# Patient Record
Sex: MALE | Race: OTHER | Employment: UNEMPLOYED | URBAN - METROPOLITAN AREA
[De-identification: names, ages, dates, MRNs, and addresses within clinical notes are randomized per-mention and may not be internally consistent; named-entity substitution may affect disease eponyms.]

---

## 2020-09-28 ENCOUNTER — VIRTUAL VISIT (OUTPATIENT)
Dept: FAMILY MEDICINE CLINIC | Age: 35
End: 2020-09-28

## 2020-09-28 DIAGNOSIS — I10 ESSENTIAL HYPERTENSION: Primary | ICD-10-CM

## 2020-09-28 PROCEDURE — 99202 OFFICE O/P NEW SF 15 MIN: CPT | Performed by: NURSE PRACTITIONER

## 2020-09-28 RX ORDER — AMLODIPINE BESYLATE 2.5 MG/1
2.5 TABLET ORAL DAILY
Qty: 30 TAB | Refills: 1 | Status: SHIPPED | OUTPATIENT
Start: 2020-09-28 | End: 2020-10-28 | Stop reason: SDUPTHER

## 2020-09-28 RX ORDER — ACETAMINOPHEN 500 MG
TABLET ORAL
COMMUNITY
End: 2022-08-18

## 2020-09-28 NOTE — PROGRESS NOTES
Patient states he is at work and does not have access to vital sign equipment. Sonam Jeffrey CMA    11:34 am: Reviewed discharge instructions and medications instructions with the patient. I told the pt he will receive by mail the AVS information. Patent verbalized understanding and denies having further questions. I sent front message to Dusty Alaniz to mail the AVS information as per Sam Aleman.  Sonam Jeffrey CMA

## 2020-09-28 NOTE — PATIENT INSTRUCTIONS
Amlodipino (Por la boca) Se [place] para tratar la presión arterial eduarda y la angina (dolor en el pecho). Liza medicamento es un agente bloqueador del canal de calcio. Kamila(s) : Norvasc Existen muchas otras marcas de Dueñas. Liza medicamento no debe ser usado cuando:  
Liza medicamento no es adecuado para todas las personas. No use liza medicamento si alguna vez ha tenido jovan reacción alérgica a la amlodipina. Forma de usar liza medicamento:  
Cara Flores para disolver · [place] cristiana medicamentos sravani se le haya indicado. Es probable que sea necesario cambiar padilla dosis varias veces hasta encontrar la que funciona mejor para usted. [employer] a la misma hora todos los días. · Rachel y siga las instrucciones para el paciente que vienen con el medicamento. Hable con padilla médico o farmacéutico si tiene alguna pregunta. · Si olvida jovan dosis: [place] la dosis tan pronto sravani lo recuerde. Si monteiro pasado más de 12 horas de la dosis que se supone que usted tome, omita la dosis [place] y tome la próxima dosis a la hora regular. · Guarde el medicamento en un recipiente cerrado a temperatura ambiente y alejado del calor, la humedad y la melanie directa. Medicamentos y [employer] que debe evitar:  
Consulte con padilla médico o farmacéutico antes de usar cualquier medicamento, incluyendo los que compra sin receta médica, las vitaminas y los productos herbales. · Algunos medicamentos pueden afectar la eficacia con que actúa la amlodipina. Informe a u médico si usted [employer] alguno de los siguientes medicamentos: ¨ Claritromicina, ciclosporina, diltiazem, itraconazol, ritonavir, sildenafil, simvastatina, tacrolimús Precauciones katherin el uso de [place] medicamento: · Informe a padilla médico si usted está embarazada o dando de lactar, o si padece de jovan enfermedad del hígado, enfermedad del corazón, arterioesclerosis coronaria, o estenosis aórtica. · Liza medicamento puede bajarle demasiado padilla presión arterial, especialmente cuando lo use por primera vez o si usted sufre jovan deshidratación. Si se siente desvanecer o mareado póngase de pie o siéntese lentamente. · Padilla médico tendrá que revisar padilla progreso y los efectos de liza medicamento katherin cristiana citas regulares. Cumpla sin falta con todas cristiana citas médicas. Asista a todas cristiana citas. · Aunque se sienta mejor, no suspenda el uso de liza medicamento sin antes consultar con padilla médico. Liza medicamento no le curará la presión arterial, kevyn sí le ayudará a mantenerla dentro de un rango normal. Es probable que necesite byron medicamento para la presión arterial por el nick de padilla janis. · Guarde todos los medicamentos fuera del alcance de los niños. Nunca comparta cristiana medicamentos con [employer]. Efectos secundarios que pueden presentarse katherin el uso de liza medicamento:  
Consulte inmediatamente con el médico si nota cualquiera de estos efectos secundarios: 
· Reacción alérgica: Comezón o ronchas, hinchazón del aruna o las dedra, hinchazón u hormigueo en la boca o garganta, opresión en el pecho, dificultad para respirar · Asif Richard · Dolor en el pecho nuevo o que [place] · Inflamación de cristiana dedra, tobillos o pies · Dificultad para respirar, náuseas, sudoración inusual, desmayos Consulte con el médico si nota otros efectos secundarios que marvin son causados por liza medicamento. Llame a padilla médico para consultarle Kaylene. Usted puede notificar cristiana efectos secundarios al FDA al [phone]. © 2017 [facility] Information is for End User's use only and may not be sold, redistributed or otherwise used for commercial purposes. Esta información es sólo para uso en educación. Padilla intención no es darle un consejo médico sobre enfermedades o tratamientos.  Colsulte con padilla Pa Jurist farmacéutico antes de seguir cualquier régimen [employer] para saber si es seguro y efectivo para usted. Presión arterial elevada: Instrucciones de cuidado - [ Elevated Blood Pressure: Care Instructions ] Instrucciones de cuidado La presión arterial es jovan medida de la fuerza que ejerce la mignon contra las rivas de las arterias. Es normal que la presión arterial suba y baje a lo tavares del día. Steph si se mantiene eduarda por un tiempo, usted tiene presión arterial eduarda. Dos números indican padilla presión arterial. El primer número es la presión sistólica. Muestra qué tan tommie presiona la mignon cuando el corazón está bombeando. El brigid número es la presión diastólica. Muestra qué tan tommie presiona la [employer] latidos, cuando el corazón está relajado y llenándose de [place].  Belts arterial ideal para adultos es menos de 120/80 (diga \"120 sobre 80\"). La presión arterial eduarda es de 140/90 o superior. Usted tiene la presión arterial eduarda si el número de [place] es 140 o superior o el número de abajo es 90 o superior, o ambas cosas. La prueba principal para la presión arterial eduarda es simple, rápida e indolora. Para diagnosticar presión arterial eduarda, padilla médico examinará padilla presión arterial en momentos diferentes. Después de tomarle la presión, es posible que padilla médico le pida que se vuelva a byron la presión en casa. Si se le diagnostica presión arterial eduarda, puede colaborar con padilla médico para elaborar un plan a tavares plazo para manejarla. La atención de seguimiento es jovan parte clave de padilla tratamiento y seguridad. Asegúrese de hacer y acudir a todas las citas, y llame a padilla médico si está teniendo problemas. También es jovan buena idea saber los resultados de los exámenes y mantener jovan lista de los medicamentos que le. Cómo puede cuidarse en el hogar? · No fume. Fumar aumenta padilla riesgo de ataque cerebral y ataque al corazón.  Si necesita ayuda para dejarlo, hable con padilla médico sobre programas y medicamentos para dejar de fumar. Estos pueden aumentar cristiana probabilidades de dejar de fumar para siempre. · Mantenga un peso saludable. · Trate de limitar la cantidad de sodio que ingiere a menos de 2,300 miligramos (mg) al día. Padilla médico podría pedirle que trate de ingerir menos de 1,500 mg al día. · Manténgase físicamente activo. Jane al menos 30 minutos de ejercicio la mayoría de los días de la [place]. Caminar es jovan buena opción. Es posible que también quiera hacer otras actividades, sravani correr, nadar, [employer], o practicar tenis o deportes de equipo. · No tome alcohol o limite la cantidad que marlene. Hable con padilla médico acerca de si puede byron alcohol. · Coma abundantes frutas, verduras y productos lácteos bajos en grasa. Consuma menos grasa saturada y total. 
· Aprenda a revisarse la presión arterial en padilla hogar. Cuándo debe pedir ayuda? Llame a padilla médico ahora mismo o busque atención médica inmediata si: 
? · Padilla presión arterial es mucho más eduarda de lo normal (sravani 180/110 o superior). ? · Iveth que la presión arterial eduarda está causando síntomas sravani: ¨ Dolor de cele intenso. Õpetajate 63. ? Vigile muy de cerca los cambios en padilla jay, y asegúrese de comunicarse con padilla médico si: 
? · No mejora sravani se esperaba. Dónde puede encontrar más información en inglés? Shailesh Castro a [email or web address]-cee.info/. Kassie I423 en la búsqueda para aprender más acerca de \"Presión arterial elevada: Instrucciones de cuidado - [ Elevated Blood Pressure: Care Instructions ]. \" 
Revisado: 21 septiembre, 2016 Versión del contenido: 11.4 © 6636-9550 Healthwise, [employer]Rx. Las instrucciones de cuidado fueron adaptadas bajo licencia por Good Help Connections (which disclaims liability or warranty for this information).  Si usted tiene [place] afección médica o sobre estas instrucciones, siempre pregunte a padilla profesional de jay. [facility] niega toda garantía o responsabilidad por padilla uso de esta información. Dieta DASH: Instrucciones de cuidado DASH Diet: Care Instructions Instrucciones de cuidado La dieta DASH es un plan de alimentación que puede ayudar a bajar la presión arterial. DASH es la sigla en inglés de \"Dietary Approaches to Stop Hypertension\" (medidas dietéticas para disminuir la hipertensión). Hipertensión significa presión arterial eduarda. La dieta DASH se concentra en el consumo de alimentos con alto contenido de calcio, potasio y [place]. Estos nutrientes pueden disminuir la presión arterial. Los alimentos con el mayor contenido de [place] nutrientes son las frutas, las verduras, los productos lácteos de bajo contenido de [place] zuleima, las nueces, las semillas y las legumbres. Steph byron suplementos de calcio, potasio y magnesio, en lugar de comer alimentos ricos en estos nutrientes, no tiene el mismo efecto. La dieta DASH también incluye granos integrales, pescado y aves. La dieta DASH es katheryn de los cambios de estilo de janis que quizá le recomiende padilla médico para reducir la presión arterial eduarda. Es posible que padilla médico también quiera que usted reduzca la cantidad de sodio en padilla Marnell Nipple. Reducir el sodio mientras sigue la dieta DASH puede bajar la presión arterial incluso más que únicamente con la dieta DASH. La atención de seguimiento es jovan parte clave de padilla tratamiento y seguridad. Asegúrese de hacer y acudir a todas las citas, y llame a padilla médico si está teniendo problemas. También es jovan buena idea saber los resultados de cristiana exámenes y mantener jovan lista de los medicamentos que le. Cómo puede cuidarse en el hogar? Cómo seguir la dieta DASH 
· Coma entre 4 y 5 porciones de fruta al día. Jovan porción incluye 1 fruta de [place], ½ taza de fruta enlatada o cortada en trozos, 1/4 taza de fruta seca o 4 onzas (½ taza) de jugo de frutas.  Scott Johnson con [place] frecuencia que jugo. · Consuma entre 4 y 11 porciones de verduras al día. Jovan porción incluye 1 taza de [place] o de verduras de hojas crudas, ½ taza de verduras cocidas o cortadas en trozos, o 4 onzas (½ taza) de jugo de verduras. Elija comer verduras con más frecuencia que byron padilla jugo. · Consuma entre 2 y 3 porciones de lácteos semidescremados y descremados al día. Jovan porción incluye 8 onzas de [place], 1 taza de yogur o 1½ onzas de Marquez-barre. · Coma entre 6 y 6 porciones de granos todos los [address] Jovan porción incluye 1 rebanada de pan, 1 onza de cereal seco, o ½ taza de arroz, pasta o cereal cocido. Trate de elegir productos de grano integral con la mayor frecuencia posible. · Limite la cantidad de [place]), aves y pescado a 2 porciones al día. Gennaro Siad porción son 3 onzas, lo que es aproximadamente el tamaño de un emma de naipes. · Coma entre 4 y 5 porciones de nueces, semillas y legumbres (frijoles [habichuelas], lentejas y arvejas [chícharos] secos y cocidos) a la semana. Jovan porción incluye 1/3 taza de nueces, 2 cucharadas de semillas o ½ taza de frijoles o arvejas cocidos. · [address] y aceites a 2 o 3 porciones al día. Jovan porción es 1 cucharadita de aceite vegetal o 2 cucharadas de aderezo para ensaladas. · Limite los dulces y el azúcar adicional a 5 porciones o menos por semana. Gennaro Siad porción es 1 cucharada de [place] o [place], ½ taza de sorbete o 1 taza de limonada. · Consuma menos de 2,300 miligramos (mg) de sodio al día. Si limita padilla consumo de sodio a 1,500 mg al día, puede reducir padilla presión arterial aún más. Consejos para tener éxito · Inicie con cambios pequeños. No intente hacer cambios radicales en padilla dieta de manera repentina. Podría sentir que extraña cristiana comidas favoritas y, por lo [place] lema, angelo jovan mayor probabilidad de que no cumpla el plan. [place]. Isadore Pester que esos cambios se conviertan en un hábito, incorpore algunos [employer]. · Pruebe algo de lo siguiente: 
? Fíjese el objetivo de comer jovan porción de fruta o de verduras en todas las comidas y los refrigerios. [place] facilitará alcanzar la cantidad diaria recomendada de frutas y verduras. ? Pruebe comer yogur cubierto con frutas frescas y nueces sravani refrigerio o sravani postre saludable. ? Agregue zurdo, tomate, pepino y cebolla a cristiana sándwiches. ? Combine jovan masa de pizza precocida con queso mozzarella de bajo contenido de grasa (\"low-fat\") y cúbralo con abundantes verduras. Intente utilizar tomates, calabaza, espinaca, brócoli, zanahorias, coliflor y cebollas. ? Opte por comer jovan variedad de verduras cortadas en trozos con un aderezo de bajo contenido de grasa sravani refrigerio, en lugar de \"chips\" (tipo aryan fritas) y un \"dip\" (producto untable). ? [place] semillas de girasol o almendras picadas [employer]. O intente agregar nueces o almendras picadas a las verduras cocidas. ? Pruebe algunas comidas vegetarianas con frijoles y arvejas. Edra Muskrat o frijoles rojos a las ensaladas. Prepare burritos y tacos con puré de frijoles pintos o negros. Dónde puede encontrar más información en inglés? Katina Mcclelland a [email or web address]/ [employer] T384 en la búsqueda para aprender más acerca de \"Dieta DASH: Instrucciones de cuidado. \" Revisado: 16 puja, 9673               VJJBXFA del contenido: 12.6 © 4219-7808 Healthwise, Incorporated. Las instrucciones de cuidado fueron adaptadas bajo licencia por Good [employer] Conne[employer] (which disclaims liability or warranty for this information). Si usted tiene [place] afección médica o sobre estas instrucciones, siempre pregunte a padilla profesional de jay. [facility] niega toda garantía o responsabilidad por padilla uso de esta información.

## 2020-09-28 NOTE — PROGRESS NOTES
HISTORY OF PRESENT ILLNESS  Wilian Goode is a 30 y.o. male. [email or web address]  #[id number]  Consent:  He and/or his healthcare decision maker is aware that this patient-initiated Telehealth encounter: Yes       This virtual visit was conducted via telephone. Antonia Star to the emergency declaration under the [address] [place] [address] authority and the [employer] Act, this Virtual  Visit was conducted to reduce the patient's risk of exposure to COVID-19 and provide continuity of care for an established patient.  Services were provided through a video synchronous discussion virtually to substitute for in-person clinic visit.  Due to this being a TeleHealth evaluation, many elements of the physical examination are unable to be assessed.      Total Time: minutes: 11-20 minutes  HPI Mr. Vanessa Muro, new to [facility], was told he has has hypertension last week when he went to a clinic for an ear infection. He bought a home BP monitor and his readings have been in the 150's over 90's-100's. His current reading is 158/78 today. He reports having HA's w/higher readings. HTN runs in the family. He's never been on BP meds. He denies CP, SOB and abdominal pain. Ht- 5'9  Wt-233  NKA    Review of Systems   Constitutional: Negative. HENT: Negative. Eyes: Negative. Respiratory: Negative. Cardiovascular: Negative. Gastrointestinal: Negative. Genitourinary: Negative. Musculoskeletal: Negative. Skin: Negative. Neurological: Positive for headaches. Negative for dizziness, tingling, tremors, sensory change, speech change, focal weakness, seizures, loss of consciousness and weakness. Endo/Heme/Allergies: Negative. Psychiatric/Behavioral: Negative. Physical Exam  Neurological:      Mental Status: He is alert and oriented to person, place, and time.    Psychiatric:         Mood and Affect: Mood normal. Behavior: Behavior normal.         ASSESSMENT and PLAN  1.  HTN  -trail Norvasc 2.5mg, 1 tab PO every day  -continue taking BP daily, log results and bring to next visit,  goal 130/80  -discussed medication action, side-effects, dosage, schedule in detail  -discussed decreasing sodium intake, exercise and stress reduction in detail  -F2F appt in 2-3 weeks, BP evaluation and wellness visit  Mr. Gabe Taylor agrees to plan

## 2020-10-28 ENCOUNTER — OFFICE VISIT (OUTPATIENT)
Dept: FAMILY MEDICINE CLINIC | Age: 35
End: 2020-10-28

## 2020-10-28 ENCOUNTER — HOSPITAL ENCOUNTER (OUTPATIENT)
Dept: LAB | Age: 35
Discharge: HOME | End: 2020-10-28

## 2020-10-28 VITALS
TEMPERATURE: 98 F | BODY MASS INDEX: 33.8 KG/M2 | SYSTOLIC BLOOD PRESSURE: 127 MMHG | WEIGHT: 223 LBS | HEIGHT: 68 IN | HEART RATE: 75 BPM | DIASTOLIC BLOOD PRESSURE: 81 MMHG

## 2020-10-28 DIAGNOSIS — Z23 ENCOUNTER FOR IMMUNIZATION: ICD-10-CM

## 2020-10-28 DIAGNOSIS — Z23 NEEDS FLU SHOT: ICD-10-CM

## 2020-10-28 DIAGNOSIS — I10 ESSENTIAL HYPERTENSION: ICD-10-CM

## 2020-10-28 DIAGNOSIS — Z71.3 DIETARY COUNSELING AND SURVEILLANCE: Primary | ICD-10-CM

## 2020-10-28 DIAGNOSIS — E66.3 OVERWEIGHT: Primary | ICD-10-CM

## 2020-10-28 LAB
ALBUMIN SERPL-MCNC: 4 G/DL (ref 3.5–5)
ALBUMIN/GLOB SERPL: 1.1 {RATIO} (ref 1.1–2.2)
ALP SERPL-CCNC: 90 U/L (ref 45–117)
ALT SERPL-CCNC: 63 U/L (ref 12–78)
ANION GAP SERPL CALC-SCNC: 8 MMOL/L (ref 5–15)
AST SERPL-CCNC: 22 U/L (ref 15–37)
BILIRUB SERPL-MCNC: 0.3 MG/DL (ref 0.2–1)
BUN SERPL-MCNC: 20 MG/DL (ref 6–20)
BUN/CREAT SERPL: 24 (ref 12–20)
CALCIUM SERPL-MCNC: 9.3 MG/DL (ref 8.5–10.1)
CHLORIDE SERPL-SCNC: 105 MMOL/L (ref 97–108)
CO2 SERPL-SCNC: 25 MMOL/L (ref 21–32)
CREAT SERPL-MCNC: 0.83 MG/DL (ref 0.7–1.3)
EST. AVERAGE GLUCOSE BLD GHB EST-MCNC: 114 MG/DL
GLOBULIN SER CALC-MCNC: 3.5 G/DL (ref 2–4)
GLUCOSE SERPL-MCNC: 103 MG/DL (ref 65–100)
HBA1C MFR BLD: 5.6 % (ref 4–5.6)
POTASSIUM SERPL-SCNC: 3.8 MMOL/L (ref 3.5–5.1)
PROT SERPL-MCNC: 7.5 G/DL (ref 6.4–8.2)
SODIUM SERPL-SCNC: 138 MMOL/L (ref 136–145)

## 2020-10-28 PROCEDURE — 97802 MEDICAL NUTRITION INDIV IN: CPT

## 2020-10-28 PROCEDURE — 80053 COMPREHEN METABOLIC PANEL: CPT

## 2020-10-28 PROCEDURE — 90471 IMMUNIZATION ADMIN: CPT

## 2020-10-28 PROCEDURE — 99213 OFFICE O/P EST LOW 20 MIN: CPT | Performed by: PHYSICIAN ASSISTANT

## 2020-10-28 PROCEDURE — 83036 HEMOGLOBIN GLYCOSYLATED A1C: CPT

## 2020-10-28 PROCEDURE — 90686 IIV4 VACC NO PRSV 0.5 ML IM: CPT

## 2020-10-28 RX ORDER — AMLODIPINE BESYLATE 2.5 MG/1
2.5 TABLET ORAL DAILY
Qty: 90 TAB | Refills: 1 | Status: SHIPPED | OUTPATIENT
Start: 2020-10-28 | End: 2021-01-21 | Stop reason: SDUPTHER

## 2020-10-28 NOTE — PROGRESS NOTES
Coordination of Care  1. Have you been to the ER, urgent care clinic since your last visit? Hospitalized since your last visit? Yes When: 10/20/20 [facility](right Ear)    2. Have you seen or consulted any other health care providers outside of the [address] [place] [address] since your last visit? Include any pap smears or colon screening. No    Does the patient need refills? NO    Learning Assessment Complete? yes  Depression Screening complete in the past 12 months? yes     Pt said that he is taking Methylprednisolone to right ear infection. 8 days ago.

## 2020-10-28 NOTE — PROGRESS NOTES
Nutritionist with patient. Farooq Quiroz.  [facility], [address] [facility]

## 2020-10-28 NOTE — PROGRESS NOTES
: Homa Avery  DATE: 10/28/2020      REFERRING PROVIDER: Dennie Peto McFerren, PA  NAME: Carlton Yin : 1985 AGE: 30 y.o. GENDER: male  REASON FOR VISIT: Hypertension Management    ASSESSMENT: Patient shares that he is interested in changing diet to help lower blood pressure. Past Medical Hx: None. LABS: No current pertinent labs. MEDICATIONS/SUPPLEMENTS:   Prior to Admission medications    Medication Sig Start Date End Date Taking? Authorizing Provider   amLODIPine (NORVASC) 2.5 mg tablet Take 1 Tab by mouth daily. Indications: high blood pressure 10/28/20   McFerren, Dennie Peto, PA   acetaminophen (TYLENOL) 500 mg tablet Take  by mouth every six (6) hours as needed for Pain. 2 or 3 po daily    Provider, Brody   amLODIPine (NORVASC) 2.5 mg tablet Take 1 Tab by mouth daily. Indications: high blood pressure 9/28/20 10/28/20  Lenny Caldera NP       FOOD ALLERGIES/INTOLERANCES: No known allergies. ANTHROPOMETRICS:    Ht Readings from Last 1 Encounters:   10/28/20 5' 8.11\" (1.73 m)      Wt Readings from Last 1 Encounters:   10/28/20 223 lb (101.2 kg)      BMI: 33.8                  Category: Obese Class I    Reported Wt Hx:  Wt Readings from Last 4 Encounters:   10/28/20 223 lb (101.2 kg)     Estimated Nutritional Needs: 2300 kcals/day ([place] [address]. 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 NUTRITION INTERVENTION: Discussed what helps to lower our blood pressure, including diet and exercise changes.  Discussed focusing on eating whole grains, fruits, and vegetables to give our bodies more fiber. We also talked about which types of foods to limit that would give excessive salt and fat. Also discussed that exercise  Helps with heart health. PATIENT GOALS:  - Look for a way to get in a walk regularly  - Avoiding red meat, eating more vegetables    Specific tips and techniques to facilitate compliance with above recommendations were provided and discussed. Pt was strongly encouraged to begin making necessary changes now. Will follow-up with Eligio Carrel prn. Yvonne Champion

## 2020-10-28 NOTE — PROGRESS NOTES
Assessment/Plan:    Diagnoses and all orders for this visit:    1. Overweight  -     METABOLIC PANEL, COMPREHENSIVE; Future  -     HEMOGLOBIN A1C WITH EAG; Future  -     REFERRAL TO NUTRITION    2. Essential hypertension  -     HEMOGLOBIN A1C WITH EAG; Future  -     REFERRAL TO NUTRITION    3. Encounter for immunization    Other orders  -     amLODIPine (NORVASC) 2.5 mg tablet; Take 1 Tab by mouth daily. Indications: high blood pressure        Follow-up and Dispositions    · Return in about 3 months (around 1/28/2021). Vessie Rao McFerren, PA-C Tilda Sandifer expressed understanding of this plan. An AVS was printed and given to the patient.      ----------------------------------------------------------------------    Chief Complaint   Patient presents with    Hypertension     F/U       History of Present Illness:  Pt presents for eval of HTN. He was started on amlodipine 2.5 mg daily about one month ago. He has had no side effects to the medication  His bp is improving, he is showing me his readings and they are going down from 140's/100 to 130/85 yesterday and today in clinic is 127/81    He does not smoke. He does not have chest pain, SOB, ALVAREZ or extremity swelling  He is currently undergoing eval and tx for chronic right ear infection with ENT specialist. He is able to pay for the appts, next one is next week. Apparently, he has been on abx and most recently a steroid dose pack, his sxs have remain unchanged    He agrees to labs and RD appt today  Flu shot       No past medical history on file. Current Outpatient Medications   Medication Sig Dispense Refill    amLODIPine (NORVASC) 2.5 mg tablet Take 1 Tab by mouth daily. Indications: high blood pressure 90 Tab 1    acetaminophen (TYLENOL) 500 mg tablet Take  by mouth every six (6) hours as needed for Pain.  2 or 3 po daily         No Known Allergies    Social History     Tobacco Use    Smoking status: Never Smoker    Smokeless tobacco: Never Used   Substance Use Topics    Alcohol use: Not Currently    Drug use: Never       No family history on file.     Physical Exam:     Visit Vitals  /81 (BP 1 Location: Left arm, BP Patient Position: Sitting)   Pulse 75   Temp 98 °F (36.7 °C)   Ht 5' 8.11\" (1.73 m)   Wt 223 lb (101.2 kg)   BMI 33.80 kg/m²     Pleasant, hard of hearing in right ear  A&Ox3  WDWN NAD  Respirations normal and non labored  Cor RRR s1s2  Lungs cTA jeanette   Ext neg for CCE jeanette

## 2020-12-19 ENCOUNTER — HOSPITAL ENCOUNTER (EMERGENCY)
Age: 35
Discharge: HOME | End: 2020-12-19
Attending: EMERGENCY MEDICINE | Admitting: EMERGENCY MEDICINE
Payer: SUBSIDIZED

## 2020-12-19 VITALS
RESPIRATION RATE: 16 BRPM | BODY MASS INDEX: 32.73 KG/M2 | WEIGHT: 221 LBS | TEMPERATURE: 99.1 F | HEART RATE: 80 BPM | HEIGHT: 69 IN | SYSTOLIC BLOOD PRESSURE: 138 MMHG | DIASTOLIC BLOOD PRESSURE: 89 MMHG | OXYGEN SATURATION: 99 %

## 2020-12-19 DIAGNOSIS — R04.0 EPISTAXIS: Primary | ICD-10-CM

## 2020-12-19 LAB
ALBUMIN SERPL-MCNC: 4 G/DL (ref 3.5–5)
ALBUMIN/GLOB SERPL: 0.9 {RATIO} (ref 1.1–2.2)
ALP SERPL-CCNC: 94 U/L (ref 45–117)
ALT SERPL-CCNC: 33 U/L (ref 12–78)
ANION GAP SERPL CALC-SCNC: 3 MMOL/L (ref 5–15)
AST SERPL-CCNC: 15 U/L (ref 15–37)
BASOPHILS # BLD: 0.1 K/UL (ref 0–0.1)
BASOPHILS NFR BLD: 1 % (ref 0–1)
BILIRUB SERPL-MCNC: 0.3 MG/DL (ref 0.2–1)
BUN SERPL-MCNC: 12 MG/DL (ref 6–20)
BUN/CREAT SERPL: 13 (ref 12–20)
CALCIUM SERPL-MCNC: 9.6 MG/DL (ref 8.5–10.1)
CHLORIDE SERPL-SCNC: 107 MMOL/L (ref 97–108)
CO2 SERPL-SCNC: 30 MMOL/L (ref 21–32)
COMMENT, HOLDF: NORMAL
CREAT SERPL-MCNC: 0.9 MG/DL (ref 0.7–1.3)
DIFFERENTIAL METHOD BLD: ABNORMAL
EOSINOPHIL # BLD: 0.2 K/UL (ref 0–0.4)
EOSINOPHIL NFR BLD: 2 % (ref 0–7)
ERYTHROCYTE [DISTWIDTH] IN BLOOD BY AUTOMATED COUNT: 13.9 % (ref 11.5–14.5)
GLOBULIN SER CALC-MCNC: 4.3 G/DL (ref 2–4)
GLUCOSE SERPL-MCNC: 111 MG/DL (ref 65–100)
HCT VFR BLD AUTO: 44.1 % (ref 36.6–50.3)
HGB BLD-MCNC: 14.7 G/DL (ref 12.1–17)
IMM GRANULOCYTES # BLD AUTO: 0 K/UL (ref 0–0.04)
IMM GRANULOCYTES NFR BLD AUTO: 0 % (ref 0–0.5)
INR PPP: 1 (ref 0.9–1.1)
LYMPHOCYTES # BLD: 1.4 K/UL (ref 0.8–3.5)
LYMPHOCYTES NFR BLD: 13 % (ref 12–49)
MCH RBC QN AUTO: 27.4 PG (ref 26–34)
MCHC RBC AUTO-ENTMCNC: 33.3 G/DL (ref 30–36.5)
MCV RBC AUTO: 82.3 FL (ref 80–99)
MONOCYTES # BLD: 0.6 K/UL (ref 0–1)
MONOCYTES NFR BLD: 6 % (ref 5–13)
NEUTS SEG # BLD: 8.4 K/UL (ref 1.8–8)
NEUTS SEG NFR BLD: 78 % (ref 32–75)
NRBC # BLD: 0 K/UL (ref 0–0.01)
NRBC BLD-RTO: 0 PER 100 WBC
PLATELET # BLD AUTO: 315 K/UL (ref 150–400)
PMV BLD AUTO: 9.7 FL (ref 8.9–12.9)
POTASSIUM SERPL-SCNC: 3.5 MMOL/L (ref 3.5–5.1)
PROT SERPL-MCNC: 8.3 G/DL (ref 6.4–8.2)
PROTHROMBIN TIME: 10.6 SEC (ref 9–11.1)
RBC # BLD AUTO: 5.36 M/UL (ref 4.1–5.7)
SAMPLES BEING HELD,HOLD: NORMAL
SODIUM SERPL-SCNC: 140 MMOL/L (ref 136–145)
WBC # BLD AUTO: 10.7 K/UL (ref 4.1–11.1)

## 2020-12-19 PROCEDURE — C9113 INJ PANTOPRAZOLE SODIUM, VIA: HCPCS | Performed by: EMERGENCY MEDICINE

## 2020-12-19 PROCEDURE — 99284 EMERGENCY DEPT VISIT MOD MDM: CPT

## 2020-12-19 PROCEDURE — 96360 HYDRATION IV INFUSION INIT: CPT

## 2020-12-19 PROCEDURE — 80053 COMPREHEN METABOLIC PANEL: CPT

## 2020-12-19 PROCEDURE — 85025 COMPLETE CBC W/AUTO DIFF WBC: CPT

## 2020-12-19 PROCEDURE — 74011250636 HC RX REV CODE- 250/636: Performed by: EMERGENCY MEDICINE

## 2020-12-19 PROCEDURE — 36415 COLL VENOUS BLD VENIPUNCTURE: CPT

## 2020-12-19 PROCEDURE — 85610 PROTHROMBIN TIME: CPT

## 2020-12-19 RX ORDER — CETIRIZINE HCL 10 MG
10 TABLET ORAL
COMMUNITY
End: 2021-01-04 | Stop reason: ALTCHOICE

## 2020-12-19 RX ORDER — PANTOPRAZOLE SODIUM 40 MG/10ML
80 INJECTION, POWDER, LYOPHILIZED, FOR SOLUTION INTRAVENOUS
Status: DISCONTINUED | OUTPATIENT
Start: 2020-12-19 | End: 2020-12-19 | Stop reason: HOSPADM

## 2020-12-19 RX ADMIN — SODIUM CHLORIDE 1000 ML: 9 INJECTION, SOLUTION INTRAVENOUS at 13:51

## 2020-12-19 NOTE — ED TRIAGE NOTES
# [id number] used. Pt reports having 1 episode of vomiting blood with clots just PTA. Pt reports \"sore throat\" since vomiting. No fevers, abdominal pain, nausea, diarrhea. Pt was recently diagnosed with TMJ. No blood thinners.

## 2020-12-19 NOTE — ED PROVIDER NOTES
Patient is a 71-year-old gentleman who comes into the emergency department after coughing and vomiting up some blood while he was experiencing a nosebleed. Patient has not had any diarrhea, dark or tarry stools, or hematochezia. The history is provided by the patient. Epistaxis   This is a new problem. The current episode started 1 to 2 hours ago. The problem occurs constantly. The problem has been resolved. The problem is associated with nothing. The bleeding has been from both nares. Past medical history comments: had a large nosebleed today but had smaller ones over the last few days. No past medical history on file. No past surgical history on file. No family history on file.     Social History     Socioeconomic History    Marital status:      Spouse name: Not on file    Number of children: Not on file    Years of education: Not on file    Highest education level: Not on file   Occupational History    Not on file   Social Needs    Financial resource strain: Not on file    Food insecurity     Worry: Not on file     Inability: Not on file    Transportation needs     Medical: Not on file     Non-medical: Not on file   Tobacco Use    Smoking status: Never Smoker    Smokeless tobacco: Never Used   Substance and Sexual Activity    Alcohol use: Not Currently    Drug use: Never    Sexual activity: Not on file   Lifestyle    Physical activity     Days per week: Not on file     Minutes per session: Not on file    Stress: Not on file   Relationships    Social connections     Talks on phone: Not on file     Gets together: Not on file     Attends Buddhism service: Not on file     Active member of club or organization: Not on file     Attends meetings of clubs or organizations: Not on file     Relationship status: Not on file    Intimate partner violence     Fear of current or ex partner: Not on file     Emotionally abused: Not on file     Physically abused: Not on file Forced sexual activity: Not on file   Other Topics Concern    Not on file   Social History Narrative    Not on file         ALLERGIES: Patient has no known allergies. Review of Systems   Constitutional: Negative for chills and fever. HENT: Positive for nosebleeds and sore throat. Respiratory: Negative for shortness of breath. Cardiovascular: Negative for chest pain. Gastrointestinal: Positive for nausea and vomiting. Negative for abdominal pain, blood in stool, constipation and diarrhea. Neurological: Negative for dizziness and light-headedness. All other systems reviewed and are negative. Vitals:    12/19/20 1321 12/19/20 1340   BP: (!) 161/98    Pulse: 90    Resp: 16    Temp: 97.5 °F (36.4 °C)    SpO2: 97% 97%   Weight: 100.2 kg (221 lb)    Height: 5' 9\" (1.753 m)             Physical Exam  Vitals signs and nursing note reviewed. Constitutional:       Appearance: He is well-developed. HENT:      Head: Normocephalic and atraumatic. Nose:      Comments: Dried blood in nares, no active bleeding       Mouth/Throat:      Mouth: Mucous membranes are moist.      Pharynx: Oropharynx is clear. No posterior oropharyngeal erythema. Eyes:      General: No scleral icterus. Neck:      Musculoskeletal: Normal range of motion. Cardiovascular:      Rate and Rhythm: Normal rate. Pulmonary:      Effort: Pulmonary effort is normal.   Abdominal:      General: There is no distension. Skin:     General: Skin is warm and dry. Findings: No erythema or rash. Neurological:      General: No focal deficit present. Mental Status: He is alert and oriented to person, place, and time. Psychiatric:         Mood and Affect: Mood normal.         Behavior: Behavior normal.          MDM  Number of Diagnoses or Management Options  Epistaxis  Diagnosis management comments: Patient is a 81-year-old gentleman who presents with \"hematemesis\" while experiencing epistaxis.   Bleeding source is most likely his nares and it is unlikely that his symptoms are secondary to an upper GI bleed. Patient denies melena and blood in his stool. Work-up negative for anemia, shock, sepsis, and other complications. Patient will follow-up with his ENT for continued management. Procedures        The patient's results have been reviewed with them and/or available family. Patient and/or family verbally conveyed their understanding and agreement of the patient's signs, symptoms, diagnosis, treatment and prognosis and additionally agree to follow up as recommended in the discharge instructions or to return to the Emergency Room should their condition change prior to their follow-up appointment. The patient/family verbally agrees with the care-plan and verbally conveys that all of their questions have been answered. The discharge instructions have also been provided to the patient and/or family with some educational information regarding the patient's diagnosis as well a list of reasons why the patient would want to return to the ER prior to their follow-up appointment, should their condition change.

## 2020-12-20 ENCOUNTER — APPOINTMENT (OUTPATIENT)
Dept: CT IMAGING | Age: 35
End: 2020-12-20
Attending: EMERGENCY MEDICINE
Payer: SUBSIDIZED

## 2020-12-20 ENCOUNTER — HOSPITAL ENCOUNTER (EMERGENCY)
Age: 35
Discharge: HOME | End: 2020-12-20
Attending: EMERGENCY MEDICINE
Payer: SUBSIDIZED

## 2020-12-20 VITALS
OXYGEN SATURATION: 97 % | WEIGHT: 221 LBS | HEART RATE: 91 BPM | BODY MASS INDEX: 32.73 KG/M2 | RESPIRATION RATE: 16 BRPM | SYSTOLIC BLOOD PRESSURE: 137 MMHG | DIASTOLIC BLOOD PRESSURE: 74 MMHG | TEMPERATURE: 99 F | HEIGHT: 69 IN

## 2020-12-20 DIAGNOSIS — R04.0 EPISTAXIS: Primary | ICD-10-CM

## 2020-12-20 PROCEDURE — 75810000284 HC CNTRL NASAL HEMORHRAGE SIMPLE

## 2020-12-20 PROCEDURE — 70450 CT HEAD/BRAIN W/O DYE: CPT

## 2020-12-20 PROCEDURE — 99284 EMERGENCY DEPT VISIT MOD MDM: CPT

## 2020-12-20 PROCEDURE — 70486 CT MAXILLOFACIAL W/O DYE: CPT

## 2020-12-20 PROCEDURE — 74011250637 HC RX REV CODE- 250/637: Performed by: EMERGENCY MEDICINE

## 2020-12-20 RX ORDER — CEPHALEXIN 250 MG/1
500 CAPSULE ORAL
Status: COMPLETED | OUTPATIENT
Start: 2020-12-20 | End: 2020-12-20

## 2020-12-20 RX ORDER — OXYMETAZOLINE HCL 0.05 %
2 SPRAY, NON-AEROSOL (ML) NASAL
Status: DISCONTINUED | OUTPATIENT
Start: 2020-12-20 | End: 2020-12-20 | Stop reason: HOSPADM

## 2020-12-20 RX ORDER — CEPHALEXIN 500 MG/1
500 CAPSULE ORAL 4 TIMES DAILY
Qty: 28 CAP | Refills: 0 | Status: SHIPPED | OUTPATIENT
Start: 2020-12-20 | End: 2020-12-27

## 2020-12-20 RX ORDER — IBUPROFEN 600 MG/1
600 TABLET ORAL
Status: COMPLETED | OUTPATIENT
Start: 2020-12-20 | End: 2020-12-20

## 2020-12-20 RX ADMIN — IBUPROFEN 600 MG: 600 TABLET, FILM COATED ORAL at 08:43

## 2020-12-20 RX ADMIN — Medication 2 SPRAY: at 10:21

## 2020-12-20 RX ADMIN — CEPHALEXIN 500 MG: 250 CAPSULE ORAL at 10:22

## 2020-12-20 NOTE — Clinical Note
Thank you for allowing us to provide you with medical care today. We realize that you have many choices for your emergency care needs. We thank you for choosing [facility]. Please choose us in the future for any continued health care needs. We hope we addressed all of your medical concerns. We strive to provide excellent quality care in the Emergency Department. Anything less than excellent does not meet our expectations. The exam and treatment you received in the Emergency Department  were for an emergent problem and are not intended as complete care. It is important that you follow up with a doctor, nurse practitioner, or physician's assistant for ongoing care. If your symptoms worsen or you do not improve as expected and you are un able to reach your usual health care provider, you should return to the Emergency Department. We are available 24 hours a day. Take this sheet with you when you go to your follow-up visit. If you have any problem arranging the follow-up visit, co ntact the Emergency Department immediately. Make an appointment your family doctor for follow up of this visit. Return to the ER if you are unable to be seen in a timely manner.

## 2020-12-20 NOTE — ED PROVIDER NOTES
Please note that this dictation was completed with Immus[employer], the computer voice recognition software.  Quite often unanticipated grammatical, syntax, homophones, and other interpretive errors are inadvertently transcribed by the computer software.  Please disregard these errors.  Please excuse any errors that have escaped final proofreading. 69-year-old  male non-English-speaking  service utilized presents the ER complaining of \"had a headache x3 days/my nose started bleeding yesterday and its blood 5 times total.\". Patient states he started having nosebleeds yesterday with 2-3 episodes lasting approximately 30 minutes. He came to the ER was treated and his symptoms seem to get better then last night he has had trouble sleeping secondary to his nosebleeding an additional 2-3 times each episode lasting approximately 15 minutes. Patient states he is able to get the bleeding stopped by pinching his nose (currently not bleeding) however then his nose gets stopped up. Patient states he is also having a headache x3 days no fevers no chills no trauma no vision changes no numbness tingling. He then produced a card which shows that he has been using a steroid nasal spray which instructed him to stop using. He also has been using Zyrtec for some seasonal allergies. He has seen an ENT previously though there is no name on the picture the ENT and was diagnosed with TMJ which is what he is describing here on the right side today as the cause of his headache. Patient states he has been unable to have anything to eat since yesterday because when he does his nose starts bleeding. Patient is requesting something for his headache something to eat and reevaluation of his nosebleed. There has been no interval trauma he denies other current systemic complaints.     pt denies HA, vison changes, diff swallowing, CP, SOB, Abd pain, F/Ch, N/V, D/Cons or other current systemic complaints    Social/ PSH reviewed in EMR    EMR Chart Reviewed - normal labs yesterday; No past medical history on file. No past surgical history on file. No family history on file. Social History     Socioeconomic History    Marital status:      Spouse name: Not on file    Number of children: Not on file    Years of education: Not on file    Highest education level: Not on file   Occupational History    Not on file   Social Needs    Financial resource strain: Not on file    Food insecurity     Worry: Not on file     Inability: Not on file    Transportation needs     Medical: Not on file     Non-medical: Not on file   Tobacco Use    Smoking status: Never Smoker    Smokeless tobacco: Never Used   Substance and Sexual Activity    Alcohol use: Not Currently    Drug use: Never    Sexual activity: Not on file   Lifestyle    Physical activity     Days per week: Not on file     Minutes per session: Not on file    Stress: Not on file   Relationships    Social connections     Talks on phone: Not on file     Gets together: Not on file     Attends Uatsdin service: Not on file     Active member of club or organization: Not on file     Attends meetings of clubs or organizations: Not on file     Relationship status: Not on file    Intimate partner violence     Fear of current or ex partner: Not on file     Emotionally abused: Not on file     Physically abused: Not on file     Forced sexual activity: Not on file   Other Topics Concern    Not on file   Social History Narrative    Not on file         ALLERGIES: Patient has no known allergies. Review of Systems   Constitutional: Negative for appetite change, chills and fever. HENT: Positive for nosebleeds and sinus pressure. Negative for drooling, rhinorrhea, trouble swallowing and voice change. Eyes: Negative for visual disturbance. Respiratory: Negative for cough and shortness of breath. Cardiovascular: Negative for leg swelling.    Gastrointestinal: Negative for abdominal pain, diarrhea, nausea and vomiting. Genitourinary: Negative for dysuria. Neurological: Positive for headaches. Negative for facial asymmetry and numbness. All other systems reviewed and are negative. Vitals:    12/20/20 0751   BP: (!) 151/96   Pulse: 91   Resp: 16   SpO2: 98%            Physical Exam  Vitals signs and nursing note reviewed. Constitutional:       General: He is not in acute distress. Appearance: Normal appearance. He is well-developed. He is not ill-appearing, toxic-appearing or diaphoretic. Comments: NAD, AxOx4, speaking in complete sentences       HENT:      Head: Normocephalic and atraumatic. Comments: Cn intact    R nare - noted dried blood; No blood running down his throat;     R mandibular min ttp; No sign dental abn noted;        Right Ear: External ear normal.      Left Ear: External ear normal.      Mouth/Throat:      Pharynx: No oropharyngeal exudate. Eyes:      General: No scleral icterus. Right eye: No discharge. Left eye: No discharge. Extraocular Movements: Extraocular movements intact. Conjunctiva/sclera: Conjunctivae normal.      Pupils: Pupils are equal, round, and reactive to light. Neck:      Musculoskeletal: Normal range of motion and neck supple. Cardiovascular:      Rate and Rhythm: Normal rate and regular rhythm. Pulses: Normal pulses. Heart sounds: Normal heart sounds. No murmur. No friction rub. No gallop. Pulmonary:      Effort: Pulmonary effort is normal. No respiratory distress. Breath sounds: Normal breath sounds. No wheezing or rales. Chest:      Chest wall: No tenderness. Abdominal:      General: Bowel sounds are normal. There is no distension. Palpations: Abdomen is soft. There is no mass. Tenderness: There is no abdominal tenderness. There is no guarding or rebound.       Comments: nttp       Genitourinary:     Comments: Pt denies urinary/ Testicular/ scrotal or penile  complaints  Musculoskeletal: Normal range of motion. General: No swelling, tenderness, deformity or signs of injury. Right lower leg: No edema. Left lower leg: No edema. Lymphadenopathy:      Cervical: No cervical adenopathy. Skin:     General: Skin is warm and dry. Capillary Refill: Capillary refill takes less than 2 seconds. Coloration: Skin is not jaundiced or pale. Findings: No bruising, erythema, lesion or rash. Neurological:      General: No focal deficit present. Mental Status: He is alert and oriented to person, place, and time. Cranial Nerves: No cranial nerve deficit. Sensory: No sensory deficit. Motor: No weakness. Coordination: Coordination normal.      Gait: Gait normal.      Deep Tendon Reflexes: Reflexes normal.          MDM       Epistaxis Management    Date/Time: 12/20/2020 9:53 AM  Performed by: Rand Briggs MD  Authorized by: Rand Briggs MD     Consent:     Consent obtained:  Verbal (  service used)    Consent given by:  Patient    Risks discussed:  Bleeding, infection, nasal injury and pain    Alternatives discussed:  Delayed treatment, no treatment, alternative treatment, observation and referral  Anesthesia (see MAR for exact dosages): Anesthesia method:  None  Procedure details:     Treatment site:  R anterior and R posterior    Treatment method:  Nasal balloon    Treatment complexity:  Limited    Treatment episode: initial    Post-procedure details:     Assessment:  Bleeding stopped    Patient tolerance of procedure: Tolerated well, no immediate complications  Comments:      Clot blown out/ afrin soaked 7.5 rhino-rocket placed; told of need for abx/ ENT follow-up and Pt agrees;         10:10 AM  Noted min oozing L nare; clot blown out/ afrin applied/ clamp placed;     10:21 AM  Clamp removed; no bleeding;    10:52 AM  No further bleeding;  Pt is going to eat his tray now; 12:12 PM  used to d/c pt; tolerating PO/ encouraged increased PO water consumption; no active bleeding noted/ min oozing noted red water; given clamp/ afrin/ instructed on clot removal/ afrin usage if needed; He agrees w/ plans/ ENT follow-up'; / Keflex rx  Cyndie Darian Mendoza's  results have been reviewed with him. He has been counseled regarding his diagnosis. He verbally conveys understanding and agreement of the signs, symptoms, diagnosis, treatment and prognosis and additionally agrees to Call/ Arrange follow up as recommended with JESSICA Rutherford in 24 - 48 hours. He also agrees with the care-plan and conveys that all of his questions have been answered. I have also put together some discharge instructions for him that include: 1) educational information regarding their diagnosis, 2) how to care for their diagnosis at home, as well a 3) list of reasons why they would want to return to the ED prior to their follow-up appointment, should their condition change or for concerns.

## 2020-12-20 NOTE — DISCHARGE INSTRUCTIONS
Patient Education        Hemorragias nasales: Instrucciones de cuidado  Nosebleeds: Care Instructions  Instrucciones de [address] [place] [address] hemorragias (sangrados) nasales son comunes, sobre todo si usted tiene resfriados o alergias. Hay muchas cosas que pueden causar jovan hemorragia nasal.  Algunas hemorragias nasales se detienen por sí solas con presión. Otras requieren taponamiento. Algunas se cauterizan (sellan). Si tiene gasa u otro material para taponar la nariz, deberá hacer jovan visita de seguimiento con padilla médico para le sea retirado el tapón. Puede que requiera tratamiento adicional si tiene hemorragias nasales con frecuencia. El médico lo andrade examinado minuciosamente, kevyn más tarde pueden presentarse problemas. Si nota algún problema o síntoma nuevo, busque tratamiento médico de inmediato. La atención de seguimiento es jovan parte clave de padilla tratamiento y seguridad. Asegúrese de hacer y acudir a todas las citas, y llame a padilla médico si está teniendo problemas. También es jovan buena idea saber los resultados de cristiana exámenes y mantener jovan lista de los medicamentos que le. ¿Cómo puede cuidarse en el hogar? · Si usted tiene otra hemorragia nasal:  ? Siéntese e incline la cele un poco hacia adelante. [place] impide que la mignon vaya hacia la garganta. ? Use los dedos pulgar e índice para apretarse la nariz y cerrarla por 10 minutos. Use un reloj. No verifique si se ha detenido la hemorragia antes de que transcurran 10 minutos. Si no se detiene la hemorragia, apriétese la nariz por 10 minutos más. ? Cuando se haya detenido la hemorragia, trate de no hurgarse, frotarse ni sonarse la nariz por 12 horas. Evitar estas cosas ayuda a impedir que [employer] nariz de [place]. · Si padilla médico le recetó antibióticos, tómelos según las indicaciones. No deje de tomarlos solo porque se sienta mejor. Debe byron todos los antibióticos hasta terminarlos.   [address] hemorragias nasales  · No se suene la nariz con [place] [facility] fuerza. · Evite levantar cosas o esforzarse después de jovan hemorragia nasal.  · Eleve la cele con jovan almohada mientras duerme. · Póngase jovan capa delgada de gel nasal a base de [place] o de [address] [place] [address], sravani [place], dentro de la Vera. Póngaselo en el tabique, el cual divide las fosas nasales. Naches prevendrá la sequedad que puede causar hemorragias nasales. · Use un vaporizador o humidificador para añadirle humedad a padilla dormitorio. Siga las instrucciones para limpiar el aparato. · No tome aspirina, ibuprofeno (Advil, Motrin) o naproxeno (Aleve) por un período de 36 a 48 horas después de jovan hemorragia nasal, a menos que padilla médico se lo indique. Puede usar acetaminofén (Tylenol) para aliviar el dolor. · Hable con padilla médico acerca de suspender cualquier otro medicamento que esté tomando. Algunos medicamentos podrían aumentar las probabilidades de que tenga jovan hemorragia nasal.  · No utilice medicamentos para el resfriado ni aerosoles nasales sin hablar aniceto con padilla médico. Pueden secarle la nariz. ¿Cuándo debes pedir ayuda? Llama al 911 toda vez que pienses que puedes necesitar atención de [place]. Por ejemplo, llama si:    · Te desmayaste (perdiste el conocimiento). [place] a tu médico ahora mismo o busca atención médica inmediata si:    · Tienes otra hemorragia nasal y te sigue sangrando la nariz después de haberte hecho presión 3 veces por 10 minutos (30 minutos en total).     · Hay mucha mignon que te baja por detrás de la garganta aunque te hayas presionado la nariz e inclinado la cele hacia adelante.     · Tienes fiebre.     · Tienes dolor en los senos paranasales. Presta especial atención a los cambios en tu jay y asegúrate de comunicarte con tu médico si:    · Tienes hemorragias nasales a menudo, incluso si se detienen.     · No mejoras sravani se esperaba. ¿Dónde puede encontrar más información en inglés?   Vaya a http://www.gray.com/  Escriba S156 en la búsqueda para aprender más acerca de \"Hemorragias nasales: Instrucciones de cuidado. \"  Revisado: 26 junio, 1686               EOYHPFS del contenido: 12.6  © 6749-8295 Healthwise, Incorporated. Las instrucciones de cuidado fueron adaptadas bajo licencia por [employer] (which disclaims liability or warranty for this information). Si usted tiene [place] afección médica o sobre estas instrucciones, siempre pregunte a padilla profesional de jay. Healthwise, Incorporated niega toda garantía o responsabilidad por padilla uso de esta información.

## 2021-01-04 ENCOUNTER — VIRTUAL VISIT (OUTPATIENT)
Dept: FAMILY MEDICINE CLINIC | Age: 36
End: 2021-01-04

## 2021-01-04 DIAGNOSIS — J01.00 ACUTE NON-RECURRENT MAXILLARY SINUSITIS: Primary | ICD-10-CM

## 2021-01-04 DIAGNOSIS — J30.2 SEASONAL ALLERGIC RHINITIS, UNSPECIFIED TRIGGER: ICD-10-CM

## 2021-01-04 PROCEDURE — 99442 PR PHYS/QHP TELEPHONE EVALUATION 11-20 MIN: CPT | Performed by: FAMILY MEDICINE

## 2021-01-04 RX ORDER — AMOXICILLIN 500 MG/1
1000 CAPSULE ORAL 2 TIMES DAILY
Qty: 40 CAP | Refills: 0 | Status: SHIPPED | OUTPATIENT
Start: 2021-01-04 | End: 2021-01-14

## 2021-01-04 RX ORDER — MINERAL OIL
180 ENEMA (ML) RECTAL DAILY
Qty: 30 TAB | Refills: 0
Start: 2021-01-04 | End: 2022-08-18

## 2021-01-04 NOTE — PROGRESS NOTES
Italia Mccormack (: 1985) is a 30 y.o. male, established patient, here for evaluation of the following chief complaint(s):   Head Pain (pt c/o right head pain radiating to ear and red eye x 3 weeks. ), Medication Refill (patient is not sure if he needs refills for BP), and Hospital Follow Up       ASSESSMENT/PLAN:  1. Acute non-recurrent maxillary sinusitis  Treat with Amox and follow up if unimproved. Would avoid analgesic overuse for his headaches as he could be having some rebound. Will have him change to Aleve OTC PRN, side effects reviewed. -     amoxicillin (AMOXIL) 500 mg capsule; Take 2 Caps by mouth two (2) times a day for 10 days. Indications: acute bacterial infection of the sinuses, Normal, Disp-40 Cap, R-0  2. Seasonal allergic rhinitis, unspecified trigger  Use Allegra OTC. Avoid nasal steroid due to prior hx of epistaxis. No orders of the defined types were placed in this encounter. SUBJECTIVE/OBJECTIVE:  HPI  Headache:  Rt sided headache with eye and ear pain x 3 weeks. This is slowly worsening requiring Tylenol every 6 hours to control pain. No fevers. Occurring day and night. Having more jaw pain and Rt upper tooth apin. Pain is worse when bending over. Epistaxis:  Seen in ER. Had f/u with ENT and was told everything was OK. No further nose bleed. Was using Zyrtec and Steroid nasal spray for his allergies. Has stopped steroid nasal spray due to nose bleed. Review of Systems   Denies fevers, vision loss, nausea, cough, dysphagia, neck pain, confusion. Patient-Reported Vitals 2021   Patient-Reported Weight 220 lb       Physical Exam    On this date 21 I have spent 18 minutes reviewing previous notes, test results and face to face with the patient discussing the diagnosis and importance of compliance with the treatment plan.       Landy Fraser is being evaluated by a Virtual Visit (phone visit) encounter to address concerns as mentioned above. A caregiver was present when appropriate. Due to this being a TeleHealth encounter (During QSU-41 public [facility] emergency), evaluation of the following organ systems was limited: Vitals/Constitutional/EENT/Resp/CV/GI//MS/Neuro/Skin/Heme-Lymph-Imm. Pursuant to the emergency declaration under the [address] [place] [address], [address] [place] [address] and the [employer] Act, this Virtual Visit was conducted with patient's (and/or legal guardian's) consent, to reduce the patient's risk of exposure to COVID-19 and provide necessary medical care. The patient (and/or legal guardian) has also been advised to contact this office for worsening conditions or problems, and seek emergency medical treatment and/or call 911 if deemed necessary. Patient identification was verified at the start of the visit: YES    Services were provided through a synchronous phone discussion virtually to substitute for in-person clinic visit. Patient and provider were located at their individual homes. An electronic signature was used to authenticate this note.   -- Brandon Elizalde MD

## 2021-01-04 NOTE — PATIENT INSTRUCTIONS
Use Aleve 220mg jovan vez diaria para dolor de [place]. Use Allegra 180 mg jovan vez diaria para las [place].

## 2021-01-04 NOTE — PROGRESS NOTES
Prescription coupons texted to pt's phone. [place] was the  for this call. I reviewed with pt recommendations for all meds and use of OTC Aleve.  Johanny Anaya RN

## 2021-01-04 NOTE — PROGRESS NOTES
Patient states he is at home and patient does  have access to vital sign equipment. Patient has not taking the vital sins lately. Coordination of Care  1. Have you been to the ER, urgent care clinic since your last visit? Hospitalized since your last visit? Yes When: 12/19 and 12/20/2020-[facility] - Epistaxis    2. Have you seen or consulted any other health care providers outside of the [address] [place] [address] since your last visit? Include any pap smears or colon screening. No    Does the patient need refills? YES    Learning Assessment Complete?  yes  Depression Screening complete in the past 12 months? yes

## 2021-01-12 ENCOUNTER — HOSPITAL ENCOUNTER (EMERGENCY)
Age: 36
Discharge: HOME | End: 2021-01-12
Attending: EMERGENCY MEDICINE
Payer: SUBSIDIZED

## 2021-01-12 VITALS
RESPIRATION RATE: 17 BRPM | BODY MASS INDEX: 32.58 KG/M2 | WEIGHT: 220 LBS | OXYGEN SATURATION: 95 % | TEMPERATURE: 97.5 F | DIASTOLIC BLOOD PRESSURE: 78 MMHG | HEIGHT: 69 IN | HEART RATE: 75 BPM | SYSTOLIC BLOOD PRESSURE: 137 MMHG

## 2021-01-12 DIAGNOSIS — R04.0 EPISTAXIS: Primary | ICD-10-CM

## 2021-01-12 PROCEDURE — 99283 EMERGENCY DEPT VISIT LOW MDM: CPT

## 2021-01-12 PROCEDURE — 74011250637 HC RX REV CODE- 250/637: Performed by: PHYSICIAN ASSISTANT

## 2021-01-12 RX ORDER — OXYMETAZOLINE HCL 0.05 %
2 SPRAY, NON-AEROSOL (ML) NASAL
Status: COMPLETED | OUTPATIENT
Start: 2021-01-12 | End: 2021-01-12

## 2021-01-12 RX ORDER — ACETAMINOPHEN 500 MG
1000 TABLET ORAL ONCE
Status: COMPLETED | OUTPATIENT
Start: 2021-01-12 | End: 2021-01-12

## 2021-01-12 RX ADMIN — ACETAMINOPHEN 1000 MG: 500 TABLET ORAL at 12:25

## 2021-01-12 RX ADMIN — Medication 2 SPRAY: at 12:25

## 2021-01-12 NOTE — ED TRIAGE NOTES
Pt here for nosebleeds starting yesterday. Denies injury or blood thinner use. Pt seen here 3 weeks ago for same. Bleeding stopped approx 1 hour ago. Pt adds right sided HA with skin sensitivity after ear infection 6 mos ago. Serbian interpretor used.

## 2021-01-12 NOTE — ED PROVIDER NOTES
Riedbergdaisy Barksdale is a 30 y.o. male with PMH of epistaxis 12/2020 with 2 ED visits one requiring nasal packing and subsequent ENT and PCP f/u presents to emergency room ambulatory for evaluation of \"I had 3 nosebleeds yesterday and 1 today but it's stopped now\". When asked which nare had blood coming from it he states \"both, no my right. \"  Symptoms began on February 19 where he was seen at [address] [place] [address], blood work done which included a hemoglobin of 14.7, platelets 482, INR normal at 1.0 and he was discharged home. He returned the next day on 12/20 and had persistent epistaxis requiring nasal packing and had a head CT and maxillofacial CT that showed sinus disease, he was discharged on Keflex with follow-up with ENT. He did follow-up with ENT and had the packing removed, states no other intervention was done by ENT. He had a follow-up appointment with his PCP on 1/4 and was diagnosed with recurrent sinusitis and given prescription for amoxicillin for 10 days which she endorses taking. He states he has a chronic headache that is unchanged prior to epistaxis. He states the nosebleeds last 5-10 minutes and he is able to control it with pinching and leaning his head forward. He has not tried Afrin. He has no active bleeding on arrival to the ER. He states he called ENT and was told to come to the ER as they had no openings today. He specifically denies fever, chills, vomiting, diarrhea, chest pain, shortness of breath, dizziness. He denies trauma or recent injury. PCP: JESSICA Ball    Surgical hx-see chart  Social hx- no EtOH    The patient has no other complaints at this time. No past medical history on file. No past surgical history on file. No family history on file.     Social History     Socioeconomic History    Marital status:      Spouse name: Not on file    Number of children: Not on file    Years of education: Not on file    Highest education level: Not on file   Occupational History    Not on file   Social Needs    Financial resource strain: Not on file    Food insecurity     Worry: Not on file     Inability: Not on file    Transportation needs     Medical: Not on file     Non-medical: Not on file   Tobacco Use    Smoking status: Never Smoker    Smokeless tobacco: Never Used   Substance and Sexual Activity    Alcohol use: Not Currently    Drug use: Never    Sexual activity: Not on file   Lifestyle    Physical activity     Days per week: Not on file     Minutes per session: Not on file    Stress: Not on file   Relationships    Social connections     Talks on phone: Not on file     Gets together: Not on file     Attends Anabaptism service: Not on file     Active member of club or organization: Not on file     Attends meetings of clubs or organizations: Not on file     Relationship status: Not on file    Intimate partner violence     Fear of current or ex partner: Not on file     Emotionally abused: Not on file     Physically abused: Not on file     Forced sexual activity: Not on file   Other Topics Concern    Not on file   Social History Narrative    Not on file         ALLERGIES: Patient has no known allergies. Review of Systems   Constitutional: Negative. Negative for activity change, chills, fatigue and unexpected weight change. HENT: Positive for nosebleeds. Negative for trouble swallowing. Respiratory: Negative for cough, chest tightness, shortness of breath and wheezing. Cardiovascular: Negative. Negative for chest pain and palpitations. Gastrointestinal: Negative. Negative for abdominal pain, diarrhea, nausea and vomiting. Genitourinary: Negative. Negative for dysuria, flank pain, frequency and hematuria. Musculoskeletal: Negative. Negative for arthralgias, back pain, neck pain and neck stiffness. Skin: Negative. Negative for color change and rash. Neurological: Negative.   Negative for dizziness, numbness and headaches. All other systems reviewed and are negative. Vitals:    01/12/21 1114   BP: 137/78   Pulse: 75   Resp: 17   Temp: 97.5 °F (36.4 °C)   SpO2: 95%   Weight: 99.8 kg (220 lb)   Height: 5' 9\" (1.753 m)            Physical Exam  Vitals signs and nursing note reviewed. Constitutional:       General: He is not in acute distress. Appearance: He is well-developed. He is not toxic-appearing or diaphoretic. HENT:      Head: Normocephalic and atraumatic. Left Ear: Tympanic membrane normal.      Ears:      Comments: Black PE tube in R TM; placed 3 months ago per patient. Nose:      Comments: Dry blood in both nares, + nasal congestion. Mouth/Throat:      Mouth: Mucous membranes are moist.      Comments: No posterior hemorrhage. Limited at opening mouth, states this is chronic due to chronic TMJ. Eyes:      General:         Right eye: No discharge. Left eye: No discharge. Conjunctiva/sclera: Conjunctivae normal.      Pupils: Pupils are equal, round, and reactive to light. Neck:      Musculoskeletal: Full passive range of motion without pain and normal range of motion. Trachea: No tracheal tenderness. Cardiovascular:      Rate and Rhythm: Normal rate and regular rhythm. Pulses: Normal pulses. Heart sounds: Normal heart sounds. No murmur. No friction rub. No gallop. Pulmonary:      Effort: Pulmonary effort is normal. No respiratory distress. Breath sounds: Normal breath sounds. No wheezing or rales. Chest:      Chest wall: No tenderness. Abdominal:      General: Bowel sounds are normal. There is no distension. Palpations: Abdomen is soft. Tenderness: There is no abdominal tenderness. There is no guarding or rebound. Musculoskeletal: Normal range of motion. General: No tenderness. Skin:     General: Skin is warm and dry. Capillary Refill: Capillary refill takes less than 2 seconds.       Findings: No abrasion, erythema or rash. Neurological:      Mental Status: He is alert and oriented to person, place, and time. Cranial Nerves: No cranial nerve deficit. Sensory: No sensory deficit. Coordination: Coordination normal.   Psychiatric:         Speech: Speech normal.         Behavior: Behavior normal.          MDM  Number of Diagnoses or Management Options  Epistaxis  Diagnosis management comments:   Ddx: epistaxis       Amount and/or Complexity of Data Reviewed  Review and summarize past medical records: yes    Patient Progress  Patient progress: stable         Procedures    Since arrival to ED patient has had no active bleeding. He has some nasal congestion and dried blood in both nares. He has had labs done in the past month with normal platelets and hemoglobin and INR, he is closely followed by his PCP and ENT. No indication for nasal packing as there is no acute hemorrhage. Will give Afrin here and for home to take twice daily for 3 days. Encouraged follow-up with ENT as he may need further management. Return precautions discussed and epistaxis management discussed. MEDICATIONS GIVEN:  Medications   oxymetazoline (AFRIN) 0.05 % nasal spray 2 Spray (2 Sprays Both Nostrils Given 1/12/21 1225)   acetaminophen (TYLENOL) tablet 1,000 mg (1,000 mg Oral Given 1/12/21 1225)         DISCHARGE NOTE:  1:20 PM  The patient's results have been reviewed with them and/or available family. Patient and/or family verbally conveyed their understanding and agreement of the patient's signs, symptoms, diagnosis, treatment and prognosis and additionally agree to follow up as recommended in the discharge instructions or to return to the Emergency Room should their condition change prior to their follow-up appointment. The patient/family verbally agrees with the care-plan and verbally conveys that all of their questions have been answered.  The discharge instructions have also been provided to the patient and/or family with some educational information regarding the patient's diagnosis as well a list of reasons why the patient would want to return to the ER prior to their follow-up appointment, should their condition change. Plan:  1. F/U with ENT, PCP  2.  Rx Afrin  3. Epistaxis management discussed  Return precautions discussed and advised to return to ER if worse

## 2021-01-13 ENCOUNTER — VIRTUAL VISIT (OUTPATIENT)
Dept: FAMILY MEDICINE CLINIC | Age: 36
End: 2021-01-13

## 2021-01-13 ENCOUNTER — TELEPHONE (OUTPATIENT)
Dept: FAMILY MEDICINE CLINIC | Age: 36
End: 2021-01-13

## 2021-01-13 ENCOUNTER — PATIENT OUTREACH (OUTPATIENT)
Dept: FAMILY MEDICINE CLINIC | Age: 36
End: 2021-01-13

## 2021-01-13 DIAGNOSIS — R04.0 EPISTAXIS: Primary | ICD-10-CM

## 2021-01-13 PROCEDURE — 99213 OFFICE O/P EST LOW 20 MIN: CPT | Performed by: NURSE PRACTITIONER

## 2021-01-13 NOTE — PROGRESS NOTES
Coordination of Care  1. Have you been to the ER, urgent care clinic since your last visit? Hospitalized since your last visit? Yes When: 1/12/21nose bleed    2. Have you seen or consulted any other health care providers outside of the [address] [place] [address] since your last visit? Include any pap smears or colon screening. No    Does the patient need refills? NO    Learning Assessment Complete?  yes  Depression Screening complete in the past 12 months? yes

## 2021-01-13 NOTE — TELEPHONE ENCOUNTER
Patient went to ER 12/19/ 12/20/ 1/12 with nose bleeds. Cause is unknown per patient, but he has been scheduled with a specialist today. Needs to be seen by our provider as well. No to all travel & exposure questions.

## 2021-01-13 NOTE — PROGRESS NOTES
HISTORY OF PRESENT ILLNESS  Frederick BLAKE Nicolas Tamayo is a 30 y.o. male w/nosebleeds. [facility] # [id number]  2 identifiers confirmed  This virtual visit was conducted via telephone. Pursuant to the emergency declaration under the [facility] [place] [address] [facility] authority and the [employer] Supplemental Appropriations Act, this Virtual  Visit was conducted to reduce the patient's risk of exposure to COVID-19 and provide continuity of care for an established patient.  Services were provided through a telephone synchronous discussion virtually to substitute for in-person clinic visit.  Due to this being a TeleHealth evaluation, many elements of the physical examination are unable to be assessed.   HPI Mr. Nicolas Tamayo has had frequent nosebleeds requiring 3 visits to the ER in the last month (last one yesterday). The bleeding stopped lastnight. He has an appointment with ENT today at 4pm. He denies use of blood thinners and only takes Tylenol for pain. It is unclear why he has been getting these nosebleeds. Non-smoker. Review of Systems   Constitutional: Negative. HENT: Positive for nosebleeds and sinus pain. Negative for congestion, ear discharge, ear pain and sore throat. Eyes: Negative. Respiratory: Negative. Cardiovascular: Negative. Gastrointestinal: Negative. Genitourinary: Negative. Musculoskeletal: Negative. Skin: Negative. Neurological: Negative. Endo/Heme/Allergies: Negative. Psychiatric/Behavioral: Negative. Physical Exam  Neurological:      Mental Status: He is alert and oriented to person, place, and time. Psychiatric:         Mood and Affect: Mood normal.         Behavior: Behavior normal.         Thought Content:  Thought content normal.         Judgment: Judgment normal.         ASSESSMENT and PLAN  1. Epistaxis  F/U w/ENT today at 16:00 (already scheduled)  Mr. Nicolas Tamayo agrees to plan

## 2021-01-21 ENCOUNTER — VIRTUAL VISIT (OUTPATIENT)
Dept: FAMILY MEDICINE CLINIC | Age: 36
End: 2021-01-21

## 2021-01-21 DIAGNOSIS — J32.9 CHRONIC SINUSITIS, UNSPECIFIED LOCATION: Primary | ICD-10-CM

## 2021-01-21 PROCEDURE — 99442 PR PHYS/QHP TELEPHONE EVALUATION 11-20 MIN: CPT | Performed by: PHYSICIAN ASSISTANT

## 2021-01-21 RX ORDER — AMLODIPINE BESYLATE 2.5 MG/1
2.5 TABLET ORAL DAILY
Qty: 90 TAB | Refills: 1 | Status: SHIPPED | OUTPATIENT
Start: 2021-01-21 | End: 2021-02-01 | Stop reason: ALTCHOICE

## 2021-01-21 RX ORDER — AMOXICILLIN AND CLAVULANATE POTASSIUM 875; 125 MG/1; MG/1
1 TABLET, FILM COATED ORAL 2 TIMES DAILY
Qty: 20 TAB | Refills: 0 | Status: SHIPPED | OUTPATIENT
Start: 2021-01-21 | End: 2021-02-01 | Stop reason: ALTCHOICE

## 2021-01-21 NOTE — PROGRESS NOTES
/84,  HR 77    Coordination of Care  1. Have you been to the ER, urgent care clinic since your last visit? Hospitalized since your last visit? No    2. Have you seen or consulted any other health care providers outside of the [address] [place] [address] since your last visit? Include any pap smears or colon screening. No    Does the patient need refills? YES    Learning Assessment Complete?  yes  Depression Screening complete in the past 12 months? yes

## 2021-01-21 NOTE — PROGRESS NOTES
Assessment/Plan:    Diagnoses and all orders for this visit:    1. Chronic sinusitis, unspecified location  -     amoxicillin-clavulanate (AUGMENTIN) 875-125 mg per tablet; Take 1 Tab by mouth two (2) times a day for 10 days. Pt did not complete the course of abx (amox) that he was given due to vague side effects of the medication. He continues to have headache. His nose bleeds are currently pretty quiet, yesterday he spoke to the specialist who recommended ER eval for heavy nose bleeds. To date, there has not been a cause of the nose bleeds identified    Recent BP readings were in the ER and were a little elevated, not sure if this is due to the emergent situation so would like for pt to come in for eval at the clinic. He agrees    Follow-up and Dispositions    · Return in about 2 weeks (around 2021) for f2f preferable . NIESHA Orozco expressed understanding of this plan. An AVS was printed and given to the patient.      ----------------------------------------------------------------------    Chief Complaint   Patient presents with    Hypertension     f/u, med refill       History of Present Illness:  Pt called and consented to virtual telephone call, he declined video  He is identified by name and     He is being seen today for f/up on HTN. He is on amlodipine for his bp control. He is not having any side effects to the medication. He was not advised that his bp could be the cause of his nose bleeds, no cause has been identified. As far as he is aware, his bp is well controlled    He was last in the ER about 9 days ago for epistaxis. The last 3 days he has only had one episode of very light bleeding, that was this morning after sneezing  He was instructed by ENT to seek ER care at [facility] for a recurrence of heavy nose bleeding    He continues to have headache, today one sided.  He takes tylenol and the headache resolves, only to return later  He did not finish the amoxicillin because he felt that the medication was causing vague side effects. His recent CT shows       No past medical history on file. Current Outpatient Medications   Medication Sig Dispense Refill    amoxicillin-clavulanate (AUGMENTIN) 875-125 mg per tablet Take 1 Tab by mouth two (2) times a day for 10 days. 20 Tab 0    fexofenadine (ALLEGRA) 180 mg tablet Take 1 Tab by mouth daily. Indications: seasonal runny nose 30 Tab 0    amLODIPine (NORVASC) 2.5 mg tablet Take 1 Tab by mouth daily. Indications: high blood pressure 90 Tab 1    acetaminophen (TYLENOL) 500 mg tablet Take  by mouth every six (6) hours as needed for Pain. 2 or 3 po daily         No Known Allergies    Social History     Tobacco Use    Smoking status: Never Smoker    Smokeless tobacco: Never Used   Substance Use Topics    Alcohol use: Not Currently    Drug use: Never       No family history on file. Physical Exam:     There were no vitals taken for this visit.     A&Ox3  WDWN NAD  Respirations normal and non labored

## 2021-01-21 NOTE — PROGRESS NOTES
RN called pt with the assistance of , Rafita Philippe. Pt was identified with name and . Per provider, RN reviewed the 2 medications that were prescribed to [facility] today. RN sent a Good RX coupon for Augmentin to pt's phone ($22) and explained how to use it. RN advised pt that the Amlodipine will cost $24 for 90, and there is no coupon available for that medication. Pt asked if he should continue taking the allergy medication prescribed on 21 as he has only 6 tabs left (no refills). RN will refer question to provider and call pt back. Jewell Duke RN     RN called pt back with the assistance of , Radha Ireland. Pt was identified by name and . Per provider, pt was   Advised to continue taking Allegra which may help relieve his sinusitus.   Jewell Duke RN

## 2021-01-22 ENCOUNTER — TELEPHONE (OUTPATIENT)
Dept: FAMILY MEDICINE CLINIC | Age: 36
End: 2021-01-22

## 2021-01-26 NOTE — PROGRESS NOTES
1/26/21    Goals      Patient/Family verbalizes understanding of self-management of chronic disease. 1/26/21   Chon Umanzor will tell Crescencio Metcalf about his chronic Head aches. NO epistaxis lately. NN provided contact information.  No FU, IM

## 2021-01-27 ENCOUNTER — OFFICE VISIT (OUTPATIENT)
Dept: FAMILY MEDICINE CLINIC | Age: 36
End: 2021-01-27

## 2021-01-27 VITALS
OXYGEN SATURATION: 98 % | TEMPERATURE: 97.7 F | DIASTOLIC BLOOD PRESSURE: 95 MMHG | BODY MASS INDEX: 30.31 KG/M2 | HEART RATE: 75 BPM | HEIGHT: 68 IN | WEIGHT: 200 LBS | SYSTOLIC BLOOD PRESSURE: 139 MMHG

## 2021-01-27 DIAGNOSIS — R51.9 HEADACHE DISORDER: ICD-10-CM

## 2021-01-27 DIAGNOSIS — G43.909 MIGRAINE SYNDROME: ICD-10-CM

## 2021-01-27 DIAGNOSIS — Z87.09 HISTORY OF CHRONIC SINUSITIS: Primary | ICD-10-CM

## 2021-01-27 DIAGNOSIS — R04.0 EPISTAXIS: ICD-10-CM

## 2021-01-27 PROCEDURE — 99213 OFFICE O/P EST LOW 20 MIN: CPT | Performed by: PHYSICIAN ASSISTANT

## 2021-01-27 RX ORDER — SUMATRIPTAN 100 MG/1
100 TABLET, FILM COATED ORAL
Qty: 9 TAB | Refills: 1 | Status: SHIPPED | OUTPATIENT
Start: 2021-01-27 | End: 2021-01-27

## 2021-01-27 RX ORDER — TOPIRAMATE 50 MG/1
50 TABLET, FILM COATED ORAL 2 TIMES DAILY
Qty: 60 TAB | Refills: 2 | Status: SHIPPED | OUTPATIENT
Start: 2021-01-27 | End: 2021-04-29

## 2021-01-27 RX ORDER — ZINC GLUCONATE 10 MG
LOZENGE ORAL
Qty: 30 TAB | Refills: 3 | Status: SHIPPED | OUTPATIENT
Start: 2021-01-27 | End: 2022-08-18

## 2021-01-27 NOTE — PROGRESS NOTES
Assessment/Plan:    Diagnoses and all orders for this visit:    1. History of chronic sinusitis    2. Headache disorder    3. Epistaxis      Seen with Dr Joe Smith  Suspect atypical migraine disorder: start magnesium 250 mg qhs, imitrex 100mg PRN migraine, and topamax 50 mg bid  Recheck in 2 weeks         [facility] expressed understanding of this plan. An AVS was printed and given to the patient.      ----------------------------------------------------------------------    Chief Complaint   Patient presents with    Headache     F/U       History of Present Illness:    29 yo patient presents for f2f visit per my request for eval of headache and nose bleeds and to check his bp (outside of an emergency room where it has been quite elevated)  He presents today and states that he is \"no better\" with regard to his headaches since starting the augmentin for \"sinusitis\". His hx is that around 12/19/20 he presented to the ER with an uncontrollable nose bleed, which was effectively stopped in the ER. A day later, he started with a \"strong headache\" right sided. Since then, he has had another ER visit for uncontrollable nose bleed, appt with ENT with no known etiology of the bleed, and continued chronic daily headaches on the right parietal side. He does have a long hx of occasional headaches. As a child, he would take tylenol and lie down until headache resolved. He has some triggers of light and noise sensitivity  He has not had a fever  Associated sxs include his inability to open his jaw fully  He has not been able to work for one month now due to the sxs. His sleep is greatly disturbed. He takes tylenol with minimal relief, the sxs typically return  The ENT visit ended with no known etiology for his nose bleeds and with recommendation to return to the ER if he has another nose bleed.  His last one was 10 days ago and was controlled at home    He is taking amlodipine as directed, 2.5 mg daily     No past medical history on file. Current Outpatient Medications   Medication Sig Dispense Refill    amoxicillin-clavulanate (AUGMENTIN) 875-125 mg per tablet Take 1 Tab by mouth two (2) times a day for 10 days. 20 Tab 0    amLODIPine (NORVASC) 2.5 mg tablet Take 1 Tab by mouth daily. Indications: high blood pressure 90 Tab 1    fexofenadine (ALLEGRA) 180 mg tablet Take 1 Tab by mouth daily. Indications: seasonal runny nose 30 Tab 0    acetaminophen (TYLENOL) 500 mg tablet Take  by mouth every six (6) hours as needed for Pain. 2 or 3 po daily         No Known Allergies    Social History     Tobacco Use    Smoking status: Never Smoker    Smokeless tobacco: Never Used   Substance Use Topics    Alcohol use: Not Currently    Drug use: Never       No family history on file.     Physical Exam:     Visit Vitals  BP (!) 139/95 (BP 1 Location: Right arm, BP Patient Position: Sitting)   Pulse 75   Temp 97.7 °F (36.5 °C)   Ht 5' 7.68\" (1.719 m)   Wt 200 lb (90.7 kg)   SpO2 98%   BMI 30.70 kg/m²   pt appears unwell, he appears older then his stated age    A&Ox3  WDWN NAD  Respirations normal and non labored  HEENT: TM intact jeanette, nose no active bleeds  OP clear- he has limited ROM of mandible but no TMJ on exam  Neuro CN 2-12 grossly intact

## 2021-02-01 ENCOUNTER — VIRTUAL VISIT (OUTPATIENT)
Dept: FAMILY MEDICINE CLINIC | Age: 36
End: 2021-02-01

## 2021-02-01 DIAGNOSIS — G44.021 INTRACTABLE CHRONIC CLUSTER HEADACHE: Primary | ICD-10-CM

## 2021-02-01 PROCEDURE — 99443 PR PHYS/QHP TELEPHONE EVALUATION 21-30 MIN: CPT | Performed by: FAMILY MEDICINE

## 2021-02-01 RX ORDER — DEXAMETHASONE 4 MG/1
4 TABLET ORAL 2 TIMES DAILY WITH MEALS
Qty: 6 TAB | Refills: 0 | Status: SHIPPED | OUTPATIENT
Start: 2021-02-01 | End: 2021-02-13

## 2021-02-01 RX ORDER — VERAPAMIL HYDROCHLORIDE 80 MG/1
80 TABLET ORAL 3 TIMES DAILY
Qty: 90 TAB | Refills: 3 | Status: SHIPPED | OUTPATIENT
Start: 2021-02-01 | End: 2021-04-29 | Stop reason: SINTOL

## 2021-02-01 RX ORDER — ZOLMITRIPTAN 2.5 MG/1
2.5 TABLET, FILM COATED ORAL AS NEEDED
Qty: 9 TAB | Refills: 1 | Status: SHIPPED | OUTPATIENT
Start: 2021-02-01 | End: 2021-06-09

## 2021-02-01 NOTE — PROGRESS NOTES
Luis Salinas (: 1985) is a 30 y.o. male, established patient, here for evaluation of the following chief complaint(s):   Medication Evaluation (Pt states the medicine he received for migraine is not working) and Insomnia (x 4 days. Patient states after he start taking the migraine medicine he's not able to sleep)      ASSESSMENT/PLAN:  1. Intractable chronic cluster headache    Headache description suggests cluster headaches, although other possibilities could be considered. Will change Imitrex to Zomig (oral due to cost) and Norvasc to Verapamil which can be more effective for cluster headaches. Continue with Topamax BID. Will use a short course of dexamethasone to see if we can abort this flare. Return in about 1 week (around 2021) for follow up for VV 1 in week. SUBJECTIVE/OBJECTIVE:  HPI  Headaches/Epistaxis:  Started migraine medication last week but is not able to sleep x 4 days due to the pain and restlessness. Is taking Topamax BID. Tried Imitrex 3 times but without relief. Seen in ER 3 times in past few months for headache and epistaxis. CT Head : Rt sided sinus disease. Evaluated by ENT for epistaxis without cause. Headaches did not improved with Amox or Augmentin tx for sinusitis. Headaches are Rt sided, can last up 20 minutes of intense pain, then improve. Associated with nasal congestion. These spells have occurred in past but over the past few months are increasing in frequently. Over the past few weeks they are occurring up to 4-8 times a day. Taking tylenol and laying down still helps until the pain subsides in 20 minutes. Denies aura. Review of Systems   Constitutional: Negative for chills, diaphoresis, fatigue, fever and unexpected weight change. HENT: Positive for congestion. Respiratory: Negative for cough and shortness of breath. Cardiovascular: Negative for chest pain, palpitations and leg swelling.    Gastrointestinal: Negative for nausea. Neurological: Negative for syncope, facial asymmetry, weakness, light-headedness and numbness. Patient-Reported Vitals 2/1/2021   Patient-Reported Weight 200 lb   Patient-Reported Pulse -   Patient-Reported Systolic  033   Patient-Reported Diastolic 94       Physical Exam    On this date 02/01/21 I have spent 37 minutes reviewing previous notes, test results and face to face (virtual) with the patient discussing the diagnosis and importance of compliance with the treatment plan as well as documenting on the day of the visit. Yesenia Nash is being evaluated by a Virtual Visit (phone visit) encounter to address concerns as mentioned above. A caregiver was present when appropriate. Due to this being a TeleHealth encounter (During [facility]- public health emergency), evaluation of the following organ systems was limited: Vitals/Constitutional/EENT/Resp/CV/GI//MS/Neuro/Skin/Heme-Lymph-Imm. Pursuant to the emergency declaration under the [address] [place] [address], [address] [place] [address] authority and the [employer] Act, this Virtual Visit was conducted with patient's (and/or legal guardian's) consent, to reduce the patient's risk of exposure to COVID-19 and provide necessary medical care. The patient (and/or legal guardian) has also been advised to contact this office for worsening conditions or problems, and seek emergency medical treatment and/or call 911 if deemed necessary. Patient identification was verified at the start of the visit: YES    Services were provided through a phone synchronous discussion virtually to substitute for in-person clinic visit. Patient was located at home and provider was located in office or at home. An electronic signature was used to authenticate this note.   -- Serena Beard MD

## 2021-02-01 NOTE — PROGRESS NOTES
Patient states he is at home and  Patient does have access to vital sign equipment. /94    Coordination of Care  1. Have you been to the ER, urgent care clinic since your last visit? Hospitalized since your last visit? No    2. Have you seen or consulted any other health care providers outside of the [address] [place] [address] since your last visit? Include any pap smears or colon screening. No    Does the patient need refills? NO    Learning Assessment Complete?  yes  Depression Screening complete in the past 12 months? yes

## 2021-02-01 NOTE — PROGRESS NOTES
Check-out Note: follow up for VV 1 week   New Medications eRxd, needs coupons. Discharge nurse encounter completed via telephoned call. Name and  verified. AVS, prescription and pharmacy location reviewed, patient was able to verbalized back new medication instructions. Goodrx coupon code for verapamil and zomig sent via text per patient's request. Patient to follow up in 1 week via virtual visit and appointment has already been made, patient aware of the day/time. This has been fully explained to the patient, who indicates understanding and agrees with plan. No further questions at this time.  Nirmala Gómez RN

## 2021-02-04 ENCOUNTER — APPOINTMENT (OUTPATIENT)
Dept: GENERAL RADIOLOGY | Age: 36
DRG: 147 | End: 2021-02-04
Attending: INTERNAL MEDICINE
Payer: SUBSIDIZED

## 2021-02-04 ENCOUNTER — HOSPITAL ENCOUNTER (INPATIENT)
Age: 36
LOS: 9 days | Discharge: HOME | DRG: 147 | End: 2021-02-13
Attending: EMERGENCY MEDICINE | Admitting: INTERNAL MEDICINE
Payer: SUBSIDIZED

## 2021-02-04 DIAGNOSIS — C11.9 NASOPHARYNGEAL CARCINOMA (HCC): ICD-10-CM

## 2021-02-04 DIAGNOSIS — R04.0 EPISTAXIS: ICD-10-CM

## 2021-02-04 DIAGNOSIS — J39.2 NASOPHARYNGEAL MASS: ICD-10-CM

## 2021-02-04 DIAGNOSIS — T40.2X5A CONSTIPATION DUE TO OPIOID THERAPY: ICD-10-CM

## 2021-02-04 DIAGNOSIS — G44.89 OTHER HEADACHE SYNDROME: ICD-10-CM

## 2021-02-04 DIAGNOSIS — K59.03 CONSTIPATION DUE TO OPIOID THERAPY: ICD-10-CM

## 2021-02-04 DIAGNOSIS — G89.3 CANCER ASSOCIATED PAIN: ICD-10-CM

## 2021-02-04 DIAGNOSIS — R04.0 POSTERIOR EPISTAXIS: Primary | ICD-10-CM

## 2021-02-04 LAB
ALBUMIN SERPL-MCNC: 3.9 G/DL (ref 3.5–5)
ALBUMIN/GLOB SERPL: 0.8 {RATIO} (ref 1.1–2.2)
ALP SERPL-CCNC: 92 U/L (ref 45–117)
ALT SERPL-CCNC: 34 U/L (ref 12–78)
ANION GAP SERPL CALC-SCNC: 4 MMOL/L (ref 5–15)
APTT PPP: 30.8 SEC (ref 22.1–31)
AST SERPL-CCNC: 13 U/L (ref 15–37)
BASOPHILS # BLD: 0.1 K/UL (ref 0–0.1)
BASOPHILS NFR BLD: 0 % (ref 0–1)
BILIRUB SERPL-MCNC: 0.3 MG/DL (ref 0.2–1)
BUN SERPL-MCNC: 11 MG/DL (ref 6–20)
BUN/CREAT SERPL: 12 (ref 12–20)
CALCIUM SERPL-MCNC: 10.1 MG/DL (ref 8.5–10.1)
CHLORIDE SERPL-SCNC: 108 MMOL/L (ref 97–108)
CO2 SERPL-SCNC: 25 MMOL/L (ref 21–32)
COMMENT, HOLDF: NORMAL
CREAT SERPL-MCNC: 0.89 MG/DL (ref 0.7–1.3)
DIFFERENTIAL METHOD BLD: ABNORMAL
EOSINOPHIL # BLD: 0.2 K/UL (ref 0–0.4)
EOSINOPHIL NFR BLD: 1 % (ref 0–7)
ERYTHROCYTE [DISTWIDTH] IN BLOOD BY AUTOMATED COUNT: 14.1 % (ref 11.5–14.5)
GLOBULIN SER CALC-MCNC: 5 G/DL (ref 2–4)
GLUCOSE SERPL-MCNC: 105 MG/DL (ref 65–100)
HCT VFR BLD AUTO: 41.9 % (ref 36.6–50.3)
HGB BLD-MCNC: 13.9 G/DL (ref 12.1–17)
IMM GRANULOCYTES # BLD AUTO: 0 K/UL (ref 0–0.04)
IMM GRANULOCYTES NFR BLD AUTO: 0 % (ref 0–0.5)
INR PPP: 1.1 (ref 0.9–1.1)
LYMPHOCYTES # BLD: 2.4 K/UL (ref 0.8–3.5)
LYMPHOCYTES NFR BLD: 20 % (ref 12–49)
MCH RBC QN AUTO: 26.8 PG (ref 26–34)
MCHC RBC AUTO-ENTMCNC: 33.2 G/DL (ref 30–36.5)
MCV RBC AUTO: 80.9 FL (ref 80–99)
MONOCYTES # BLD: 0.9 K/UL (ref 0–1)
MONOCYTES NFR BLD: 8 % (ref 5–13)
NEUTS SEG # BLD: 8.3 K/UL (ref 1.8–8)
NEUTS SEG NFR BLD: 71 % (ref 32–75)
NRBC # BLD: 0 K/UL (ref 0–0.01)
NRBC BLD-RTO: 0 PER 100 WBC
PLATELET # BLD AUTO: 373 K/UL (ref 150–400)
PMV BLD AUTO: 9.6 FL (ref 8.9–12.9)
POTASSIUM SERPL-SCNC: 3.6 MMOL/L (ref 3.5–5.1)
PROT SERPL-MCNC: 8.9 G/DL (ref 6.4–8.2)
PROTHROMBIN TIME: 11 SEC (ref 9–11.1)
RBC # BLD AUTO: 5.18 M/UL (ref 4.1–5.7)
SAMPLES BEING HELD,HOLD: NORMAL
SODIUM SERPL-SCNC: 137 MMOL/L (ref 136–145)
THERAPEUTIC RANGE,PTTT: NORMAL SECS (ref 58–77)
WBC # BLD AUTO: 11.8 K/UL (ref 4.1–11.1)

## 2021-02-04 PROCEDURE — 36415 COLL VENOUS BLD VENIPUNCTURE: CPT

## 2021-02-04 PROCEDURE — 71045 X-RAY EXAM CHEST 1 VIEW: CPT

## 2021-02-04 PROCEDURE — 85730 THROMBOPLASTIN TIME PARTIAL: CPT

## 2021-02-04 PROCEDURE — 85025 COMPLETE CBC W/AUTO DIFF WBC: CPT

## 2021-02-04 PROCEDURE — 65270000032 HC RM SEMIPRIVATE

## 2021-02-04 PROCEDURE — 86901 BLOOD TYPING SEROLOGIC RH(D): CPT

## 2021-02-04 PROCEDURE — 85610 PROTHROMBIN TIME: CPT

## 2021-02-04 PROCEDURE — 99283 EMERGENCY DEPT VISIT LOW MDM: CPT

## 2021-02-04 PROCEDURE — 80053 COMPREHEN METABOLIC PANEL: CPT

## 2021-02-04 RX ORDER — POLYETHYLENE GLYCOL 3350 17 G/17G
17 POWDER, FOR SOLUTION ORAL DAILY PRN
Status: DISCONTINUED | OUTPATIENT
Start: 2021-02-04 | End: 2021-02-10 | Stop reason: SDUPTHER

## 2021-02-04 RX ORDER — VERAPAMIL HYDROCHLORIDE 80 MG/1
80 TABLET ORAL 3 TIMES DAILY
Status: DISCONTINUED | OUTPATIENT
Start: 2021-02-05 | End: 2021-02-13 | Stop reason: HOSPADM

## 2021-02-04 RX ORDER — SODIUM CHLORIDE 0.9 % (FLUSH) 0.9 %
5-40 SYRINGE (ML) INJECTION EVERY 8 HOURS
Status: DISCONTINUED | OUTPATIENT
Start: 2021-02-04 | End: 2021-02-13 | Stop reason: HOSPADM

## 2021-02-04 RX ORDER — SODIUM CHLORIDE 0.9 % (FLUSH) 0.9 %
5-40 SYRINGE (ML) INJECTION AS NEEDED
Status: DISCONTINUED | OUTPATIENT
Start: 2021-02-04 | End: 2021-02-13 | Stop reason: HOSPADM

## 2021-02-04 RX ORDER — ACETAMINOPHEN 325 MG/1
650 TABLET ORAL
Status: DISCONTINUED | OUTPATIENT
Start: 2021-02-04 | End: 2021-02-13 | Stop reason: HOSPADM

## 2021-02-04 RX ORDER — OXYMETAZOLINE HCL 0.05 %
2 SPRAY, NON-AEROSOL (ML) NASAL
Status: DISCONTINUED | OUTPATIENT
Start: 2021-02-04 | End: 2021-02-13 | Stop reason: HOSPADM

## 2021-02-04 RX ORDER — TOPIRAMATE 25 MG/1
50 TABLET ORAL 2 TIMES DAILY
Status: DISCONTINUED | OUTPATIENT
Start: 2021-02-05 | End: 2021-02-13 | Stop reason: HOSPADM

## 2021-02-04 RX ORDER — ONDANSETRON 2 MG/ML
4 INJECTION INTRAMUSCULAR; INTRAVENOUS
Status: DISCONTINUED | OUTPATIENT
Start: 2021-02-04 | End: 2021-02-13 | Stop reason: HOSPADM

## 2021-02-04 RX ORDER — ZOLMITRIPTAN 2.5 MG/1
2.5 TABLET, FILM COATED ORAL AS NEEDED
Status: DISCONTINUED | OUTPATIENT
Start: 2021-02-04 | End: 2021-02-13 | Stop reason: HOSPADM

## 2021-02-04 RX ORDER — ACETAMINOPHEN 650 MG/1
650 SUPPOSITORY RECTAL
Status: DISCONTINUED | OUTPATIENT
Start: 2021-02-04 | End: 2021-02-13 | Stop reason: HOSPADM

## 2021-02-04 RX ORDER — PROMETHAZINE HYDROCHLORIDE 25 MG/1
12.5 TABLET ORAL
Status: DISCONTINUED | OUTPATIENT
Start: 2021-02-04 | End: 2021-02-13 | Stop reason: HOSPADM

## 2021-02-05 ENCOUNTER — APPOINTMENT (OUTPATIENT)
Dept: CT IMAGING | Age: 36
DRG: 147 | End: 2021-02-05
Attending: RADIOLOGY
Payer: SUBSIDIZED

## 2021-02-05 ENCOUNTER — APPOINTMENT (OUTPATIENT)
Dept: MRI IMAGING | Age: 36
DRG: 147 | End: 2021-02-05
Attending: NURSE PRACTITIONER
Payer: SUBSIDIZED

## 2021-02-05 LAB
ABO + RH BLD: NORMAL
ALBUMIN SERPL-MCNC: 4 G/DL (ref 3.5–5)
ALBUMIN/GLOB SERPL: 1 {RATIO} (ref 1.1–2.2)
ALP SERPL-CCNC: 96 U/L (ref 45–117)
ALT SERPL-CCNC: 36 U/L (ref 12–78)
AMPHET UR QL SCN: NEGATIVE
ANION GAP SERPL CALC-SCNC: 11 MMOL/L (ref 5–15)
ARTERIAL PATENCY WRIST A: ABNORMAL
AST SERPL-CCNC: 12 U/L (ref 15–37)
BARBITURATES UR QL SCN: NEGATIVE
BASE DEFICIT BLD-SCNC: 2 MMOL/L
BASOPHILS # BLD: 0 K/UL (ref 0–0.1)
BASOPHILS NFR BLD: 0 % (ref 0–1)
BDY SITE: ABNORMAL
BENZODIAZ UR QL: NEGATIVE
BILIRUB SERPL-MCNC: 0.4 MG/DL (ref 0.2–1)
BLOOD GROUP ANTIBODIES SERPL: NORMAL
BUN SERPL-MCNC: 11 MG/DL (ref 6–20)
BUN/CREAT SERPL: 14 (ref 12–20)
CA-I BLD-SCNC: 1.27 MMOL/L (ref 1.12–1.32)
CALCIUM SERPL-MCNC: 10.5 MG/DL (ref 8.5–10.1)
CANNABINOIDS UR QL SCN: NEGATIVE
CHLORIDE SERPL-SCNC: 108 MMOL/L (ref 97–108)
CO2 SERPL-SCNC: 21 MMOL/L (ref 21–32)
COCAINE UR QL SCN: NEGATIVE
COMMENT, HOLDF: NORMAL
COVID-19 RAPID TEST, COVR: NOT DETECTED
CREAT SERPL-MCNC: 0.81 MG/DL (ref 0.7–1.3)
DIFFERENTIAL METHOD BLD: ABNORMAL
DRUG SCRN COMMENT,DRGCM: NORMAL
EOSINOPHIL # BLD: 0.1 K/UL (ref 0–0.4)
EOSINOPHIL NFR BLD: 1 % (ref 0–7)
ERYTHROCYTE [DISTWIDTH] IN BLOOD BY AUTOMATED COUNT: 13.8 % (ref 11.5–14.5)
GAS FLOW.O2 O2 DELIVERY SYS: ABNORMAL L/MIN
GLOBULIN SER CALC-MCNC: 4.1 G/DL (ref 2–4)
GLUCOSE SERPL-MCNC: 96 MG/DL (ref 65–100)
HCO3 BLD-SCNC: 22.4 MMOL/L (ref 22–26)
HCT VFR BLD AUTO: 41.6 % (ref 36.6–50.3)
HGB BLD-MCNC: 13.6 G/DL (ref 12.1–17)
IMM GRANULOCYTES # BLD AUTO: 0.1 K/UL (ref 0–0.04)
IMM GRANULOCYTES NFR BLD AUTO: 0 % (ref 0–0.5)
LYMPHOCYTES # BLD: 2.4 K/UL (ref 0.8–3.5)
LYMPHOCYTES NFR BLD: 20 % (ref 12–49)
MAGNESIUM SERPL-MCNC: 2.4 MG/DL (ref 1.6–2.4)
MCH RBC QN AUTO: 26.7 PG (ref 26–34)
MCHC RBC AUTO-ENTMCNC: 32.7 G/DL (ref 30–36.5)
MCV RBC AUTO: 81.7 FL (ref 80–99)
METHADONE UR QL: NEGATIVE
MONOCYTES # BLD: 0.9 K/UL (ref 0–1)
MONOCYTES NFR BLD: 7 % (ref 5–13)
NEUTS SEG # BLD: 8.8 K/UL (ref 1.8–8)
NEUTS SEG NFR BLD: 72 % (ref 32–75)
NRBC # BLD: 0 K/UL (ref 0–0.01)
NRBC BLD-RTO: 0 PER 100 WBC
O2/TOTAL GAS SETTING VFR VENT: 0.21 %
OPIATES UR QL: NEGATIVE
PCO2 BLD: 34.1 MMHG (ref 35–45)
PCP UR QL: NEGATIVE
PH BLD: 7.43 [PH] (ref 7.35–7.45)
PHOSPHATE SERPL-MCNC: 4.9 MG/DL (ref 2.6–4.7)
PLATELET # BLD AUTO: 358 K/UL (ref 150–400)
PMV BLD AUTO: 10 FL (ref 8.9–12.9)
PO2 BLD: 48 MMHG (ref 80–100)
POTASSIUM SERPL-SCNC: 4 MMOL/L (ref 3.5–5.1)
PROT SERPL-MCNC: 8.1 G/DL (ref 6.4–8.2)
RBC # BLD AUTO: 5.09 M/UL (ref 4.1–5.7)
SAMPLES BEING HELD,HOLD: NORMAL
SAO2 % BLD: 85 % (ref 92–97)
SARS-COV-2, COV2: NORMAL
SODIUM SERPL-SCNC: 140 MMOL/L (ref 136–145)
SOURCE, COVRS: NORMAL
SPECIMEN EXP DATE BLD: NORMAL
SPECIMEN TYPE: ABNORMAL
TOTAL RESP. RATE, ITRR: 22
TSH SERPL DL<=0.05 MIU/L-ACNC: 1.04 UIU/ML (ref 0.36–3.74)
WBC # BLD AUTO: 12.3 K/UL (ref 4.1–11.1)

## 2021-02-05 PROCEDURE — A9575 INJ GADOTERATE MEGLUMI 0.1ML: HCPCS | Performed by: FAMILY MEDICINE

## 2021-02-05 PROCEDURE — U0002 COVID-19 LAB TEST NON-CDC: HCPCS

## 2021-02-05 PROCEDURE — 80307 DRUG TEST PRSMV CHEM ANLYZR: CPT

## 2021-02-05 PROCEDURE — 36415 COLL VENOUS BLD VENIPUNCTURE: CPT

## 2021-02-05 PROCEDURE — 74011250637 HC RX REV CODE- 250/637: Performed by: FAMILY MEDICINE

## 2021-02-05 PROCEDURE — 83735 ASSAY OF MAGNESIUM: CPT

## 2021-02-05 PROCEDURE — 70553 MRI BRAIN STEM W/O & W/DYE: CPT

## 2021-02-05 PROCEDURE — 70544 MR ANGIOGRAPHY HEAD W/O DYE: CPT

## 2021-02-05 PROCEDURE — 74011250637 HC RX REV CODE- 250/637: Performed by: INTERNAL MEDICINE

## 2021-02-05 PROCEDURE — 70496 CT ANGIOGRAPHY HEAD: CPT

## 2021-02-05 PROCEDURE — 84443 ASSAY THYROID STIM HORMONE: CPT

## 2021-02-05 PROCEDURE — 84100 ASSAY OF PHOSPHORUS: CPT

## 2021-02-05 PROCEDURE — 85025 COMPLETE CBC W/AUTO DIFF WBC: CPT

## 2021-02-05 PROCEDURE — 99255 IP/OBS CONSLTJ NEW/EST HI 80: CPT | Performed by: RADIOLOGY

## 2021-02-05 PROCEDURE — 74011000636 HC RX REV CODE- 636: Performed by: FAMILY MEDICINE

## 2021-02-05 PROCEDURE — 74011250636 HC RX REV CODE- 250/636: Performed by: FAMILY MEDICINE

## 2021-02-05 PROCEDURE — 82803 BLOOD GASES ANY COMBINATION: CPT

## 2021-02-05 PROCEDURE — 65270000032 HC RM SEMIPRIVATE

## 2021-02-05 PROCEDURE — 70498 CT ANGIOGRAPHY NECK: CPT

## 2021-02-05 PROCEDURE — 80053 COMPREHEN METABOLIC PANEL: CPT

## 2021-02-05 RX ORDER — GADOTERATE MEGLUMINE 376.9 MG/ML
18 INJECTION INTRAVENOUS
Status: COMPLETED | OUTPATIENT
Start: 2021-02-05 | End: 2021-02-05

## 2021-02-05 RX ORDER — SODIUM CHLORIDE 0.9 % (FLUSH) 0.9 %
10 SYRINGE (ML) INJECTION
Status: DISPENSED | OUTPATIENT
Start: 2021-02-05 | End: 2021-02-06

## 2021-02-05 RX ORDER — SODIUM CHLORIDE 0.9 % (FLUSH) 0.9 %
10 SYRINGE (ML) INJECTION
Status: COMPLETED | OUTPATIENT
Start: 2021-02-05 | End: 2021-02-05

## 2021-02-05 RX ORDER — BUTALBITAL, ACETAMINOPHEN AND CAFFEINE 50; 325; 40 MG/1; MG/1; MG/1
1 TABLET ORAL
Status: DISCONTINUED | OUTPATIENT
Start: 2021-02-05 | End: 2021-02-13 | Stop reason: HOSPADM

## 2021-02-05 RX ORDER — CLINDAMYCIN PHOSPHATE 900 MG/50ML
900 INJECTION INTRAVENOUS EVERY 8 HOURS
Status: DISCONTINUED | OUTPATIENT
Start: 2021-02-05 | End: 2021-02-06

## 2021-02-05 RX ADMIN — VERAPAMIL HYDROCHLORIDE 80 MG: 80 TABLET, FILM COATED ORAL at 00:06

## 2021-02-05 RX ADMIN — Medication 10 ML: at 00:07

## 2021-02-05 RX ADMIN — VERAPAMIL HYDROCHLORIDE 80 MG: 80 TABLET, FILM COATED ORAL at 16:18

## 2021-02-05 RX ADMIN — CLINDAMYCIN IN 5 PERCENT DEXTROSE 900 MG: 18 INJECTION, SOLUTION INTRAVENOUS at 17:46

## 2021-02-05 RX ADMIN — TOPIRAMATE 50 MG: 25 TABLET, FILM COATED ORAL at 17:45

## 2021-02-05 RX ADMIN — BUTALBITAL, ACETAMINOPHEN, AND CAFFEINE 1 TABLET: 50; 325; 40 TABLET ORAL at 23:40

## 2021-02-05 RX ADMIN — VERAPAMIL HYDROCHLORIDE 80 MG: 80 TABLET, FILM COATED ORAL at 23:40

## 2021-02-05 RX ADMIN — BUTALBITAL, ACETAMINOPHEN, AND CAFFEINE 1 TABLET: 50; 325; 40 TABLET ORAL at 17:52

## 2021-02-05 RX ADMIN — Medication 10 ML: at 10:48

## 2021-02-05 RX ADMIN — Medication 10 ML: at 17:29

## 2021-02-05 RX ADMIN — TOPIRAMATE 50 MG: 25 TABLET, FILM COATED ORAL at 10:36

## 2021-02-05 RX ADMIN — VERAPAMIL HYDROCHLORIDE 80 MG: 80 TABLET, FILM COATED ORAL at 10:45

## 2021-02-05 RX ADMIN — ZOLMITRIPTAN 2.5 MG: 2.5 TABLET ORAL at 07:16

## 2021-02-05 RX ADMIN — Medication 10 ML: at 23:40

## 2021-02-05 RX ADMIN — IOPAMIDOL 100 ML: 755 INJECTION, SOLUTION INTRAVENOUS at 20:37

## 2021-02-05 RX ADMIN — GADOTERATE MEGLUMINE 18 ML: 376.9 INJECTION INTRAVENOUS at 17:26

## 2021-02-05 RX ADMIN — ACETAMINOPHEN 650 MG: 325 TABLET ORAL at 00:06

## 2021-02-05 RX ADMIN — Medication 10 ML: at 13:12

## 2021-02-05 RX ADMIN — ACETAMINOPHEN 650 MG: 325 TABLET ORAL at 11:05

## 2021-02-05 RX ADMIN — CLINDAMYCIN IN 5 PERCENT DEXTROSE 900 MG: 18 INJECTION, SOLUTION INTRAVENOUS at 10:46

## 2021-02-05 NOTE — PROGRESS NOTES
Bedside shift change report given to Tee Cuevas (oncoming nurse) by Hien Madden (offgoing nurse). Report included the following information SBAR.     1405 Dr. Bonifacio Seth states pt may have cardiac diet if he is not having a procedure today. Christina with [facility]. Pt may eat. MRI/MRA being ordered. NP suggesting a neuro consult.

## 2021-02-05 NOTE — H&P
[address]  HISTORY AND PHYSICAL    Name:  Ale Curran  MR#:  [id number]  :  1985  ACCOUNT #:  [de-identified]  ADMIT DATE:  2021      The patient was seen, evaluated and admitted by me on 2021. PRIMARY CARE PROVIDER:  JESSICA Lawrence    SOURCE OF INFORMATION:  The patient through the [address] [place] [address] because the patient is mostly Yi speaking and review of ED and electronic medical records. CHIEF COMPLAINT:  Nosebleed. HISTORY OF PRESENT ILLNESS:  This is a 67-year-old man with a past medical history significant for hypertension, migraine headache, was in his usual state of health until the day of presentation at the emergency room when the patient developed nosebleed from the right nostril. The patient's nosebleed started at about 02:00 p.m. and has had 6 episodes since then, associated with headache. The headache is located on the right side of the head, described as throbbing. No associated blurring of vision. The nosebleed has been going on intermittently for about 2 months. According to the patient, this is usually aggravated by bending forward with a slight relief when the patient puts direct pressure on his nares. Since the onset of the nosebleed about 2 months ago, the patient has been seen by the ENT surgeon. He was at [facility] on 2020. The nose was packed and he was seen by ENT surgeon; shortly after that, the pack was removed. The patient underwent nasal endoscopy without any obvious source of bleeding. When the patient arrived at the emergency room today, the patient was no longer having active bleeding, but since this is intermittent and recurrent, the patient's ENT surgeon was consulted. The ENT surgeon advised consultation with interventional radiologist for embolization and the patient was referred to the hospitalist service for evaluation for admission.   No record of prior admission to this [facility].    PAST MEDICAL HISTORY:  Hypertension, migraine headache. ALLERGIES:  NO KNOWN DRUG ALLERGIES. MEDICATIONS:  Tylenol 500 mg every 6 hours as needed for pain; Allegra 180 mg daily; magnesium 250 mg daily at bedtime; Topamax 50 mg twice daily; verapamil 80 mg 3 times daily; Zomig 2.5 mg, dosage as directed. FAMILY HISTORY:  This was reviewed. His mother has hypertension and migraine headache. PAST SURGICAL HISTORY:  Not significant. SOCIAL HISTORY:  No history of alcohol or tobacco abuse. REVIEW OF SYSTEMS:  HEAD, EYES, EARS, NOSE AND THROAT:  This is positive for nosebleed and headache. No blurring of vision, no photophobia. RESPIRATORY SYSTEM:  No cough, no shortness of breath, no hemoptysis. CARDIOVASCULAR SYSTEM:  No chest pain, no orthopnea, no palpitation. GASTROINTESTINAL SYSTEM:  No nausea or vomiting, no diarrhea, no constipation. GENITOURINARY SYSTEM:  No dysuria, no urgency, and no frequency. All other systems are reviewed and they are negative. PHYSICAL EXAMINATION:  GENERAL APPEARANCE:  The patient appeared ill, in moderate distress. VITAL SIGNS:  On arrival at the emergency room, temperature 98, pulse 93, respiratory rate 18, blood pressure 148/103, oxygen saturation 99% on room air. HEENT:  Head:  Normocephalic, atraumatic. Eyes:  Normal eye movements. No redness, no drainage, no discharge. Ears:  Normal external ears with no evidence of drainage. Nose:  No deformity and no drainage. Mouth and Throat:  No visible oral lesion. NECK:  Neck is supple. No JVD, no thyromegaly. CHEST:  Clear breath sounds. No wheezing, no crackles. HEART:  Normal S1 and S2, regular. No clinically appreciable murmur. ABDOMEN:  Soft, nontender. Normal bowel sounds. CNS:  Alert, oriented x3. No gross focal neurological deficit. EXTREMITIES:  No edema. Pulses 2+ bilaterally. MUSCULOSKELETAL SYSTEM:  No evidence of joint deformity or swelling.   SKIN:  No active skin lesions seen in the exposed part of the body. PSYCHIATRY:  Normal mood and affect. LYMPHATIC SYSTEM:  No cervical lymphadenopathy. DIAGNOSTIC DATA:  Chest x-ray, no acute process. LABORATORY DATA:  Hematology:  WBC 11.8, hemoglobin 13.9, hematocrit 41.9, platelets 318. Coagulation profile:  INR 1.1, PT 11.0, PTT 30.8. Chemistry:  Sodium 137, potassium 3.6, chloride 108, CO2 25, glucose 105, BUN 11, creatinine 0.89, calcium 10.1, total bilirubin 0.3, ALT 34, AST 13, alkaline phosphatase 92, total protein 8.9, albumin level 3.9, globulin 5.0.    ASSESSMENT:  1. Right epistaxis. 2.  Hypertension. 3.  Migraine headache. PLAN:  1. Right epistaxis. We will admit the patient for further evaluation and treatment. We will carry out supportive treatment. We will await further recommendation from the ENT surgeon. The patient will be n.p.o. in anticipation of embolization by neurointerventionist consulted by the ENT surgeon. 2.  Hypertension. We will resume preadmission medication. We will monitor the patient's blood pressure closely. 3.  Migraine headache. We will continue with preadmission medication. OTHER ISSUES:  Code status: The patient is a full code. We will request SCD for DVT prophylaxis. FUNCTIONAL STATUS PRIOR TO ADMISSION:  The patient came from home. The patient is ambulatory with no assistant or device. COVID PRECAUTION:  The patient was wearing a face mask. I was wearing a face mask and gloves for this patient's encounter. Moose Gonsalez MD      [id number]  D:  02/05/2021 4:03  T:  02/05/2021 5:35  JOB #:  [id number]  CC:   JESSICA Ryan

## 2021-02-05 NOTE — CONSULTS
Neurointerventional Surgery Consult    Patient: Mel Macedo MRN: [id number]  SSN: [id number]    YOB: 1985  Age: 30 y.o. Sex: male      History of presenting illness:      Cherylene Gip A Ezell Buckle is a 30 y.o. male with a past medical history of hypertension. He reports a 2-month history of migrainous headaches, 10 out of 10 pain associated with frequent right nares nosebleeds. He was recently treated at [facility] at the end of December for nosebleed at which time he underwent a nasal endoscopy with no obvious source of bleeding identified. On 2/4/2021 the patient developed a nosebleed which started around 2 PM.  He reports 6 episodes of bleeding since that time with an ongoing headache described as throbbing on the right side of his head, 10 out of 10 pain. The patient's ENT surgeon, Dr. Viraj Gale, was consulted upon arrival to the ER who recommended consultation with our service for possible embolization. The patient has had no further nosebleeds since admission to the hospital, there is no packing present at time of examination, and hemoglobin is stable at 12. 3. No past medical history on file. No past surgical history on file. No family history on file.   Social History     Tobacco Use    Smoking status: Never Smoker    Smokeless tobacco: Never Used   Substance Use Topics    Alcohol use: Not Currently      Current Facility-Administered Medications   Medication Dose Route Frequency Provider Last Rate Last Admin    clindamycin (CLEOCIN) 900mg D5W 50mL IVPB (premix)  900 mg IntraVENous Q8H Yoav Meza  mL/hr at 02/05/21 1046 900 mg at 02/05/21 1046    oxymetazoline (AFRIN) 0.05 % nasal spray 2 Spray  2 Spray Both Nostrils BID PRN Sixto Tate MD        topiramate (TOPAMAX) tablet 50 mg  50 mg Oral BID Sixto Tate MD   50 mg at 02/05/21 1036    verapamiL (CALAN) tablet 80 mg  80 mg Oral TID Gauri BLAKE MD   80 mg at 02/05/21 1045    ZOLMitriptan (ZOMIG) tablet 2.5 mg  2.5 mg Oral PRN Sixto Tate MD   2.5 mg at 02/05/21 0716    sodium chloride (NS) flush 5-40 mL  5-40 mL IntraVENous Q8H Sixto Tate MD   10 mL at 02/05/21 1312    sodium chloride (NS) flush 5-40 mL  5-40 mL IntraVENous PRN Sixto Tate MD   10 mL at 02/05/21 1048    acetaminophen (TYLENOL) tablet 650 mg  650 mg Oral Q6H PRN Sixto Tate MD   650 mg at 02/05/21 1105    Or    acetaminophen (TYLENOL) suppository 650 mg  650 mg Rectal Q6H PRN Sixto Tate MD        polyethylene glycol (MIRALAX) packet 17 g  17 g Oral DAILY PRN Sixto Tate MD        promethazine (PHENERGAN) tablet 12.5 mg  12.5 mg Oral Q6H PRN Sixto Tate MD        Or    ondansetron (ZOFRAN) injection 4 mg  4 mg IntraVENous Q6H PRN Sixto Tate MD            No Known Allergies    Review of Systems:  A comprehensive review of systems was negative except for that written in the History of Present Illness. Objective:     Vitals:    02/04/21 2240 02/04/21 2334 02/05/21 0335 02/05/21 0917   BP: (!) 145/90 (!) 147/101 (!) 142/80 128/89   Pulse: 77 78 77 72   Resp: 20 16 18 16   Temp:  98.3 °F (36.8 °C) 98 °F (36.7 °C) 97.8 °F (36.6 °C)   SpO2: 99% 98% 97% 97%        Neurologic Exam:  Mental Status:  Alert and oriented x 4. Flat affect, appears depressed    Language:    Normal fluency, repetition, comprehension and naming. Cranial Nerves:   Pupils equal, round and reactive to light. Visual fields full to confrontation. Extraocular movements intact. Facial sensation intact V1 - V3. Full facial strength, no asymmetry. Hearing intact bilaterally. No dysarthria. Tongue protrudes to midline, palate elevates symmetrically. Shoulder shrug 5/5 bilaterally. Motor:    No pronator drift. Bulk and tone normal.      5/5 power in all extremities proximally and distally. No involuntary movements.     Sensation:    Sensation intact throughout to light touch    Coordination & Gait: Deferred      Imaging:    No imaging to review. Assessment:     Hospital Problems  Date Reviewed: 2/4/2021          Codes Class Noted POA    * (Principal) Epistaxis ICD-10-CM: R04.0  ICD-9-CM: 784.7  2/4/2021 Yes              Plan:     Mr. Zeina Jimenez is a 80-year-old male with a 2-month history of severe migrainous headache which is primarily on the right side along with recurrent right nares nosebleeds. The patient reports having had a nasal endoscopy at the end of December at [facility] which showed no obvious source of bleeding. He has been seeing Dr. Swapnil Montoya, ENT for these ongoing nosebleeds. The patient describes the bleeds as small amount of blood loss. He presented to the ER after having 6 episodes of nosebleeding since 2 PM yesterday. His ENT was contacted who recommend consultation by our service for possible embolization. The patient has not had any further nose bleeding since he presented to the ER, his hemoglobin is stable, and when he describes having had these bleeds it is small quantities of blood loss. Unfortunately this patient is not a candidate for embolization based on the criteria that he is not having ongoing and severe epistaxis. We would like to further investigate the cause of these headaches and have therefore ordered an MRI brain and an MRA brain. The patient describes depression and anxiety associated with concern and worry over his headaches. I discussed this patient's case with his hospitalist Dr. Colt Tripp and recommended neurology consult for headache work-up as well as communicated the patient's new onset of depression and anxiety which was described to us during his evaluation. Thank you for this consult and participating in the care of this patient. I have discussed the diagnosis with the patient and the intended plan as seen in the above orders. Patient is in agreement.       Signed By: Cony Aaron NP February 5, 2021

## 2021-02-05 NOTE — ROUTINE PROCESS
TRANSFER - OUT REPORT:    Verbal report given to [facility], RN(name) on Bhaskar Santana  being transferred to (unit) for routine progression of care       Report consisted of patients Situation, Background, Assessment and   Recommendations(SBAR). Information from the following report(s) SBAR, Kardex, Intake/Output, MAR and Recent Results was reviewed with the receiving nurse. Lines:       Opportunity for questions and clarification was provided.       Patient transported with:   [facility]

## 2021-02-05 NOTE — PROGRESS NOTES
Patient admitted by my colleague Dr. Whitney Saldaña, earlier this morning  Patient presented with epistaxis  ENT consult as was IR consult has been requested for possible embolization, H&H stable this morning, repeat H&H pending, will initiate patient on empirical antibiotics, monitor

## 2021-02-05 NOTE — ED PROVIDER NOTES
This is a 72-year-old male with past medical history of unspecified headache disorder (states migraines) and recurrent epistaxis who presents the ER today for evaluation of epistaxis. According the patient, he has been having intermittent and recurrent bleeding from his nares (R>L) since late December without any precipitating injury/trauma. He reports being in the emergency department for the symptoms once before and followed up with ENT shortly after (about 2 weeks ago). Is a somewhat poor medical historian and is uncertain whether any procedures or diagnosis was made. He presents today due to ongoing chronic intermittent nosebleeds stating that he had about 6 episodes earlier today lasting anywhere from 5 minutes to 20 minutes. He states that the nosebleeds seem to be aggravated by bending forward and alleviated with direct pressure to his nares. He denies any pain, aside from his intermittent headaches that he has had for several months. He denies the use of any cocaine or intranasal drugs. He denies a history of bleeding from his gums, urinary tract, melena, and hematochezia. He also denies any dizziness, abnormal fatigue, shortness of breath, chest pain. No past medical history on file. No past surgical history on file. No family history on file.     Social History     Socioeconomic History    Marital status:      Spouse name: Not on file    Number of children: Not on file    Years of education: Not on file    Highest education level: Not on file   Occupational History    Not on file   Social Needs    Financial resource strain: Not on file    Food insecurity     Worry: Not on file     Inability: Not on file    Transportation needs     Medical: Not on file     Non-medical: Not on file   Tobacco Use    Smoking status: Never Smoker    Smokeless tobacco: Never Used   Substance and Sexual Activity    Alcohol use: Not Currently    Drug use: Never    Sexual activity: Not on file   Lifestyle    Physical activity     Days per week: Not on file     Minutes per session: Not on file    Stress: Not on file   Relationships    Social connections     Talks on phone: Not on file     Gets together: Not on file     Attends Gnosticist service: Not on file     Active member of club or organization: Not on file     Attends meetings of clubs or organizations: Not on file     Relationship status: Not on file    Intimate partner violence     Fear of current or ex partner: Not on file     Emotionally abused: Not on file     Physically abused: Not on file     Forced sexual activity: Not on file   Other Topics Concern    Not on file   Social History Narrative    Not on file         ALLERGIES: Patient has no known allergies. Review of Systems   Constitutional: Negative for fever. HENT: Positive for nosebleeds. Negative for sore throat and trouble swallowing. Eyes: Negative for visual disturbance. Respiratory: Negative for shortness of breath. Cardiovascular: Negative for palpitations. Gastrointestinal: Negative for vomiting. Genitourinary: Negative for dysuria. Musculoskeletal: Negative for myalgias. Skin: Negative for rash. Neurological: Negative for dizziness. Psychiatric/Behavioral: Negative for dysphoric mood. Vitals:    02/04/21 1941   BP: (!) 148/103   Pulse: 93   Resp: 18   Temp: 98 °F (36.7 °C)   SpO2: 99%            Physical Exam  Vitals signs and nursing note reviewed. Constitutional:       General: He is not in acute distress. Appearance: Normal appearance. He is not ill-appearing. HENT:      Head: Normocephalic and atraumatic. Nose: No nasal deformity, septal deviation, signs of injury or laceration. Right Nostril: No septal hematoma. Left Nostril: No septal hematoma. Right Sinus: Maxillary sinus tenderness present. No frontal sinus tenderness. Left Sinus: Maxillary sinus tenderness present. No frontal sinus tenderness. Comments: Dried blood visualized in both nares     Mouth/Throat:      Mouth: Mucous membranes are moist.      Pharynx: Oropharynx is clear. Eyes:      Extraocular Movements: Extraocular movements intact. Neck:      Musculoskeletal: Normal range of motion and neck supple. Cardiovascular:      Rate and Rhythm: Normal rate and regular rhythm. Pulses: Normal pulses. Heart sounds: Normal heart sounds. Pulmonary:      Effort: Pulmonary effort is normal.      Breath sounds: Normal breath sounds. Abdominal:      General: Bowel sounds are normal.      Palpations: Abdomen is soft. Musculoskeletal: Normal range of motion. Skin:     General: Skin is warm and dry. Neurological:      Mental Status: He is alert and oriented to person, place, and time. Mental status is at baseline. Psychiatric:         Mood and Affect: Mood normal.         Behavior: Behavior normal.          MDM     VITAL SIGNS:  Patient Vitals for the past 4 hrs:   Temp Pulse Resp BP SpO2   02/04/21 1941 98 °F (36.7 °C) 93 18 (!) 148/103 99 %         LABS:  No results found for this or any previous visit (from the past 6 hour(s)). IMAGING:  No orders to display         Medications During Visit:  Medications   oxymetazoline (AFRIN) 0.05 % nasal spray 2 Spray (has no administration in time range)         DECISION MAKING:  Cherylene Gip A Ezell Buckle is a 30 y.o. male who comes in as above. Discussed the case with patient's ENT doctor, Shanae Osullivan, who is very familiar with the patient. It seems he has seen the patient several times with no success in finding or treating the source of the nosebleed. Dr. Viraj Gale requested that I admit the patient to hospital medicine for IR procedure (embolization) to be completed tomorrow morning. Perfect Serve Consult for Admission  8:44 PM    ED Room Number: R38/R38  Patient Name and age:  Mel Macedo 30 y.o.  male  Working Diagnosis:   1.  Posterior epistaxis COVID-19 Suspicion:  no  Sepsis present:  no  Reassessment needed: no  Code Status:  Full Code  Readmission: no  Isolation Requirements:  no  Recommended Level of Care:  med/surg  Department:[facility] Adult ED - 21   Other: Recurrent epistaxis increasing in frequency and duration. Discussed with patient's ENT doctor who requested admission for embolization in the morning due to multiple failed outpatient attempts to control bleeding. IMPRESSION:  1.  Posterior epistaxis        DISPOSITION:  Admitted

## 2021-02-05 NOTE — PROGRESS NOTES
Transition of Care Plan   RUR- Low 9%   DISPOSITION: TBD   Transport: family by car            Reason for Admission:   Epistaxis                   RUR Score:          9%           Plan for utilizing home health:      No likely will need HH at this point    PCP: First and Last name:  Carlos Manuel [address] [place] [address]   Name of Practice:    Are you a current patient: Yes/No:    Approximate date of last visit:    Can you participate in a virtual visit with your                     Current Advanced Directive/Advance Care Plan:   Full code, no ACP is on file                         Transition of Care Plan: This CM- called and spoke with patient's wife Lane Hurt [phone] who stated that patient is independent at home. Patient lived with wife and minor child. Family will be able to transport at home. Per wife, patient is not on medications. CM will need to follow for needs.       Care Management Interventions  PCP Verified by CM: Yes([facility])  Palliative Care Criteria Met (RRAT>21 & CHF Dx)?: No  Transition of Care Consult (CM Consult): (TBD/ CM to follow)  MyChart Signup: No  Discharge Durable Medical Equipment: No  Health Maintenance Reviewed: Yes  Physical Therapy Consult: No  Occupational Therapy Consult: No  Speech Therapy Consult: No  Current Support Network: Lives with Spouse(lives in an apartment)  Discharge Location  Discharge Placement: (TBD/ CM will need to follow)  Bora Bolivar  3:36 PM

## 2021-02-05 NOTE — CONSULTS
Brief NIS Note    I reviewed the prior CT scans from 12/20/2020. Imaging shows asymmetric soft tissue density in the right posterior nasopharynx with right mastoid fluid. There is extensive sinus disease with complete opacification of the sphenoid sinus and complete erosions through the inferior sphenoid sinus. 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 MRI with and without contrast of the brain will be performed. Recommend correlation with direct inspection. Discussed with Dr. Marielle Spivey at 5:06 PM on 2/5/2021.

## 2021-02-05 NOTE — ED TRIAGE NOTES
TRIAGE NOTE:  Patient arrives with c/o nose bleed patient reports bleeding from right nare. Patient bleeding started at 2 pm and has had 6 nose bleeds. Patient also reports headache to right side. No bleeding noted in triage.

## 2021-02-05 NOTE — PROGRESS NOTES
Otolaryngology - Head & Neck Surgery Progress Note        PATIENT NAME: Fara Chavez  MRN: [id number]  DATE: 2/5/2021  ADMISSION DATE: 2/4/2021      Subjective:     No epistaxis today. He continues to c/o headache, mainly right-sided since late December. CT in late December showed partial opacification of sphenoid sinuses with some hyperdense material) as well as mucosal thickening in maxillary/OMU regions. 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 Objective:     Visit Vitals  /85 (BP 1 Location: Left upper arm, BP Patient Position: At rest)   Pulse 69   Temp 98.3 °F (36.8 °C)   Resp 18   SpO2 99%     No intake/output data recorded. Physical Exam:     General - awake, alert, NAD. Head & face - no edema or cellulitis. CN 5/7 intact. Nose - small amount of dried blood anteriorly (in vestibules) but no visible blood intranasally. No obvious mass. Oral/oropharynx - clear, palate normal.   Neck - no mass or adenopathy. 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 Given erosion into sphenoid sinus, must consider neoplastic process, possible malignancy. Plan: Will plan for nasal endoscopy with biopsy, possible endoscopic sphenoidotomy tomorrow. NPO after midnight.        Modesto Godinez MD   [phone] - Office   [phone] - Cell

## 2021-02-05 NOTE — CONSULTS
Otolaryngology - Head & Neck Surgery Consult Note        PATIENT NAME: Jen Dunn  MRN: [id number]  DATE: 2/4/2021  ADMISSION DATE: 2/4/2021      Subjective:     Patient known to me as I've seen him in the office x 2 in December and early January. Recurrent nose bleeds x 2 months. A right nasal pack placed in ER at [facility] on 12/19. Packing was removed 2 days later but no obvious source. Nasal endoscopy clear. He continues to have frequent nosebleeds that are likely from posterior source (? Right sphenopalatine). Objective:     Visit Vitals  BP (!) 148/103 (BP 1 Location: Left upper arm, BP Patient Position: Sitting)   Pulse 93   Temp 98 °F (36.7 °C)   Resp 18   SpO2 99%     No intake/output data recorded. Physical Exam:             Assessment:     Recurrent epistaxis with high suspicion for posterior source, likely right side. Case discussed with Dr. Carlos Tariq from interventional neuroradiology 3 weeks ago. He would likely benefit from embolization of sphenopalatine arteries. Plan:     Admit to medicine service. Will consult neuro IR in AM.  NPO.        Jasmina Graves MD   [phone] - Office   [phone] - Cell

## 2021-02-06 ENCOUNTER — ANESTHESIA (OUTPATIENT)
Dept: SURGERY | Age: 36
DRG: 147 | End: 2021-02-06
Payer: SUBSIDIZED

## 2021-02-06 ENCOUNTER — ANESTHESIA EVENT (OUTPATIENT)
Dept: SURGERY | Age: 36
DRG: 147 | End: 2021-02-06
Payer: SUBSIDIZED

## 2021-02-06 PROCEDURE — 36415 COLL VENOUS BLD VENIPUNCTURE: CPT

## 2021-02-06 PROCEDURE — 74011250636 HC RX REV CODE- 250/636: Performed by: SPECIALIST

## 2021-02-06 PROCEDURE — 88305 TISSUE EXAM BY PATHOLOGIST: CPT

## 2021-02-06 PROCEDURE — 74011250636 HC RX REV CODE- 250/636: Performed by: NURSE ANESTHETIST, CERTIFIED REGISTERED

## 2021-02-06 PROCEDURE — 74011250636 HC RX REV CODE- 250/636: Performed by: ANESTHESIOLOGY

## 2021-02-06 PROCEDURE — 76060000033 HC ANESTHESIA 1 TO 1.5 HR: Performed by: SPECIALIST

## 2021-02-06 PROCEDURE — 76010000149 HC OR TIME 1 TO 1.5 HR: Performed by: SPECIALIST

## 2021-02-06 PROCEDURE — 88341 IMHCHEM/IMCYTCHM EA ADD ANTB: CPT

## 2021-02-06 PROCEDURE — 76210000017 HC OR PH I REC 1.5 TO 2 HR: Performed by: SPECIALIST

## 2021-02-06 PROCEDURE — 77030013079 HC BLNKT BAIR HGGR 3M -A: Performed by: ANESTHESIOLOGY

## 2021-02-06 PROCEDURE — 77030026438 HC STYL ET INTUB CARD -A: Performed by: ANESTHESIOLOGY

## 2021-02-06 PROCEDURE — 2709999900 HC NON-CHARGEABLE SUPPLY: Performed by: SPECIALIST

## 2021-02-06 PROCEDURE — 74011250636 HC RX REV CODE- 250/636: Performed by: FAMILY MEDICINE

## 2021-02-06 PROCEDURE — 65270000029 HC RM PRIVATE

## 2021-02-06 PROCEDURE — 88342 IMHCHEM/IMCYTCHM 1ST ANTB: CPT

## 2021-02-06 PROCEDURE — 09BN8ZX EXCISION OF NASOPHARYNX, VIA NATURAL OR ARTIFICIAL OPENING ENDOSCOPIC, DIAGNOSTIC: ICD-10-PCS | Performed by: SPECIALIST

## 2021-02-06 PROCEDURE — 77030008684 HC TU ET CUF COVD -B: Performed by: ANESTHESIOLOGY

## 2021-02-06 PROCEDURE — 74011250637 HC RX REV CODE- 250/637: Performed by: SPECIALIST

## 2021-02-06 PROCEDURE — 77030014008 HC SPNG HEMSTAT J&J -C: Performed by: SPECIALIST

## 2021-02-06 PROCEDURE — 74011250637 HC RX REV CODE- 250/637: Performed by: FAMILY MEDICINE

## 2021-02-06 PROCEDURE — 88365 INSITU HYBRIDIZATION (FISH): CPT

## 2021-02-06 PROCEDURE — 74011000250 HC RX REV CODE- 250: Performed by: NURSE ANESTHETIST, CERTIFIED REGISTERED

## 2021-02-06 PROCEDURE — 74011250637 HC RX REV CODE- 250/637: Performed by: INTERNAL MEDICINE

## 2021-02-06 RX ORDER — ONDANSETRON 2 MG/ML
INJECTION INTRAMUSCULAR; INTRAVENOUS
Status: DISPENSED
Start: 2021-02-06 | End: 2021-02-07

## 2021-02-06 RX ORDER — HYDROMORPHONE HYDROCHLORIDE 1 MG/ML
INJECTION, SOLUTION INTRAMUSCULAR; INTRAVENOUS; SUBCUTANEOUS
Status: DISPENSED
Start: 2021-02-06 | End: 2021-02-07

## 2021-02-06 RX ORDER — EPINEPHRINE 1 MG/ML
INJECTION, SOLUTION, CONCENTRATE INTRAVENOUS AS NEEDED
Status: DISCONTINUED | OUTPATIENT
Start: 2021-02-06 | End: 2021-02-06 | Stop reason: HOSPADM

## 2021-02-06 RX ORDER — FENTANYL CITRATE 50 UG/ML
INJECTION, SOLUTION INTRAMUSCULAR; INTRAVENOUS AS NEEDED
Status: DISCONTINUED | OUTPATIENT
Start: 2021-02-06 | End: 2021-02-06 | Stop reason: HOSPADM

## 2021-02-06 RX ORDER — MORPHINE SULFATE 2 MG/ML
2 INJECTION, SOLUTION INTRAMUSCULAR; INTRAVENOUS
Status: DISCONTINUED | OUTPATIENT
Start: 2021-02-06 | End: 2021-02-06 | Stop reason: HOSPADM

## 2021-02-06 RX ORDER — DEXAMETHASONE SODIUM PHOSPHATE 4 MG/ML
INJECTION, SOLUTION INTRA-ARTICULAR; INTRALESIONAL; INTRAMUSCULAR; INTRAVENOUS; SOFT TISSUE AS NEEDED
Status: DISCONTINUED | OUTPATIENT
Start: 2021-02-06 | End: 2021-02-06 | Stop reason: HOSPADM

## 2021-02-06 RX ORDER — MIDAZOLAM HYDROCHLORIDE 1 MG/ML
INJECTION, SOLUTION INTRAMUSCULAR; INTRAVENOUS AS NEEDED
Status: DISCONTINUED | OUTPATIENT
Start: 2021-02-06 | End: 2021-02-06 | Stop reason: HOSPADM

## 2021-02-06 RX ORDER — ONDANSETRON 2 MG/ML
4 INJECTION INTRAMUSCULAR; INTRAVENOUS ONCE
Status: COMPLETED | OUTPATIENT
Start: 2021-02-06 | End: 2021-02-06

## 2021-02-06 RX ORDER — FENTANYL CITRATE 50 UG/ML
INJECTION, SOLUTION INTRAMUSCULAR; INTRAVENOUS
Status: DISPENSED
Start: 2021-02-06 | End: 2021-02-07

## 2021-02-06 RX ORDER — ROCURONIUM BROMIDE 10 MG/ML
INJECTION, SOLUTION INTRAVENOUS AS NEEDED
Status: DISCONTINUED | OUTPATIENT
Start: 2021-02-06 | End: 2021-02-06 | Stop reason: HOSPADM

## 2021-02-06 RX ORDER — SODIUM CHLORIDE, SODIUM LACTATE, POTASSIUM CHLORIDE, CALCIUM CHLORIDE 600; 310; 30; 20 MG/100ML; MG/100ML; MG/100ML; MG/100ML
INJECTION, SOLUTION INTRAVENOUS
Status: DISCONTINUED | OUTPATIENT
Start: 2021-02-06 | End: 2021-02-06 | Stop reason: HOSPADM

## 2021-02-06 RX ORDER — DEXMEDETOMIDINE HYDROCHLORIDE 100 UG/ML
INJECTION, SOLUTION INTRAVENOUS AS NEEDED
Status: DISCONTINUED | OUTPATIENT
Start: 2021-02-06 | End: 2021-02-06 | Stop reason: HOSPADM

## 2021-02-06 RX ORDER — ONDANSETRON 2 MG/ML
INJECTION INTRAMUSCULAR; INTRAVENOUS AS NEEDED
Status: DISCONTINUED | OUTPATIENT
Start: 2021-02-06 | End: 2021-02-06 | Stop reason: HOSPADM

## 2021-02-06 RX ORDER — SUCCINYLCHOLINE CHLORIDE 20 MG/ML
INJECTION INTRAMUSCULAR; INTRAVENOUS AS NEEDED
Status: DISCONTINUED | OUTPATIENT
Start: 2021-02-06 | End: 2021-02-06 | Stop reason: HOSPADM

## 2021-02-06 RX ORDER — PROPOFOL 10 MG/ML
INJECTION, EMULSION INTRAVENOUS AS NEEDED
Status: DISCONTINUED | OUTPATIENT
Start: 2021-02-06 | End: 2021-02-06 | Stop reason: HOSPADM

## 2021-02-06 RX ORDER — HYDROMORPHONE HYDROCHLORIDE 1 MG/ML
0.5 INJECTION, SOLUTION INTRAMUSCULAR; INTRAVENOUS; SUBCUTANEOUS
Status: DISCONTINUED | OUTPATIENT
Start: 2021-02-06 | End: 2021-02-06 | Stop reason: HOSPADM

## 2021-02-06 RX ORDER — HYDROMORPHONE HYDROCHLORIDE 1 MG/ML
0.5 INJECTION, SOLUTION INTRAMUSCULAR; INTRAVENOUS; SUBCUTANEOUS
Status: DISCONTINUED | OUTPATIENT
Start: 2021-02-06 | End: 2021-02-09

## 2021-02-06 RX ORDER — FENTANYL CITRATE 50 UG/ML
25 INJECTION, SOLUTION INTRAMUSCULAR; INTRAVENOUS
Status: DISCONTINUED | OUTPATIENT
Start: 2021-02-06 | End: 2021-02-06 | Stop reason: HOSPADM

## 2021-02-06 RX ADMIN — MIDAZOLAM 2 MG: 1 INJECTION INTRAMUSCULAR; INTRAVENOUS at 13:05

## 2021-02-06 RX ADMIN — PROPOFOL 200 MG: 10 INJECTION, EMULSION INTRAVENOUS at 13:13

## 2021-02-06 RX ADMIN — FENTANYL CITRATE 25 MCG: 50 INJECTION, SOLUTION INTRAMUSCULAR; INTRAVENOUS at 14:56

## 2021-02-06 RX ADMIN — FENTANYL CITRATE 25 MCG: 50 INJECTION, SOLUTION INTRAMUSCULAR; INTRAVENOUS at 13:13

## 2021-02-06 RX ADMIN — VERAPAMIL HYDROCHLORIDE 80 MG: 80 TABLET, FILM COATED ORAL at 21:10

## 2021-02-06 RX ADMIN — BUTALBITAL, ACETAMINOPHEN, AND CAFFEINE 1 TABLET: 50; 325; 40 TABLET ORAL at 08:20

## 2021-02-06 RX ADMIN — VERAPAMIL HYDROCHLORIDE 80 MG: 80 TABLET, FILM COATED ORAL at 16:27

## 2021-02-06 RX ADMIN — FENTANYL CITRATE 25 MCG: 50 INJECTION, SOLUTION INTRAMUSCULAR; INTRAVENOUS at 15:15

## 2021-02-06 RX ADMIN — DEXAMETHASONE SODIUM PHOSPHATE 8 MG: 4 INJECTION, SOLUTION INTRAMUSCULAR; INTRAVENOUS at 13:19

## 2021-02-06 RX ADMIN — TOPIRAMATE 50 MG: 25 TABLET, FILM COATED ORAL at 08:09

## 2021-02-06 RX ADMIN — ONDANSETRON 4 MG: 2 INJECTION INTRAMUSCULAR; INTRAVENOUS at 15:02

## 2021-02-06 RX ADMIN — SODIUM CHLORIDE, POTASSIUM CHLORIDE, SODIUM LACTATE AND CALCIUM CHLORIDE: 600; 310; 30; 20 INJECTION, SOLUTION INTRAVENOUS at 12:50

## 2021-02-06 RX ADMIN — SUCCINYLCHOLINE CHLORIDE 120 MG: 20 INJECTION, SOLUTION INTRAMUSCULAR; INTRAVENOUS at 13:14

## 2021-02-06 RX ADMIN — HYDROMORPHONE HYDROCHLORIDE 0.5 MG: 1 INJECTION, SOLUTION INTRAMUSCULAR; INTRAVENOUS; SUBCUTANEOUS at 15:00

## 2021-02-06 RX ADMIN — BUTALBITAL, ACETAMINOPHEN, AND CAFFEINE 1 TABLET: 50; 325; 40 TABLET ORAL at 17:11

## 2021-02-06 RX ADMIN — Medication 10 ML: at 05:27

## 2021-02-06 RX ADMIN — FENTANYL CITRATE 75 MCG: 50 INJECTION, SOLUTION INTRAMUSCULAR; INTRAVENOUS at 13:22

## 2021-02-06 RX ADMIN — BUTALBITAL, ACETAMINOPHEN, AND CAFFEINE 1 TABLET: 50; 325; 40 TABLET ORAL at 03:02

## 2021-02-06 RX ADMIN — FENTANYL CITRATE 50 MCG: 50 INJECTION, SOLUTION INTRAMUSCULAR; INTRAVENOUS at 13:47

## 2021-02-06 RX ADMIN — BUTALBITAL, ACETAMINOPHEN, AND CAFFEINE 1 TABLET: 50; 325; 40 TABLET ORAL at 21:14

## 2021-02-06 RX ADMIN — DEXMEDETOMIDINE HYDROCHLORIDE 10 MCG: 100 INJECTION, SOLUTION, CONCENTRATE INTRAVENOUS at 13:50

## 2021-02-06 RX ADMIN — CLINDAMYCIN IN 5 PERCENT DEXTROSE 900 MG: 18 INJECTION, SOLUTION INTRAVENOUS at 09:38

## 2021-02-06 RX ADMIN — ONDANSETRON HYDROCHLORIDE 4 MG: 2 INJECTION, SOLUTION INTRAMUSCULAR; INTRAVENOUS at 13:19

## 2021-02-06 RX ADMIN — HYDROMORPHONE HYDROCHLORIDE 0.5 MG: 1 INJECTION, SOLUTION INTRAMUSCULAR; INTRAVENOUS; SUBCUTANEOUS at 19:20

## 2021-02-06 RX ADMIN — Medication 10 ML: at 16:28

## 2021-02-06 RX ADMIN — Medication 10 ML: at 21:11

## 2021-02-06 RX ADMIN — CLINDAMYCIN IN 5 PERCENT DEXTROSE 900 MG: 18 INJECTION, SOLUTION INTRAVENOUS at 02:58

## 2021-02-06 RX ADMIN — PROPOFOL 100 MG: 10 INJECTION, EMULSION INTRAVENOUS at 13:22

## 2021-02-06 RX ADMIN — DEXMEDETOMIDINE HYDROCHLORIDE 10 MCG: 100 INJECTION, SOLUTION, CONCENTRATE INTRAVENOUS at 14:22

## 2021-02-06 RX ADMIN — HYDROMORPHONE HYDROCHLORIDE 0.5 MG: 1 INJECTION, SOLUTION INTRAMUSCULAR; INTRAVENOUS; SUBCUTANEOUS at 14:43

## 2021-02-06 RX ADMIN — FENTANYL CITRATE 25 MCG: 50 INJECTION, SOLUTION INTRAMUSCULAR; INTRAVENOUS at 14:45

## 2021-02-06 RX ADMIN — ACETAMINOPHEN 650 MG: 325 TABLET ORAL at 03:02

## 2021-02-06 RX ADMIN — FENTANYL CITRATE 25 MCG: 50 INJECTION, SOLUTION INTRAMUSCULAR; INTRAVENOUS at 15:05

## 2021-02-06 RX ADMIN — ROCURONIUM BROMIDE 10 MG: 10 SOLUTION INTRAVENOUS at 13:13

## 2021-02-06 RX ADMIN — ROCURONIUM BROMIDE 30 MG: 10 SOLUTION INTRAVENOUS at 13:22

## 2021-02-06 RX ADMIN — FENTANYL CITRATE 50 MCG: 50 INJECTION, SOLUTION INTRAMUSCULAR; INTRAVENOUS at 13:45

## 2021-02-06 RX ADMIN — TOPIRAMATE 50 MG: 25 TABLET, FILM COATED ORAL at 17:11

## 2021-02-06 NOTE — PROGRESS NOTES
Per Emry Concha, CT Tech, pt began itching and having redness on his chest after receiving the IV contrast. The floor RN was notified and stated she will notified the physician to enter the allergy into the system.

## 2021-02-06 NOTE — PROGRESS NOTES
Reviewed chart for evidence of physician discussing biopsey and procedure to be done, could not find anything documented. This RN will let PACU staff and md performing procedure obtain consent.

## 2021-02-06 NOTE — PROGRESS NOTES
Otolaryngology - Head & Neck Surgery Progress Note        PATIENT NAME: Tommy Dooley  MRN: [id number]  DATE: 2/6/2021  ADMISSION DATE: 2/4/2021      Subjective:   Results of MRI and CTA reviewed. No further bleeding. Still with severe headache pain, mainly right-sided. Objective:     Visit Vitals  /82   Pulse 100   Temp 98 °F (36.7 °C)   Resp 14   SpO2 97%     02/04 1901 - 02/06 0700  In: 480 [P.O.:480]  Out: 400 [Urine:400]    Physical Exam:     NAD, voice normal.  Head & face - no edema, proptosis. Nose - clear anteriorly. No blood. 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 Plan: Will proceed with endoscopic nasopharyngeal biopsy. Risks discussed, including bleeding and infection. Consent obtained.        Marianne Griffiths MD   [phone] - Office   [phone] - Cell

## 2021-02-06 NOTE — BRIEF OP NOTE
Brief Postoperative Note    Patient: Joann Patterson  YOB: 1985  MRN: [id number]    Date of Procedure: 2/6/2021     Pre-Op Diagnosis: Nasopharyngeal mass, recurrent epistaxis and severe headaches. Post-Op Diagnosis: Same as preoperative diagnosis. Procedure(s):  ENDOSCOPIC NASOPHARENGEAL BIOPSIES    Surgeon(s):  Becky Ramirez MD    Surgical Assistant: None    Anesthesia: General     Estimated Blood Loss (mL): less than 50     Complications: None    Specimens:   ID Type Source Tests Collected by Time Destination   1 : RIGHT NASOPHARYNGEAL MASS Fresh Nasopharyngeal  Becky Ramirez MD 2/6/2021 1329 Pathology   2 : RIGHT NASOPHARYNGEAL MASS, R/O LYMPHOMA Fresh Nasopharyngeal  Becky Ramirez MD 2/6/2021 1330 Pathology        Implants: * No implants in log *    Drains: * No LDAs found *    Findings: Large nasopharyngeal mass, predominantly right-sided. Biopsies obtained, results pending.      Electronically Signed by Varsha Farias MD on 2/6/2021 at 2:20 PM

## 2021-02-06 NOTE — ANESTHESIA POSTPROCEDURE EVALUATION
Procedure(s):  ENDOSCOPIC NASOPHARENGEAL BIOPSY  ENDO SINUS TRAY ESSENTIAL  REQ TF.    general    <BSHSIANPOST>    INITIAL Post-op Vital signs:   Vitals Value Taken Time   /93 02/06/21 1432   Temp 36.8 °C (98.2 °F) 02/06/21 1432   Pulse 79 02/06/21 1434   Resp 17 02/06/21 1434   SpO2 97 % 02/06/21 1434   Vitals shown include unvalidated device data.

## 2021-02-06 NOTE — PERIOP NOTES
TRANSFER - OUT REPORT:    Verbal report given to Porsche (name) on Adrianne Obrien  being transferred to [facility](unit) for routine post - op       Report consisted of patients Situation, Background, Assessment and   Recommendations(SBAR). Time Pre op antibiotic MPAR  Anesthesia Stop time: 0699  Jones Present on Transfer to floor:N  Order for Jones on Chart:N  Discharge Prescriptions with Chart:N/A    Information from the following report(s) SBAR, OR Summary, Procedure Summary, Intake/Output and MAR was reviewed with the receiving nurse. Opportunity for questions and clarification was provided. Is the patient on 02? NO       L/Min RA       Other N/A    Is the patient on a monitor? NO    Is the nurse transporting with the patient? NO    Surgical Waiting Area notified of patient's transfer from PACU? NO (Wife and Friend are @ home)      The following personal items collected during your admission accompanied patient upon transfer:   Dental Appliance: Dental Appliances: None  Vision:    Hearing Aid:    Jewelry: Jewelry: None  Clothing: Clothing: Shirt, Pants  Other Valuables:  Other Valuables: Cell Phone, [facility] 37 sent to safe:

## 2021-02-06 NOTE — PERIOP NOTES
Wife called at patients request to obtain phone number of [phone]. speaking friend. Informed patient that must use interpretor for all medical procedures/signings, consent and information sharing and not friend via phone. Will review this again with team and interpretor when surgeon arrives. Patient wanted friends name on chart demographics as a contact.

## 2021-02-06 NOTE — PROGRESS NOTES
[place]-Head & Neck Surgery      Endoscopic biopsied of nasopharyngeal mass obtained today. I spoke with his wife (via ) following surgery. We will await path results. 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 Consider hem/onc consult ASAP. I d/c'd clindamycin as no signs of infection.      Jasmina Graves MD    [facility] [phone]

## 2021-02-06 NOTE — PERIOP NOTES
Interpretor #[id number] utilized for explaining procedure, consent, OR procedures and medications. Patient asked questions and was comfortable with signing consent on [place] and Citizen of [place] consents. Dr Michelle Ahuja will call wife using interpretor postoperatively.

## 2021-02-06 NOTE — PROGRESS NOTES
Brief NIS Note    MRI and CTA demonstrate large right nasopharyngeal mass with sphenoid infiltration and narrowing of right ICA. No evidence of active extravasation. Patient care per ENT and Hospitalist teams. NIS available as needed. Please call NIS at [phone] for any acute bleeding events.

## 2021-02-07 ENCOUNTER — APPOINTMENT (OUTPATIENT)
Dept: MRI IMAGING | Age: 36
DRG: 147 | End: 2021-02-07
Attending: INTERNAL MEDICINE
Payer: SUBSIDIZED

## 2021-02-07 LAB
ALBUMIN SERPL-MCNC: 3.6 G/DL (ref 3.5–5)
ANION GAP SERPL CALC-SCNC: 10 MMOL/L (ref 5–15)
BUN SERPL-MCNC: 15 MG/DL (ref 6–20)
BUN/CREAT SERPL: 16 (ref 12–20)
CALCIUM SERPL-MCNC: 9.2 MG/DL (ref 8.5–10.1)
CHLORIDE SERPL-SCNC: 106 MMOL/L (ref 97–108)
CO2 SERPL-SCNC: 22 MMOL/L (ref 21–32)
CREAT SERPL-MCNC: 0.94 MG/DL (ref 0.7–1.3)
GLUCOSE SERPL-MCNC: 98 MG/DL (ref 65–100)
HBV SURFACE AG SER QL: <0.1 INDEX
HBV SURFACE AG SER QL: NEGATIVE
HCV AB SERPL QL IA: NONREACTIVE
HCV COMMENT,HCGAC: NORMAL
HIV 1+2 AB+HIV1 P24 AG SERPL QL IA: NONREACTIVE
HIV12 RESULT COMMENT, HHIVC: NORMAL
LDH SERPL L TO P-CCNC: 158 U/L (ref 85–241)
PHOSPHATE SERPL-MCNC: 3.3 MG/DL (ref 2.6–4.7)
POTASSIUM SERPL-SCNC: 3.7 MMOL/L (ref 3.5–5.1)
SODIUM SERPL-SCNC: 138 MMOL/L (ref 136–145)

## 2021-02-07 PROCEDURE — A9575 INJ GADOTERATE MEGLUMI 0.1ML: HCPCS | Performed by: INTERNAL MEDICINE

## 2021-02-07 PROCEDURE — 83615 LACTATE (LD) (LDH) ENZYME: CPT

## 2021-02-07 PROCEDURE — 87389 HIV-1 AG W/HIV-1&-2 AB AG IA: CPT

## 2021-02-07 PROCEDURE — 99255 IP/OBS CONSLTJ NEW/EST HI 80: CPT | Performed by: INTERNAL MEDICINE

## 2021-02-07 PROCEDURE — 74011250637 HC RX REV CODE- 250/637: Performed by: SPECIALIST

## 2021-02-07 PROCEDURE — 70543 MRI ORBT/FAC/NCK W/O &W/DYE: CPT

## 2021-02-07 PROCEDURE — 80069 RENAL FUNCTION PANEL: CPT

## 2021-02-07 PROCEDURE — 74011250636 HC RX REV CODE- 250/636: Performed by: INTERNAL MEDICINE

## 2021-02-07 PROCEDURE — 74011250636 HC RX REV CODE- 250/636: Performed by: SPECIALIST

## 2021-02-07 PROCEDURE — 86803 HEPATITIS C AB TEST: CPT

## 2021-02-07 PROCEDURE — 36415 COLL VENOUS BLD VENIPUNCTURE: CPT

## 2021-02-07 PROCEDURE — 87799 DETECT AGENT NOS DNA QUANT: CPT

## 2021-02-07 PROCEDURE — 87340 HEPATITIS B SURFACE AG IA: CPT

## 2021-02-07 PROCEDURE — 65270000029 HC RM PRIVATE

## 2021-02-07 RX ORDER — SODIUM CHLORIDE 0.9 % (FLUSH) 0.9 %
10 SYRINGE (ML) INJECTION
Status: DISPENSED | OUTPATIENT
Start: 2021-02-07 | End: 2021-02-08

## 2021-02-07 RX ORDER — GADOTERATE MEGLUMINE 376.9 MG/ML
18 INJECTION INTRAVENOUS
Status: COMPLETED | OUTPATIENT
Start: 2021-02-07 | End: 2021-02-07

## 2021-02-07 RX ADMIN — BUTALBITAL, ACETAMINOPHEN, AND CAFFEINE 1 TABLET: 50; 325; 40 TABLET ORAL at 11:23

## 2021-02-07 RX ADMIN — Medication 10 ML: at 20:10

## 2021-02-07 RX ADMIN — HYDROMORPHONE HYDROCHLORIDE 0.5 MG: 1 INJECTION, SOLUTION INTRAMUSCULAR; INTRAVENOUS; SUBCUTANEOUS at 01:17

## 2021-02-07 RX ADMIN — HYDROMORPHONE HYDROCHLORIDE 0.5 MG: 1 INJECTION, SOLUTION INTRAMUSCULAR; INTRAVENOUS; SUBCUTANEOUS at 13:47

## 2021-02-07 RX ADMIN — BUTALBITAL, ACETAMINOPHEN, AND CAFFEINE 1 TABLET: 50; 325; 40 TABLET ORAL at 04:04

## 2021-02-07 RX ADMIN — Medication 10 ML: at 13:51

## 2021-02-07 RX ADMIN — VERAPAMIL HYDROCHLORIDE 80 MG: 80 TABLET, FILM COATED ORAL at 08:10

## 2021-02-07 RX ADMIN — VERAPAMIL HYDROCHLORIDE 80 MG: 80 TABLET, FILM COATED ORAL at 17:04

## 2021-02-07 RX ADMIN — VERAPAMIL HYDROCHLORIDE 80 MG: 80 TABLET, FILM COATED ORAL at 20:09

## 2021-02-07 RX ADMIN — Medication 10 ML: at 06:00

## 2021-02-07 RX ADMIN — GADOTERATE MEGLUMINE 18 ML: 376.9 INJECTION INTRAVENOUS at 15:07

## 2021-02-07 RX ADMIN — TOPIRAMATE 50 MG: 25 TABLET, FILM COATED ORAL at 08:10

## 2021-02-07 RX ADMIN — TOPIRAMATE 50 MG: 25 TABLET, FILM COATED ORAL at 17:04

## 2021-02-07 RX ADMIN — BUTALBITAL, ACETAMINOPHEN, AND CAFFEINE 1 TABLET: 50; 325; 40 TABLET ORAL at 20:09

## 2021-02-07 RX ADMIN — HYDROMORPHONE HYDROCHLORIDE 0.5 MG: 1 INJECTION, SOLUTION INTRAMUSCULAR; INTRAVENOUS; SUBCUTANEOUS at 08:10

## 2021-02-07 NOTE — CONSULTS
[facility] [address] [place], [id number] [place] [address] Road, [address] [place] [address]  W: [phone]  F: [phone]    Reason for consult:   Mandie Cunningham is a 30 y.o. male who is seen in consultation at the request of Dr. Kassidy Giron for evaluation of nasopharyngeal mass    This is a Telemedicine consult/ Synchronous audio visual    Treatment History:   · 2/6/2021: Endoscopic biopsy of the nasopharyngeal mass ( R)     History of Present Illness:   Patient is a 30 y.o. male with no significant PMH seen for above  He was seen by Dr. Rebecca Watts with ENT in December with c/o recurrent epistaxis x 2 months requiring ER visits and packing. His prior nasal endoscopy was clear. He was admitted on 2/4/2021 for recurrent HAs and epistaxis which lasted several minutes with improvement on pressure. He had a normal CBC for the most part and had no h/o bleeding growing up. MRI brain 2/5/2021 was obtained and showed a 4.2 x 4 cm posterior nasopharyngeal mass Extending into the sphenoid sinus with narrowing f the R distal internal carotid artery. CTA did not show active extravasation, but showed bilateral cervical adenopathy. He had a biopsy of the NP mass 2/6/2021 and pathology is pending. Oncology is consulted for further management. Patient states that he has ongoing HA, he has some decrease in visual acuity, he has no fevers, sweats, chills, appetite changes, rash. Can swallow, has no emesis, denies hearing loss. History reviewed. No pertinent past medical history. No past surgical history on file. Social History     Tobacco Use    Smoking status: Never Smoker    Smokeless tobacco: Never Used   Substance Use Topics    Alcohol use: Not Currently      No family history on file.   Current Facility-Administered Medications   Medication Dose Route Frequency    HYDROmorphone (PF) (DILAUDID) injection 0.5 mg  0.5 mg IntraVENous Q4H PRN    butalbital-acetaminophen-caffeine (FIORICET, ESGIC) -40 mg per tablet 1 Tab  1 Tab Oral Q4H PRN    oxymetazoline (AFRIN) 0.05 % nasal spray 2 Spray  2 Spray Both Nostrils BID PRN    topiramate (TOPAMAX) tablet 50 mg  50 mg Oral BID    verapamiL (CALAN) tablet 80 mg  80 mg Oral TID    ZOLMitriptan (ZOMIG) tablet 2.5 mg  2.5 mg Oral PRN    sodium chloride (NS) flush 5-40 mL  5-40 mL IntraVENous Q8H    sodium chloride (NS) flush 5-40 mL  5-40 mL IntraVENous PRN    acetaminophen (TYLENOL) tablet 650 mg  650 mg Oral Q6H PRN    Or    acetaminophen (TYLENOL) suppository 650 mg  650 mg Rectal Q6H PRN    polyethylene glycol (MIRALAX) packet 17 g  17 g Oral DAILY PRN    promethazine (PHENERGAN) tablet 12.5 mg  12.5 mg Oral Q6H PRN    Or    ondansetron (ZOFRAN) injection 4 mg  4 mg IntraVENous Q6H PRN      Allergies   Allergen Reactions    Iodinated Contrast Media Itching        Review of Systems: A complete review of systems was obtained, negative except as described above. Physical Exam:     Visit Vitals  BP (!) 143/94 (BP 1 Location: Left upper arm, BP Patient Position: At rest)   Pulse 83   Temp 98.3 °F (36.8 °C)   Resp 17   SpO2 95%       Due to this being a TeleHealth evaluation, many elements of the physical examination are unable to be assessed. Constitutional: Appears well-developed and well-nourished in no apparent distress   Mental status: Alert and awake, Oriented to person/place/time, Able to follow commands  Eyes: EOM normal, Sclera normal, No visible ocular discharge  HENT: Normocephalic, atraumatic  Pulmonary/Chest: Respiratory effort normal, No visualized signs of difficulty breathing or respiratory distress   Musculoskeletal: Normal range of motion of neck  Neurological: No facial asymmetry (Cranial nerve 7 motor function), No gaze palsy  Skin: No significant exanthematous lesions or discoloration noted on facial skin  Psychiatric: Normal affect, normal judgment/insight.  No hallucinations     Results:     Lab Results   Component Value Date/Time    WBC 12.3 (H) 02/05/2021 03:50 AM    HGB 13.6 02/05/2021 03:50 AM    HCT 41.6 02/05/2021 03:50 AM    PLATELET 585 78/90/3820 03:50 AM    MCV 81.7 02/05/2021 03:50 AM    ABS. NEUTROPHILS 8.8 (H) 02/05/2021 03:50 AM     Lab Results   Component Value Date/Time    Sodium 140 02/05/2021 03:50 AM    Potassium 4.0 02/05/2021 03:50 AM    Chloride 108 02/05/2021 03:50 AM    CO2 21 02/05/2021 03:50 AM    Glucose 96 02/05/2021 03:50 AM    BUN 11 02/05/2021 03:50 AM    Creatinine 0.81 02/05/2021 03:50 AM    GFR est AA >60 02/05/2021 03:50 AM    GFR est non-AA >60 02/05/2021 03:50 AM    Calcium 10.5 (H) 02/05/2021 03:50 AM     Lab Results   Component Value Date/Time    Bilirubin, total 0.4 02/05/2021 03:50 AM    ALT (SGPT) 36 02/05/2021 03:50 AM    Alk. phosphatase 96 02/05/2021 03:50 AM    Protein, total 8.1 02/05/2021 03:50 AM    Albumin 4.0 02/05/2021 03:50 AM    Globulin 4.1 (H) 02/05/2021 03:50 AM         Records reviewed and summarized above. Pathology report(s) reviewed above. Radiology report(s) reviewed above. CTA neck    IMPRESSION  1. Attenuation of the right internal carotid artery as it passes through the  right nasopharyngeal mass but no evidence of active extravasation or  intraluminal thrombus. 2.  Bilateral cervical lymphadenopathy in level 2 and level 5B. 3.  Right posterior nasopharyngeal mass with sphenoid sinus involvement and  diffuse sinus disease    MRI brain    IMPRESSION  1. 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 2.  Diffuse sinus mucosal thickening. 3.  Mass related narrowing of the right distal cervical internal carotid artery  but no large vessel occlusion. 4.  No evidence of infarct.       Assessment:   1) Nasopharyngeal mass- R     4.2 x 4 cm, extends into the sphenoid sinus, bilateral cervical nodes.  [place] but does not involve carotid artery    Suspicious for NP carcinoma  Extranodal NK cell Lymphoma is also in there differential     If this is Nasopharyngeal carcinoma he has at least T3N2MX-Stage III disease which is treated with ChemoRT followed by chemotherapy. Pathology is awaited  In the meantime will obtain staging scans as below. If those show no distant disease will get OP PET CT for definitive assessment  Also would get an MRI of face and orbit for clear understanding of CN involvement    2) Epistaxis  Secondary to the mass  CTA without active extravasation  At high risk for catastrophic hemorrhage  Management per ENT for now  Will consult Rad Onc    3) Headaches  Secondary to the nasopharyngeal mass with splenoid sinus involvement  He is on a regimen for migraines, dilaudid and decongestents    4) Nausea  Zofran prn    Plan:     · MRI skull base to clavicle ( bony erosion/ CN Assesment  · CT chest abdomen and pelvis  · EBV DNA PCR, LD, HIV, Hep panel  · Await pathology  · Rad Onc consult  · Consult palliative care for symptom management  · Discussed with Dr. Hunter Reynolds     I appreciate the opportunity to participate in Mr. Marvin Mendoza's care. Signed By: Rae Lopez MD      The patient was seen by synchronous (real-time) audio-video technology. I was in the office while conducting this encounter. The patient was at [facility] inpatient unit    Pursuant to the emergency declaration under the [facility], [address] [place] [address] authority and the [employer] General Act, this Virtual Visit was conducted.

## 2021-02-07 NOTE — PROGRESS NOTES
[address] [facility]  Hospitalist Group                                                                                          Hospitalist Progress Note  Aaron Adam MD  Answering service: [phone] OR [phone] from in house phone        Date of Service:  2021  NAME:  Gladys Wade  :  1985  MRN:  [id number]      Admission Summary: This is a 67-year-old man with a past medical history significant for hypertension, migraine headache, was in his usual state of health until the day of presentation at the emergency room when the patient developed nosebleed from the right nostril. The patient's nosebleed started at about 02:00 p.m. and has had 6 episodes since then, associated with headache. The headache is located on the right side of the head, described as throbbing. No associated blurring of vision. The nosebleed has been going on intermittently for about 2 months. According to the patient, this is usually aggravated by bending forward with a slight relief when the patient puts direct pressure on his nares. Since the onset of the nosebleed about 2 months ago, the patient has been seen by the ENT surgeon. He was at [facility] on 2020. The nose was packed and he was seen by ENT surgeon; shortly after that, the pack was removed. The patient underwent nasal endoscopy without any obvious source of bleeding. When the patient arrived at the emergency room today, the patient was no longer having active bleeding, but since this is intermittent and recurrent, the patient's ENT surgeon was consulted. The ENT surgeon advised consultation with interventional radiologist for embolization and the patient was referred to the hospitalist service for evaluation for admission. No record of prior admission to this hospital.  Interval history / Subjective:   Patient admitted to oncology floor where he had a biopsy of the nasopharyngeal mass today.  He is awaiting pathology     Assessment & Plan:     Patient is a pleasant 30 YOHM admitted for epistaxis. Interview done via language line    1. Right epistaxis. -- Went to OR today with ENT  -- Biopsy pending, concerning for nasopharyngeal carcinoma  -- Will consult oncology  -- Morphine for pain  -- We appreciate ENT help    2. Hypertension. We will resume preadmission medication. We will monitor the patient's blood pressure closely. 3.  Migraine headache. We will continue with preadmission medication. Code status: Full  DVT prophylaxis: Holding due to bleed risk    Care Plan discussed with: Patient/Family  Anticipated Disposition: Home w/Family  Anticipated Discharge: Less than 24 hours     Hospital Problems  Date Reviewed: 2/4/2021          Codes Class Noted POA    * (Principal) Epistaxis ICD-10-CM: R04.0  ICD-9-CM: 784.7  2/4/2021 Yes                Review of Systems:   A comprehensive review of systems was negative except for that written in the HPI. Vital Signs:    Last 24hrs VS reviewed since prior progress note. Most recent are:  Visit Vitals  /87 (BP 1 Location: Left upper arm, BP Patient Position: At rest)   Pulse 97   Temp 99.1 °F (37.3 °C)   Resp 17   SpO2 97%         Intake/Output Summary (Last 24 hours) at 2/6/2021 1915  Last data filed at 2/6/2021 1909  Gross per 24 hour   Intake 1380 ml   Output 20 ml   Net 1360 ml        Physical Examination:     I had a face to face encounter with this patient and independently examined them on 2/6/2021 as outlined below:          Constitutional:  No acute distress, cooperative, pleasant    ENT:  Oral mucosa moist, oropharynx benign. Resp:  CTA bilaterally. No wheezing/rhonchi/rales. No accessory muscle use   CV:  Regular rhythm, normal rate, no murmurs, gallops, rubs    GI:  Soft, non distended, non tender.  normoactive bowel sounds, no hepatosplenomegaly     Musculoskeletal:  No edema, warm, 2+ pulses throughout    Neurologic:  Moves all extremities. AAOx3, CN II-XII reviewed     Psych:  Good insight, Not anxious nor agitated. Data Review:    Review and/or order of clinical lab test  Review and/or order of tests in the radiology section of CPT  Review and/or order of tests in the medicine section of CPT      Labs:     Recent Labs     02/05/21 0350 02/04/21 2101   WBC 12.3* 11.8*   HGB 13.6 13.9   HCT 41.6 41.9    373     Recent Labs     02/05/21 0350 02/04/21 2101    137   K 4.0 3.6    108   CO2 21 25   BUN 11 11   CREA 0.81 0.89   GLU 96 105*   CA 10.5* 10.1   MG 2.4  --    PHOS 4.9*  --      Recent Labs     02/05/21 0350 02/04/21 2101   ALT 36 34   AP 96 92   TBILI 0.4 0.3   TP 8.1 8.9*   ALB 4.0 3.9   GLOB 4.1* 5.0*     Recent Labs     02/04/21 2101   INR 1.1   PTP 11.0   APTT 30.8      No results for input(s): FE, TIBC, PSAT, FERR in the last 72 hours. No results found for: FOL, RBCF   No results for input(s): PH, PCO2, PO2 in the last 72 hours. No results for input(s): CPK, CKNDX, TROIQ in the last 72 hours.     No lab exists for component: CPKMB  No results found for: CHOL, CHOLX, CHLST, CHOLV, HDL, HDLP, LDL, LDLC, DLDLP, TGLX, TRIGL, TRIGP, CHHD, CHHDX  No results found for: GLUCPOC  No results found for: COLOR, APPRN, SPGRU, REFSG, DENICE, PROTU, GLUCU, KETU, BILU, UROU, KIERAN, LEUKU, GLUKE, EPSU, BACTU, WBCU, RBCU, CASTS, UCRY      Medications Reviewed:     Current Facility-Administered Medications   Medication Dose Route Frequency    fentaNYL citrate (PF) 50 mcg/mL injection        HYDROmorphone (PF) (DILAUDID) 1 mg/mL injection        ondansetron (ZOFRAN) 4 mg/2 mL injection        HYDROmorphone (PF) (DILAUDID) injection 0.5 mg  0.5 mg IntraVENous Q4H PRN    butalbital-acetaminophen-caffeine (FIORICET, ESGIC) -40 mg per tablet 1 Tab  1 Tab Oral Q4H PRN    oxymetazoline (AFRIN) 0.05 % nasal spray 2 Spray  2 Spray Both Nostrils BID PRN    topiramate (TOPAMAX) tablet 50 mg  50 mg Oral BID    verapamiL (CALAN) tablet 80 mg  80 mg Oral TID    ZOLMitriptan (ZOMIG) tablet 2.5 mg  2.5 mg Oral PRN    sodium chloride (NS) flush 5-40 mL  5-40 mL IntraVENous Q8H    sodium chloride (NS) flush 5-40 mL  5-40 mL IntraVENous PRN    acetaminophen (TYLENOL) tablet 650 mg  650 mg Oral Q6H PRN    Or    acetaminophen (TYLENOL) suppository 650 mg  650 mg Rectal Q6H PRN    polyethylene glycol (MIRALAX) packet 17 g  17 g Oral DAILY PRN    promethazine (PHENERGAN) tablet 12.5 mg  12.5 mg Oral Q6H PRN    Or    ondansetron (ZOFRAN) injection 4 mg  4 mg IntraVENous Q6H PRN     ______________________________________________________________________  EXPECTED LENGTH OF STAY: 2d 4h  ACTUAL LENGTH OF STAY:          2                 Aaron Adam MD

## 2021-02-07 NOTE — OP NOTES
[address]   OPERATIVE REPORT    Name:  Nikky Steve  MR#:  [id number]  :  1985  ACCOUNT #:  [de-identified]  DATE OF SERVICE:  2021    PREOPERATIVE DIAGNOSES:  Nasopharyngeal mass, recurrent epistaxis and headaches. POSTOPERATIVE DIAGNOSES:  Nasopharyngeal mass, recurrent epistaxis and headaches. PROCEDURE PERFORMED:  Endoscopic biopsy of right nasopharyngeal mass. SURGEON:  Sheri Banuelos MD    ASSISTANT:  None. ANESTHESIA:  General endotracheal.    COMPLICATIONS:  None. SPECIMENS REMOVED:  Nasopharyngeal mass. IMPLANTS:  None. ESTIMATED BLOOD LOSS:  25 mL. DRAINS:  None. INDICATION FOR SURGERY:  The patient is a 60-year-old male with a 2-month history of recurrent epistaxis. He has had repeated epistaxis. He was admitted on 2021 where an MRI showed a large nasopharyngeal mass with destruction of the sphenoid sinus. A CT angiogram demonstrated a fairly well vascularized mass in the nasopharynx. Following extensive discussion with the patient regarding the management options, a decision was made to proceed with endoscopic nasopharyngeal biopsy. OPERATIVE FINDINGS:  There was a large mass in the nasopharynx. The mass was visualized bilaterally, but seemed to be more prominent on the right side. There was no extension into the nasal cavity. The mass was fairly vascularized. Multiple biopsies were obtained and sent in both formalin and in saline. We had a discussion with pathology before sending the specimen. Surgical pathology results are pending. DESCRIPTION OF PROCEDURE:  The patient was met in the preoperative area where the procedure was discussed in detail and an operative permit was obtained. He was then transported to the operating room where he was placed in a supine position on the operating table. General anesthesia was commenced and he was orotracheally intubated without complication.   The surgical bed was rotated to 90 degrees. He was positioned and draped in a standard fashion for endoscopic nasal surgery. A multidisciplinary surgical time-out was then performed. The nasal cavities were inspected using a 0-degree rigid nasal endoscope. Pledgets moistened with epinephrine were placed bilaterally for topical decongestion. After about 3 minutes, the right-sided pledgets were advanced into the nasopharynx where they were left in place for another couple of minutes. The pledgets were then removed bilaterally. The rigid endoscope was again inserted into the right and left nasal cavities and the findings were as aforementioned. The nasopharyngeal mass was much more prominent on the right side. Using a small Blakesley forceps, multiple biopsies of the mass were obtained. The specimens were sent in formalin and also as a fresh specimen for possible flow cytometry. The nasal cavities were irrigated with saline. Pledgets moistened with epinephrine were placed over the nasopharyngeal mass for hemostasis. After several minutes, hemostasis appeared to be fairly adequate. A small piece of Surgicel was cut to an appropriate size, slightly moistened in epinephrine, and placed over a small region of bleeding around one of the biopsy sites. This was left in place. The drapes were removed. The table was rotated to its neutral position. The patient was awakened and extubated. He was transferred to the postanesthesia care unit. There were no intraoperative complications. Surgical pathology is pending.         Gordon Torres MD [id number]  D:  02/06/2021 14:20  T:  02/06/2021 20:47  JOB #:  [id number]

## 2021-02-08 ENCOUNTER — VIRTUAL VISIT (OUTPATIENT)
Dept: FAMILY MEDICINE CLINIC | Age: 36
End: 2021-02-08

## 2021-02-08 LAB
ALBUMIN SERPL-MCNC: 3.3 G/DL (ref 3.5–5)
ANION GAP SERPL CALC-SCNC: 9 MMOL/L (ref 5–15)
BASOPHILS # BLD: 0.1 K/UL (ref 0–0.1)
BASOPHILS NFR BLD: 1 % (ref 0–1)
BUN SERPL-MCNC: 14 MG/DL (ref 6–20)
BUN/CREAT SERPL: 15 (ref 12–20)
CALCIUM SERPL-MCNC: 9.6 MG/DL (ref 8.5–10.1)
CHLORIDE SERPL-SCNC: 107 MMOL/L (ref 97–108)
CO2 SERPL-SCNC: 21 MMOL/L (ref 21–32)
CREAT SERPL-MCNC: 0.94 MG/DL (ref 0.7–1.3)
DIFFERENTIAL METHOD BLD: NORMAL
EOSINOPHIL # BLD: 0.1 K/UL (ref 0–0.4)
EOSINOPHIL NFR BLD: 1 % (ref 0–7)
ERYTHROCYTE [DISTWIDTH] IN BLOOD BY AUTOMATED COUNT: 14.3 % (ref 11.5–14.5)
GLUCOSE SERPL-MCNC: 112 MG/DL (ref 65–100)
HCT VFR BLD AUTO: 42.6 % (ref 36.6–50.3)
HGB BLD-MCNC: 13.3 G/DL (ref 12.1–17)
IMM GRANULOCYTES # BLD AUTO: 0 K/UL (ref 0–0.04)
IMM GRANULOCYTES NFR BLD AUTO: 0 % (ref 0–0.5)
LYMPHOCYTES # BLD: 2.5 K/UL (ref 0.8–3.5)
LYMPHOCYTES NFR BLD: 23 % (ref 12–49)
MCH RBC QN AUTO: 26.5 PG (ref 26–34)
MCHC RBC AUTO-ENTMCNC: 31.2 G/DL (ref 30–36.5)
MCV RBC AUTO: 84.9 FL (ref 80–99)
MONOCYTES # BLD: 0.8 K/UL (ref 0–1)
MONOCYTES NFR BLD: 7 % (ref 5–13)
NEUTS SEG # BLD: 7.4 K/UL (ref 1.8–8)
NEUTS SEG NFR BLD: 68 % (ref 32–75)
NRBC # BLD: 0 K/UL (ref 0–0.01)
NRBC BLD-RTO: 0 PER 100 WBC
PHOSPHATE SERPL-MCNC: 3.4 MG/DL (ref 2.6–4.7)
PLATELET # BLD AUTO: 322 K/UL (ref 150–400)
PMV BLD AUTO: 9.7 FL (ref 8.9–12.9)
POTASSIUM SERPL-SCNC: 3.5 MMOL/L (ref 3.5–5.1)
RBC # BLD AUTO: 5.02 M/UL (ref 4.1–5.7)
SODIUM SERPL-SCNC: 137 MMOL/L (ref 136–145)
WBC # BLD AUTO: 10.9 K/UL (ref 4.1–11.1)

## 2021-02-08 PROCEDURE — 74011250637 HC RX REV CODE- 250/637: Performed by: SPECIALIST

## 2021-02-08 PROCEDURE — 36415 COLL VENOUS BLD VENIPUNCTURE: CPT

## 2021-02-08 PROCEDURE — 80069 RENAL FUNCTION PANEL: CPT

## 2021-02-08 PROCEDURE — 85025 COMPLETE CBC W/AUTO DIFF WBC: CPT

## 2021-02-08 PROCEDURE — 74011250636 HC RX REV CODE- 250/636: Performed by: SPECIALIST

## 2021-02-08 PROCEDURE — 65270000029 HC RM PRIVATE

## 2021-02-08 RX ADMIN — Medication 10 ML: at 04:52

## 2021-02-08 RX ADMIN — HYDROMORPHONE HYDROCHLORIDE 0.5 MG: 1 INJECTION, SOLUTION INTRAMUSCULAR; INTRAVENOUS; SUBCUTANEOUS at 21:17

## 2021-02-08 RX ADMIN — TOPIRAMATE 50 MG: 25 TABLET, FILM COATED ORAL at 17:03

## 2021-02-08 RX ADMIN — BUTALBITAL, ACETAMINOPHEN, AND CAFFEINE 1 TABLET: 50; 325; 40 TABLET ORAL at 17:03

## 2021-02-08 RX ADMIN — VERAPAMIL HYDROCHLORIDE 80 MG: 80 TABLET, FILM COATED ORAL at 15:36

## 2021-02-08 RX ADMIN — TOPIRAMATE 50 MG: 25 TABLET, FILM COATED ORAL at 09:34

## 2021-02-08 RX ADMIN — HYDROMORPHONE HYDROCHLORIDE 0.5 MG: 1 INJECTION, SOLUTION INTRAMUSCULAR; INTRAVENOUS; SUBCUTANEOUS at 00:41

## 2021-02-08 RX ADMIN — HYDROMORPHONE HYDROCHLORIDE 0.5 MG: 1 INJECTION, SOLUTION INTRAMUSCULAR; INTRAVENOUS; SUBCUTANEOUS at 04:50

## 2021-02-08 RX ADMIN — BUTALBITAL, ACETAMINOPHEN, AND CAFFEINE 1 TABLET: 50; 325; 40 TABLET ORAL at 13:08

## 2021-02-08 RX ADMIN — BUTALBITAL, ACETAMINOPHEN, AND CAFFEINE 1 TABLET: 50; 325; 40 TABLET ORAL at 09:34

## 2021-02-08 RX ADMIN — VERAPAMIL HYDROCHLORIDE 80 MG: 80 TABLET, FILM COATED ORAL at 21:19

## 2021-02-08 RX ADMIN — Medication 10 ML: at 21:18

## 2021-02-08 RX ADMIN — HYDROMORPHONE HYDROCHLORIDE 0.5 MG: 1 INJECTION, SOLUTION INTRAMUSCULAR; INTRAVENOUS; SUBCUTANEOUS at 09:25

## 2021-02-08 RX ADMIN — VERAPAMIL HYDROCHLORIDE 80 MG: 80 TABLET, FILM COATED ORAL at 09:34

## 2021-02-08 RX ADMIN — Medication 10 ML: at 14:00

## 2021-02-08 NOTE — PROGRESS NOTES
Bedside and Verbal shift change report given to Rocio Travis (oncoming nurse) by Marty Stephen RN (offgoing nurse). Report included the following information SBAR, Kardex, MAR and Recent Results.

## 2021-02-08 NOTE — PROGRESS NOTES
SILVA called patient today for triage for appt at [facility] via telephone at 1:45pm but patient is in hospital unable to speak and requested appt to be cancelled for today.

## 2021-02-08 NOTE — PROGRESS NOTES
[place] - Head & Neck Surgery (ENT)    Appreciate consult from Dr. Pancho Gibbons yesterday. Results of orbital MRI reviewed. Awaiting results of biopsy from 2/6 but suspect nasopharyngeal carcinoma. If desired, patient can likely be discharged with follow up in oncology clinic ASAP.      Yessy Barrios MD

## 2021-02-08 NOTE — PROGRESS NOTES
[facility] [facility]  Hospitalist Group                                                                                          Hospitalist Progress Note  Tal Walls MD  Answering service: [phone] from in house phone        Date of Service:  2021  NAME:  Bernardino Galarza  :  1985  MRN:  [id number]      Admission Summary: This is a 69-year-old man with a past medical history significant for hypertension, migraine headache, was in his usual state of health until the day of presentation at the emergency room when the patient developed nosebleed from the right nostril. The patient's nosebleed started at about 02:00 p.m. and has had 6 episodes since then, associated with headache. The headache is located on the right side of the head, described as throbbing. No associated blurring of vision. The nosebleed has been going on intermittently for about 2 months. According to the patient, this is usually aggravated by bending forward with a slight relief when the patient puts direct pressure on his nares. Since the onset of the nosebleed about 2 months ago, the patient has been seen by the ENT surgeon. He was at [facility] on 2020. The nose was packed and he was seen by ENT surgeon; shortly after that, the pack was removed. The patient underwent nasal endoscopy without any obvious source of bleeding. When the patient arrived at the emergency room today, the patient was no longer having active bleeding, but since this is intermittent and recurrent, the patient's ENT surgeon was consulted. The ENT surgeon advised consultation with interventional radiologist for embolization and the patient was referred to the hospitalist service for evaluation for admission. No record of prior admission to this hospital.  Interval history / Subjective:   Admitted for epistaxis, found to have right nasopharyngeal mass, biopsy pending.    Have seen and examined patient today. Used the [facility] translation line to speak with patient. He reports dizziness. No nasal bleed. He said the first episode of epistaxis was December 19, 2020  He denied any known medical condition including cancer. .    Assessment & Plan:      Right epistaxis with nasopharyngeal mass concerning for malignancy. 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 CT of C/A/P pending. Radiation oncology consult per oncology. --Pain management. P      Hypertension. Patient on verapamil, BP stable. Migraine headache. Patient on Topamax. Code status: Full  DVT prophylaxis: Holding due to bleed risk    Care Plan discussed with: Patient/Family  Anticipated Disposition: Home w/Family  Anticipated Discharge: Pending biopsy report and/or ENT clearance. Hospital Problems  Date Reviewed: 2/4/2021          Codes Class Noted POA    * (Principal) Epistaxis ICD-10-CM: R04.0  ICD-9-CM: 784.7  2/4/2021 Yes                Review of Systems:   A comprehensive review of systems was negative except for that written in the HPI. Vital Signs:    Last 24hrs VS reviewed since prior progress note. Most recent are:  Visit Vitals  /80 (BP 1 Location: Left upper arm, BP Patient Position: At rest)   Pulse 73   Temp 98.2 °F (36.8 °C)   Resp 16   SpO2 95%         Intake/Output Summary (Last 24 hours) at 2/8/2021 1300  Last data filed at 2/8/2021 0931  Gross per 24 hour   Intake 360 ml   Output --   Net 360 ml        Physical Examination:     I had a face to face encounter with this patient and independently examined them on 2/8/2021 as outlined below:          Constitutional:  No acute distress, cooperative, pleasant    ENT:  Oral mucosa moist,no bleeding,or protruding mass. Lorean Snare Resp:  CTA bilaterally. No wheezing/rhonchi/rales. No accessory muscle use   CV:  Regular rhythm, normal rate, no murmurs, gallops, rubs    GI:  Soft, non distended, non tender.  normoactive bowel sounds, no hepatosplenomegaly     Musculoskeletal:  No edema, warm, 2+ pulses throughout    Neurologic:  Moves all extremities. AAOx3, CN II-XII reviewed     Psych:  Good insight, Not anxious nor agitated. Data Review:    Review and/or order of clinical lab test  Review and/or order of tests in the radiology section of CPT  Review and/or order of tests in the medicine section of CPT      Labs:     Recent Labs     02/08/21  0043   WBC 10.9   HGB 13.3   HCT 42.6        Recent Labs     02/08/21 0043 02/07/21  1226    138   K 3.5 3.7    106   CO2 21 22   BUN 14 15   CREA 0.94 0.94   * 98   CA 9.6 9.2   PHOS 3.4 3.3     Recent Labs     02/08/21 0043 02/07/21  1226   ALB 3.3* 3.6     No results for input(s): INR, PTP, APTT, INREXT, INREXT in the last 72 hours. No results for input(s): FE, TIBC, PSAT, FERR in the last 72 hours. No results found for: FOL, RBCF   No results for input(s): PH, PCO2, PO2 in the last 72 hours. No results for input(s): CPK, CKNDX, TROIQ in the last 72 hours.     No lab exists for component: CPKMB  No results found for: CHOL, CHOLX, CHLST, CHOLV, HDL, HDLP, LDL, LDLC, DLDLP, TGLX, TRIGL, TRIGP, CHHD, CHHDX  No results found for: GLUCPOC  No results found for: COLOR, APPRN, SPGRU, REFSG, DENICE, PROTU, GLUCU, KETU, BILU, UROU, KIERAN, LEUKU, GLUKE, EPSU, BACTU, WBCU, RBCU, CASTS, UCRY      Medications Reviewed:     Current Facility-Administered Medications   Medication Dose Route Frequency    HYDROmorphone (PF) (DILAUDID) injection 0.5 mg  0.5 mg IntraVENous Q4H PRN    butalbital-acetaminophen-caffeine (FIORICET, ESGIC) -40 mg per tablet 1 Tab  1 Tab Oral Q4H PRN    oxymetazoline (AFRIN) 0.05 % nasal spray 2 Spray  2 Spray Both Nostrils BID PRN    topiramate (TOPAMAX) tablet 50 mg  50 mg Oral BID    verapamiL (CALAN) tablet 80 mg  80 mg Oral TID    ZOLMitriptan (ZOMIG) tablet 2.5 mg  2.5 mg Oral PRN    sodium chloride (NS) flush 5-40 mL  5-40 mL IntraVENous Q8H    sodium chloride (NS) flush 5-40 mL  5-40 mL IntraVENous PRN    acetaminophen (TYLENOL) tablet 650 mg  650 mg Oral Q6H PRN    Or    acetaminophen (TYLENOL) suppository 650 mg  650 mg Rectal Q6H PRN    polyethylene glycol (MIRALAX) packet 17 g  17 g Oral DAILY PRN    promethazine (PHENERGAN) tablet 12.5 mg  12.5 mg Oral Q6H PRN    Or    ondansetron (ZOFRAN) injection 4 mg  4 mg IntraVENous Q6H PRN     ______________________________________________________________________  EXPECTED LENGTH OF STAY: 2d 4h  ACTUAL LENGTH OF STAY:          4                 Raine Marsh MD

## 2021-02-09 LAB
ALBUMIN SERPL-MCNC: 3.4 G/DL (ref 3.5–5)
ANION GAP SERPL CALC-SCNC: 9 MMOL/L (ref 5–15)
BASOPHILS # BLD: 0 K/UL (ref 0–0.1)
BASOPHILS NFR BLD: 0 % (ref 0–1)
BUN SERPL-MCNC: 14 MG/DL (ref 6–20)
BUN/CREAT SERPL: 15 (ref 12–20)
CALCIUM SERPL-MCNC: 9.6 MG/DL (ref 8.5–10.1)
CHLORIDE SERPL-SCNC: 106 MMOL/L (ref 97–108)
CO2 SERPL-SCNC: 21 MMOL/L (ref 21–32)
CREAT SERPL-MCNC: 0.92 MG/DL (ref 0.7–1.3)
DIFFERENTIAL METHOD BLD: ABNORMAL
EOSINOPHIL # BLD: 0.2 K/UL (ref 0–0.4)
EOSINOPHIL NFR BLD: 2 % (ref 0–7)
ERYTHROCYTE [DISTWIDTH] IN BLOOD BY AUTOMATED COUNT: 13.9 % (ref 11.5–14.5)
GLUCOSE SERPL-MCNC: 96 MG/DL (ref 65–100)
HCT VFR BLD AUTO: 39.7 % (ref 36.6–50.3)
HGB BLD-MCNC: 13.2 G/DL (ref 12.1–17)
IMM GRANULOCYTES # BLD AUTO: 0 K/UL (ref 0–0.04)
IMM GRANULOCYTES NFR BLD AUTO: 0 % (ref 0–0.5)
LYMPHOCYTES # BLD: 2.2 K/UL (ref 0.8–3.5)
LYMPHOCYTES NFR BLD: 18 % (ref 12–49)
MCH RBC QN AUTO: 26.8 PG (ref 26–34)
MCHC RBC AUTO-ENTMCNC: 33.2 G/DL (ref 30–36.5)
MCV RBC AUTO: 80.5 FL (ref 80–99)
MONOCYTES # BLD: 1 K/UL (ref 0–1)
MONOCYTES NFR BLD: 8 % (ref 5–13)
NEUTS SEG # BLD: 8.7 K/UL (ref 1.8–8)
NEUTS SEG NFR BLD: 72 % (ref 32–75)
NRBC # BLD: 0 K/UL (ref 0–0.01)
NRBC BLD-RTO: 0 PER 100 WBC
PHOSPHATE SERPL-MCNC: 4.4 MG/DL (ref 2.6–4.7)
PLATELET # BLD AUTO: 370 K/UL (ref 150–400)
PMV BLD AUTO: 10 FL (ref 8.9–12.9)
POTASSIUM SERPL-SCNC: 3.5 MMOL/L (ref 3.5–5.1)
RBC # BLD AUTO: 4.93 M/UL (ref 4.1–5.7)
SODIUM SERPL-SCNC: 136 MMOL/L (ref 136–145)
WBC # BLD AUTO: 12.1 K/UL (ref 4.1–11.1)

## 2021-02-09 PROCEDURE — 85025 COMPLETE CBC W/AUTO DIFF WBC: CPT

## 2021-02-09 PROCEDURE — 74011250637 HC RX REV CODE- 250/637: Performed by: SPECIALIST

## 2021-02-09 PROCEDURE — 74011250637 HC RX REV CODE- 250/637: Performed by: INTERNAL MEDICINE

## 2021-02-09 PROCEDURE — 99233 SBSQ HOSP IP/OBS HIGH 50: CPT | Performed by: INTERNAL MEDICINE

## 2021-02-09 PROCEDURE — 74011636637 HC RX REV CODE- 636/637: Performed by: INTERNAL MEDICINE

## 2021-02-09 PROCEDURE — 80069 RENAL FUNCTION PANEL: CPT

## 2021-02-09 PROCEDURE — 65270000029 HC RM PRIVATE

## 2021-02-09 PROCEDURE — 36415 COLL VENOUS BLD VENIPUNCTURE: CPT

## 2021-02-09 PROCEDURE — 74011250636 HC RX REV CODE- 250/636: Performed by: SPECIALIST

## 2021-02-09 RX ORDER — PREDNISONE 50 MG/1
50 TABLET ORAL ONCE
Status: COMPLETED | OUTPATIENT
Start: 2021-02-10 | End: 2021-02-10

## 2021-02-09 RX ORDER — HYDROMORPHONE HYDROCHLORIDE 1 MG/ML
0.5 INJECTION, SOLUTION INTRAMUSCULAR; INTRAVENOUS; SUBCUTANEOUS
Status: DISCONTINUED | OUTPATIENT
Start: 2021-02-10 | End: 2021-02-10

## 2021-02-09 RX ORDER — DIPHENHYDRAMINE HCL 25 MG
50 CAPSULE ORAL ONCE
Status: COMPLETED | OUTPATIENT
Start: 2021-02-10 | End: 2021-02-10

## 2021-02-09 RX ORDER — POTASSIUM CHLORIDE 750 MG/1
40 TABLET, FILM COATED, EXTENDED RELEASE ORAL
Status: COMPLETED | OUTPATIENT
Start: 2021-02-09 | End: 2021-02-09

## 2021-02-09 RX ORDER — PREDNISONE 50 MG/1
50 TABLET ORAL ONCE
Status: COMPLETED | OUTPATIENT
Start: 2021-02-09 | End: 2021-02-09

## 2021-02-09 RX ADMIN — PREDNISONE 50 MG: 50 TABLET ORAL at 17:31

## 2021-02-09 RX ADMIN — HYDROMORPHONE HYDROCHLORIDE 0.5 MG: 1 INJECTION, SOLUTION INTRAMUSCULAR; INTRAVENOUS; SUBCUTANEOUS at 02:39

## 2021-02-09 RX ADMIN — HYDROMORPHONE HYDROCHLORIDE 0.5 MG: 1 INJECTION, SOLUTION INTRAMUSCULAR; INTRAVENOUS; SUBCUTANEOUS at 07:28

## 2021-02-09 RX ADMIN — POTASSIUM CHLORIDE 40 MEQ: 750 TABLET, FILM COATED, EXTENDED RELEASE ORAL at 15:26

## 2021-02-09 RX ADMIN — TOPIRAMATE 50 MG: 25 TABLET, FILM COATED ORAL at 09:36

## 2021-02-09 RX ADMIN — HYDROMORPHONE HYDROCHLORIDE 0.5 MG: 1 INJECTION, SOLUTION INTRAMUSCULAR; INTRAVENOUS; SUBCUTANEOUS at 20:59

## 2021-02-09 RX ADMIN — Medication 10 ML: at 21:02

## 2021-02-09 RX ADMIN — HYDROMORPHONE HYDROCHLORIDE 0.5 MG: 1 INJECTION, SOLUTION INTRAMUSCULAR; INTRAVENOUS; SUBCUTANEOUS at 17:30

## 2021-02-09 RX ADMIN — BUTALBITAL, ACETAMINOPHEN, AND CAFFEINE 1 TABLET: 50; 325; 40 TABLET ORAL at 15:28

## 2021-02-09 RX ADMIN — TOPIRAMATE 50 MG: 25 TABLET, FILM COATED ORAL at 17:31

## 2021-02-09 RX ADMIN — VERAPAMIL HYDROCHLORIDE 80 MG: 80 TABLET, FILM COATED ORAL at 20:59

## 2021-02-09 RX ADMIN — HYDROMORPHONE HYDROCHLORIDE 0.5 MG: 1 INJECTION, SOLUTION INTRAMUSCULAR; INTRAVENOUS; SUBCUTANEOUS at 13:21

## 2021-02-09 RX ADMIN — BUTALBITAL, ACETAMINOPHEN, AND CAFFEINE 1 TABLET: 50; 325; 40 TABLET ORAL at 06:06

## 2021-02-09 RX ADMIN — Medication 10 ML: at 06:06

## 2021-02-09 RX ADMIN — BUTALBITAL, ACETAMINOPHEN, AND CAFFEINE 1 TABLET: 50; 325; 40 TABLET ORAL at 23:10

## 2021-02-09 RX ADMIN — Medication 10 ML: at 13:21

## 2021-02-09 RX ADMIN — BUTALBITAL, ACETAMINOPHEN, AND CAFFEINE 1 TABLET: 50; 325; 40 TABLET ORAL at 01:28

## 2021-02-09 RX ADMIN — VERAPAMIL HYDROCHLORIDE 80 MG: 80 TABLET, FILM COATED ORAL at 15:25

## 2021-02-09 RX ADMIN — VERAPAMIL HYDROCHLORIDE 80 MG: 80 TABLET, FILM COATED ORAL at 09:36

## 2021-02-09 NOTE — PROGRESS NOTES
Md was contacted three times regarding his CT scan scheduled for today> He requested a CT was contrast.

## 2021-02-09 NOTE — PROGRESS NOTES
[facility] [place] [facility] [place] [address], [address]  W: [phone]  F: [phone]    Reason for consult:   Shila Mcallister is a 30 y.o. male who is seen in consultation at the request of Dr. Alfredo Greenfield for evaluation of nasopharyngeal mass    Treatment History:   · 2/6/2021: Endoscopic biopsy of the nasopharyngeal mass ( R)     History of Present Illness:   Patient is a 30 y.o. male with no significant PMH seen for above  He was seen by Dr. Brad Hyde with ENT in December with c/o recurrent epistaxis x 2 months requiring ER visits and packing. His prior nasal endoscopy was clear. He was admitted on 2/4/2021 for recurrent HAs and epistaxis which lasted several minutes with improvement on pressure. He had a normal CBC for the most part and had no h/o bleeding growing up. MRI brain 2/5/2021 was obtained and showed a 4.2 x 4 cm posterior nasopharyngeal mass Extending into the sphenoid sinus with narrowing f the R distal internal carotid artery. CTA did not show active extravasation, but showed bilateral cervical adenopathy. He had a biopsy of the NP mass 2/6/2021 and pathology is pending. Oncology is consulted for further management. Patient states that he has ongoing HA, he is sleepy, had some epistaxis today    History reviewed. No pertinent past medical history. No past surgical history on file. Social History     Tobacco Use    Smoking status: Never Smoker    Smokeless tobacco: Never Used   Substance Use Topics    Alcohol use: Not Currently      No family history on file.   Current Facility-Administered Medications   Medication Dose Route Frequency    HYDROmorphone (PF) (DILAUDID) injection 0.5 mg  0.5 mg IntraVENous Q4H PRN    butalbital-acetaminophen-caffeine (FIORICET, ESGIC) -40 mg per tablet 1 Tab  1 Tab Oral Q4H PRN    oxymetazoline (AFRIN) 0.05 % nasal spray 2 Spray  2 Spray Both Nostrils BID PRN    topiramate (TOPAMAX) tablet 50 mg  50 mg Oral BID    verapamiL (CALAN) tablet 80 mg  80 mg Oral TID    ZOLMitriptan (ZOMIG) tablet 2.5 mg  2.5 mg Oral PRN    sodium chloride (NS) flush 5-40 mL  5-40 mL IntraVENous Q8H    sodium chloride (NS) flush 5-40 mL  5-40 mL IntraVENous PRN    acetaminophen (TYLENOL) tablet 650 mg  650 mg Oral Q6H PRN    Or    acetaminophen (TYLENOL) suppository 650 mg  650 mg Rectal Q6H PRN    polyethylene glycol (MIRALAX) packet 17 g  17 g Oral DAILY PRN    promethazine (PHENERGAN) tablet 12.5 mg  12.5 mg Oral Q6H PRN    Or    ondansetron (ZOFRAN) injection 4 mg  4 mg IntraVENous Q6H PRN      Allergies   Allergen Reactions    Iodinated Contrast Media Itching        Review of Systems: A complete review of systems was obtained, negative except as described above. Physical Exam:     Visit Vitals  /87 (BP 1 Location: Right upper arm, BP Patient Position: At rest)   Pulse 79   Temp 98.8 °F (37.1 °C)   Resp 18   SpO2 98%       Vitals reviewed      General appearance - alert, well appearing, and in no distress  Mental status - oriented to person, place, and time  Mouth - mucous membranes moist, old blood at nares  Neck - supple, no appreciable thyromegaly  Lymphatics - no palpable lymphadenopathy  Skin - normal coloration and turgor, no new rashes, no suspicious skin lesions noted    ECOG PS:1        Results:     Lab Results   Component Value Date/Time    WBC 12.1 (H) 02/09/2021 02:31 AM    HGB 13.2 02/09/2021 02:31 AM    HCT 39.7 02/09/2021 02:31 AM    PLATELET 155 44/59/8640 02:31 AM    MCV 80.5 02/09/2021 02:31 AM    ABS.  NEUTROPHILS 8.7 (H) 02/09/2021 02:31 AM     Lab Results   Component Value Date/Time    Sodium 136 02/09/2021 02:31 AM    Potassium 3.5 02/09/2021 02:31 AM    Chloride 106 02/09/2021 02:31 AM    CO2 21 02/09/2021 02:31 AM    Glucose 96 02/09/2021 02:31 AM    BUN 14 02/09/2021 02:31 AM    Creatinine 0.92 02/09/2021 02:31 AM    GFR est AA >60 02/09/2021 02:31 AM    GFR est non-AA >60 02/09/2021 02:31 AM    Calcium 9.6 02/09/2021 02:31 AM     Lab Results   Component Value Date/Time    Bilirubin, total 0.4 02/05/2021 03:50 AM    ALT (SGPT) 36 02/05/2021 03:50 AM    Alk. phosphatase 96 02/05/2021 03:50 AM    Protein, total 8.1 02/05/2021 03:50 AM    Albumin 3.4 (L) 02/09/2021 02:31 AM    Globulin 4.1 (H) 02/05/2021 03:50 AM         Records reviewed and summarized above. Pathology report(s) reviewed above. Radiology report(s) reviewed above. CTA neck    IMPRESSION  1. Attenuation of the right internal carotid artery as it passes through the  right nasopharyngeal mass but no evidence of active extravasation or  intraluminal thrombus. 2.  Bilateral cervical lymphadenopathy in level 2 and level 5B. 3.  Right posterior nasopharyngeal mass with sphenoid sinus involvement and  diffuse sinus disease    MRI brain    IMPRESSION  1. 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 2.  Diffuse sinus mucosal thickening. 3.  Mass related narrowing of the right distal cervical internal carotid artery  but no large vessel occlusion. 4.  No evidence of infarct.       MRI orbit and face    IMPRESSION  Again demonstrated is large right nasopharyngeal tumor as previously described  and without significant change. Some involvement right internal carotid artery. Associated mucosal thickening and fluid right paranasal sinuses. Assessment:   1) Nasopharyngeal mass- R     4.2 x 4 cm, extends into the sphenoid sinus, bilateral cervical nodes.  [place] but does not involve carotid artery    Suspicious for NP carcinoma  Scans and labs make Lymphoma less likely  He reacted to the die and hence will get an OP staging PET CT    If this is Nasopharyngeal carcinoma he has at least T3N2MX-Stage III disease which is treated with ChemoRT followed by chemotherapy OR induction chemotherapy followed by adjuvant chemotherapy    Unless he had T4 disease I generally prefer to go with chemoRT first due to high doses of Cisplatin given with RT . If we did give induction chemotherapy first there is no data to replace Cisplatin with carboplatin for that part of his treatment and we would then find it challenging to give him Cisplatin with concurrent RT ( should he develop neuro/ nephro/ nalini toxicity). Whereas, in the adjuvant setting if need be there is data to substitute carboplatin instead of Cipro should the need arise. 2) Epistaxis  Secondary to the mass  CTA without active extravasation  At high risk for catastrophic hemorrhage  Management per ENT for now  Rad Onc consulted    3) Headaches  Secondary to the nasopharyngeal mass with splenoid sinus involvement  He is on a regimen for migraines, dilaudid and decongestents    4) Nausea  Zofran prn    Plan:     · PET CT OP  · EBV DNA PCR pending  · Await path  · Rad Onc consulted  · Consult palliative care for symptom management      I appreciate the opportunity to participate in Mr. Genoveva Mendoza's care.     Signed By: Allyson Torres MD

## 2021-02-09 NOTE — PROGRESS NOTES
I was contacted by Nursing team for CT with contrast given allergy listed for contrast dye. I was told that I was contacted before 3 times but any of the previous communication did not reach me successfully. We have d/w radiology to plan CT with contrast tomorrow at 7am and 13hrs pre-medication with 50mg Prednisone 13, hrs, 7 hrs and 1 hrs before and 50mg Bendryl 1 hr before scan. Nursing team aware.

## 2021-02-09 NOTE — PROGRESS NOTES
[facility]                                                                                          Hospitalist Progress Note  Romana Neptune, MD  Answering service: [phone] from in house phone        Date of Service:  2021  NAME:  Salinas Brock  :  1985  MRN:  [id number]      Admission Summary: This is a 44-year-old man with a past medical history significant for hypertension, migraine headache, was in his usual state of health until the day of presentation at the emergency room when the patient developed nosebleed from the right nostril. The patient's nosebleed started at about 02:00 p.m. and has had 6 episodes since then, associated with headache. The headache is located on the right side of the head, described as throbbing. No associated blurring of vision. The nosebleed has been going on intermittently for about 2 months. According to the patient, this is usually aggravated by bending forward with a slight relief when the patient puts direct pressure on his nares. Since the onset of the nosebleed about 2 months ago, the patient has been seen by the ENT surgeon. He was at [facility] on [facility]. The nose was packed and he was seen by ENT surgeon; shortly after that, the pack was removed. The patient underwent nasal endoscopy without any obvious source of bleeding. When the patient arrived at the emergency room today, the patient was no longer having active bleeding, but since this is intermittent and recurrent, the patient's ENT surgeon was consulted. The ENT surgeon advised consultation with interventional radiologist for embolization and the patient was referred to the hospitalist service for evaluation for admission. No record of prior admission to this hospital.  Interval history / Subjective:   Admitted for epistaxis, found to have right nasopharyngeal mass, biopsy pending.    Have seen and examined patient today. Used the [employer] translation line to speak with patient. Complaints of pain, less controlled. Bothersome in nose and headache. Short lasting control with IV Dilaudid. Awaiting Nasal Bx, done on 2/6    Assessment & Plan:     Right epistaxis with nasopharyngeal mass concerning for malignancy. Willi Quintanilla --Biopsy done 2/6 by ENT-Dr. 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 CT of C/A/P pending. Radiation oncology consult per oncology. --Pain management. On IV dialudid  --Palliative care consultation consideration for symptoms management  --Rad-onc consulted as well. Hypertension. Patient on verapamil, BP stable. Migraine headache. Patient on Topamax. Code status: Full  DVT prophylaxis: Holding due to bleed risk    Care Plan discussed with: Patient/Family  Anticipated Disposition: Home w/Family  Anticipated Discharge: Pending biopsy report and/or ENT clearance. Hospital Problems  Date Reviewed: 2/4/2021          Codes Class Noted POA    * (Principal) Epistaxis ICD-10-CM: R04.0  ICD-9-CM: 784.7  2/4/2021 Yes                Review of Systems:   A comprehensive review of systems was negative except for that written in the HPI. Vital Signs:    Last 24hrs VS reviewed since prior progress note. Most recent are:  Visit Vitals  /87 (BP 1 Location: Right upper arm, BP Patient Position: At rest)   Pulse 79   Temp 98.8 °F (37.1 °C)   Resp 18   SpO2 98%         Intake/Output Summary (Last 24 hours) at 2/9/2021 1711  Last data filed at 2/9/2021 0935  Gross per 24 hour   Intake 240 ml   Output --   Net 240 ml        Physical Examination:     I had a face to face encounter with this patient and independently examined them on 2/9/2021 as outlined below:          Constitutional:  No acute distress, cooperative, pleasant    ENT:  Oral mucosa moist,no bleeding,or protruding mass. Willi Quintanilla Resp:  CTA bilaterally. No wheezing/rhonchi/rales.  No accessory muscle use CV:  Regular rhythm, normal rate, no murmurs, gallops, rubs    GI:  Soft, non distended, non tender. normoactive bowel sounds, no hepatosplenomegaly     Musculoskeletal:  No edema, warm, 2+ pulses throughout    Neurologic:  Moves all extremities. AAOx3, CN II-XII reviewed     Psych:  Good insight, Not anxious nor agitated. Data Review:    Review and/or order of clinical lab test  Review and/or order of tests in the radiology section of CPT  Review and/or order of tests in the medicine section of CPT    Mri Orb Face Neck W Wo Cont    Result Date: 2/8/2021  Again demonstrated is large right nasopharyngeal tumor as previously described and without significant change. Some involvement right internal carotid artery. Associated mucosal thickening and fluid right paranasal sinuses. Mra Brain Wo Cont    Result Date: 2/6/2021  1. 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 2.  Diffuse sinus mucosal thickening. 3.  Mass related narrowing of the right distal cervical internal carotid artery but no large vessel occlusion. 4.  No evidence of infarct. Mri Brain W Wo Cont    Result Date: 2/6/2021  1. 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 2.  Diffuse sinus mucosal thickening. 3.  Mass related narrowing of the right distal cervical internal carotid artery but no large vessel occlusion. 4.  No evidence of infarct. Cta Head    Result Date: 2/6/2021  1. Attenuation of the right internal carotid artery as it passes through the right nasopharyngeal mass but no evidence of active extravasation or intraluminal thrombus. 2.  Bilateral cervical lymphadenopathy in level 2 and level 5B. 3.  Right posterior nasopharyngeal mass with sphenoid sinus involvement and diffuse sinus disease. Cta Neck    Result Date: 2/6/2021  1.   Attenuation of the right internal carotid artery as it passes through the right nasopharyngeal mass but no evidence of active extravasation or intraluminal thrombus. 2.  Bilateral cervical lymphadenopathy in level 2 and level 5B. 3.  Right posterior nasopharyngeal mass with sphenoid sinus involvement and diffuse sinus disease. Xr Chest Port    Result Date: 2/4/2021  No acute process identified. Labs:     Recent Labs     02/09/21 0231 02/08/21  0043   WBC 12.1* 10.9   HGB 13.2 13.3   HCT 39.7 42.6    322     Recent Labs     02/09/21 0231 02/08/21  0043 02/07/21  1226    137 138   K 3.5 3.5 3.7    107 106   CO2 21 21 22   BUN 14 14 15   CREA 0.92 0.94 0.94   GLU 96 112* 98   CA 9.6 9.6 9.2   PHOS 4.4 3.4 3.3     Recent Labs     02/09/21 0231 02/08/21  0043 02/07/21  1226   ALB 3.4* 3.3* 3.6     No results for input(s): INR, PTP, APTT, INREXT, INREXT in the last 72 hours. No results for input(s): FE, TIBC, PSAT, FERR in the last 72 hours. No results found for: FOL, RBCF   No results for input(s): PH, PCO2, PO2 in the last 72 hours. No results for input(s): CPK, CKNDX, TROIQ in the last 72 hours.     No lab exists for component: CPKMB  No results found for: CHOL, CHOLX, CHLST, CHOLV, HDL, HDLP, LDL, LDLC, DLDLP, TGLX, TRIGL, TRIGP, CHHD, CHHDX  No results found for: GLUCPOC  No results found for: COLOR, APPRN, SPGRU, REFSG, DENICE, PROTU, GLUCU, KETU, BILU, UROU, KIERAN, LEUKU, GLUKE, EPSU, BACTU, WBCU, RBCU, CASTS, UCRY      Medications Reviewed:     Current Facility-Administered Medications   Medication Dose Route Frequency    predniSONE (DELTASONE) tablet 50 mg  50 mg Oral ONCE    [START ON 2/10/2021] predniSONE (DELTASONE) tablet 50 mg  50 mg Oral ONCE    [START ON 2/10/2021] predniSONE (DELTASONE) tablet 50 mg  50 mg Oral ONCE    [START ON 2/10/2021] diphenhydrAMINE (BENADRYL) capsule 50 mg  50 mg Oral ONCE    HYDROmorphone (PF) (DILAUDID) injection 0.5 mg  0.5 mg IntraVENous Q4H PRN    butalbital-acetaminophen-caffeine (FIORICET, ESGIC) -40 mg per tablet 1 Tab  1 Tab Oral Q4H PRN    oxymetazoline (AFRIN) 0.05 % nasal spray 2 Spray  2 Spray Both Nostrils BID PRN    topiramate (TOPAMAX) tablet 50 mg  50 mg Oral BID    verapamiL (CALAN) tablet 80 mg  80 mg Oral TID    ZOLMitriptan (ZOMIG) tablet 2.5 mg  2.5 mg Oral PRN    sodium chloride (NS) flush 5-40 mL  5-40 mL IntraVENous Q8H    sodium chloride (NS) flush 5-40 mL  5-40 mL IntraVENous PRN    acetaminophen (TYLENOL) tablet 650 mg  650 mg Oral Q6H PRN    Or    acetaminophen (TYLENOL) suppository 650 mg  650 mg Rectal Q6H PRN    polyethylene glycol (MIRALAX) packet 17 g  17 g Oral DAILY PRN    promethazine (PHENERGAN) tablet 12.5 mg  12.5 mg Oral Q6H PRN    Or    ondansetron (ZOFRAN) injection 4 mg  4 mg IntraVENous Q6H PRN     ______________________________________________________________________  EXPECTED LENGTH OF STAY: 2d 4h  ACTUAL LENGTH OF STAY:          5                 Valerie Kaufman MD

## 2021-02-10 ENCOUNTER — APPOINTMENT (OUTPATIENT)
Dept: CT IMAGING | Age: 36
DRG: 147 | End: 2021-02-10
Attending: INTERNAL MEDICINE
Payer: SUBSIDIZED

## 2021-02-10 LAB
ALBUMIN SERPL-MCNC: 3.6 G/DL (ref 3.5–5)
ALBUMIN/GLOB SERPL: 0.7 {RATIO} (ref 1.1–2.2)
ALP SERPL-CCNC: 110 U/L (ref 45–117)
ALT SERPL-CCNC: 64 U/L (ref 12–78)
ANION GAP SERPL CALC-SCNC: 8 MMOL/L (ref 5–15)
AST SERPL-CCNC: 19 U/L (ref 15–37)
BASOPHILS # BLD: 0 K/UL (ref 0–0.1)
BASOPHILS NFR BLD: 0 % (ref 0–1)
BILIRUB SERPL-MCNC: 0.2 MG/DL (ref 0.2–1)
BUN SERPL-MCNC: 14 MG/DL (ref 6–20)
BUN/CREAT SERPL: 16 (ref 12–20)
CALCIUM SERPL-MCNC: 10.4 MG/DL (ref 8.5–10.1)
CHLORIDE SERPL-SCNC: 105 MMOL/L (ref 97–108)
CO2 SERPL-SCNC: 19 MMOL/L (ref 21–32)
CREAT SERPL-MCNC: 0.87 MG/DL (ref 0.7–1.3)
DIFFERENTIAL METHOD BLD: ABNORMAL
EOSINOPHIL # BLD: 0 K/UL (ref 0–0.4)
EOSINOPHIL NFR BLD: 0 % (ref 0–7)
ERYTHROCYTE [DISTWIDTH] IN BLOOD BY AUTOMATED COUNT: 13.9 % (ref 11.5–14.5)
GLOBULIN SER CALC-MCNC: 5.3 G/DL (ref 2–4)
GLUCOSE SERPL-MCNC: 139 MG/DL (ref 65–100)
HCT VFR BLD AUTO: 40.8 % (ref 36.6–50.3)
HGB BLD-MCNC: 13.6 G/DL (ref 12.1–17)
IMM GRANULOCYTES # BLD AUTO: 0.1 K/UL (ref 0–0.04)
IMM GRANULOCYTES NFR BLD AUTO: 1 % (ref 0–0.5)
LYMPHOCYTES # BLD: 0.8 K/UL (ref 0.8–3.5)
LYMPHOCYTES NFR BLD: 9 % (ref 12–49)
MCH RBC QN AUTO: 26.4 PG (ref 26–34)
MCHC RBC AUTO-ENTMCNC: 33.3 G/DL (ref 30–36.5)
MCV RBC AUTO: 79.1 FL (ref 80–99)
MONOCYTES # BLD: 0.1 K/UL (ref 0–1)
MONOCYTES NFR BLD: 1 % (ref 5–13)
NEUTS SEG # BLD: 8.8 K/UL (ref 1.8–8)
NEUTS SEG NFR BLD: 89 % (ref 32–75)
NRBC # BLD: 0 K/UL (ref 0–0.01)
NRBC BLD-RTO: 0 PER 100 WBC
PLATELET # BLD AUTO: 395 K/UL (ref 150–400)
PMV BLD AUTO: 9.7 FL (ref 8.9–12.9)
POTASSIUM SERPL-SCNC: 4.4 MMOL/L (ref 3.5–5.1)
PROT SERPL-MCNC: 8.9 G/DL (ref 6.4–8.2)
RBC # BLD AUTO: 5.16 M/UL (ref 4.1–5.7)
SODIUM SERPL-SCNC: 132 MMOL/L (ref 136–145)
WBC # BLD AUTO: 9.7 K/UL (ref 4.1–11.1)

## 2021-02-10 PROCEDURE — 65270000029 HC RM PRIVATE

## 2021-02-10 PROCEDURE — 85025 COMPLETE CBC W/AUTO DIFF WBC: CPT

## 2021-02-10 PROCEDURE — 74011000636 HC RX REV CODE- 636: Performed by: INTERNAL MEDICINE

## 2021-02-10 PROCEDURE — 74011250637 HC RX REV CODE- 250/637: Performed by: PHYSICAL MEDICINE & REHABILITATION

## 2021-02-10 PROCEDURE — 99233 SBSQ HOSP IP/OBS HIGH 50: CPT | Performed by: INTERNAL MEDICINE

## 2021-02-10 PROCEDURE — 36415 COLL VENOUS BLD VENIPUNCTURE: CPT

## 2021-02-10 PROCEDURE — 74011250637 HC RX REV CODE- 250/637: Performed by: INTERNAL MEDICINE

## 2021-02-10 PROCEDURE — 74011636637 HC RX REV CODE- 636/637: Performed by: INTERNAL MEDICINE

## 2021-02-10 PROCEDURE — 80053 COMPREHEN METABOLIC PANEL: CPT

## 2021-02-10 PROCEDURE — 74011250637 HC RX REV CODE- 250/637: Performed by: SPECIALIST

## 2021-02-10 PROCEDURE — 74177 CT ABD & PELVIS W/CONTRAST: CPT

## 2021-02-10 PROCEDURE — 99223 1ST HOSP IP/OBS HIGH 75: CPT | Performed by: PHYSICAL MEDICINE & REHABILITATION

## 2021-02-10 PROCEDURE — 71260 CT THORAX DX C+: CPT

## 2021-02-10 PROCEDURE — 74011250636 HC RX REV CODE- 250/636: Performed by: INTERNAL MEDICINE

## 2021-02-10 RX ORDER — HYDROMORPHONE HYDROCHLORIDE 1 MG/ML
0.5 INJECTION, SOLUTION INTRAMUSCULAR; INTRAVENOUS; SUBCUTANEOUS
Status: DISCONTINUED | OUTPATIENT
Start: 2021-02-10 | End: 2021-02-12

## 2021-02-10 RX ORDER — HYDROCODONE BITARTRATE AND ACETAMINOPHEN 5; 325 MG/1; MG/1
1 TABLET ORAL
Status: DISCONTINUED | OUTPATIENT
Start: 2021-02-10 | End: 2021-02-11

## 2021-02-10 RX ORDER — POLYETHYLENE GLYCOL 3350 17 G/17G
17 POWDER, FOR SOLUTION ORAL DAILY
Status: DISCONTINUED | OUTPATIENT
Start: 2021-02-10 | End: 2021-02-13 | Stop reason: HOSPADM

## 2021-02-10 RX ORDER — SENNOSIDES 8.6 MG/1
2 TABLET ORAL DAILY
Status: DISCONTINUED | OUTPATIENT
Start: 2021-02-10 | End: 2021-02-13 | Stop reason: HOSPADM

## 2021-02-10 RX ADMIN — HYDROMORPHONE HYDROCHLORIDE 0.5 MG: 1 INJECTION, SOLUTION INTRAMUSCULAR; INTRAVENOUS; SUBCUTANEOUS at 13:23

## 2021-02-10 RX ADMIN — HYDROCODONE BITARTRATE AND ACETAMINOPHEN 1 TABLET: 5; 325 TABLET ORAL at 22:53

## 2021-02-10 RX ADMIN — Medication 10 ML: at 20:46

## 2021-02-10 RX ADMIN — HYDROMORPHONE HYDROCHLORIDE 0.5 MG: 1 INJECTION, SOLUTION INTRAMUSCULAR; INTRAVENOUS; SUBCUTANEOUS at 07:03

## 2021-02-10 RX ADMIN — IOHEXOL 50 ML: 240 INJECTION, SOLUTION INTRATHECAL; INTRAVASCULAR; INTRAVENOUS; ORAL at 08:34

## 2021-02-10 RX ADMIN — HYDROCODONE BITARTRATE AND ACETAMINOPHEN 1 TABLET: 5; 325 TABLET ORAL at 17:50

## 2021-02-10 RX ADMIN — Medication 17.2 MG: at 12:35

## 2021-02-10 RX ADMIN — TOPIRAMATE 50 MG: 25 TABLET, FILM COATED ORAL at 10:29

## 2021-02-10 RX ADMIN — ZOLMITRIPTAN 2.5 MG: 2.5 TABLET ORAL at 13:23

## 2021-02-10 RX ADMIN — PREDNISONE 50 MG: 50 TABLET ORAL at 05:05

## 2021-02-10 RX ADMIN — VERAPAMIL HYDROCHLORIDE 80 MG: 80 TABLET, FILM COATED ORAL at 10:29

## 2021-02-10 RX ADMIN — HYDROMORPHONE HYDROCHLORIDE 0.5 MG: 1 INJECTION, SOLUTION INTRAMUSCULAR; INTRAVENOUS; SUBCUTANEOUS at 20:45

## 2021-02-10 RX ADMIN — HYDROMORPHONE HYDROCHLORIDE 0.5 MG: 1 INJECTION, SOLUTION INTRAMUSCULAR; INTRAVENOUS; SUBCUTANEOUS at 05:06

## 2021-02-10 RX ADMIN — POLYETHYLENE GLYCOL 3350 17 G: 17 POWDER, FOR SOLUTION ORAL at 12:35

## 2021-02-10 RX ADMIN — Medication 10 ML: at 13:23

## 2021-02-10 RX ADMIN — PREDNISONE 50 MG: 50 TABLET ORAL at 01:03

## 2021-02-10 RX ADMIN — HYDROMORPHONE HYDROCHLORIDE 0.5 MG: 1 INJECTION, SOLUTION INTRAMUSCULAR; INTRAVENOUS; SUBCUTANEOUS at 01:03

## 2021-02-10 RX ADMIN — IOPAMIDOL 100 ML: 755 INJECTION, SOLUTION INTRAVENOUS at 08:34

## 2021-02-10 RX ADMIN — HYDROCODONE BITARTRATE AND ACETAMINOPHEN 1 TABLET: 5; 325 TABLET ORAL at 10:29

## 2021-02-10 RX ADMIN — VERAPAMIL HYDROCHLORIDE 80 MG: 80 TABLET, FILM COATED ORAL at 17:50

## 2021-02-10 RX ADMIN — VERAPAMIL HYDROCHLORIDE 80 MG: 80 TABLET, FILM COATED ORAL at 20:45

## 2021-02-10 RX ADMIN — HYDROMORPHONE HYDROCHLORIDE 0.5 MG: 1 INJECTION, SOLUTION INTRAMUSCULAR; INTRAVENOUS; SUBCUTANEOUS at 03:00

## 2021-02-10 RX ADMIN — TOPIRAMATE 50 MG: 25 TABLET, FILM COATED ORAL at 17:50

## 2021-02-10 RX ADMIN — DIPHENHYDRAMINE HYDROCHLORIDE 50 MG: 25 CAPSULE ORAL at 05:05

## 2021-02-10 NOTE — PROGRESS NOTES
[facility] [place] [address] [address], [address]  W: [phone]  F: [phone]    Reason for consult:   Cabrera Leal is a 30 y.o. male who is seen in consultation at the request of Dr. Donnella Aase for evaluation of nasopharyngeal mass    Treatment History:   · 2/6/2021: Endoscopic biopsy of the nasopharyngeal mass ( R)     History of Present Illness:   Patient is a 30 y.o. male with no significant PMH seen for above  He was seen by Dr. Steve Mark with ENT in December with c/o recurrent epistaxis x 2 months requiring ER visits and packing. His prior nasal endoscopy was clear. He was admitted on 2/4/2021 for recurrent HAs and epistaxis which lasted several minutes with improvement on pressure. He had a normal CBC for the most part and had no h/o bleeding growing up. MRI brain 2/5/2021 was obtained and showed a 4.2 x 4 cm posterior nasopharyngeal mass Extending into the sphenoid sinus with narrowing f the R distal internal carotid artery. CTA did not show active extravasation, but showed bilateral cervical adenopathy. He had a biopsy of the NP mass 2/6/2021 and pathology is pending. Oncology is consulted for further management. Patient states that he has ongoing HA but better controlled, he is sleepy, had some epistaxis today    History reviewed. No pertinent past medical history. No past surgical history on file. Social History     Tobacco Use    Smoking status: Never Smoker    Smokeless tobacco: Never Used   Substance Use Topics    Alcohol use: Not Currently      No family history on file.   Current Facility-Administered Medications   Medication Dose Route Frequency    HYDROmorphone (PF) (DILAUDID) injection 0.5 mg  0.5 mg IntraVENous Q4H PRN    HYDROcodone-acetaminophen (NORCO) 5-325 mg per tablet 1 Tab  1 Tab Oral Q4H PRN    polyethylene glycol (MIRALAX) packet 17 g  17 g Oral DAILY    senna (SENOKOT) tablet 17.2 mg  2 Tab Oral DAILY    butalbital-acetaminophen-caffeine (FIORICET, ESGIC) -40 mg per tablet 1 Tab  1 Tab Oral Q4H PRN    oxymetazoline (AFRIN) 0.05 % nasal spray 2 Spray  2 Spray Both Nostrils BID PRN    topiramate (TOPAMAX) tablet 50 mg  50 mg Oral BID    verapamiL (CALAN) tablet 80 mg  80 mg Oral TID    ZOLMitriptan (ZOMIG) tablet 2.5 mg  2.5 mg Oral PRN    sodium chloride (NS) flush 5-40 mL  5-40 mL IntraVENous Q8H    sodium chloride (NS) flush 5-40 mL  5-40 mL IntraVENous PRN    acetaminophen (TYLENOL) tablet 650 mg  650 mg Oral Q6H PRN    Or    acetaminophen (TYLENOL) suppository 650 mg  650 mg Rectal Q6H PRN    promethazine (PHENERGAN) tablet 12.5 mg  12.5 mg Oral Q6H PRN    Or    ondansetron (ZOFRAN) injection 4 mg  4 mg IntraVENous Q6H PRN      Allergies   Allergen Reactions    Iodinated Contrast Media Itching        Review of Systems: A complete review of systems was obtained, negative except as described above. Physical Exam:     Visit Vitals  BP (!) 148/82 (BP 1 Location: Right upper arm, BP Patient Position: At rest)   Pulse 68   Temp 97.7 °F (36.5 °C)   Resp 18   SpO2 96%       Vitals reviewed      General appearance - alert, well appearing, and in no distress  Mental status - oriented to person, place, and time  Mouth - mucous membranes moist, old blood at nares  Neck - supple, no appreciable thyromegaly  Lymphatics - no palpable lymphadenopathy  Skin - normal coloration and turgor, no new rashes, no suspicious skin lesions noted    ECOG PS:1        Results:     Lab Results   Component Value Date/Time    WBC 9.7 02/10/2021 03:55 AM    HGB 13.6 02/10/2021 03:55 AM    HCT 40.8 02/10/2021 03:55 AM    PLATELET 033 42/68/4909 03:55 AM    MCV 79.1 (L) 02/10/2021 03:55 AM    ABS.  NEUTROPHILS 8.8 (H) 02/10/2021 03:55 AM     Lab Results   Component Value Date/Time    Sodium 132 (L) 02/10/2021 03:55 AM    Potassium 4.4 02/10/2021 03:55 AM    Chloride 105 02/10/2021 03:55 AM    CO2 19 (L) 02/10/2021 03:55 AM Glucose 139 (H) 02/10/2021 03:55 AM    BUN 14 02/10/2021 03:55 AM    Creatinine 0.87 02/10/2021 03:55 AM    GFR est AA >60 02/10/2021 03:55 AM    GFR est non-AA >60 02/10/2021 03:55 AM    Calcium 10.4 (H) 02/10/2021 03:55 AM     Lab Results   Component Value Date/Time    Bilirubin, total 0.2 02/10/2021 03:55 AM    ALT (SGPT) 64 02/10/2021 03:55 AM    Alk. phosphatase 110 02/10/2021 03:55 AM    Protein, total 8.9 (H) 02/10/2021 03:55 AM    Albumin 3.6 02/10/2021 03:55 AM    Globulin 5.3 (H) 02/10/2021 03:55 AM         Records reviewed and summarized above. Pathology report(s) reviewed above. Radiology report(s) reviewed above. CTA neck    IMPRESSION  1. Attenuation of the right internal carotid artery as it passes through the  right nasopharyngeal mass but no evidence of active extravasation or  intraluminal thrombus. 2.  Bilateral cervical lymphadenopathy in level 2 and level 5B. 3.  Right posterior nasopharyngeal mass with sphenoid sinus involvement and  diffuse sinus disease    MRI brain    IMPRESSION  1. 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 2.  Diffuse sinus mucosal thickening. 3.  Mass related narrowing of the right distal cervical internal carotid artery  but no large vessel occlusion. 4.  No evidence of infarct.       MRI orbit and face    IMPRESSION  Again demonstrated is large right nasopharyngeal tumor as previously described  and without significant change. Some involvement right internal carotid artery. Associated mucosal thickening and fluid right paranasal sinuses. Assessment:   1) Nasopharyngeal carcinoma- R   SEMAJ positive    4.2 x 4 cm, extends into the sphenoid sinus, bilateral cervical nodes.  [place] but does not involve carotid artery     OP staging PET CT    Nasopharyngeal carcinoma - at least T3N2MX-Stage III disease which is treated with ChemoRT followed by chemotherapy OR induction chemotherapy followed by adjuvant chemotherapy    Unless he had T4 disease I generally prefer to go with chemoRT first due to high doses of Cisplatin given with RT . If we did give induction chemotherapy first there is no data to replace Cisplatin with carboplatin for that part of his treatment and we would then find it challenging to give him Cisplatin with concurrent RT ( should he develop neuro/ nephro/ nalini toxicity). Whereas, in the adjuvant setting if need be there is data to substitute carboplatin instead of Cipro should the need arise. I have discussed this plan with Dr. Siena Jennings  We are hoping he can have a port and once his pain is controlled can start ChemoRT    2) Epistaxis  Secondary to the mass  CTA without active extravasation  At high risk for catastrophic hemorrhage  Management per ENT for now  Rad Onc consulted    3) Headaches  Secondary to the nasopharyngeal mass with splenoid sinus involvement  He is on a regimen for migraines, dilaudid and decongestents    4) Nausea  Zofran prn    Plan:     · PET OP  · Port tomorrow  · EBV DNA PCR pending  · Plan on ChemoRT with HD Cisplatin- d/w Dr. Siena Jennings  · Appreciate  palliative care for symptom management. Once pain is manageable on oral regimen we hope he can be discharged and start ChemoRT asap    Language interpretation services used      I appreciate the opportunity to participate in Mr. Luis Mendoza's care.     Signed By: Angelica Lehman MD

## 2021-02-10 NOTE — PROGRESS NOTES
ENT    Nasopharyngeal biopsy demonstrated nasopharyngeal carcinoma. Large mass in close proximity of carotid raised concern for severe hemorrhage but so far bleeding has been minimal. Very difficult issue. Really appreciate assistance from interventional neuroradiology and heme/onc. He will require intensive therapy (chemo/XRT). Will sign off for now. I am going out of town tomorrow but will discuss case with Dr. Suma Lopez in case problems arise.        Kathleen Grimes MD  [place] [phone]

## 2021-02-10 NOTE — PROGRESS NOTES
Spiritual Care Assessment/Progress Note  [address]      NAME: Dayday Mason      MRN: [id number]  AGE: 30 y.o. SEX: male  Congregational Affiliation: No preference   Language: Swazi     2/10/2021     Total Time (in minutes): 5     Spiritual Assessment begun in [facility] through conversation with:         [x]Patient        [] Family    [] Friend(s)        Reason for Consult: Palliative Care, Initial/Spiritual Assessment     Spiritual beliefs: (Please include comment if needed)     [] Identifies with a aaron tradition:         [] Supported by a aaron community:            [] Claims no spiritual orientation:           [] Seeking spiritual identity:                [] Adheres to an individual form of spirituality:           [x] Not able to assess:                           Identified resources for coping:      [] Prayer                               [] Music                  [] Guided Imagery     [] Family/friends                 [] Pet visits     [] Devotional reading                         [x] Unknown     [] Other:                                               Interventions offered during this visit: (See comments for more details)    Patient Interventions: Initial visit           Plan of Care:     [] Support spiritual and/or cultural needs    [] Support AMD and/or advance care planning process      [] Support grieving process   [] Coordinate Rites and/or Rituals    [] Coordination with community clergy   [] No spiritual needs identified at this time   [] Detailed Plan of Care below (See Comments)  [] Make referral to Music Therapy  [] Make referral to Pet Therapy     [] Make referral to Addiction services  [] Make referral to [facility]  [] Make referral to Spiritual Care Partner  [] No future visits requested        [x] Follow up upon further referrals     Attempted to visit pt for initial spiritual assessment.  Unable to complete assessment at this time, pt sleeping and did not awake.   Chaplain Max MDiv, MS, [facility]  [phone])

## 2021-02-10 NOTE — PROGRESS NOTES
Comprehensive Nutrition Assessment      Type and Reason for Visit: Initial, Positive nutrition screen    Nutrition Recommendations/Plan:   1. Continue Regular diet- encourage PO  2. Monitor POC- if treatment needed (radiation/chemo) pt may benefit from PEG to support nutrition  3. Daily weights  4. Adjust bowel regimen- no BM in 6 days. Nutrition Assessment:       Pt admitted for Epistaxis [R04.0]. Pt with PMHx: HTN, migraines. Pt with significant wt loss over last 4 months, and particularly over the last 1 month. Pt states he was trying to lose weight and was doing so by reducing portion sizes. Pt has lost 28 lbs over the last ~1 month which is much more weight loss than one would expect through moderate portion reduction. Pt states appetite is fine and he is eating fairly well currently. Pt denies n/v. Pt states he's missing a few teeth but no difficulty chewing or swallowing. No sore throat. Pt states he took some pills to help with bowel movement yesterday. No BM in 6 days though, likely opioid induced constipation as well as lack of movement. Pt was pretty sleepy and pt noted he was sorry he was so sleepy. He believes this is due to pain medication. Pt was also noted to be sweating. Offered to adjust thermostat- pt declined saying when it gets cold his head hurts. Notified RN that pt was sweating. Pt meets malnutrition criteria due to significant wt loss and predicted PO <75% x >7 days. Wt Readings from Last 10 Encounters:   02/05/21 87.3 kg (192 lb 8 oz)   01/27/21 90.7 kg (200 lb)   01/12/21 99.8 kg (220 lb)   12/20/20 100.2 kg (221 lb)   12/19/20 100.2 kg (221 lb)   10/28/20 101.2 kg (223 lb)       Malnutrition Assessment:  Malnutrition Status:   Moderate malnutrition    Context:  Acute illness     Findings of the 6 clinical characteristics of malnutrition:   Energy Intake:  1 - 75% or less of est energy req for 7 or more days  Weight Loss:  7.00 - Greater than 7.5% over 3 months     Body Fat Loss:  No significant body fat loss,     Muscle Mass Loss:  No significant muscle mass loss,    Fluid Accumulation:  No significant fluid accumulation,     Strength:  Not performed           Estimated Daily Nutrient Needs:  Energy (kcal):  2761  Protein (g):         Fluid (ml/day):  2750    Meal intake:   Patient Vitals for the past 168 hrs:   % Diet Eaten   02/09/21 0935 75 %   02/08/21 1308 75 %   02/08/21 0931 100 %   02/07/21 1759 10 %   02/07/21 1124 50 %   02/07/21 0807 100 %   02/06/21 1909 25 %   02/06/21 0808 0 %   02/05/21 1035 0 %         Nutrition Related Findings:     Last BM 2/4- constipation. On Miralax and Senna. No edema    Nutritionally Significant Medications:   Topomax, dilaudid, Senna, miralax. Wounds:    None       Current Nutrition Therapies:  DIET REGULAR    Anthropometric Measures:  · Height:  5' 7.01\" (170.2 cm)  · Current Body Wt:  87.1 kg (192 lb)   · Admission Body Wt:    87.3 kg   · Usual Body Wt:  101.2 kg (223 lb)     · Ideal Body Wt:  148 lbs:  129.7 %   · BMI Category:   BMI 30.1, obese      Nutrition Diagnosis:   · Inadequate energy intake related to catabolic illness as evidenced by weight loss, weight loss greater than or equal to 5% in 1 month      Nutrition Interventions:   Food and/or Nutrient Delivery: Continue current diet  Nutrition Education and Counseling: Education not indicated  Coordination of Nutrition Care: Continue to monitor while inpatient, Interdisciplinary rounds    Goals:  Pt will consume >75% of meals with wt maintanence within 5-7 days       Nutrition Monitoring and Evaluation:   Behavioral-Environmental Outcomes: Knowledge or skill  Food/Nutrient Intake Outcomes: Food and nutrient intake, IVF intake  Physical Signs/Symptoms Outcomes:      Discharge Planning:     Too soon to determine     Electronically signed by Marie Reyes, [address]     Contact: [phone]

## 2021-02-10 NOTE — CONSULTS
Palliative Medicine Consult  Mario: [phone])    Patient Name: Mendoza Maya  YOB: 1985    Date of Initial Consult: 2/10/21  Reason for Consult: Overwhelming sx  Requesting Provider: Merlyn Waldron  Primary Care Physician: JESSICA Nelson     SUMMARY:   Mendoza Maya is a 30 y. o. without significant pmhx who was admitted on 2/4/2021 from home w/ epistaxis requiring hx of packing in ED and recurrent headaches. MRI brain 2/5/21 showing 4.2x4cm posterior nasopharyngeal mass, b/l cervical adenopathy. 2/6/21 bx performed- path still pending. If nasopharyngeal carcinoma plan would be for radiation and chemotherapy. Current medical issues leading to Palliative Medicine involvement include: overwhelming sx as recommended by Onc. On Topamax for migraine sx, has Fioricet prn and Dilaudid 0.5mg IV every 2h prn yesterday and now every 4h prn - used total of 4mg in past 24h. Social: Pt  to [place]. They have a young child. He uses [facility] for medical care. Speaks some English but does require translation services w/ [employer]. PALLIATIVE DIAGNOSES:   1. Headache due to nasopharyngeal mass- R sided   2. Constipation due to pain medications   3. Debility due to pain   4. Advanced care planning        PLAN:   1. Headache pain report sounds migrainous in nature but no hx of migraines- related to nasopharyngeal mass. 2. Headache:On Topamax 50mg bid and Zomig prn for migraine type sx- received one dose of Zomig on 2/5- consider trial of this medication again. 3. Received total of 4mg IV Dilaudid in past 24h. 4. Agree with keeping po opioids as well as trial of po- has been ordered Norco 5/325mg po every 4h prn.   5. Based on IV needs, may require high dose of po opioids until we can get treatment started for possible carcinoma - will need to choose low cost medications, no insurance benefit. Consider Morphine IR 15mg po every 4h prn if Norco does not work. 6. Constipation due to pain medication: Miralax and Senna daily- last BM before admission per patient. 7. Advanced care planning: AMD copies left w/ pt in both [place] and [place]. Introduced ACP but pt in too much pain right now to consider completion. 8. Following w/ you as path comes back, can consider clinic w/ our team too. 9. Initial consult note routed to primary continuity provider and/or primary health care team members  10. Communicated plan of care with: Palliative IDTKristen [facility] Team incl Harish CHOI     GOALS OF CARE / TREATMENT PREFERENCES:     GOALS OF CARE:  Patient/Health Care Proxy Stated Goals: Other (comment)(Sx management)    TREATMENT PREFERENCES:   Code Status: Full Code    Advance Care Planning:  [x] [facility] Interdisciplinary Team has updated the ACP Navigator with Health Care Decision Maker and Patient Capacity      No flowsheet data found. Medical Interventions: Full interventions             Other:    As far as possible, the palliative care team has discussed with patient / health care proxy about goals of care / treatment preferences for patient. HISTORY:     History obtained from: chart, staff, family     CHIEF COMPLAINT: Headache    HPI/SUBJECTIVE:    The patient is:   [x] Verbal and participatory  [] Non-participatory due to:     See report of pain below. Pt appears in distress. Does get better w/ 0.5mg IV Dilaudid but does not last and that causes him to be very fatigued and sleep most of the day. +flatus, no recent BM.     Clinical Pain Assessment (nonverbal scale for severity on nonverbal patients):   Clinical Pain Assessment  Severity: 10  Location: R side of head and behind both eyes  Character: Aching, pounding  Duration: Weeks  Effect: Cannot move, function, eat  Factors: Better w/ medication  Frequency: Constant          Duration: for how long has pt been experiencing pain (e.g., 2 days, 1 month, years)  Frequency: how often pain is an issue (e.g., several times per day, once every few days, constant)     FUNCTIONAL ASSESSMENT:     Palliative Performance Scale (PPS):  PPS: 70       PSYCHOSOCIAL/SPIRITUAL SCREENING:     Palliative IDT has assessed this patient for cultural preferences / practices and a referral made as appropriate to needs (Cultural Services, Patient Advocacy, Ethics, etc.)    Any spiritual / Presybeterian concerns:  [] Yes /  [x] No    Caregiver Burnout:  [] Yes /  [x] No /  [] No Caregiver Present      Anticipatory grief assessment:   [x] Normal  / [] Maladaptive       ESAS Anxiety: Anxiety: 2    ESAS Depression: Depression: 0        REVIEW OF SYSTEMS:     Positive and pertinent negative findings in ROS are noted above in HPI. The following systems were [x] reviewed / [] unable to be reviewed as noted in HPI  Other findings are noted below. Systems: constitutional, ears/nose/mouth/throat, respiratory, gastrointestinal, genitourinary, musculoskeletal, integumentary, neurologic, psychiatric, endocrine. Positive findings noted below. Modified ESAS Completed by: provider   Fatigue: 5 Drowsiness: 0   Depression: 0 Pain: 10   Anxiety: 2 Nausea: 0   Anorexia: 8 Dyspnea: 0     Constipation: Yes              PHYSICAL EXAM:     From RN flowsheet:  Wt Readings from Last 3 Encounters:   02/05/21 192 lb 8 oz (87.3 kg)   01/27/21 200 lb (90.7 kg)   01/12/21 220 lb (99.8 kg)     Blood pressure (!) 148/82, pulse 68, temperature 97.7 °F (36.5 °C), resp. rate 18, SpO2 96 %.     Pain Scale 1: Numeric (0 - 10)  Pain Intensity 1: 6  Pain Onset 1: acute  Pain Location 1: Head  Pain Orientation 1: Anterior  Pain Description 1: Aching  Pain Intervention(s) 1: Medication (see MAR)      Constitutional:awake, alert, speech clear, appears in mild distress holding head   Eyes: pupils equal, anicteric  ENMT: no nasal discharge, moist mucous membranes  Cardiovascular: regular rhythm  Respiratory: breathing not labored, symmetric  Gastrointestinal: soft non-tender, +bowel sounds  Musculoskeletal: no deformity, no tenderness to palpation  Skin: warm, dry  Neurologic: following commands, moving all extremities  Psychiatric: calm        HISTORY:     Principal Problem:    Epistaxis (2/4/2021)      History reviewed. No pertinent past medical history. No past surgical history on file. No family history on file. History reviewed, no pertinent family history.   Social History     Tobacco Use    Smoking status: Never Smoker    Smokeless tobacco: Never Used   Substance Use Topics    Alcohol use: Not Currently     Allergies   Allergen Reactions    Iodinated Contrast Media Itching      Current Facility-Administered Medications   Medication Dose Route Frequency    HYDROmorphone (PF) (DILAUDID) injection 0.5 mg  0.5 mg IntraVENous Q4H PRN    HYDROcodone-acetaminophen (NORCO) 5-325 mg per tablet 1 Tab  1 Tab Oral Q4H PRN    butalbital-acetaminophen-caffeine (FIORICET, ESGIC) -40 mg per tablet 1 Tab  1 Tab Oral Q4H PRN    oxymetazoline (AFRIN) 0.05 % nasal spray 2 Spray  2 Spray Both Nostrils BID PRN    topiramate (TOPAMAX) tablet 50 mg  50 mg Oral BID    verapamiL (CALAN) tablet 80 mg  80 mg Oral TID    ZOLMitriptan (ZOMIG) tablet 2.5 mg  2.5 mg Oral PRN    sodium chloride (NS) flush 5-40 mL  5-40 mL IntraVENous Q8H    sodium chloride (NS) flush 5-40 mL  5-40 mL IntraVENous PRN    acetaminophen (TYLENOL) tablet 650 mg  650 mg Oral Q6H PRN    Or    acetaminophen (TYLENOL) suppository 650 mg  650 mg Rectal Q6H PRN    polyethylene glycol (MIRALAX) packet 17 g  17 g Oral DAILY PRN    promethazine (PHENERGAN) tablet 12.5 mg  12.5 mg Oral Q6H PRN    Or    ondansetron (ZOFRAN) injection 4 mg  4 mg IntraVENous Q6H PRN          LAB AND IMAGING FINDINGS:     Lab Results   Component Value Date/Time    WBC 9.7 02/10/2021 03:55 AM    HGB 13.6 02/10/2021 03:55 AM    PLATELET 470 54/17/6853 03:55 AM     Lab Results   Component Value Date/Time    Sodium 132 (L) 02/10/2021 03:55 AM Potassium 4.4 02/10/2021 03:55 AM    Chloride 105 02/10/2021 03:55 AM    CO2 19 (L) 02/10/2021 03:55 AM    BUN 14 02/10/2021 03:55 AM    Creatinine 0.87 02/10/2021 03:55 AM    Calcium 10.4 (H) 02/10/2021 03:55 AM    Magnesium 2.4 02/05/2021 03:50 AM    Phosphorus 4.4 02/09/2021 02:31 AM      Lab Results   Component Value Date/Time    Alk. phosphatase 110 02/10/2021 03:55 AM    Protein, total 8.9 (H) 02/10/2021 03:55 AM    Albumin 3.6 02/10/2021 03:55 AM    Globulin 5.3 (H) 02/10/2021 03:55 AM     Lab Results   Component Value Date/Time    INR 1.1 02/04/2021 09:01 PM    Prothrombin time 11.0 02/04/2021 09:01 PM    aPTT 30.8 02/04/2021 09:01 PM      No results found for: IRON, FE, TIBC, IBCT, PSAT, FERR   No results found for: PH, PCO2, PO2  No components found for: GLPOC   No results found for: CPK, CKMB             Total time:   Counseling / coordination time, spent as noted above:   > 50% counseling / coordination?:     Prolonged service was provided for  []30 min   []75 min in face to face time in the presence of the patient, spent as noted above. Time Start:   Time End:   Note: this can only be billed with 18609 (initial) or 73929 (follow up). If multiple start / stop times, list each separately.

## 2021-02-11 ENCOUNTER — HOSPITAL ENCOUNTER (OUTPATIENT)
Dept: INTERVENTIONAL RADIOLOGY/VASCULAR | Age: 36
Discharge: HOME | DRG: 147 | End: 2021-02-11
Attending: INTERNAL MEDICINE
Payer: SUBSIDIZED

## 2021-02-11 LAB
ALBUMIN SERPL-MCNC: 3.4 G/DL (ref 3.5–5)
ALBUMIN/GLOB SERPL: 0.7 {RATIO} (ref 1.1–2.2)
ALP SERPL-CCNC: 101 U/L (ref 45–117)
ALT SERPL-CCNC: 77 U/L (ref 12–78)
ANION GAP SERPL CALC-SCNC: 10 MMOL/L (ref 5–15)
AST SERPL-CCNC: 25 U/L (ref 15–37)
BASOPHILS # BLD: 0.1 K/UL (ref 0–0.1)
BASOPHILS NFR BLD: 1 % (ref 0–1)
BILIRUB SERPL-MCNC: 0.2 MG/DL (ref 0.2–1)
BUN SERPL-MCNC: 21 MG/DL (ref 6–20)
BUN/CREAT SERPL: 22 (ref 12–20)
CALCIUM SERPL-MCNC: 10.1 MG/DL (ref 8.5–10.1)
CHLORIDE SERPL-SCNC: 103 MMOL/L (ref 97–108)
CO2 SERPL-SCNC: 23 MMOL/L (ref 21–32)
CREAT SERPL-MCNC: 0.94 MG/DL (ref 0.7–1.3)
DIFFERENTIAL METHOD BLD: ABNORMAL
EOSINOPHIL # BLD: 0.2 K/UL (ref 0–0.4)
EOSINOPHIL NFR BLD: 2 % (ref 0–7)
EPSTEIN-BARR DNA QUANT, PCR, 139208: 327 COPIES/ML
ERYTHROCYTE [DISTWIDTH] IN BLOOD BY AUTOMATED COUNT: 14.1 % (ref 11.5–14.5)
GLOBULIN SER CALC-MCNC: 4.9 G/DL (ref 2–4)
GLUCOSE SERPL-MCNC: 99 MG/DL (ref 65–100)
HCT VFR BLD AUTO: 40.3 % (ref 36.6–50.3)
HGB BLD-MCNC: 13.4 G/DL (ref 12.1–17)
IMM GRANULOCYTES # BLD AUTO: 0.1 K/UL (ref 0–0.04)
IMM GRANULOCYTES NFR BLD AUTO: 1 % (ref 0–0.5)
LOG10 EBV DNA QN PCR, 139238: 2.52 LOG10 COPY/ML
LYMPHOCYTES # BLD: 3 K/UL (ref 0.8–3.5)
LYMPHOCYTES NFR BLD: 28 % (ref 12–49)
MCH RBC QN AUTO: 26.4 PG (ref 26–34)
MCHC RBC AUTO-ENTMCNC: 33.3 G/DL (ref 30–36.5)
MCV RBC AUTO: 79.5 FL (ref 80–99)
MONOCYTES # BLD: 0.9 K/UL (ref 0–1)
MONOCYTES NFR BLD: 8 % (ref 5–13)
NEUTS SEG # BLD: 6.6 K/UL (ref 1.8–8)
NEUTS SEG NFR BLD: 60 % (ref 32–75)
NRBC # BLD: 0 K/UL (ref 0–0.01)
NRBC BLD-RTO: 0 PER 100 WBC
PLATELET # BLD AUTO: 379 K/UL (ref 150–400)
PMV BLD AUTO: 9.5 FL (ref 8.9–12.9)
POTASSIUM SERPL-SCNC: 3.6 MMOL/L (ref 3.5–5.1)
PROT SERPL-MCNC: 8.3 G/DL (ref 6.4–8.2)
RBC # BLD AUTO: 5.07 M/UL (ref 4.1–5.7)
SODIUM SERPL-SCNC: 136 MMOL/L (ref 136–145)
WBC # BLD AUTO: 10.7 K/UL (ref 4.1–11.1)

## 2021-02-11 PROCEDURE — 74011000250 HC RX REV CODE- 250: Performed by: STUDENT IN AN ORGANIZED HEALTH CARE EDUCATION/TRAINING PROGRAM

## 2021-02-11 PROCEDURE — 77030031139 HC SUT VCRL2 J&J -A

## 2021-02-11 PROCEDURE — 74011250637 HC RX REV CODE- 250/637: Performed by: PHYSICAL MEDICINE & REHABILITATION

## 2021-02-11 PROCEDURE — 74011250636 HC RX REV CODE- 250/636: Performed by: INTERNAL MEDICINE

## 2021-02-11 PROCEDURE — 74011250637 HC RX REV CODE- 250/637: Performed by: SPECIALIST

## 2021-02-11 PROCEDURE — 36415 COLL VENOUS BLD VENIPUNCTURE: CPT

## 2021-02-11 PROCEDURE — C1769 GUIDE WIRE: HCPCS

## 2021-02-11 PROCEDURE — C1894 INTRO/SHEATH, NON-LASER: HCPCS

## 2021-02-11 PROCEDURE — 99233 SBSQ HOSP IP/OBS HIGH 50: CPT | Performed by: PHYSICAL MEDICINE & REHABILITATION

## 2021-02-11 PROCEDURE — 0JH63XZ INSERTION OF TUNNELED VASCULAR ACCESS DEVICE INTO CHEST SUBCUTANEOUS TISSUE AND FASCIA, PERCUTANEOUS APPROACH: ICD-10-PCS | Performed by: STUDENT IN AN ORGANIZED HEALTH CARE EDUCATION/TRAINING PROGRAM

## 2021-02-11 PROCEDURE — 85025 COMPLETE CBC W/AUTO DIFF WBC: CPT

## 2021-02-11 PROCEDURE — 74011250637 HC RX REV CODE- 250/637: Performed by: INTERNAL MEDICINE

## 2021-02-11 PROCEDURE — 77030011893 HC TY CUT DN TRIS -B

## 2021-02-11 PROCEDURE — 2709999900 HC NON-CHARGEABLE SUPPLY

## 2021-02-11 PROCEDURE — 76937 US GUIDE VASCULAR ACCESS: CPT

## 2021-02-11 PROCEDURE — 74011250636 HC RX REV CODE- 250/636: Performed by: STUDENT IN AN ORGANIZED HEALTH CARE EDUCATION/TRAINING PROGRAM

## 2021-02-11 PROCEDURE — 65270000029 HC RM PRIVATE

## 2021-02-11 PROCEDURE — C1788 PORT, INDWELLING, IMP: HCPCS

## 2021-02-11 PROCEDURE — 05HM33Z INSERTION OF INFUSION DEVICE INTO RIGHT INTERNAL JUGULAR VEIN, PERCUTANEOUS APPROACH: ICD-10-PCS | Performed by: STUDENT IN AN ORGANIZED HEALTH CARE EDUCATION/TRAINING PROGRAM

## 2021-02-11 PROCEDURE — 80053 COMPREHEN METABOLIC PANEL: CPT

## 2021-02-11 RX ORDER — ADHESIVE BANDAGE
30 BANDAGE TOPICAL DAILY PRN
Status: DISCONTINUED | OUTPATIENT
Start: 2021-02-11 | End: 2021-02-13 | Stop reason: HOSPADM

## 2021-02-11 RX ORDER — FACIAL-BODY WIPES
10 EACH TOPICAL DAILY PRN
Status: DISCONTINUED | OUTPATIENT
Start: 2021-02-11 | End: 2021-02-13 | Stop reason: HOSPADM

## 2021-02-11 RX ORDER — FENTANYL CITRATE 50 UG/ML
100 INJECTION, SOLUTION INTRAMUSCULAR; INTRAVENOUS
Status: DISCONTINUED | OUTPATIENT
Start: 2021-02-11 | End: 2021-02-11

## 2021-02-11 RX ORDER — HEPARIN 100 UNIT/ML
300 SYRINGE INTRAVENOUS AS NEEDED
Status: DISCONTINUED | OUTPATIENT
Start: 2021-02-11 | End: 2021-02-15 | Stop reason: HOSPADM

## 2021-02-11 RX ORDER — LIDOCAINE HYDROCHLORIDE AND EPINEPHRINE 10; 10 MG/ML; UG/ML
1.5 INJECTION, SOLUTION INFILTRATION; PERINEURAL ONCE
Status: COMPLETED | OUTPATIENT
Start: 2021-02-11 | End: 2021-02-11

## 2021-02-11 RX ORDER — SODIUM CHLORIDE 9 MG/ML
25 INJECTION, SOLUTION INTRAVENOUS CONTINUOUS
Status: DISCONTINUED | OUTPATIENT
Start: 2021-02-11 | End: 2021-02-11

## 2021-02-11 RX ORDER — LIDOCAINE HYDROCHLORIDE 20 MG/ML
20 INJECTION, SOLUTION INFILTRATION; PERINEURAL
Status: COMPLETED | OUTPATIENT
Start: 2021-02-11 | End: 2021-02-11

## 2021-02-11 RX ORDER — MORPHINE SULFATE 15 MG/1
15 TABLET ORAL
Status: DISCONTINUED | OUTPATIENT
Start: 2021-02-11 | End: 2021-02-12

## 2021-02-11 RX ADMIN — FENTANYL CITRATE 25 MCG: 50 INJECTION, SOLUTION INTRAMUSCULAR; INTRAVENOUS at 15:51

## 2021-02-11 RX ADMIN — HYDROCODONE BITARTRATE AND ACETAMINOPHEN 1 TABLET: 5; 325 TABLET ORAL at 08:48

## 2021-02-11 RX ADMIN — HYDROMORPHONE HYDROCHLORIDE 0.5 MG: 1 INJECTION, SOLUTION INTRAMUSCULAR; INTRAVENOUS; SUBCUTANEOUS at 17:32

## 2021-02-11 RX ADMIN — FENTANYL CITRATE 50 MCG: 50 INJECTION, SOLUTION INTRAMUSCULAR; INTRAVENOUS at 15:35

## 2021-02-11 RX ADMIN — MORPHINE SULFATE 15 MG: 15 TABLET ORAL at 20:13

## 2021-02-11 RX ADMIN — VERAPAMIL HYDROCHLORIDE 80 MG: 80 TABLET, FILM COATED ORAL at 17:31

## 2021-02-11 RX ADMIN — HYDROCODONE BITARTRATE AND ACETAMINOPHEN 1 TABLET: 5; 325 TABLET ORAL at 03:47

## 2021-02-11 RX ADMIN — POLYETHYLENE GLYCOL 3350 17 G: 17 POWDER, FOR SOLUTION ORAL at 08:48

## 2021-02-11 RX ADMIN — VERAPAMIL HYDROCHLORIDE 80 MG: 80 TABLET, FILM COATED ORAL at 08:48

## 2021-02-11 RX ADMIN — LIDOCAINE HYDROCHLORIDE,EPINEPHRINE BITARTRATE 6 MG: 10; .01 INJECTION, SOLUTION INFILTRATION; PERINEURAL at 14:30

## 2021-02-11 RX ADMIN — HYDROMORPHONE HYDROCHLORIDE 0.5 MG: 1 INJECTION, SOLUTION INTRAMUSCULAR; INTRAVENOUS; SUBCUTANEOUS at 11:48

## 2021-02-11 RX ADMIN — Medication 10 ML: at 05:41

## 2021-02-11 RX ADMIN — Medication 10 ML: at 17:30

## 2021-02-11 RX ADMIN — HYDROMORPHONE HYDROCHLORIDE 0.5 MG: 1 INJECTION, SOLUTION INTRAMUSCULAR; INTRAVENOUS; SUBCUTANEOUS at 05:41

## 2021-02-11 RX ADMIN — TOPIRAMATE 50 MG: 25 TABLET, FILM COATED ORAL at 08:48

## 2021-02-11 RX ADMIN — LIDOCAINE HYDROCHLORIDE 10 MG: 20 INJECTION, SOLUTION INFILTRATION; PERINEURAL at 15:00

## 2021-02-11 RX ADMIN — HYDROMORPHONE HYDROCHLORIDE 0.5 MG: 1 INJECTION, SOLUTION INTRAMUSCULAR; INTRAVENOUS; SUBCUTANEOUS at 01:31

## 2021-02-11 RX ADMIN — VERAPAMIL HYDROCHLORIDE 80 MG: 80 TABLET, FILM COATED ORAL at 20:13

## 2021-02-11 RX ADMIN — FENTANYL CITRATE 50 MCG: 50 INJECTION, SOLUTION INTRAMUSCULAR; INTRAVENOUS at 15:44

## 2021-02-11 RX ADMIN — SODIUM CHLORIDE 25 ML/HR: 9 INJECTION, SOLUTION INTRAVENOUS at 15:33

## 2021-02-11 RX ADMIN — TOPIRAMATE 50 MG: 25 TABLET, FILM COATED ORAL at 17:31

## 2021-02-11 RX ADMIN — HYDROMORPHONE HYDROCHLORIDE 0.5 MG: 1 INJECTION, SOLUTION INTRAMUSCULAR; INTRAVENOUS; SUBCUTANEOUS at 22:25

## 2021-02-11 RX ADMIN — CEFAZOLIN SODIUM 2 G: 1 INJECTION, POWDER, FOR SOLUTION INTRAMUSCULAR; INTRAVENOUS at 15:32

## 2021-02-11 RX ADMIN — Medication 17.2 MG: at 08:48

## 2021-02-11 NOTE — ROUTINE PROCESS
Patient alert and oriented x 4, denies discomfort to procedure site, incision edges approximated with topical skin adhesive.

## 2021-02-11 NOTE — PROGRESS NOTES
Palliative Medicine Consult  Mario: [phone] ([phone])    Patient Name: Adrianne Obrien  YOB: 1985    Date of Initial Consult: 2/10/21  Reason for Consult: Overwhelming sx  Requesting Provider: Aleks Vela  Primary Care Physician: JESSICA Noble     SUMMARY:   Adrianne Obrien is a 30 y. o. without significant pmhx who was admitted on 2/4/2021 from home w/ epistaxis requiring hx of packing in ED and recurrent headaches. MRI brain 2/5/21 showing 4.2x4cm posterior nasopharyngeal mass, b/l cervical adenopathy. 2/6/21 bx performed- path still pending. If nasopharyngeal carcinoma plan would be for radiation and chemotherapy. 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 On Topamax for migraine sx, has Fioricet prn and Dilaudid 0.5mg IV every 2h prn yesterday and now every 4h prn - used total of 4mg in past 24h. Social: Pt  to Grant. They have a young child. He uses [facility] for medical care. Speaks some English but does require translation services w/ [employer]. PALLIATIVE DIAGNOSES:   1. Headache due to nasopharyngeal mass- R sided   2. Constipation due to pain medications   3. Debility due to pain   4. Advanced care planning        PLAN:   1. Headache pain report sounds migrainous in nature but no hx of migraines- related to nasopharyngeal mass. 2. Headache:On Topamax 50mg bid and Zomig prn for migraine type sx. 3. Tell patient that we can help control pain, but cannot get rid of it completely w/ po medication- will need treatment for NP cancer.    4. Change Norco 5/325mg to Morphine IR 15mg po every 4h prn.   5. Cont Dilaudid 0.5mgIV every 4h prn but encourage po use first.   6. Consider long acting Morphine based on needs and ability to purchase, as no insurance and may be too costly- will check w/ care management. 7. Constipation due to pain medication: Miralax and Senna daily- last BM today per pt, not charted. 8. Add prn Bisacodyl and MoM. 9. Advanced care planning: AMD copies left w/ pt in both [place] and [place]. Introduced ACP but pt in too much pain right now to consider completion. 10. Communicated plan of care with: Palliative IDT, [facility] Team      GOALS OF CARE / TREATMENT PREFERENCES:     GOALS OF CARE:  Patient/Health Care Proxy Stated Goals: Cure    TREATMENT PREFERENCES:   Code Status: Full Code    Advance Care Planning:  [x] The [employer] Interdisciplinary Team has updated the ACP Navigator with Health Care Decision Maker and Patient Capacity      No flowsheet data found. Medical Interventions: Full interventions             Other:    As far as possible, the palliative care team has discussed with patient / health care proxy about goals of care / treatment preferences for patient. HISTORY:     History obtained from: chart, staff, family     CHIEF COMPLAINT: Headache    HPI/SUBJECTIVE:    The patient is:   [x] Verbal and participatory  [] Non-participatory due to: Improved HA pain somewhat- received 5 doses of 5/325mg po Norco and 4 doses 0.5mg IV Dilaudid in past 24h. BM today.      Clinical Pain Assessment (nonverbal scale for severity on nonverbal patients):   Clinical Pain Assessment  Severity: 6  Location: R side of head and behind both eyes  Character: Aching, pounding  Duration: Weeks  Effect: Cannot move, function, eat  Factors: Better w/ medication  Frequency: Constant          Duration: for how long has pt been experiencing pain (e.g., 2 days, 1 month, years)  Frequency: how often pain is an issue (e.g., several times per day, once every few days, constant)     FUNCTIONAL ASSESSMENT:     Palliative Performance Scale (PPS):  PPS: 70       PSYCHOSOCIAL/SPIRITUAL SCREENING:     Palliative IDT has assessed this patient for cultural preferences / practices and a referral made as appropriate to needs (Cultural Services, Patient Advocacy, Ethics, etc.)    Any spiritual / Methodist concerns:  [] Yes /  [x] No    Caregiver Burnout:  [] Yes /  [x] No /  [] No Caregiver Present      Anticipatory grief assessment:   [x] Normal  / [] Maladaptive       ESAS Anxiety: Anxiety: 2    ESAS Depression: Depression: 0        REVIEW OF SYSTEMS:     Positive and pertinent negative findings in ROS are noted above in HPI. The following systems were [x] reviewed / [] unable to be reviewed as noted in HPI  Other findings are noted below. Systems: constitutional, ears/nose/mouth/throat, respiratory, gastrointestinal, genitourinary, musculoskeletal, integumentary, neurologic, psychiatric, endocrine. Positive findings noted below. Modified ESAS Completed by: provider   Fatigue: 3 Drowsiness: 0   Depression: 0 Pain: 6   Anxiety: 2 Nausea: 0   Anorexia: 3 Dyspnea: 0     Constipation: Yes              PHYSICAL EXAM:     From RN flowsheet:  Wt Readings from Last 3 Encounters:   02/05/21 192 lb 8 oz (87.3 kg)   01/27/21 200 lb (90.7 kg)   01/12/21 220 lb (99.8 kg)     Blood pressure 112/71, pulse 62, temperature 97.5 °F (36.4 °C), resp. rate 18, height 5' 7.01\" (1.702 m), SpO2 97 %.     Pain Scale 1: Numeric (0 - 10)  Pain Intensity 1: 5  Pain Onset 1: acute  Pain Location 1: Face  Pain Orientation 1: Anterior  Pain Description 1: Aching  Pain Intervention(s) 1: Medication (see MAR)      Constitutional:awake, alert, speech clear, appears in mild distress holding head   Eyes: pupils equal, anicteric  ENMT: no nasal discharge, moist mucous membranes  Cardiovascular: regular rhythm  Respiratory: breathing not labored, symmetric  Gastrointestinal: soft non-tender, +bowel sounds  Musculoskeletal: no deformity, no tenderness to palpation  Skin: warm, dry  Neurologic: following commands, moving all extremities  Psychiatric: calm        HISTORY:     Principal Problem:    Epistaxis (2/4/2021)      History reviewed. No pertinent past medical history. No past surgical history on file. No family history on file. History reviewed, no pertinent family history.   Social History     Tobacco Use    Smoking status: Never Smoker    Smokeless tobacco: Never Used   Substance Use Topics    Alcohol use: Not Currently     Allergies   Allergen Reactions    Iodinated Contrast Media Itching      Current Facility-Administered Medications   Medication Dose Route Frequency    bisacodyL (DULCOLAX) suppository 10 mg  10 mg Rectal DAILY PRN    magnesium hydroxide (MILK OF MAGNESIA) 400 mg/5 mL oral suspension 30 mL  30 mL Oral DAILY PRN    morphine IR (MS IR) tablet 15 mg  15 mg Oral Q4H PRN    HYDROmorphone (PF) (DILAUDID) injection 0.5 mg  0.5 mg IntraVENous Q4H PRN    polyethylene glycol (MIRALAX) packet 17 g  17 g Oral DAILY    senna (SENOKOT) tablet 17.2 mg  2 Tab Oral DAILY    butalbital-acetaminophen-caffeine (FIORICET, ESGIC) -40 mg per tablet 1 Tab  1 Tab Oral Q4H PRN    oxymetazoline (AFRIN) 0.05 % nasal spray 2 Spray  2 Spray Both Nostrils BID PRN    topiramate (TOPAMAX) tablet 50 mg  50 mg Oral BID    verapamiL (CALAN) tablet 80 mg  80 mg Oral TID    ZOLMitriptan (ZOMIG) tablet 2.5 mg  2.5 mg Oral PRN    sodium chloride (NS) flush 5-40 mL  5-40 mL IntraVENous Q8H    sodium chloride (NS) flush 5-40 mL  5-40 mL IntraVENous PRN    acetaminophen (TYLENOL) tablet 650 mg  650 mg Oral Q6H PRN    Or    acetaminophen (TYLENOL) suppository 650 mg  650 mg Rectal Q6H PRN    promethazine (PHENERGAN) tablet 12.5 mg  12.5 mg Oral Q6H PRN    Or    ondansetron (ZOFRAN) injection 4 mg  4 mg IntraVENous Q6H PRN     Facility-Administered Medications Ordered in Other Encounters   Medication Dose Route Frequency    lidocaine (XYLOCAINE) 20 mg/mL (2 %) injection 400 mg  20 mL SubCUTAneous RAD ONCE    lidocaine-EPINEPHrine (XYLOCAINE) 1 %-1:100,000 injection 15 mg  1.5 mL IntraDERMal ONCE    heparin (porcine) pf 300 Units  300 Units InterCATHeter PRN          LAB AND IMAGING FINDINGS:     Lab Results   Component Value Date/Time    WBC 10.7 02/11/2021 03:40 AM    HGB 13.4 02/11/2021 03:40 AM    PLATELET 174 12/44/6975 03:40 AM     Lab Results   Component Value Date/Time    Sodium 136 02/11/2021 03:40 AM    Potassium 3.6 02/11/2021 03:40 AM    Chloride 103 02/11/2021 03:40 AM    CO2 23 02/11/2021 03:40 AM    BUN 21 (H) 02/11/2021 03:40 AM    Creatinine 0.94 02/11/2021 03:40 AM    Calcium 10.1 02/11/2021 03:40 AM    Magnesium 2.4 02/05/2021 03:50 AM    Phosphorus 4.4 02/09/2021 02:31 AM      Lab Results   Component Value Date/Time    Alk. phosphatase 101 02/11/2021 03:40 AM    Protein, total 8.3 (H) 02/11/2021 03:40 AM    Albumin 3.4 (L) 02/11/2021 03:40 AM    Globulin 4.9 (H) 02/11/2021 03:40 AM     Lab Results   Component Value Date/Time    INR 1.1 02/04/2021 09:01 PM    Prothrombin time 11.0 02/04/2021 09:01 PM    aPTT 30.8 02/04/2021 09:01 PM      No results found for: IRON, FE, TIBC, IBCT, PSAT, FERR   No results found for: PH, PCO2, PO2  No components found for: GLPOC   No results found for: CPK, CKMB             Total time:   Counseling / coordination time, spent as noted above:   > 50% counseling / coordination?:     Prolonged service was provided for  []30 min   []75 min in face to face time in the presence of the patient, spent as noted above. Time Start:   Time End:   Note: this can only be billed with 09648 (initial) or 21168 (follow up). If multiple start / stop times, list each separately.

## 2021-02-11 NOTE — ROUTINE PROCESS
[id number] Patient arrived via stretcher to [facility] [employer]. Patient alert and oriented x 4. Patient is [place] speaking, [employer] interpretor Hailey Dougherty [id number] used to explain procedure to patient. Patient verbalized understanding for procedure.

## 2021-02-11 NOTE — PROCEDURES
Interventional Radiology  Procedure Note        2/11/2021 4:24 PM    Patient: Salinas Brock     Informed consent obtained    Diagnosis: Nasopharyngeal mass    Procedure(s): Port placement    Specimens removed:  none    Complications: None    Primary Physician: Mickey Ferris MD    Recommendations: OK to use port immediately    Discharge Disposition: stable; discharge to home    Full dictated report to follow    Mickey Ferris MD  Interventional Radiology  [facility], [facility].  4:24 PM, 2/11/2021

## 2021-02-11 NOTE — ROUTINE PROCESS
Patient reports being sad since hospital admission, [employer]   Haris Finch utilized to clarify patient being sad.   Patient advised denies being suicidal or homicidal.

## 2021-02-11 NOTE — PROGRESS NOTES
[facility] [facility]  Hospitalist Group                                                                                          Hospitalist Progress Note  Sharlene Costa MD  Answering service: [phone] from in house phone        Date of Service:  2/10/2021  NAME:  Fara Chavez  :  1985  MRN:  [id number]      Admission Summary: This is a 71-year-old man with a past medical history significant for hypertension, migraine headache, was in his usual state of health until the day of presentation at the emergency room when the patient developed nosebleed from the right nostril. The patient's nosebleed started at about 02:00 p.m. and has had 6 episodes since then, associated with headache. The headache is located on the right side of the head, described as throbbing. No associated blurring of vision. The nosebleed has been going on intermittently for about 2 months. According to the patient, this is usually aggravated by bending forward with a slight relief when the patient puts direct pressure on his nares. Since the onset of the nosebleed about 2 months ago, the patient has been seen by the ENT surgeon. He was at [place] [address] on 2020. The nose was packed and he was seen by ENT surgeon; shortly after that, the pack was removed. The patient underwent nasal endoscopy without any obvious source of bleeding. When the patient arrived at the emergency room today, the patient was no longer having active bleeding, but since this is intermittent and recurrent, the patient's ENT surgeon was consulted. The ENT surgeon advised consultation with interventional radiologist for embolization and the patient was referred to the hospitalist service for evaluation for admission. No record of prior admission to this hospital.  Interval history / Subjective:   Admitted for epistaxis, found to have right nasopharyngeal mass, biopsy done .    Have seen and examined patient today. Used the [employer] translation line to speak with patient. HA persist with persistent complaint of pain, however somewhat better controlled. P.o. Norco added this morning, approved by palliative care team for symptom management. Biopsy results noted.  + For nasopharyngeal carcinoma. No further bleeding. Assessment & Plan:     Right epistaxis with nasopharyngeal mass, BX on 2/6 confirms nasopharyngeal carcinoma, SEMAJ +, nonkeratinizing, undifferentiated  --Biopsy done 2/6 by ENT-paula Wong  --Oncology on board. CT of C/A/P completed 2/10 with premedication given mild contrast dye allergy. Unremarkable   --Radiation oncology consult per oncology. --Pain management. On IV dilaudid  --Add p.o. Norco.  Further adjustment per palliative care team  --Palliative care consultation, for symptoms management, appreciate  --Port placement per oncology 2/11 with plan for HD chemo RT with cisplatin at the earliest possible  --DC when pain adequately controlled with p.o. medications    Hypertension. Patient on verapamil, BP stable. Migraine headache. Patient on Topamax. Further as per palliative care team      Code status: Full  DVT prophylaxis: Holding due to bleed risk    Care Plan discussed with: Patient/Family  Anticipated Disposition: Home w/Family  Anticipated Discharge: As symptoms controlled with oral meds     Hospital Problems  Date Reviewed: 2/4/2021          Codes Class Noted POA    * (Principal) Epistaxis ICD-10-CM: R04.0  ICD-9-CM: 784.7  2/4/2021 Yes                Review of Systems:   A comprehensive review of systems was negative except for that written in the HPI. Vital Signs:    Last 24hrs VS reviewed since prior progress note.  Most recent are:  Visit Vitals  BP (!) 157/76   Pulse 76   Temp 98.2 °F (36.8 °C)   Resp 18   Ht 5' 7.01\" (1.702 m)   SpO2 94%   BMI 30.14 kg/m²       No intake or output data in the 24 hours ending 02/10/21 2134     Physical Examination:     I had a face to face encounter with this patient and independently examined them on 2/10/2021 as outlined below:          Constitutional:  Moderate distress, cooperative, pleasant    ENT:  Oral mucosa moist,no bleeding,or protruding mass. Mark Rathke Resp:  CTA bilaterally. No wheezing/rhonchi/rales. No accessory muscle use   CV:  Regular rhythm, normal rate, no murmurs, gallops, rubs    GI:  Soft, non distended, non tender. normoactive bowel sounds, no hepatosplenomegaly     Musculoskeletal:  No edema, warm, 2+ pulses throughout    Neurologic:  Moves all extremities. AAOx3, CN II-XII reviewed     Psych:  Good insight, Not anxious nor agitated. Data Review:    Review and/or order of clinical lab test  Review and/or order of tests in the radiology section of CPT  Review and/or order of tests in the medicine section of CPT    Mri Orb Face Neck W Wo Cont    Result Date: 2/8/2021  Again demonstrated is large right nasopharyngeal tumor as previously described and without significant change. Some involvement right internal carotid artery. Associated mucosal thickening and fluid right paranasal sinuses. Mra Brain Wo Cont    Result Date: 2/6/2021  1. 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 2.  Diffuse sinus mucosal thickening. 3.  Mass related narrowing of the right distal cervical internal carotid artery but no large vessel occlusion. 4.  No evidence of infarct. Mri Brain W Wo Cont    Result Date: 2/6/2021  1. 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 2.  Diffuse sinus mucosal thickening. 3.  Mass related narrowing of the right distal cervical internal carotid artery but no large vessel occlusion. 4.  No evidence of infarct. Cta Head    Result Date: 2/6/2021  1.   Attenuation of the right internal carotid artery as it passes through the right nasopharyngeal mass but no evidence of active extravasation or intraluminal thrombus. 2.  Bilateral cervical lymphadenopathy in level 2 and level 5B. 3.  Right posterior nasopharyngeal mass with sphenoid sinus involvement and diffuse sinus disease. Cta Neck    Result Date: 2/6/2021  1. Attenuation of the right internal carotid artery as it passes through the right nasopharyngeal mass but no evidence of active extravasation or intraluminal thrombus. 2.  Bilateral cervical lymphadenopathy in level 2 and level 5B. 3.  Right posterior nasopharyngeal mass with sphenoid sinus involvement and diffuse sinus disease. Ct Chest W Cont    Result Date: 2/10/2021  Normal CT chest, abdomen and pelvis. Ct Abd Pelv W Cont    Result Date: 2/10/2021  Normal CT chest, abdomen and pelvis. Xr Chest Port    Result Date: 2/4/2021  No acute process identified. Labs:     Recent Labs     02/10/21  0355 02/09/21  0231   WBC 9.7 12.1*   HGB 13.6 13.2   HCT 40.8 39.7    370     Recent Labs     02/10/21  0355 02/09/21  0231 02/08/21  0043   * 136 137   K 4.4 3.5 3.5    106 107   CO2 19* 21 21   BUN 14 14 14   CREA 0.87 0.92 0.94   * 96 112*   CA 10.4* 9.6 9.6   PHOS  --  4.4 3.4     Recent Labs     02/10/21  0355 02/09/21  0231 02/08/21  0043   ALT 64  --   --      --   --    TBILI 0.2  --   --    TP 8.9*  --   --    ALB 3.6 3.4* 3.3*   GLOB 5.3*  --   --      No results for input(s): INR, PTP, APTT, INREXT, INREXT in the last 72 hours. No results for input(s): FE, TIBC, PSAT, FERR in the last 72 hours. No results found for: FOL, RBCF   No results for input(s): PH, PCO2, PO2 in the last 72 hours. No results for input(s): CPK, CKNDX, TROIQ in the last 72 hours.     No lab exists for component: CPKMB  No results found for: CHOL, CHOLX, CHLST, CHOLV, HDL, HDLP, LDL, LDLC, DLDLP, TGLX, TRIGL, TRIGP, CHHD, CHHDX  No results found for: GLUCPOC  No results found for: COLOR, APPRN, SPGRU, REFSG, DENICE, PROTU, GLUCU, KETU, BILU, UROU, KIERAN, LEUKU, GLUKE, EPSU, BACTU, WBCU, RBCU, CASTS, UCRY      Medications Reviewed:     Current Facility-Administered Medications   Medication Dose Route Frequency    HYDROmorphone (PF) (DILAUDID) injection 0.5 mg  0.5 mg IntraVENous Q4H PRN    HYDROcodone-acetaminophen (NORCO) 5-325 mg per tablet 1 Tab  1 Tab Oral Q4H PRN    polyethylene glycol (MIRALAX) packet 17 g  17 g Oral DAILY    senna (SENOKOT) tablet 17.2 mg  2 Tab Oral DAILY    butalbital-acetaminophen-caffeine (FIORICET, ESGIC) -40 mg per tablet 1 Tab  1 Tab Oral Q4H PRN    oxymetazoline (AFRIN) 0.05 % nasal spray 2 Spray  2 Spray Both Nostrils BID PRN    topiramate (TOPAMAX) tablet 50 mg  50 mg Oral BID    verapamiL (CALAN) tablet 80 mg  80 mg Oral TID    ZOLMitriptan (ZOMIG) tablet 2.5 mg  2.5 mg Oral PRN    sodium chloride (NS) flush 5-40 mL  5-40 mL IntraVENous Q8H    sodium chloride (NS) flush 5-40 mL  5-40 mL IntraVENous PRN    acetaminophen (TYLENOL) tablet 650 mg  650 mg Oral Q6H PRN    Or    acetaminophen (TYLENOL) suppository 650 mg  650 mg Rectal Q6H PRN    promethazine (PHENERGAN) tablet 12.5 mg  12.5 mg Oral Q6H PRN    Or    ondansetron (ZOFRAN) injection 4 mg  4 mg IntraVENous Q6H PRN     ______________________________________________________________________  EXPECTED LENGTH OF STAY: 2d 4h  ACTUAL LENGTH OF STAY:          6                 Romana Neptune, MD

## 2021-02-11 NOTE — H&P
Radiology History and Physical    Patient: Cornelius Gao 30 y.o. male       Chief Complaint: Nasopharyngeal mass    History of Present Illness: 30year old male with a nasopharyngeal mass, presents for port placement. French speaking, used video . Patient is unable to open his mouth more than 1-2 finger breadths. Denies chest pain, abdominal pain, shortness of breath. History:    No past medical history on file. No family history on file. Social History     Socioeconomic History    Marital status:      Spouse name: Not on file    Number of children: Not on file    Years of education: Not on file    Highest education level: Not on file   Occupational History    Not on file   Social Needs    Financial resource strain: Not on file    Food insecurity     Worry: Not on file     Inability: Not on file    Transportation needs     Medical: Not on file     Non-medical: Not on file   Tobacco Use    Smoking status: Never Smoker    Smokeless tobacco: Never Used   Substance and Sexual Activity    Alcohol use: Not Currently    Drug use: Never    Sexual activity: Not on file   Lifestyle    Physical activity     Days per week: Not on file     Minutes per session: Not on file    Stress: Not on file   Relationships    Social connections     Talks on phone: Not on file     Gets together: Not on file     Attends Gnosticist service: Not on file     Active member of club or organization: Not on file     Attends meetings of clubs or organizations: Not on file     Relationship status: Not on file    Intimate partner violence     Fear of current or ex partner: Not on file     Emotionally abused: Not on file     Physically abused: Not on file     Forced sexual activity: Not on file   Other Topics Concern    Not on file   Social History Narrative    Not on file       Allergies:    Allergies   Allergen Reactions    Iodinated Contrast Media Itching       Current Medications:  Current Facility-Administered Medications   Medication Dose Route Frequency    lidocaine (XYLOCAINE) 20 mg/mL (2 %) injection 400 mg  20 mL SubCUTAneous RAD ONCE    lidocaine-EPINEPHrine (XYLOCAINE) 1 %-1:100,000 injection 15 mg  1.5 mL IntraDERMal ONCE    heparin (porcine) pf 300 Units  300 Units InterCATHeter PRN     No current outpatient medications on file.      Facility-Administered Medications Ordered in Other Encounters   Medication Dose Route Frequency    bisacodyL (DULCOLAX) suppository 10 mg  10 mg Rectal DAILY PRN    magnesium hydroxide (MILK OF MAGNESIA) 400 mg/5 mL oral suspension 30 mL  30 mL Oral DAILY PRN    morphine IR (MS IR) tablet 15 mg  15 mg Oral Q4H PRN    0.9% sodium chloride infusion  25 mL/hr IntraVENous CONTINUOUS    ceFAZolin (ANCEF) 2 g in sterile water (preservative free) 20 mL IV syringe  2 g IntraVENous ONCE    fentaNYL citrate (PF) injection 100 mcg  100 mcg IntraVENous Multiple    HYDROmorphone (PF) (DILAUDID) injection 0.5 mg  0.5 mg IntraVENous Q4H PRN    polyethylene glycol (MIRALAX) packet 17 g  17 g Oral DAILY    senna (SENOKOT) tablet 17.2 mg  2 Tab Oral DAILY    butalbital-acetaminophen-caffeine (FIORICET, ESGIC) -40 mg per tablet 1 Tab  1 Tab Oral Q4H PRN    oxymetazoline (AFRIN) 0.05 % nasal spray 2 Spray  2 Spray Both Nostrils BID PRN    topiramate (TOPAMAX) tablet 50 mg  50 mg Oral BID    verapamiL (CALAN) tablet 80 mg  80 mg Oral TID    ZOLMitriptan (ZOMIG) tablet 2.5 mg  2.5 mg Oral PRN    sodium chloride (NS) flush 5-40 mL  5-40 mL IntraVENous Q8H    sodium chloride (NS) flush 5-40 mL  5-40 mL IntraVENous PRN    acetaminophen (TYLENOL) tablet 650 mg  650 mg Oral Q6H PRN    Or    acetaminophen (TYLENOL) suppository 650 mg  650 mg Rectal Q6H PRN    promethazine (PHENERGAN) tablet 12.5 mg  12.5 mg Oral Q6H PRN    Or    ondansetron (ZOFRAN) injection 4 mg  4 mg IntraVENous Q6H PRN        Physical Exam:  There were no vitals taken for this visit.  GENERAL: alert, cooperative, no distress, appears stated age,   LUNG: Nonlabored respiration on room air  HEART: regular rate and rhythm    Alerts:    Hospital Problems  Date Reviewed: 2/4/2021    None          Laboratory:      Recent Labs     02/11/21  0340   HGB 13.4   HCT 40.3   WBC 10.7      BUN 21*   CREA 0.94   K 3.6         Plan of Care/Planned Procedure:  Risks, benefits, and alternatives reviewed with patient and he agrees to proceed with the procedure.      Port placement    Marta Shankar MD  Interventional Radiology  [facility]  3:16 PM, 2/11/2021

## 2021-02-11 NOTE — ROUTINE PROCESS
Dr Cornelio Mae at bedside evaluating patient and explaining procedure to patient with risks and benefits using [employer]  Juliocesar Ferris [id number].

## 2021-02-11 NOTE — ROUTINE PROCESS
TRANSFER - OUT REPORT:    Verbal report given to CC(name) on Bhaskar Brar  being transferred to [address] [place] [address]  (unit) for routine post - op       Report consisted of patients Situation, Background, Assessment and   Recommendations(SBAR). Information from the following report(s) SBAR was reviewed with the receiving nurse. Lines:   Venous Access Device Vaccess CT Power-Injectable Implantable Port lot [id number] 02/11/21 Upper chest (subclavicular area, right (Active)       Peripheral IV 02/06/21 Right Hand (Active)   Site Assessment Clean, dry, & intact 02/11/21 0847   Phlebitis Assessment 0 02/11/21 0847   Infiltration Assessment 0 02/11/21 0847   Dressing Status Clean, dry, & intact 02/11/21 0847   Dressing Type Transparent;Tape 02/11/21 0847   Hub Color/Line Status Capped 02/11/21 0847   Action Taken Open ports on tubing capped 02/11/21 0847   Alcohol Cap Used Yes 02/11/21 0847        Opportunity for questions and clarification was provided.

## 2021-02-12 ENCOUNTER — DOCUMENTATION ONLY (OUTPATIENT)
Dept: ONCOLOGY | Age: 36
End: 2021-02-12

## 2021-02-12 ENCOUNTER — TRANSCRIBE ORDER (OUTPATIENT)
Dept: SCHEDULING | Age: 36
End: 2021-02-12

## 2021-02-12 DIAGNOSIS — C11.9 MALIGNANT NEOPLASM OF NASOPHARYNGEAL WALL (HCC): Primary | ICD-10-CM

## 2021-02-12 PROBLEM — C76.0 HEAD AND NECK CANCER (HCC): Status: ACTIVE | Noted: 2021-02-12

## 2021-02-12 LAB
ALBUMIN SERPL-MCNC: 3.4 G/DL (ref 3.5–5)
ALBUMIN/GLOB SERPL: 0.8 {RATIO} (ref 1.1–2.2)
ALP SERPL-CCNC: 102 U/L (ref 45–117)
ALT SERPL-CCNC: 61 U/L (ref 12–78)
ANION GAP SERPL CALC-SCNC: 9 MMOL/L (ref 5–15)
AST SERPL-CCNC: 17 U/L (ref 15–37)
BASOPHILS # BLD: 0.1 K/UL (ref 0–0.1)
BASOPHILS NFR BLD: 0 % (ref 0–1)
BILIRUB SERPL-MCNC: 0.3 MG/DL (ref 0.2–1)
BUN SERPL-MCNC: 22 MG/DL (ref 6–20)
BUN/CREAT SERPL: 26 (ref 12–20)
CALCIUM SERPL-MCNC: 9.5 MG/DL (ref 8.5–10.1)
CHLORIDE SERPL-SCNC: 102 MMOL/L (ref 97–108)
CO2 SERPL-SCNC: 23 MMOL/L (ref 21–32)
CREAT SERPL-MCNC: 0.84 MG/DL (ref 0.7–1.3)
DIFFERENTIAL METHOD BLD: ABNORMAL
EOSINOPHIL # BLD: 0.1 K/UL (ref 0–0.4)
EOSINOPHIL NFR BLD: 1 % (ref 0–7)
ERYTHROCYTE [DISTWIDTH] IN BLOOD BY AUTOMATED COUNT: 14 % (ref 11.5–14.5)
GLOBULIN SER CALC-MCNC: 4.2 G/DL (ref 2–4)
GLUCOSE SERPL-MCNC: 105 MG/DL (ref 65–100)
HCT VFR BLD AUTO: 39.2 % (ref 36.6–50.3)
HGB BLD-MCNC: 13.1 G/DL (ref 12.1–17)
IMM GRANULOCYTES # BLD AUTO: 0 K/UL (ref 0–0.04)
IMM GRANULOCYTES NFR BLD AUTO: 0 % (ref 0–0.5)
LYMPHOCYTES # BLD: 1.9 K/UL (ref 0.8–3.5)
LYMPHOCYTES NFR BLD: 16 % (ref 12–49)
MCH RBC QN AUTO: 26.4 PG (ref 26–34)
MCHC RBC AUTO-ENTMCNC: 33.4 G/DL (ref 30–36.5)
MCV RBC AUTO: 78.9 FL (ref 80–99)
MONOCYTES # BLD: 1 K/UL (ref 0–1)
MONOCYTES NFR BLD: 8 % (ref 5–13)
NEUTS SEG # BLD: 8.7 K/UL (ref 1.8–8)
NEUTS SEG NFR BLD: 75 % (ref 32–75)
NRBC # BLD: 0 K/UL (ref 0–0.01)
NRBC BLD-RTO: 0 PER 100 WBC
PLATELET # BLD AUTO: 368 K/UL (ref 150–400)
PMV BLD AUTO: 9.7 FL (ref 8.9–12.9)
POTASSIUM SERPL-SCNC: 4 MMOL/L (ref 3.5–5.1)
PROT SERPL-MCNC: 7.6 G/DL (ref 6.4–8.2)
RBC # BLD AUTO: 4.97 M/UL (ref 4.1–5.7)
SODIUM SERPL-SCNC: 134 MMOL/L (ref 136–145)
WBC # BLD AUTO: 11.9 K/UL (ref 4.1–11.1)

## 2021-02-12 PROCEDURE — 99233 SBSQ HOSP IP/OBS HIGH 50: CPT | Performed by: PHYSICAL MEDICINE & REHABILITATION

## 2021-02-12 PROCEDURE — 74011250637 HC RX REV CODE- 250/637: Performed by: SPECIALIST

## 2021-02-12 PROCEDURE — 74011250636 HC RX REV CODE- 250/636: Performed by: INTERNAL MEDICINE

## 2021-02-12 PROCEDURE — 65270000029 HC RM PRIVATE

## 2021-02-12 PROCEDURE — 85025 COMPLETE CBC W/AUTO DIFF WBC: CPT

## 2021-02-12 PROCEDURE — 80053 COMPREHEN METABOLIC PANEL: CPT

## 2021-02-12 PROCEDURE — 74011250637 HC RX REV CODE- 250/637: Performed by: PHYSICAL MEDICINE & REHABILITATION

## 2021-02-12 PROCEDURE — 36415 COLL VENOUS BLD VENIPUNCTURE: CPT

## 2021-02-12 RX ORDER — MORPHINE SULFATE 15 MG/1
15 TABLET ORAL
Qty: 84 TAB | Refills: 0 | Status: SHIPPED | OUTPATIENT
Start: 2021-02-12 | End: 2021-02-12

## 2021-02-12 RX ORDER — HYDROMORPHONE HYDROCHLORIDE 1 MG/ML
0.2 INJECTION, SOLUTION INTRAMUSCULAR; INTRAVENOUS; SUBCUTANEOUS
Status: DISCONTINUED | OUTPATIENT
Start: 2021-02-12 | End: 2021-02-13 | Stop reason: HOSPADM

## 2021-02-12 RX ORDER — OXYCODONE HYDROCHLORIDE 15 MG/1
15 TABLET ORAL
Qty: 84 TAB | Refills: 0 | Status: SHIPPED | OUTPATIENT
Start: 2021-02-12 | End: 2021-02-26

## 2021-02-12 RX ORDER — OXYCODONE HYDROCHLORIDE 5 MG/1
15 TABLET ORAL
Status: DISCONTINUED | OUTPATIENT
Start: 2021-02-12 | End: 2021-02-13 | Stop reason: HOSPADM

## 2021-02-12 RX ADMIN — BUTALBITAL, ACETAMINOPHEN, AND CAFFEINE 1 TABLET: 50; 325; 40 TABLET ORAL at 11:43

## 2021-02-12 RX ADMIN — OXYCODONE 15 MG: 5 TABLET ORAL at 20:28

## 2021-02-12 RX ADMIN — VERAPAMIL HYDROCHLORIDE 80 MG: 80 TABLET, FILM COATED ORAL at 16:36

## 2021-02-12 RX ADMIN — VERAPAMIL HYDROCHLORIDE 80 MG: 80 TABLET, FILM COATED ORAL at 09:30

## 2021-02-12 RX ADMIN — MORPHINE SULFATE 15 MG: 15 TABLET ORAL at 11:43

## 2021-02-12 RX ADMIN — POLYETHYLENE GLYCOL 3350 17 G: 17 POWDER, FOR SOLUTION ORAL at 09:31

## 2021-02-12 RX ADMIN — TOPIRAMATE 50 MG: 25 TABLET, FILM COATED ORAL at 09:30

## 2021-02-12 RX ADMIN — Medication 17.2 MG: at 09:30

## 2021-02-12 RX ADMIN — VERAPAMIL HYDROCHLORIDE 80 MG: 80 TABLET, FILM COATED ORAL at 21:51

## 2021-02-12 RX ADMIN — TOPIRAMATE 50 MG: 25 TABLET, FILM COATED ORAL at 18:29

## 2021-02-12 RX ADMIN — Medication 10 ML: at 16:37

## 2021-02-12 RX ADMIN — HYDROMORPHONE HYDROCHLORIDE 0.5 MG: 1 INJECTION, SOLUTION INTRAMUSCULAR; INTRAVENOUS; SUBCUTANEOUS at 04:34

## 2021-02-12 RX ADMIN — HYDROMORPHONE HYDROCHLORIDE 0.5 MG: 1 INJECTION, SOLUTION INTRAMUSCULAR; INTRAVENOUS; SUBCUTANEOUS at 09:28

## 2021-02-12 RX ADMIN — Medication 10 ML: at 06:32

## 2021-02-12 RX ADMIN — MORPHINE SULFATE 15 MG: 15 TABLET ORAL at 06:32

## 2021-02-12 RX ADMIN — MORPHINE SULFATE 15 MG: 15 TABLET ORAL at 01:52

## 2021-02-12 RX ADMIN — OXYCODONE 15 MG: 5 TABLET ORAL at 16:36

## 2021-02-12 NOTE — PROGRESS NOTES
Please schedule hospital follow-up with Dr. Nakia Ellis this coming Wednesday 2PM.   Patient is Liberian speaking and needs . Ana-please schedule pt to start HD Cisplatin the following Tuesday. Anna-reminder to teach/consent for HD Cisplatin.

## 2021-02-12 NOTE — PROGRESS NOTES
Picked up patient's medications from the outpatient pharmacy. Medication locked in the charge nurse drawer at the nurses station. Patient is supposed to be discharged home tomorrow and the medication will be given to him then.

## 2021-02-12 NOTE — PROGRESS NOTES
Provided  services remotely to [facility] NP during a f/u call. Opportunity for questions and clarification was provided.               Melburn Law [employer]  [phone]

## 2021-02-12 NOTE — PALLIATIVE CARE DISCHARGE
Palliative medicine discharge:    Dear Kate Barone,    It was nice to meet you this admission, I know that you received a new diagnosis of cancer and that this is a scary time for you and your family. You will have a lot of support from our Oncology and Palliative medicine teams- to ensure that you get good treatments and that your symptoms are managed. Your headaches are from the tumor and your pain will get better with treatment. In the meantime:    -Take Topamax 50mg twice a day. This is to help prevent headaches.     -Take Oxycodone 15mg every 4 hours as needed for pain. You do not need to take this medication if you are not in pain. This is an opioid/narcotic that can be very dangerous if someone takes too much. Keep it in safe place where your children cannot get to it.     -You will have constipation from the Oxycodone, so please take stool softeners including Senna and Miralax daily. Take good care of yourself, you will hear from the Palliative clinic for an appointment.      Anmol Diez MD

## 2021-02-12 NOTE — PROGRESS NOTES
EDWIN:  RUR: 15%    Disposition:  Home tomorrow. Chart reviewed. Patient is known to [facility]  ([address] [place] [address]). Patient is uninsured. CM Dept assisting patient with d/c prescription $77.98. Noble Bryant RN picking up prescription and will lock in drawer at the [facility] station.     Estefani Chaudhari, [address], [address] [facility]

## 2021-02-12 NOTE — PROGRESS NOTES
[facility] Adult  Hospitalist Group                                                                                          Hospitalist Progress Note  Khushbu Augustine MD  Answering service: [phone] from in house phone        Date of Service:  2021  NAME:  Maryanne Jackson  :  1985  MRN:  [id number]      Admission Summary: This is a 80-year-old man with a past medical history significant for hypertension, migraine headache, was in his usual state of health until the day of presentation at the emergency room when the patient developed nosebleed from the right nostril. The patient's nosebleed started at about 02:00 p.m. and has had 6 episodes since then, associated with headache. The headache is located on the right side of the head, described as throbbing. No associated blurring of vision. The nosebleed has been going on intermittently for about 2 months. According to the patient, this is usually aggravated by bending forward with a slight relief when the patient puts direct pressure on his nares. Since the onset of the nosebleed about 2 months ago, the patient has been seen by the ENT surgeon. He was at [facility] on 2020. The nose was packed and he was seen by ENT surgeon; shortly after that, the pack was removed. The patient underwent nasal endoscopy without any obvious source of bleeding. When the patient arrived at the emergency room today, the patient was no longer having active bleeding, but since this is intermittent and recurrent, the patient's ENT surgeon was consulted. The ENT surgeon advised consultation with interventional radiologist for embolization and the patient was referred to the hospitalist service for evaluation for admission. No record of prior admission to this hospital.  Interval history / Subjective:   Admitted for epistaxis, found to have right nasopharyngeal mass, biopsy done .    Have seen and examined patient today. Feeling ok. Pain better controlled and PO meds working but IV more effective. No fever, chills. No further epistaxis or bleeding. Port placement today with IR. 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 Ascencion Claudio, resulted on 2/10  --Oncology on board. CT of C/A/P completed 2/10 with premedication given mild contrast dye allergy. Unremarkable   --Radiation oncology consult per oncology. --Pain management. On IV dilaudid  --Add p.o. Norco.  Further adjustment per palliative care team  --Palliative care consultation, for symptoms management, appreciate  --HD chemo RT with cisplatin at the earliest possible outpatient  --DC when pain adequately controlled with p.o. medications. Appreciate palliative care consultation  --Cont IV dilaudid for now as NPO for procedure. Consider transition of PO [place] to PO Morphin IR as recommended by Palliative care team    Hypertension. Patient on verapamil, BP stable. Migraine headache. Patient on Topamax and Zomig prn. Further as per palliative care team      Code status: Full  DVT prophylaxis: Holding due to bleed risk    Care Plan discussed with: Patient/Family  Anticipated Disposition: Home w/Family  Anticipated Discharge: As symptoms controlled with oral meds     Hospital Problems  Date Reviewed: 2/4/2021          Codes Class Noted POA    * (Principal) Epistaxis ICD-10-CM: R04.0  ICD-9-CM: 784.7  2/4/2021 Yes                Review of Systems:   A comprehensive review of systems was negative except for that written in the HPI. Vital Signs:    Last 24hrs VS reviewed since prior progress note.  Most recent are:  Visit Vitals  /75 (BP 1 Location: Left upper arm, BP Patient Position: At rest)   Pulse 68   Temp 98.4 °F (36.9 °C)   Resp 18   Ht 5' 7.01\" (1.702 m)   SpO2 95%   BMI 30.14 kg/m²       No intake or output data in the 24 hours ending 02/11/21 4856     Physical Examination:     I had a face to face encounter with this patient and independently examined them on 2/11/2021 as outlined below:          Constitutional:  mild distress, appears better and more comfortable, cooperative, pleasant    ENT:  Oral mucosa moist,no bleeding,or protruding mass. Anton Zimmerman Resp:  CTA bilaterally. No wheezing/rhonchi/rales. No accessory muscle use   CV:  Regular rhythm, normal rate, no murmurs, gallops, rubs    GI:  Soft, non distended, non tender. normoactive bowel sounds, no hepatosplenomegaly     Musculoskeletal:  No edema, warm, 2+ pulses throughout    Neurologic:  Moves all extremities. AAOx3, CN II-XII reviewed     Psych:  Good insight, Not anxious nor agitated. Data Review:    Review and/or order of clinical lab test  Review and/or order of tests in the radiology section of [facility]  Review and/or order of tests in the medicine section of [facility]    Radiology  No results found. Labs:     Recent Labs     02/11/21  0340 02/10/21  0355   WBC 10.7 9.7   HGB 13.4 13.6   HCT 40.3 40.8    395     Recent Labs     02/11/21  0340 02/10/21  0355 02/09/21  0231    132* 136   K 3.6 4.4 3.5    105 106   CO2 23 19* 21   BUN 21* 14 14   CREA 0.94 0.87 0.92   GLU 99 139* 96   CA 10.1 10.4* 9.6   PHOS  --   --  4.4     Recent Labs     02/11/21  0340 02/10/21  0355 02/09/21  0231   ALT 77 64  --     110  --    TBILI 0.2 0.2  --    TP 8.3* 8.9*  --    ALB 3.4* 3.6 3.4*   GLOB 4.9* 5.3*  --      No results for input(s): INR, PTP, APTT, INREXT, INREXT in the last 72 hours. No results for input(s): FE, TIBC, PSAT, FERR in the last 72 hours. No results found for: FOL, RBCF   No results for input(s): PH, PCO2, PO2 in the last 72 hours. No results for input(s): CPK, CKNDX, TROIQ in the last 72 hours.     No lab exists for component: CPKMB  No results found for: CHOL, CHOLX, CHLST, CHOLV, HDL, HDLP, LDL, LDLC, DLDLP, TGLX, TRIGL, TRIGP, CHHD, CHHDX  No results found for: GLUCPOC  No results found for: COLOR, APPRN, SPGRU, REFSG, DENICE, PROTU, GLUCU, KETU, BILU, UROU, KIERAN, LEUKU, GLUKE, EPSU, BACTU, WBCU, RBCU, CASTS, UCRY      Medications Reviewed:     Current Facility-Administered Medications   Medication Dose Route Frequency    bisacodyL (DULCOLAX) suppository 10 mg  10 mg Rectal DAILY PRN    magnesium hydroxide (MILK OF MAGNESIA) 400 mg/5 mL oral suspension 30 mL  30 mL Oral DAILY PRN    morphine IR (MS IR) tablet 15 mg  15 mg Oral Q4H PRN    HYDROmorphone (PF) (DILAUDID) injection 0.5 mg  0.5 mg IntraVENous Q4H PRN    polyethylene glycol (MIRALAX) packet 17 g  17 g Oral DAILY    senna (SENOKOT) tablet 17.2 mg  2 Tab Oral DAILY    butalbital-acetaminophen-caffeine (FIORICET, ESGIC) -40 mg per tablet 1 Tab  1 Tab Oral Q4H PRN    oxymetazoline (AFRIN) 0.05 % nasal spray 2 Spray  2 Spray Both Nostrils BID PRN    topiramate (TOPAMAX) tablet 50 mg  50 mg Oral BID    verapamiL (CALAN) tablet 80 mg  80 mg Oral TID    ZOLMitriptan (ZOMIG) tablet 2.5 mg  2.5 mg Oral PRN    sodium chloride (NS) flush 5-40 mL  5-40 mL IntraVENous Q8H    sodium chloride (NS) flush 5-40 mL  5-40 mL IntraVENous PRN    acetaminophen (TYLENOL) tablet 650 mg  650 mg Oral Q6H PRN    Or    acetaminophen (TYLENOL) suppository 650 mg  650 mg Rectal Q6H PRN    promethazine (PHENERGAN) tablet 12.5 mg  12.5 mg Oral Q6H PRN    Or    ondansetron (ZOFRAN) injection 4 mg  4 mg IntraVENous Q6H PRN     Facility-Administered Medications Ordered in Other Encounters   Medication Dose Route Frequency    heparin (porcine) pf 300 Units  300 Units InterCATHeter PRN     ______________________________________________________________________  EXPECTED LENGTH OF STAY: 2d 4h  ACTUAL LENGTH OF STAY:          7                 Olga Gale MD

## 2021-02-13 VITALS
BODY MASS INDEX: 30.14 KG/M2 | HEIGHT: 67 IN | DIASTOLIC BLOOD PRESSURE: 85 MMHG | SYSTOLIC BLOOD PRESSURE: 127 MMHG | TEMPERATURE: 98.5 F | HEART RATE: 87 BPM | RESPIRATION RATE: 17 BRPM | OXYGEN SATURATION: 98 %

## 2021-02-13 PROCEDURE — 74011250637 HC RX REV CODE- 250/637: Performed by: PHYSICAL MEDICINE & REHABILITATION

## 2021-02-13 PROCEDURE — 74011250637 HC RX REV CODE- 250/637: Performed by: SPECIALIST

## 2021-02-13 RX ORDER — SODIUM CHLORIDE 9 MG/ML
25 INJECTION, SOLUTION INTRAVENOUS CONTINUOUS
Status: CANCELLED | OUTPATIENT
Start: 2021-03-16

## 2021-02-13 RX ORDER — DIPHENHYDRAMINE HYDROCHLORIDE 50 MG/ML
50 INJECTION, SOLUTION INTRAMUSCULAR; INTRAVENOUS AS NEEDED
Status: CANCELLED
Start: 2021-03-16

## 2021-02-13 RX ORDER — ALBUTEROL SULFATE 0.83 MG/ML
2.5 SOLUTION RESPIRATORY (INHALATION) AS NEEDED
Status: CANCELLED
Start: 2021-03-16

## 2021-02-13 RX ORDER — ONDANSETRON 2 MG/ML
8 INJECTION INTRAMUSCULAR; INTRAVENOUS AS NEEDED
Status: CANCELLED | OUTPATIENT
Start: 2021-04-06

## 2021-02-13 RX ORDER — ACETAMINOPHEN 325 MG/1
650 TABLET ORAL AS NEEDED
Status: CANCELLED
Start: 2021-03-16

## 2021-02-13 RX ORDER — PALONOSETRON 0.05 MG/ML
0.25 INJECTION, SOLUTION INTRAVENOUS ONCE
Status: CANCELLED | OUTPATIENT
Start: 2021-03-16 | End: 2021-03-16

## 2021-02-13 RX ORDER — SODIUM CHLORIDE 9 MG/ML
25 INJECTION, SOLUTION INTRAVENOUS CONTINUOUS
Status: CANCELLED | OUTPATIENT
Start: 2021-02-23

## 2021-02-13 RX ORDER — SODIUM CHLORIDE 0.9 % (FLUSH) 0.9 %
10 SYRINGE (ML) INJECTION AS NEEDED
Status: CANCELLED | OUTPATIENT
Start: 2021-03-16

## 2021-02-13 RX ORDER — POLYETHYLENE GLYCOL 3350 17 G/17G
17 POWDER, FOR SOLUTION ORAL DAILY
Qty: 30 EACH | Refills: 0 | Status: SHIPPED | OUTPATIENT
Start: 2021-02-14 | End: 2022-08-18

## 2021-02-13 RX ORDER — SODIUM CHLORIDE 0.9 % (FLUSH) 0.9 %
10 SYRINGE (ML) INJECTION AS NEEDED
Status: CANCELLED | OUTPATIENT
Start: 2021-02-23

## 2021-02-13 RX ORDER — DIPHENHYDRAMINE HYDROCHLORIDE 50 MG/ML
25 INJECTION, SOLUTION INTRAMUSCULAR; INTRAVENOUS AS NEEDED
Status: CANCELLED
Start: 2021-03-16

## 2021-02-13 RX ORDER — ALBUTEROL SULFATE 0.83 MG/ML
2.5 SOLUTION RESPIRATORY (INHALATION) AS NEEDED
Status: CANCELLED
Start: 2021-02-23

## 2021-02-13 RX ORDER — EPINEPHRINE 1 MG/ML
0.3 INJECTION, SOLUTION, CONCENTRATE INTRAVENOUS AS NEEDED
Status: CANCELLED | OUTPATIENT
Start: 2021-04-06

## 2021-02-13 RX ORDER — DIPHENHYDRAMINE HYDROCHLORIDE 50 MG/ML
25 INJECTION, SOLUTION INTRAMUSCULAR; INTRAVENOUS AS NEEDED
Status: CANCELLED
Start: 2021-02-23

## 2021-02-13 RX ORDER — EPINEPHRINE 1 MG/ML
0.3 INJECTION, SOLUTION, CONCENTRATE INTRAVENOUS AS NEEDED
Status: CANCELLED | OUTPATIENT
Start: 2021-02-23

## 2021-02-13 RX ORDER — SODIUM CHLORIDE 9 MG/ML
10 INJECTION INTRAMUSCULAR; INTRAVENOUS; SUBCUTANEOUS AS NEEDED
Status: CANCELLED | OUTPATIENT
Start: 2021-04-06

## 2021-02-13 RX ORDER — ONDANSETRON 2 MG/ML
8 INJECTION INTRAMUSCULAR; INTRAVENOUS AS NEEDED
Status: CANCELLED | OUTPATIENT
Start: 2021-03-16

## 2021-02-13 RX ORDER — EPINEPHRINE 1 MG/ML
0.3 INJECTION, SOLUTION, CONCENTRATE INTRAVENOUS AS NEEDED
Status: CANCELLED | OUTPATIENT
Start: 2021-03-16

## 2021-02-13 RX ORDER — ACETAMINOPHEN 325 MG/1
650 TABLET ORAL AS NEEDED
Status: CANCELLED
Start: 2021-04-06

## 2021-02-13 RX ORDER — ALBUTEROL SULFATE 0.83 MG/ML
2.5 SOLUTION RESPIRATORY (INHALATION) AS NEEDED
Status: CANCELLED
Start: 2021-04-06

## 2021-02-13 RX ORDER — DIPHENHYDRAMINE HYDROCHLORIDE 50 MG/ML
50 INJECTION, SOLUTION INTRAMUSCULAR; INTRAVENOUS AS NEEDED
Status: CANCELLED
Start: 2021-04-06

## 2021-02-13 RX ORDER — HEPARIN 100 UNIT/ML
300-500 SYRINGE INTRAVENOUS AS NEEDED
Status: CANCELLED
Start: 2021-02-23

## 2021-02-13 RX ORDER — ONDANSETRON 2 MG/ML
8 INJECTION INTRAMUSCULAR; INTRAVENOUS AS NEEDED
Status: CANCELLED | OUTPATIENT
Start: 2021-02-23

## 2021-02-13 RX ORDER — SENNOSIDES 8.6 MG/1
2 TABLET ORAL DAILY
Qty: 60 TAB | Refills: 0 | Status: SHIPPED
Start: 2021-02-14 | End: 2021-06-09

## 2021-02-13 RX ORDER — HEPARIN 100 UNIT/ML
300-500 SYRINGE INTRAVENOUS AS NEEDED
Status: CANCELLED
Start: 2021-04-06

## 2021-02-13 RX ORDER — SODIUM CHLORIDE 9 MG/ML
10 INJECTION INTRAMUSCULAR; INTRAVENOUS; SUBCUTANEOUS AS NEEDED
Status: CANCELLED | OUTPATIENT
Start: 2021-03-16

## 2021-02-13 RX ORDER — PALONOSETRON 0.05 MG/ML
0.25 INJECTION, SOLUTION INTRAVENOUS ONCE
Status: CANCELLED | OUTPATIENT
Start: 2021-02-23 | End: 2021-02-23

## 2021-02-13 RX ORDER — HYDROCORTISONE SODIUM SUCCINATE 100 MG/2ML
100 INJECTION, POWDER, FOR SOLUTION INTRAMUSCULAR; INTRAVENOUS AS NEEDED
Status: CANCELLED | OUTPATIENT
Start: 2021-03-16

## 2021-02-13 RX ORDER — SODIUM CHLORIDE 9 MG/ML
10 INJECTION INTRAMUSCULAR; INTRAVENOUS; SUBCUTANEOUS AS NEEDED
Status: CANCELLED | OUTPATIENT
Start: 2021-02-23

## 2021-02-13 RX ORDER — HYDROCORTISONE SODIUM SUCCINATE 100 MG/2ML
100 INJECTION, POWDER, FOR SOLUTION INTRAMUSCULAR; INTRAVENOUS AS NEEDED
Status: CANCELLED | OUTPATIENT
Start: 2021-02-23

## 2021-02-13 RX ORDER — SODIUM CHLORIDE 0.9 % (FLUSH) 0.9 %
10 SYRINGE (ML) INJECTION AS NEEDED
Status: CANCELLED | OUTPATIENT
Start: 2021-04-06

## 2021-02-13 RX ORDER — DIPHENHYDRAMINE HYDROCHLORIDE 50 MG/ML
50 INJECTION, SOLUTION INTRAMUSCULAR; INTRAVENOUS AS NEEDED
Status: CANCELLED
Start: 2021-02-23

## 2021-02-13 RX ORDER — HEPARIN 100 UNIT/ML
300-500 SYRINGE INTRAVENOUS AS NEEDED
Status: CANCELLED
Start: 2021-03-16

## 2021-02-13 RX ORDER — DIPHENHYDRAMINE HYDROCHLORIDE 50 MG/ML
25 INJECTION, SOLUTION INTRAMUSCULAR; INTRAVENOUS AS NEEDED
Status: CANCELLED
Start: 2021-04-06

## 2021-02-13 RX ORDER — OXYMETAZOLINE HCL 0.05 %
2 SPRAY, NON-AEROSOL (ML) NASAL
Qty: 1 BOTTLE | Refills: 0 | Status: SHIPPED | OUTPATIENT
Start: 2021-02-13 | End: 2021-02-16

## 2021-02-13 RX ORDER — HYDROCORTISONE SODIUM SUCCINATE 100 MG/2ML
100 INJECTION, POWDER, FOR SOLUTION INTRAMUSCULAR; INTRAVENOUS AS NEEDED
Status: CANCELLED | OUTPATIENT
Start: 2021-04-06

## 2021-02-13 RX ORDER — ACETAMINOPHEN 325 MG/1
650 TABLET ORAL AS NEEDED
Status: CANCELLED
Start: 2021-02-23

## 2021-02-13 RX ORDER — SODIUM CHLORIDE 9 MG/ML
25 INJECTION, SOLUTION INTRAVENOUS CONTINUOUS
Status: CANCELLED | OUTPATIENT
Start: 2021-04-06

## 2021-02-13 RX ORDER — PALONOSETRON 0.05 MG/ML
0.25 INJECTION, SOLUTION INTRAVENOUS ONCE
Status: CANCELLED | OUTPATIENT
Start: 2021-04-06 | End: 2021-04-06

## 2021-02-13 RX ADMIN — POLYETHYLENE GLYCOL 3350 17 G: 17 POWDER, FOR SOLUTION ORAL at 08:05

## 2021-02-13 RX ADMIN — OXYCODONE 15 MG: 5 TABLET ORAL at 05:53

## 2021-02-13 RX ADMIN — Medication 17.2 MG: at 08:04

## 2021-02-13 RX ADMIN — TOPIRAMATE 50 MG: 25 TABLET, FILM COATED ORAL at 08:04

## 2021-02-13 RX ADMIN — OXYCODONE 15 MG: 5 TABLET ORAL at 11:02

## 2021-02-13 RX ADMIN — VERAPAMIL HYDROCHLORIDE 80 MG: 80 TABLET, FILM COATED ORAL at 08:05

## 2021-02-13 RX ADMIN — OXYCODONE 15 MG: 5 TABLET ORAL at 00:35

## 2021-02-13 NOTE — DISCHARGE INSTRUCTIONS
Discharge Instructions       PATIENT ID: Farhad Ann  MRN: [id number]   YOB: 1985    DATE OF ADMISSION: 2/4/2021  8:10 PM    DATE OF DISCHARGE: 2/13/2021    PRIMARY CARE PROVIDER: JESSIAC Zarate     ATTENDING PHYSICIAN: Teresa Rios MD  DISCHARGING PROVIDER: Renetta Simmons MD    To contact this individual call [phone] and ask the  to page. If unavailable ask to be transferred the Adult Hospitalist Department. 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 Mauricio Merida, resulted on 2/10  --Oncology on board. Follow up with Dr. Ijeoma Ordonez on Wed, 2/17  --CT of C/A/P completed 2/10 with premedication given mild contrast dye allergy. Unremarkable   --Radiation oncology consult per oncology. Defer further consultation with Dr. Israel Harris to Dr. Ijeoma Ordonez, his oncologist.  --Pain management per palliative care team. To follow up with Dr. Areli Bhatt, palliative care team as suggested by her in next 1-2 weeks. --Weaned off IV Dilaudid with no IV pain meds since transition to PO Oxycodone  --Discharge on PO Oxycodone 15 mg every 4 hours as needed for 14days, script filled with help of CM assistance by Dr. Areli Bhatt. Appreciate all the team members. --Counseled patient for safe and judicious use of opiate pain medications and educated about safety and side effect profile/risks  --HD chemo RT with cisplatin at the earliest possible outpatient. To see Dr. Ijeoma Ordonez in 5 days on Wednesday, 2/17/21  --Continue Topamax as well. --Afrin nasal spray as needed. ENT follow up as needed     Hypertension. Patient on verapamil, BP stable. Migraine headache. Patient on Topamax and Zomig prn.   Further as per palliative care team.        CONSULTATIONS: IP CONSULT TO OTOLARYNGOLOGY  IP CONSULT TO NEUROINTERVENTIONAL SURGERY  IP CONSULT TO OTOLARYNGOLOGY  IP CONSULT TO ONCOLOGY  IP CONSULT TO RADIATION ONCOLOGY  IP CONSULT TO PALLIATIVE CARE - PROVIDER    PROCEDURES/SURGERIES: Procedure(s):  ENDOSCOPIC NASOPHARENGEAL BIOPSY. PENDING TEST RESULTS:   At the time of discharge the following test results are still pending: none    FOLLOW UP APPOINTMENTS:   Follow-up Information     Follow up With Specialties Details Why Fidelia Claire MD Hematology and Oncology, Internal Medicine, Hematology, Oncology Schedule an appointment as soon as possible for a visit in 5 days Oncology: To see Dr. Barbara Elizondo on Wednesday, 2/17/2021 at 2 PM Sixto Nation MD Palliative Medicine Schedule an appointment as soon as possible for a visit in 1 week Palliative care: Please call to schedule appointment with Dr. Mychal Jordan within 1 week or as recommended by her office for further pain management [address]      Kathleen Oliver MD Radiation Oncology Schedule an appointment as soon as possible for a visit Radiation oncology: Follow-up as recommended by oncology, Dr. Ebony Harvey  [phone]      Piper Jackson MD Otolaryngology Schedule an appointment as soon as possible for a visit in 1 week ENT: Follow-up with ENT within 1 week or as appropriate/recommended by Dr. Goff [facility] [address]  [address] [facility]  [phone]      [address], Klever [place], [place] Physician Assistant In 1 week As needed, If symptoms worsen and post hospital discharge follow-up with PCP [facility]  [phone]             ADDITIONAL CARE RECOMMENDATIONS:   Crucial Follow ups with Dr. Barbara Elizondo, Hem-oncology/ Dr. Mychal Jordan, Palliative care/ Dr. Natalie Tejeda, ENT and Dr. Franklin Lugo, Rad-Oncology as noted above. · It is important that you take the medication exactly as they are prescribed.    · Keep your medication in the bottles provided by the pharmacist and keep a list of the medication names, dosages, and times to be taken in your wallet. · Do not take other medications without consulting your doctor. · No drinking alcohol or driving car or operating machinery if you are on narcotic pain medications. Donot take sedating mediations if you are sleepy or confused. · Fall Precautions  · Keep Well Hydrated  · Report to your medical provider if you feel you have  developed allergies to medications  · Follow up with your PCP or Consultant for medication adjustments and refills  · Monitor for signs of fevers,chills,bleeding,chest pain and seek medical attention if you do so. DIET: Cardiac Diet    ACTIVITY: Activity as tolerated    WOUND CARE: NA. For epistaxis, can use Afrin nasal spray. If does not stop, further medical help with Urgent care or ED urgently. EQUIPMENT needed: none    ==============================================  Palliative medicine discharge:     Dear Aleksandra Barrios,     It was nice to meet you this admission, I know that you received a new diagnosis of cancer and that this is a scary time for you and your family. You will have a lot of support from our Oncology and Palliative medicine teams- to ensure that you get good treatments and that your symptoms are managed.      Your headaches are from the tumor and your pain will get better with treatment. In the meantime:     -Take Topamax 50mg twice a day. This is to help prevent headaches.      -Take Oxycodone 15mg every 4 hours as needed for pain. You do not need to take this medication if you are not in pain. This is an opioid/narcotic that can be very dangerous if someone takes too much.  Keep it in safe place where your children cannot get to it.      -You will have constipation from the Oxycodone, so please take stool softeners including Senna and Miralax daily.         Take good care of yourself, you will hear from the Palliative clinic for an appointment.   Ingrid Alvarenga MD  ===========================================      Radiology:  [address] Over 5 Years    Result Date: 2/12/2021  Successful placement of a single lumen power injectable subcutaneous port. The catheter is ready for use. DISCHARGE MEDICATIONS:   See Medication Reconciliation Form    · It is important that you take the medication exactly as they are prescribed. · Keep your medication in the bottles provided by the pharmacist and keep a list of the medication names, dosages, and times to be taken in your wallet. · Do not take other medications without consulting your doctor. NOTIFY YOUR PHYSICIAN FOR ANY OF THE FOLLOWING:   Fever over 101 degrees for 24 hours. Chest pain, shortness of breath, fever, chills, nausea, vomiting, diarrhea, change in mentation, falling, weakness, bleeding. Severe pain or pain not relieved by medications. Or, any other signs or symptoms that you may have questions about. DISPOSITION:  x  Home With:   OT  PT  HH  RN       SNF/Inpatient Rehab/LTAC    Independent/assisted living    Hospice    Other:     CDMP Checked:   Yes x     PROBLEM LIST Updated:  Yes x        My Medications      START taking these medications      Instructions Each Dose to Equal Morning Noon Evening Bedtime   oxyCODONE IR 15 mg immediate release tablet  Commonly known as: OXY-IR    Your last dose was: Your next dose is: Take 1 Tab by mouth every four (4) hours as needed for Pain for up to 14 days. Max Daily Amount: 90 mg.   15 mg                 oxymetazoline 0.05 % nasal spray  Commonly known as: AFRIN    Your last dose was: Your next dose is: 2 Sprays by Both Nostrils route two (2) times daily as needed for Congestion for up to 3 days. 2 Spray                 polyethylene glycol 17 gram packet  Commonly known as: MIRALAX  Start taking on: February 14, 2021    Your last dose was: Your next dose is:          Take 1 Packet by mouth daily. Mix in 8 oz of water, juice, soda, coffee, or tea prior to administration   17 g                 senna 8.6 mg tablet  Commonly known as: SENOKOT  Start taking on: 2021    Your last dose was: Your next dose is: Take 2 Tabs by mouth daily. 2 Tab                    CONTINUE taking these medications      Instructions Each Dose to Equal Morning Noon Evening Bedtime   acetaminophen 500 mg tablet  Commonly known as: TYLENOL    Your last dose was: Your next dose is: Take  by mouth every six (6) hours as needed for Pain. 2 or 3 po daily                  fexofenadine 180 mg tablet  Commonly known as: ALLEGRA    Your last dose was: Your next dose is: Take 1 Tab by mouth daily. Indications: seasonal runny nose   180 mg                 magnesium 250 mg Tab    Your last dose was: Your next dose is:         Si po qhs                  topiramate 50 mg tablet  Commonly known as: TOPAMAX    Your last dose was: Your next dose is: Take 1 Tab by mouth two (2) times a day. 50 mg                 verapamiL 80 mg tablet  Commonly known as: CALAN    Your last dose was: Your next dose is: Take 1 Tab by mouth three (3) times daily. 80 mg                 ZOLMitriptan 2.5 mg tablet  Commonly known as: ZOMIG    Your last dose was: Your next dose is: Take 1 Tab by mouth as needed for Migraine (May repeat in 2 hours if not improved. Max 10mg/24HRs). 2.5 mg                    STOP taking these medications    dexAMETHasone 4 mg tablet  Commonly known as: DECADRON              Where to Get Your Medications      These medications were sent to [address], [address] [place] [address]    Phone: [phone]   · oxyCODONE IR 15 mg immediate release tablet  · polyethylene glycol 17 gram packet  · senna 8.6 mg tablet     Information on where to get these meds will be given to you by the nurse or doctor.     Ask your nurse or doctor about these medications  · oxymetazoline 0.05 % nasal spray         Signed:   Beau Mendez MD  2/13/2021  1:29 PM

## 2021-02-13 NOTE — PROGRESS NOTES
Physician Progress Note      PATIENT:               Crystal Campa  CSN #:                  [id number]  :                       1985  ADMIT DATE:       2021 8:10 PM  [address] [place] DATE:  RESPONDING  PROVIDER #:        Rose Jon MD          QUERY TEXT:    Dear attending physician,    Patient admitted with epistaxis. Noted documentation of moderate malnutrition per the dietician. If possible, please document in progress notes and discharge summary if you are evaluating and /or treating any of the following: The medical record reflects the following:  Risk Factors:  Clinical Indicators:  2/10-Per dietary- Pt meets malnutrition criteria due to significant wt loss and predicted PO <75% x >7 days. Malnutrition Status: Moderate malnutrition  Context:  Acute illness  Findings of the 6 clinical characteristics of malnutrition:  Energy Intake:  1 - 75% or less of est energy req for 7 or more days  Weight Loss:  7.00 - Greater than 7.5% over 3 months  Body Fat Loss:  No significant body fat loss,  Muscle Mass Loss:  No significant muscle mass loss,  Fluid Accumulation:  No significant fluid accumulation,   Strength:  Not performed  Inadequate energy intake related to catabolic illness as evidenced by weight loss, weight loss greater than or equal to 5% in 1 month  Treatment: monitor I&O, weight, oral supplements, dietary consult    Thank you,  Venancio Dick RN, BSN  Clinical documentation Improvement  [phone]  Options provided:  -- Malnutrition, moderate  -- Protein calorie malnutrition moderate  -- Other - I will add my own diagnosis  -- Disagree - Not applicable / Not valid  -- Disagree - Clinically unable to determine / Unknown  -- Refer to Clinical Documentation Reviewer    PROVIDER RESPONSE TEXT:    This patient has moderate malnutrition.     Query created by: Benjamin Post on 2021 1:12 PM      Electronically signed by:  Rose Jon MD 2021 10:15 PM

## 2021-02-13 NOTE — DISCHARGE SUMMARY
Discharge Summary       PATIENT ID: Cole Shafer  MRN: [id number]   YOB: 1985    DATE OF ADMISSION: 2/4/2021  8:10 PM    DATE OF DISCHARGE: 02/13/21   PRIMARY CARE PROVIDER: JESSICA Gaffney     ATTENDING PHYSICIAN: Morales Horvath MD  DISCHARGING PROVIDER: Morales Horvath MD    To contact this individual call [phone] and ask the  to page. If unavailable ask to be transferred the Adult Hospitalist Department. CONSULTATIONS: IP CONSULT TO OTOLARYNGOLOGY  IP CONSULT TO NEUROINTERVENTIONAL SURGERY  IP CONSULT TO OTOLARYNGOLOGY  IP CONSULT TO ONCOLOGY  IP CONSULT TO RADIATION ONCOLOGY  IP CONSULT TO PALLIATIVE CARE - PROVIDER    PROCEDURES/SURGERIES: Procedure(s):  ENDOSCOPIC NASOPHARENGEAL BIOPSY. Admission Summary:    This is a 52-year-old man with a past medical history significant for hypertension, migraine headache, was in his usual state of health until the day of presentation at the emergency room when the patient developed nosebleed from the right nostril.  The patient's nosebleed started at about 02:00 p.m. and has had 6 episodes since then, associated with headache.  The headache is located on the right side of the head, described as throbbing.  No associated blurring of vision.  The nosebleed has been going on intermittently for about 2 months.  According to the patient, this is usually aggravated by bending forward with a slight relief when the patient puts direct pressure on his nares.  Since the onset of the nosebleed about 2 months ago, the patient has been seen by the ENT surgeon. [facility] was at [place] [address] on 12/19/2020.  The nose was packed and he was seen by ENT surgeon; shortly after that, the pack was removed.  The patient underwent nasal endoscopy without any obvious source of bleeding.  When the patient arrived at the emergency room today, the patient was no longer having active bleeding, but since this is intermittent and recurrent, the patient's ENT surgeon was consulted. [facility] ENT surgeon advised consultation with interventional radiologist for embolization and the patient was referred to the hospitalist service for evaluation for admission.  No record of prior admission to this hospital.  Interval history / Subjective:   Admitted for epistaxis, found to have right nasopharyngeal mass, biopsy done 2/6. Have seen and examined patient today. No further bleeding from nose. Headache and pain controlled. On PO Oxycodone IR only and did not use any IV Dilaudid overnight. Ready for DC home today. Understood follow up instructions. 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 Nidia Win, resulted on 2/10  --Oncology on board. Follow up with Dr. Balbina Ruiz on Wed, 2/17  --CT of C/A/P completed 2/10 with premedication given mild contrast dye allergy.  Unremarkable   --Radiation oncology consult per oncology. Defer further consultation with Dr. Julian Liu to Dr. Balbina Ruiz, his oncologist.  --Pain management per palliative care team. To follow up with Dr. Gladys Lopez, palliative care team as suggested by her in next 1-2 weeks. --Weaned off IV Dilaudid with no IV pain meds since transition to PO Oxycodone  --Discharge on PO Oxycodone 15 mg every 4 hours as needed for 14days, script filled with help of CM assistance by Dr. Gladys Lopez. Appreciate all the team members. --Counseled patient for safe and judicious use of opiate pain medications and educated about safety and side effect profile/risks  --HD chemo RT with cisplatin at the earliest possible outpatient. To see Dr. Balbina Ruiz in 5 days on Wednesday, 2/17/21  --Continue Topamax as well. --Afrin nasal spray as needed. ENT follow up as needed     Hypertension.  Patient on verapamil, BP stable.   Migraine headache.  Patient on Topamax and Zomig prn.  Further as per palliative care team.     DC home today. DISCHARGE DIAGNOSES / PLAN:      As above     ADDITIONAL CARE RECOMMENDATIONS:   Crucial Follow ups with Dr. Shreyas Cedeño, Hem-oncology/ Dr. Misael Lerner, Palliative care/ Dr. Rebecca Watts, ENT and Dr. Sam Massey, Rad-Oncology as noted above. · It is important that you take the medication exactly as they are prescribed. · Keep your medication in the bottles provided by the pharmacist and keep a list of the medication names, dosages, and times to be taken in your wallet. · Do not take other medications without consulting your doctor. · No drinking alcohol or driving car or operating machinery if you are on narcotic pain medications. Donot take sedating mediations if you are sleepy or confused. · Fall Precautions  · Keep Well Hydrated  · Report to your medical provider if you feel you have  developed allergies to medications  · Follow up with your PCP or Consultant for medication adjustments and refills  · Monitor for signs of fevers,chills,bleeding,chest pain and seek medical attention if you do so.          DIET: Cardiac Diet     ACTIVITY: Activity as tolerated     WOUND CARE: NA. For epistaxis, can use Afrin nasal spray. If does not stop, further medical help with Urgent care or ED urgently.     EQUIPMENT needed: none    PENDING TEST RESULTS:   At the time of discharge the following test results are still pending: none    FOLLOW UP APPOINTMENTS:    Follow-up Information     Follow up With Specialties Details Why Javier Gaucher, MD Hematology and Oncology, Internal Medicine, Hematology, Oncology Schedule an appointment as soon as possible for a visit in 5 days Oncology:  To see Dr. Shreyas Cedeño on Wednesday, 2/17/2021 at 2  [facility]  [address]       Joss Mckeon MD Palliative Medicine Schedule an appointment as soon as possible for a visit in 1 week Palliative care: Please call to schedule appointment with Dr. Syeda Maloney within 1 week or as recommended by her office for further pain management Carlos Eduardo Dillon MD Radiation Oncology Schedule an appointment as soon as possible for a visit Radiation oncology: Follow-up as recommended by oncology, Dr. Almanza Client  [phone]      Becky Ramirez MD Otolaryngology Schedule an appointment as soon as possible for a visit in 1 week ENT: Follow-up with ENT within 1 week or as appropriate/recommended by Dr. Merlin [address]  [phone]      [address], [place], [address] Physician Assistant In 1 week As needed, If symptoms worsen and post hospital discharge follow-up with PCP [address]  [address]  [phone]                 DISCHARGE MEDICATIONS:  Current Discharge Medication List      START taking these medications    Details   senna (SENOKOT) 8.6 mg tablet Take 2 Tabs by mouth daily. Qty: 60 Tab, Refills: 0      polyethylene glycol (MIRALAX) 17 gram packet Take 1 Packet by mouth daily. Mix in 8 oz of water, juice, soda, coffee, or tea prior to administration  Qty: 30 Each, Refills: 0      oxymetazoline (AFRIN) 0.05 % nasal spray 2 Sprays by Both Nostrils route two (2) times daily as needed for Congestion for up to 3 days. Qty: 1 Bottle, Refills: 0      oxyCODONE IR (OXY-IR) 15 mg immediate release tablet Take 1 Tab by mouth every four (4) hours as needed for Pain for up to 14 days. Max Daily Amount: 90 mg.  Qty: 84 Tab, Refills: 0    Associated Diagnoses: Nasopharyngeal carcinoma ([facility] [address]         CONTINUE these medications which have NOT CHANGED    Details   verapamiL (CALAN) 80 mg tablet Take 1 Tab by mouth three (3) times daily. Qty: 90 Tab, Refills: 3      ZOLMitriptan (ZOMIG) 2.5 mg tablet Take 1 Tab by mouth as needed for Migraine (May repeat in 2 hours if not improved. Max 10mg/24HRs).   Qty: 9 Tab, Refills: 1      magnesium 250 mg tab Si po qhs  Qty: 30 Tab, Refills: 3    Associated Diagnoses: Migraine syndrome      topiramate (TOPAMAX) 50 mg tablet Take 1 Tab by mouth two (2) times a day. Qty: 60 Tab, Refills: 2    Associated Diagnoses: Migraine syndrome      fexofenadine (ALLEGRA) 180 mg tablet Take 1 Tab by mouth daily. Indications: seasonal runny nose  Qty: 30 Tab, Refills: 0    Comments: OTC      acetaminophen (TYLENOL) 500 mg tablet Take  by mouth every six (6) hours as needed for Pain. 2 or 3 po daily         STOP taking these medications       dexAMETHasone (DECADRON) 4 mg tablet Comments:   Reason for Stopping:                 NOTIFY YOUR PHYSICIAN FOR ANY OF THE FOLLOWING:   Fever over 101 degrees for 24 hours. Chest pain, shortness of breath, fever, chills, nausea, vomiting, diarrhea, change in mentation, falling, weakness, bleeding. Severe pain or pain not relieved by medications. Or, any other signs or symptoms that you may have questions about. DISPOSITION:  x  Home With:   OT  PT  HH  RN       Long term SNF/Inpatient Rehab    Independent/assisted living    Hospice    Other:       PATIENT CONDITION AT DISCHARGE:     Functional status    Poor     Deconditioned    x Independent      Cognition   x  Lucid     Forgetful     Dementia      Catheters/lines (plus indication)    Jones    x Port placement    PEG     None      Code status   x  Full code     DNR      PHYSICAL EXAMINATION AT DISCHARGE:  Visit Vitals  /75   Pulse 78   Temp 98.4 °F (36.9 °C)   Resp 14   Ht 5' 7.01\" (1.702 m)   SpO2 96%   BMI 30.14 kg/m²       I had a face to face encounter with this patient and independently examined them on 2021 as outlined below:                                                   Constitutional:  Appears comfortable, cooperative, NAD, somewhat sad    ENT:  Oral mucosa moist,no bleeding,or protruding mass.   Dried crusting over naris from epistaxis   Resp:  CTA B, no wheezing/rhonchi/rales. R upper chest port site clean and dry   CV:  Regular rhythm, normal rate, no murmurs, gallops, rubs    GI:  Soft, non distended, non tender. normoactive bowel sounds, no hepatosplenomegaly     Musculoskeletal:  No edema, warm, 2+ pulses throughout    Neurologic:  Moves all extremities. AAOx3, CN II-XII reviewed                         Psych:  Good insight, Not anxious nor agitated. CHRONIC MEDICAL DIAGNOSES:  Problem List as of 2/13/2021 Date Reviewed: 2/4/2021          Codes Class Noted - Resolved    Head and neck cancer ([facility] [address] ICD-10-CM: C76.0  ICD-9-CM: 195.0  2/12/2021 - Present        * (Principal) Epistaxis ICD-10-CM: R04.0  ICD-9-CM: 784.7  2/4/2021 - Present            Radiology  Ir Insert [address] Over 5 Years    Result Date: 2/12/2021  Successful placement of a single lumen power injectable subcutaneous port. The catheter is ready for use. No results found for this or any previous visit (from the past 24 hour(s)).    ==============================================  Palliative medicine discharge:     Dear Cherylene Gip,     It was nice to meet you this admission, I know that you received a new diagnosis of cancer and that this is a scary time for you and your family. You will have a lot of support from our Oncology and Palliative medicine teams- to ensure that you get good treatments and that your symptoms are managed.      Your headaches are from the tumor and your pain will get better with treatment. In the meantime:     -Take Topamax 50mg twice a day. This is to help prevent headaches.      -Take Oxycodone 15mg every 4 hours as needed for pain. You do not need to take this medication if you are not in pain. This is an opioid/narcotic that can be very dangerous if someone takes too much. Keep it in safe place where your children cannot get to it.      -You will have constipation from the Oxycodone, so please take stool softeners including Senna and Miralax daily.       Take good care of yourself, you will hear from the Palliative clinic for an appointment.      Surya Bello MD  ===========================================    Greater than 40 minutes were spent with the patient on counseling and coordination of care    Signed:   Khushbu Augustine MD  2/13/2021  1:37 PM

## 2021-02-13 NOTE — PROGRESS NOTES
[facility] Adult  Hospitalist Group                                                                                          Hospitalist Progress Note  Juan Carlos Desir MD  Answering service: [phone] from in house phone        Date of Service:  2021  NAME:  Johan Kenney  :  1985  MRN:  [id number]      Admission Summary: This is a 78-year-old man with a past medical history significant for hypertension, migraine headache, was in his usual state of health until the day of presentation at the emergency room when the patient developed nosebleed from the right nostril. The patient's nosebleed started at about 02:00 p.m. and has had 6 episodes since then, associated with headache. The headache is located on the right side of the head, described as throbbing. No associated blurring of vision. The nosebleed has been going on intermittently for about 2 months. According to the patient, this is usually aggravated by bending forward with a slight relief when the patient puts direct pressure on his nares. Since the onset of the nosebleed about 2 months ago, the patient has been seen by the ENT surgeon. He was at [facility] on 2020. The nose was packed and he was seen by ENT surgeon; shortly after that, the pack was removed. The patient underwent nasal endoscopy without any obvious source of bleeding. When the patient arrived at the emergency room today, the patient was no longer having active bleeding, but since this is intermittent and recurrent, the patient's ENT surgeon was consulted. The ENT surgeon advised consultation with interventional radiologist for embolization and the patient was referred to the hospitalist service for evaluation for admission. No record of prior admission to this hospital.  Interval history / Subjective:   Admitted for epistaxis, found to have right nasopharyngeal mass, biopsy done .    Have seen and examined patient today. Family at bedside. Assisted with interpretation as well. Patient understands simplified English as well. They had very good discussion with oncology team on phone to answer their questions and concerns. D/W palliative care team as well. Overall pain medication discharge plans completed. CM assisted with arranging medications on discharge. Plan to DC tomorrow. Oncology and palliative care follow-up outpatient soon along with ENT follow-up as well. No other concerns or questions. Encourage patient not to use IV pain medication-Dilaudid if possible. Overnight epistaxis, controlled now. 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 Mauricio Merida, resulted on 2/10  --Oncology on board. CT of C/A/P completed 2/10 with premedication given mild contrast dye allergy. Unremarkable   --Radiation oncology consult per oncology. --Pain management per palliative care team.   --Down titrate IV Dilaudid to 0.2 mg IV every 4 hours as needed for breakthrough pain  --First preference with p.o. oxycodone 15 mg every 4 hours as needed. Initial plan was to use morphine IR 15 mg every 4 hours as needed but our pharmacy was out of it for which palliative care team replaced it with p.o. oxycodone for 14 days. --CM assisted with filling this medication under palliative care guidance and lead. Appreciate all the team members. --Counseled patient for safe and judicious use of opiate pain medications and educated about safety and side effect profile/risks  --HD chemo RT with cisplatin at the earliest possible outpatient  --Continue Topamax as well. Hypertension. Patient on verapamil, BP stable. Migraine headache. Patient on Topamax and Zomig prn.   Further as per palliative care team    Code status: Full  DVT prophylaxis: Holding due to bleed risk    Care Plan discussed with: Patient/Family  Anticipated Disposition: Home w/Family  Anticipated Discharge: As symptoms controlled with oral meds     Hospital Problems  Date Reviewed: 2/4/2021          Codes Class Noted POA    * (Principal) Epistaxis ICD-10-CM: R04.0  ICD-9-CM: 784.7  2/4/2021 Yes                Review of Systems:   A comprehensive review of systems was negative except for that written in the HPI. Vital Signs:    Last 24hrs VS reviewed since prior progress note. Most recent are:  Visit Vitals  BP (!) 145/98 (BP 1 Location: Left arm, BP Patient Position: At rest)   Pulse 82   Temp 97.6 °F (36.4 °C)   Resp 16   Ht 5' 7.01\" (1.702 m)   SpO2 97%   BMI 30.14 kg/m²       No intake or output data in the 24 hours ending 02/12/21 2051     Physical Examination:     I had a face to face encounter with this patient and independently examined them on 2/12/2021 as outlined below:          Constitutional:  Appears comfortable, cooperative, NAD, somewhat sad    ENT:  Oral mucosa moist,no bleeding,or protruding mass. Dried crusting over naris from epistaxis   Resp:  CTA B, no wheezing/rhonchi/rales   CV:  Regular rhythm, normal rate, no murmurs, gallops, rubs    GI:  Soft, non distended, non tender. normoactive bowel sounds, no hepatosplenomegaly     Musculoskeletal:  No edema, warm, 2+ pulses throughout    Neurologic:  Moves all extremities. AAOx3, CN II-XII reviewed     Psych:  Good insight, Not anxious nor agitated. Data Review:    Review and/or order of clinical lab test  Review and/or order of tests in the radiology section of CPT  Review and/or order of tests in the medicine section of [facility]    Radiology  [address] Over 5 Years    Result Date: 2/12/2021  Successful placement of a single lumen power injectable subcutaneous port. The catheter is ready for use.       Labs:     Recent Labs     02/12/21  0157 02/11/21  0340   WBC 11.9* 10.7   HGB 13.1 13.4   HCT 39.2 40.3   PLT 368 379     Recent Labs     02/12/21  0157 02/11/21  0340 02/10/21  0355   * 136 132*   K 4.0 3.6 4.4    103 105   CO2 23 23 19*   BUN 22* 21* 14   CREA 0.84 0.94 0.87   * 99 139*   CA 9.5 10.1 10.4*     Recent Labs     02/12/21  0157 02/11/21  0340 02/10/21  0355   ALT 61 77 64    101 110   TBILI 0.3 0.2 0.2   TP 7.6 8.3* 8.9*   ALB 3.4* 3.4* 3.6   GLOB 4.2* 4.9* 5.3*     No results for input(s): INR, PTP, APTT, INREXT, INREXT in the last 72 hours. No results for input(s): FE, TIBC, PSAT, FERR in the last 72 hours. No results found for: FOL, RBCF   No results for input(s): PH, PCO2, PO2 in the last 72 hours. No results for input(s): CPK, CKNDX, TROIQ in the last 72 hours.     No lab exists for component: CPKMB  No results found for: CHOL, CHOLX, CHLST, CHOLV, HDL, HDLP, LDL, LDLC, DLDLP, TGLX, TRIGL, TRIGP, CHHD, CHHDX  No results found for: GLUCPOC  No results found for: COLOR, APPRN, SPGRU, REFSG, DENICE, PROTU, GLUCU, KETU, BILU, UROU, KIERAN, LEUKU, GLUKE, EPSU, BACTU, WBCU, RBCU, CASTS, UCRY      Medications Reviewed:     Current Facility-Administered Medications   Medication Dose Route Frequency    HYDROmorphone (PF) (DILAUDID) injection 0.2 mg  0.2 mg IntraVENous Q6H PRN    oxyCODONE IR (ROXICODONE) tablet 15 mg  15 mg Oral Q4H PRN    bisacodyL (DULCOLAX) suppository 10 mg  10 mg Rectal DAILY PRN    magnesium hydroxide (MILK OF MAGNESIA) 400 mg/5 mL oral suspension 30 mL  30 mL Oral DAILY PRN    polyethylene glycol (MIRALAX) packet 17 g  17 g Oral DAILY    senna (SENOKOT) tablet 17.2 mg  2 Tab Oral DAILY    butalbital-acetaminophen-caffeine (FIORICET, ESGIC) -40 mg per tablet 1 Tab  1 Tab Oral Q4H PRN    oxymetazoline (AFRIN) 0.05 % nasal spray 2 Spray  2 Spray Both Nostrils BID PRN    topiramate (TOPAMAX) tablet 50 mg  50 mg Oral BID    verapamiL (CALAN) tablet 80 mg  80 mg Oral TID    ZOLMitriptan (ZOMIG) tablet 2.5 mg  2.5 mg Oral PRN    sodium chloride (NS) flush 5-40 mL  5-40 mL IntraVENous Q8H    sodium chloride (NS) flush 5-40 mL  5-40 mL IntraVENous PRN    acetaminophen (TYLENOL) tablet 650 mg  650 mg Oral Q6H PRN    Or    acetaminophen (TYLENOL) suppository 650 mg  650 mg Rectal Q6H PRN    promethazine (PHENERGAN) tablet 12.5 mg  12.5 mg Oral Q6H PRN    Or    ondansetron (ZOFRAN) injection 4 mg  4 mg IntraVENous Q6H PRN     Facility-Administered Medications Ordered in Other Encounters   Medication Dose Route Frequency    heparin (porcine) pf 300 Units  300 Units InterCATHeter PRN     ______________________________________________________________________  EXPECTED LENGTH OF STAY: 2d 4h  ACTUAL LENGTH OF STAY:          8                 Cierra Alvarez MD

## 2021-02-13 NOTE — ACP (ADVANCE CARE PLANNING)
[facility] Adult  Hospitalist Group                                      Advance Care Planning Note    Name: Italia Mccormack  YOB: 1985  MRN: [id number]  Admission Date: 2/4/2021  8:10 PM    Date of discussion: 2/12/2021    Active Diagnoses:    Hospital Problems  Date Reviewed: 2/4/2021          Codes Class Noted POA    * (Principal) Epistaxis ICD-10-CM: R04.0  ICD-9-CM: 784.7  2/4/2021 Yes          Nasopharyngeal carcinoma  Headache   Epistaxis    These active diagnoses are of sufficient risk that focused discussion on advance care planning is indicated in order to allow the patient to thoughtfully consider personal goals of care, and if situations arise that prevent the ability to personally give input, to ensure appropriate representation of their personal desires for different levels and aggressiveness of care. Discussion:     Persons present and participating in discussion: Italia Mccormack, Karli Oliveira MD, Brother:    Topics Discussed:  Patient's medical condition and diagnosis: [ x ] yes [  ] no   Surrogate decision maker: [ x ] yes [  ] no   Patient's current physical function/cognitive function/frailty: [ x ] yes [  ] no   Code Status: [ x ] yes [  ] no --- FULL CODE  Artificial Nutrition / Dialysis / Non-Invasive Ventilation / Blood Transfusion: [  ] yes [  ] no  Potential Resources for home (durable medical equipment, home nursing, home O2): [  ] yes [  ] no    Overview of Discussion:   I had a detailed discussion regarding patient's CODE STATUS with patient. I explained to him why we need to have a discussion regarding the CODE STATUS, what the discussion entails, and what we want accomplished with this discussion. I explained to him what CODE STATUS means, the various kinds of code statuses including DNR/DNI, partial code, full code.   Explained to him what each of that entails, the resuscitative modalities and partial code and full code, what DNR/DNI entails, and explained to him in detail the expectation associated with each CODE STATUS. I answered all his questions and concerns. He reports that he would like him to be a full code at this time  CODE STATUS full code. Detailed discussion about pain management, follow-ups, prognosis, oncological treatment options as discussed by oncology team discussed. Answered all the questions and concerns. Time Spent:     Total time spent face-to-face in education and discussion:  18 minutes.      Juhi Lopez MD  Date of Service:  2/12/2021  10:23 PM

## 2021-02-15 ENCOUNTER — TELEPHONE (OUTPATIENT)
Dept: FAMILY MEDICINE CLINIC | Age: 36
End: 2021-02-15

## 2021-02-15 ENCOUNTER — PATIENT OUTREACH (OUTPATIENT)
Dept: FAMILY MEDICINE CLINIC | Age: 36
End: 2021-02-15

## 2021-02-15 ENCOUNTER — TELEPHONE (OUTPATIENT)
Dept: PALLATIVE CARE | Age: 36
End: 2021-02-15

## 2021-02-15 DIAGNOSIS — C11.9 NASOPHARYNGEAL CARCINOMA (HCC): Primary | ICD-10-CM

## 2021-02-15 DIAGNOSIS — G89.3 CANCER RELATED PAIN: ICD-10-CM

## 2021-02-15 NOTE — PROGRESS NOTES
2/15/21  Message sent to our team for assistance with this case via in box. This is a 31 Yo patient with Hx of epistaxis, now diagnosed with neoplasm of larynx/pharynx. He requires a lot of oncologists specialists, chemo and has multiple appts. I have made a call to Dr. Magdalena Chavez and confirmed first appt. Spoke to Geno who will speak to Dr Yahir Hirsch 's team and make them aware of the patient's not insured. I would ask our team to assist with moving this case along as quickly as possible and provide me with the logistics of helping this patient. FU in 2 days.     Hospital Discharge Follow-Up      Date/Time:  2/15/2021 1:55 PM    Patient was admitted to [facility] on 21 and discharged on 21 for epitaxis, neoplasm to phanryx . The physician discharge summary was available at the time of outreach. Patient was contacted within 1 business days of discharge. Top Challenges reviewed with the provider   Uninsured, Belarusian speaking only, dx of neoplasm on Pharynx, multiple appts. with Oncologists, central  line catheter to right chest. Sx/sx of infection reportable immediately reviewed with [facility]. Method of communication with provider :chart routing    Inpatient RRAT score: na  Was this a readmission? no   Patient stated reason for the readmission: na    Nurse Navigator (NN) contacted the spouse [facility] by telephone to perform post hospital discharge assessment. Verified name and  with caregiver as identifiers. Provided introduction to self, and explanation of the Nurse Navigator role. Reviewed discharge instructions and red flags with caregiver who verbalized understanding. Caregiver given an opportunity to ask questions and does not have any further questions or concerns at this time. The caregiver agrees to contact the PCP office for questions related to their healthcare. NN provided contact information for future reference.     Disease Specific:  Cancer to pharynx   Summary of patient's top problems:  1. Uninsured with multiple appt with specialists   2. Romanian speaking only  3. Central line to Right chest wall. Home Health orders at discharge: Dr Justine Medley. onc appts  [address]: na  Date of initial visit: na    Durable Medical Equipment ordered/company: na  Durable Medical Equipment received: na    Barriers to care? financial, lack of knowledge about disease    Advance Care Planning:   Does patient have an Advance Directive:  patient declined education     Medication(s):   New Medications at Discharge: Topamax, Oxycodone, Tylenol   Changed Medications at Discharge: na  Discontinued Medications at Discharge: na    Medication reconciliation was performed with caregiver, who verbalizes understanding of administration of home medications. There were barriers to obtaining medications identified at this time. Referral to Pharm D needed: no     Current Outpatient Medications   Medication Sig    senna (SENOKOT) 8.6 mg tablet Take 2 Tabs by mouth daily.  polyethylene glycol (MIRALAX) 17 gram packet Take 1 Packet by mouth daily. Mix in 8 oz of water, juice, soda, coffee, or tea prior to administration    oxymetazoline (AFRIN) 0.05 % nasal spray 2 Sprays by Both Nostrils route two (2) times daily as needed for Congestion for up to 3 days.  oxyCODONE IR (OXY-IR) 15 mg immediate release tablet Take 1 Tab by mouth every four (4) hours as needed for Pain for up to 14 days. Max Daily Amount: 90 mg.    verapamiL (CALAN) 80 mg tablet Take 1 Tab by mouth three (3) times daily.  ZOLMitriptan (ZOMIG) 2.5 mg tablet Take 1 Tab by mouth as needed for Migraine (May repeat in 2 hours if not improved. Max 10mg/24HRs).  magnesium 250 mg tab Si po qhs    topiramate (TOPAMAX) 50 mg tablet Take 1 Tab by mouth two (2) times a day.  acetaminophen (TYLENOL) 500 mg tablet Take  by mouth every six (6) hours as needed for Pain.  2 or 3 po daily    fexofenadine (ALLEGRA) 180 mg tablet Take 1 Tab by mouth daily. Indications: seasonal runny nose     No current facility-administered medications for this visit. There are no discontinued medications. [facility] follow up appointment(s):   Future Appointments   Date Time Provider Renae Tana   2/17/2021  2:00 PM Sudhakar Moody  [address] BS AMB   2/18/2021 10:00 AM [facility] RCR PET DOSE 1 SMHRCHPET FOSTER VANESSA   2/18/2021 11:00 AM [facility] RCR PET 1 SMHRCHPET FOSTER VANESSA   2/23/2021  7:30 AM B2 [facility] [place] [facility]   3/16/2021  8:00 AM B2 [facility] [place] [facility]      Non-[facility] follow up appointment(s): multiple appts. listed below. [facility]:  offered and patient declined       Goals      Patient/Family verbalizes understanding of self-management of chronic disease. 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 Maryanne arellano of [facility] Juliet Freedman and NN are working together to coordinate appts, obtaining medications, addressing his uninsured status. NN called Dr Fletcher Bone office, comfirmed appt 2/17/21 at 2 pm at [facility], [address]. Spoke to JIMBO Smith, who assured NN that the [address] application, medications, CL dressing to R chest will be taken care of at the first appt By Emily Moulton at [phone]. NN asked spose Ethan García to start putting together all documants needed for FA and keep records copies in a disgnated folder. NN reviewed all medications at length with side effects of oxycodone and safety. Covid 19 precautions reviewed  and sx/sx to report immediately. Ethan García was given opportunity for questions, NN provided contact and hoursof operation. NN will brief Funmilayo Jones RN with this chart information so that in my absence she can handle this case. 1/26/21   Wichodeirdre Leonardo will tell Crescencio Dolan about his chronic Head aches. NO epistaxis lately.  NN provided contact information.  No FU, IM

## 2021-02-15 NOTE — TELEPHONE ENCOUNTER
Call from Emmanuel Laguna, Care Manager at [facility], called to advise that our patient, Mendoza Maya, was being released from [facility] on 2/12 and had been given a prescription for oxycodone. Call if you have any questions.     Tennis Opal April

## 2021-02-16 ENCOUNTER — DOCUMENTATION ONLY (OUTPATIENT)
Dept: ONCOLOGY | Age: 36
End: 2021-02-16

## 2021-02-16 ENCOUNTER — PATIENT OUTREACH (OUTPATIENT)
Dept: FAMILY MEDICINE CLINIC | Age: 36
End: 2021-02-16

## 2021-02-17 ENCOUNTER — OFFICE VISIT (OUTPATIENT)
Dept: ONCOLOGY | Age: 36
End: 2021-02-17
Payer: SUBSIDIZED

## 2021-02-17 ENCOUNTER — DOCUMENTATION ONLY (OUTPATIENT)
Dept: ONCOLOGY | Age: 36
End: 2021-02-17

## 2021-02-17 VITALS
HEIGHT: 67 IN | HEART RATE: 118 BPM | TEMPERATURE: 96.3 F | SYSTOLIC BLOOD PRESSURE: 137 MMHG | BODY MASS INDEX: 29.29 KG/M2 | DIASTOLIC BLOOD PRESSURE: 87 MMHG | WEIGHT: 186.6 LBS

## 2021-02-17 DIAGNOSIS — C76.0 HEAD AND NECK CANCER (HCC): ICD-10-CM

## 2021-02-17 DIAGNOSIS — C11.9 NASOPHARYNGEAL CARCINOMA (HCC): Primary | ICD-10-CM

## 2021-02-17 PROCEDURE — 99215 OFFICE O/P EST HI 40 MIN: CPT | Performed by: INTERNAL MEDICINE

## 2021-02-17 NOTE — PROGRESS NOTES
Oncology Social Work  Psychosocial Assessment    Reason for Assessment:      [] Social Work Referral [x] Initial Assessment [] New Diagnosis [] Other    Advance Care Planning:  Patient does not have an advanced medical directive, and did not express interest in completing one today. Sources of Information:    [x]Patient  []Family  []Staff  []Medical Record     Mental Status:    [x]Alert  []Lethargic  []Unresponsive   [] Unable to assess   Oriented to:  [x]Person  [x]Place  [x]Time  [x]Situation      Relationship Status:  []Single  [x]  []Significant Other/Life Partner  []  []  []    Living Circumstances:  []Lives Alone  [x]Family/Significant Other in Household  []Roommates  []Children in the Home  []Paid Caregivers  []Assisted Living Facility/Group Home  []Skilled [address]  []Homeless  []Incarcerated  []Environmental/Care Concerns  []Other:    Employment Status:  []Employed Full-time []Employed Part-time []Homemaker  [] Retired [] Short-Term Disability [] [facility]  [x] Unemployed     Barriers to Learning:    [x]Language  []Developmental  []Cognitive  []Altered Mental Status  []Visual/Hearing Impairment  []Unable to Read/Write  []Motivational  [] Challenges Understanding Medical Jargon []No Barriers Identified      Financial/Legal Concerns:    [x]Uninsured  [x]Limited Income/Resources  [x]Non-Citizen  []Food Insecurity []No Concerns Identified   []Other:    Worship/Spiritual/Existential:  Does patient have any spiritual or Adventism beliefs? [] Yes [] No  Is the patient involved in a spiritual, aaron or Adventism community?  [] Yes [] No  Patient expressing spiritual/existential angst? [] Yes [x] No  Notes:     Support System:    Identified Support Person/Group:  [x]Strong  []Fair  []Limited    Coping with Illness:   []  Coping Well  [] Challenges Coping with Serious Illness [] Situational Depression [x] Situational Anxiety [] Anticipatory Grief  [] Recent Loss [] Caregiver Strawberry            Narrative:   Met with the patient during his office visit today, along with his friend, Gabrielle Rajna, SHIKHA#[phone]. Utilized Zehra Espinosa Cultural Navigator/. The patient is being seen for nasopharyngeal carcinoma. The patient expressed anxiety and \"nervousness\" today, and explained that he is having trouble understanding how cancer spreads, and what his options are for treatment. Notified his medical team of these concerns, and encouraged the patient to ask questions when speaking with his providers. He explained that he presented to the doctor after having persistent nose bleeds. He has not been able to work since December 19, 2020, due to these nose bleeds. He typically works doing siding on homes/construction. He is unemployed at this time. He is also uninsured. The patient is not eligible for Medicaid due to his citizenship status. He has a wife, and two children; an 6year old daughter, and a 9year old son. They have been living off of his savings up until this point, but they are now in a position of needing assistance with rent, utilities, and food. They have reached out to [facility] for help with rent, utilities, and food. They utilized the office food pantry today. The patient has a PCP - Dr. Carmencita Willoughby with the [facility], and a Nurse Navigator, She Delacruz. The patient completed a care card application, which he submitted to this  today. Sent this to Elver Viveros, Case Management Assistant/Financial Navigator, who will submit this application on the patient's behalf. Provided this 's contact information and offered continued support to the patient and his family as needed. Plan:   1. Introduced self and role of this  in the [address] [place] [address] Dr. 2.  Informed the patient of the [facility] and available resources there.     3. Continue to meet with the patient when he returns to the clinic for ongoing assessment of the patients adjustment to his diagnosis and treatment. 4.  Ongoing psychosocial support as desired by patient.       Referral:   Insurance/Entitlements referral  Financial/Medication assistance referral   Traci Mendoza LCSW

## 2021-02-17 NOTE — PROGRESS NOTES
Isidoro Farah is a 30 y.o. male  Chief Complaint   Patient presents with    Follow-up     nasopharyngeal mass     1. Have you been to the ER, urgent care clinic since your last visit? Hospitalized since your last visit? No    2. Have you seen or consulted any other health care providers outside of the [address] [place] [address] since your last visit? Include any pap smears or colon screening.  No

## 2021-02-17 NOTE — PROGRESS NOTES
Pharmacy Note- Chemotherapy Education    Frederick Beard HAYDEN Tamayo is a  30 y.o.male  diagnosed with nasopharyngeal cancer here today for chemotherapy counseling and consent. Mr. Nicolas Tamayo is being treated with CISplatin. Provided education on CISplatin and premedications - fosaprepitant, palonosetron and dexamethasone. Provided Mr. Tameka Navarrete with a copy of Chemotherapy and You in Khmer. Side effects of chemotherapy reviewed included s/s infection, anemia, appetite changes, thromboyctopenia, fatigue, hair loss/alopecia, bone pain, skin and nail changes, diarrhea/constipation, hearing changings, peripheral neuropathy and kidney changes. Patient given ways to manage these side effects and when to contact office. [facility] in [place] and [place] [facility] in [address] of medications provided to patient. Mr. Nicolas Tamayo verbalized understanding of the information presented and all of the patient's questions were answered. Valorie turk Attune RTD provided translation.    Naye Pena, PharmD, BCPS, BCOP    CLINICAL PHARMACY CONSULT: MED RECONCILIATION/REVIEW ADDENDUM    For Pharmacy Admin Tracking Only    PHSO: PHSO Patient?: No  Total # of Interventions Recommended: Count: 1    Total Interventions Accepted: 1  Time Spent (min): 30

## 2021-02-17 NOTE — PROGRESS NOTES
[facility] [place] [facility] [place] [address], [address], [place]: [phone]  F: [phone]    Reason for visit   Dayday Mason is a 30 y.o. male who is seen for follow up of nasopharyngeal Carcinoma- non keratinizing / undifferentiated    Treatment History:   · 2/6/2021: Endoscopic biopsy of the nasopharyngeal mass ( R)     History of Present Illness:   Patient is a 30 y.o. male with no significant PMH seen for above  He was seen by Dr. Sandy Sandifer with ENT in December with c/o recurrent epistaxis x 2 months requiring ER visits and packing. His prior nasal endoscopy was clear. He was admitted on 2/4/2021 for recurrent HAs and epistaxis which lasted several minutes with improvement on pressure. He had a normal CBC for the most part and had no h/o bleeding growing up. MRI brain 2/5/2021 was obtained and showed a 4.2 x 4 cm posterior nasopharyngeal mass Extending into the sphenoid sinus with narrowing f the R distal internal carotid artery. CTA did not show active extravasation, but showed bilateral cervical adenopathy. He had a biopsy of the NP mass 2/6/2021 and pathology showed Nasopharyngeal carcinoma. He has been evaluated by radiation Oncology. Patient states that he has ongoing HA but better controlled, he is sleepy, had some epistaxis today  He comes with his friend  Interpretor services used      No past medical history on file. Past Surgical History:   Procedure Laterality Date    IR INSERT TUNL CVC W PORT OVER 5 YEARS  2/11/2021         IR INSERT TUNL CVC W PORT OVER 5 YEARS  2/11/2021      Social History     Tobacco Use    Smoking status: Never Smoker    Smokeless tobacco: Never Used   Substance Use Topics    Alcohol use: Not Currently      No family history on file. Current Outpatient Medications   Medication Sig    senna (SENOKOT) 8.6 mg tablet Take 2 Tabs by mouth daily.     polyethylene glycol (MIRALAX) 17 gram packet Take 1 Packet by mouth daily. Mix in 8 oz of water, juice, soda, coffee, or tea prior to administration    oxyCODONE IR (OXY-IR) 15 mg immediate release tablet Take 1 Tab by mouth every four (4) hours as needed for Pain for up to 14 days. Max Daily Amount: 90 mg.    verapamiL (CALAN) 80 mg tablet Take 1 Tab by mouth three (3) times daily.  ZOLMitriptan (ZOMIG) 2.5 mg tablet Take 1 Tab by mouth as needed for Migraine (May repeat in 2 hours if not improved. Max 10mg/24HRs).  magnesium 250 mg tab Si po qhs    topiramate (TOPAMAX) 50 mg tablet Take 1 Tab by mouth two (2) times a day.  fexofenadine (ALLEGRA) 180 mg tablet Take 1 Tab by mouth daily. Indications: seasonal runny nose    acetaminophen (TYLENOL) 500 mg tablet Take  by mouth every six (6) hours as needed for Pain. 2 or 3 po daily     No current facility-administered medications for this visit. Allergies   Allergen Reactions    Iodinated Contrast Media Itching        Review of Systems: A complete review of systems was obtained, negative except as described above. Physical Exam:     Visit Vitals  /87   Pulse (!) 118   Temp (!) 96.3 °F (35.7 °C)   Ht 5' 7\" (1.702 m)   Wt 186 lb 9.6 oz (84.6 kg)   PF 98 L/min   BMI 29.23 kg/m²           General appearance - alert, well appearing, and in no distress, nervous, poor eye contact  Mental status - oriented to person, place, and time  Mouth - mucous membranes moist, old blood at nares  Neck - supple, no appreciable thyromegaly  Lymphatics - no palpable lymphadenopathy  Skin - normal coloration and turgor, no new rashes, no suspicious skin lesions noted    ECOG PS:1        Results:     Lab Results   Component Value Date/Time    WBC 11.9 (H) 2021 01:57 AM    HGB 13.1 2021 01:57 AM    HCT 39.2 2021 01:57 AM    PLATELET 600 6645 01:57 AM    MCV 78.9 (L) 2021 01:57 AM    ABS.  NEUTROPHILS 8.7 (H) 2021 01:57 AM     Lab Results   Component Value Date/Time    Sodium 134 (L) 02/12/2021 01:57 AM    Potassium 4.0 02/12/2021 01:57 AM    Chloride 102 02/12/2021 01:57 AM    CO2 23 02/12/2021 01:57 AM    Glucose 105 (H) 02/12/2021 01:57 AM    BUN 22 (H) 02/12/2021 01:57 AM    Creatinine 0.84 02/12/2021 01:57 AM    GFR est AA >60 02/12/2021 01:57 AM    GFR est non-AA >60 02/12/2021 01:57 AM    Calcium 9.5 02/12/2021 01:57 AM     Lab Results   Component Value Date/Time    Bilirubin, total 0.3 02/12/2021 01:57 AM    ALT (SGPT) 61 02/12/2021 01:57 AM    Alk. phosphatase 102 02/12/2021 01:57 AM    Protein, total 7.6 02/12/2021 01:57 AM    Albumin 3.4 (L) 02/12/2021 01:57 AM    Globulin 4.2 (H) 02/12/2021 01:57 AM     Component      Latest Ref Rng & Units 2/7/2021          12:26 PM   Crisótbal-Purdy DNA QT, PCR      Negative copies/mL 327   Cristóbal-Barr Virus log10      log10 copy/mL 2.515       Records reviewed and summarized above. Pathology report(s) reviewed     PATHOLOGY     FINAL PATHOLOGIC DIAGNOSIS   1. Right nasopharyngeal mass, biopsy:   Nasopharyngeal carcinoma, nonkeratinizing, undifferentiated type (see comment)   In situ hybridization for Cristóbal-Barr virus (SEMAJ) is positive   2. Right nasopharyngeal mass, biopsy:   Nasopharyngeal carcinoma, nonkeratinizing, undifferentiated type   Comment   Immunohistochemical stains performed from block 1A show that the invasive carcinoma expresses p40, AE1/AE3, and CAM5.2 while negative for p16, CK7, and CK20. This immunoprofile supports the above rendered diagnosis     Radiology report(s) reviewed above. CTA neck    IMPRESSION  1. Attenuation of the right internal carotid artery as it passes through the  right nasopharyngeal mass but no evidence of active extravasation or  intraluminal thrombus. 2.  Bilateral cervical lymphadenopathy in level 2 and level 5B. 3.  Right posterior nasopharyngeal mass with sphenoid sinus involvement and  diffuse sinus disease    MRI brain    IMPRESSION  1.   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 2.  Diffuse sinus mucosal thickening. 3.  Mass related narrowing of the right distal cervical internal carotid artery  but no large vessel occlusion. 4.  No evidence of infarct.       MRI orbit and face    IMPRESSION  Again demonstrated is large right nasopharyngeal tumor as previously described  and without significant change. Some involvement right internal carotid artery. Associated mucosal thickening and fluid right paranasal sinuses. Assessment:   1) Nasopharyngeal carcinoma- R   SEMAJ positive    4.2 x 4 cm, extends into the sphenoid sinus, bilateral cervical nodes. [place] but does not involve carotid artery  OP staging PET CT is pending    Nasopharyngeal carcinoma - at least T3N2MX-Stage III disease. This is an aggressive malignancy with high risk of fatal local complications  In his case , the abutment of carotid artery is highly concerning for risk of tumor extension and catastrophic bleeding  He is very symptomatic with severe pain, epistaxis, hearing impediment  Per discussions with Rad Onc the current radiation field is unlikely to be altered by cytoreduction with induction chemotherapy and with his symptomatic disease and bleeding, he is likely to need RT sooner than later    Keeping these factors in mind we talked about ChemoRT  Followed possibly by adjuvant Chemotherapy. The latter has not been shown to improve survival though this was likely in part due to poor tolerance to adjuvant chemotherapy post RT  However , if his PET CT does show significant fco burden we will start with induction chemotherapy. We discussed weekly Cisplatin with concurrent RT  If we decide on Induction chemotherapy based on PET CT we will go with Cisplatin + Gemzar.  If we go for adjuvant chemotherapy , will consider Cis+ 5FU or Carboplatin + 5FU    At present, the [employer] (NCCN) Guidelines recommend bothconcurrent chemoradiotherapy with adjuvant chemotherapyor induction chemotherapy followed by concurrent  chemoradiation as level 2A evidence, andconcurrent chemoradiotherapy alone as level 2B evidence  for stage II? ROSALEE nasopharyngeal carcinoma. 2) Epistaxis  Secondary to the mass  CTA without active extravasation  At high risk for catastrophic hemorrhage    3) Headaches  Secondary to the nasopharyngeal mass with splenoid sinus involvement  Controlled on Verapamil, Zomig, Oxycodone  Continue palliative care follow up    4) Nausea  Zofran prn    Plan:     · PET tomorrow- will review to see if induction chemo or adjuvant chemo is indicated  · Will tentatively plan to start ChemoRT next week with 40mg/m2 weekly Cisplatin for 6 weeks  · Dexamethasone, Zofran and Hydration protocol  · Continue to follow with palliative care  · CBC, CMP, mag each week    Language interpretation services used    RTC weekly  I appreciate the opportunity to participate in Mr. Celso Mendoza's care.     Signed By: Pilo Zamarripa MD

## 2021-02-17 NOTE — Clinical Note
Danni Ordonez do weekly Cisplatin  I am still waiting on PET to make a final call based on fco disease

## 2021-02-18 ENCOUNTER — HOSPITAL ENCOUNTER (OUTPATIENT)
Dept: RADIATION THERAPY | Age: 36
Discharge: HOME | End: 2021-02-18

## 2021-02-18 ENCOUNTER — HOSPITAL ENCOUNTER (OUTPATIENT)
Dept: PET IMAGING | Age: 36
Discharge: HOME | End: 2021-02-18
Attending: RADIOLOGY
Payer: SUBSIDIZED

## 2021-02-18 VITALS — WEIGHT: 187 LBS | BODY MASS INDEX: 29.35 KG/M2 | HEIGHT: 67 IN

## 2021-02-18 DIAGNOSIS — C11.9 MALIGNANT NEOPLASM OF NASOPHARYNGEAL WALL (HCC): ICD-10-CM

## 2021-02-18 LAB
GLUCOSE BLD STRIP.AUTO-MCNC: 106 MG/DL (ref 65–100)
SERVICE CMNT-IMP: ABNORMAL

## 2021-02-18 PROCEDURE — A9552 F18 FDG: HCPCS

## 2021-02-18 RX ORDER — SODIUM CHLORIDE 0.9 % (FLUSH) 0.9 %
10 SYRINGE (ML) INJECTION
Status: COMPLETED | OUTPATIENT
Start: 2021-02-18 | End: 2021-02-18

## 2021-02-18 RX ADMIN — Medication 10 ML: at 10:05

## 2021-02-21 RX ORDER — DIPHENHYDRAMINE HYDROCHLORIDE 50 MG/ML
50 INJECTION, SOLUTION INTRAMUSCULAR; INTRAVENOUS AS NEEDED
Status: CANCELLED
Start: 2021-02-23

## 2021-02-21 RX ORDER — SODIUM CHLORIDE 9 MG/ML
25 INJECTION, SOLUTION INTRAVENOUS CONTINUOUS
Status: CANCELLED | OUTPATIENT
Start: 2021-03-02

## 2021-02-21 RX ORDER — ONDANSETRON 2 MG/ML
8 INJECTION INTRAMUSCULAR; INTRAVENOUS AS NEEDED
Status: CANCELLED | OUTPATIENT
Start: 2021-02-23

## 2021-02-21 RX ORDER — HEPARIN 100 UNIT/ML
300-500 SYRINGE INTRAVENOUS AS NEEDED
Status: CANCELLED
Start: 2021-03-02

## 2021-02-21 RX ORDER — ALBUTEROL SULFATE 0.83 MG/ML
2.5 SOLUTION RESPIRATORY (INHALATION) AS NEEDED
Status: CANCELLED
Start: 2021-03-02

## 2021-02-21 RX ORDER — SODIUM CHLORIDE 0.9 % (FLUSH) 0.9 %
10 SYRINGE (ML) INJECTION AS NEEDED
Status: CANCELLED | OUTPATIENT
Start: 2021-02-23

## 2021-02-21 RX ORDER — EPINEPHRINE 1 MG/ML
0.3 INJECTION, SOLUTION, CONCENTRATE INTRAVENOUS AS NEEDED
Status: CANCELLED | OUTPATIENT
Start: 2021-02-23

## 2021-02-21 RX ORDER — DIPHENHYDRAMINE HYDROCHLORIDE 50 MG/ML
25 INJECTION, SOLUTION INTRAMUSCULAR; INTRAVENOUS AS NEEDED
Status: CANCELLED
Start: 2021-03-02

## 2021-02-21 RX ORDER — PALONOSETRON 0.05 MG/ML
0.25 INJECTION, SOLUTION INTRAVENOUS ONCE
Status: CANCELLED | OUTPATIENT
Start: 2021-02-23 | End: 2021-02-23

## 2021-02-21 RX ORDER — EPINEPHRINE 1 MG/ML
0.3 INJECTION, SOLUTION, CONCENTRATE INTRAVENOUS AS NEEDED
Status: CANCELLED | OUTPATIENT
Start: 2021-03-02

## 2021-02-21 RX ORDER — HYDROCORTISONE SODIUM SUCCINATE 100 MG/2ML
100 INJECTION, POWDER, FOR SOLUTION INTRAMUSCULAR; INTRAVENOUS AS NEEDED
Status: CANCELLED | OUTPATIENT
Start: 2021-02-23

## 2021-02-21 RX ORDER — SODIUM CHLORIDE 9 MG/ML
10 INJECTION INTRAMUSCULAR; INTRAVENOUS; SUBCUTANEOUS AS NEEDED
Status: CANCELLED | OUTPATIENT
Start: 2021-03-02

## 2021-02-21 RX ORDER — DIPHENHYDRAMINE HYDROCHLORIDE 50 MG/ML
25 INJECTION, SOLUTION INTRAMUSCULAR; INTRAVENOUS AS NEEDED
Status: CANCELLED
Start: 2021-02-23

## 2021-02-21 RX ORDER — HYDROCORTISONE SODIUM SUCCINATE 100 MG/2ML
100 INJECTION, POWDER, FOR SOLUTION INTRAMUSCULAR; INTRAVENOUS AS NEEDED
Status: CANCELLED | OUTPATIENT
Start: 2021-03-02

## 2021-02-21 RX ORDER — DIPHENHYDRAMINE HYDROCHLORIDE 50 MG/ML
50 INJECTION, SOLUTION INTRAMUSCULAR; INTRAVENOUS AS NEEDED
Status: CANCELLED
Start: 2021-03-02

## 2021-02-21 RX ORDER — SODIUM CHLORIDE 9 MG/ML
10 INJECTION INTRAMUSCULAR; INTRAVENOUS; SUBCUTANEOUS AS NEEDED
Status: CANCELLED | OUTPATIENT
Start: 2021-02-23

## 2021-02-21 RX ORDER — SODIUM CHLORIDE 9 MG/ML
25 INJECTION, SOLUTION INTRAVENOUS CONTINUOUS
Status: CANCELLED | OUTPATIENT
Start: 2021-02-23

## 2021-02-21 RX ORDER — ALBUTEROL SULFATE 0.83 MG/ML
2.5 SOLUTION RESPIRATORY (INHALATION) AS NEEDED
Status: CANCELLED
Start: 2021-02-23

## 2021-02-21 RX ORDER — ACETAMINOPHEN 325 MG/1
650 TABLET ORAL AS NEEDED
Status: CANCELLED
Start: 2021-02-23

## 2021-02-21 RX ORDER — ACETAMINOPHEN 325 MG/1
650 TABLET ORAL AS NEEDED
Status: CANCELLED
Start: 2021-03-02

## 2021-02-21 RX ORDER — ONDANSETRON 2 MG/ML
8 INJECTION INTRAMUSCULAR; INTRAVENOUS ONCE
Status: CANCELLED
Start: 2021-03-02 | End: 2021-03-02

## 2021-02-21 RX ORDER — SODIUM CHLORIDE 0.9 % (FLUSH) 0.9 %
10 SYRINGE (ML) INJECTION AS NEEDED
Status: CANCELLED | OUTPATIENT
Start: 2021-03-02

## 2021-02-21 RX ORDER — HEPARIN 100 UNIT/ML
300-500 SYRINGE INTRAVENOUS AS NEEDED
Status: CANCELLED
Start: 2021-02-23

## 2021-02-21 RX ORDER — ONDANSETRON 2 MG/ML
8 INJECTION INTRAMUSCULAR; INTRAVENOUS AS NEEDED
Status: CANCELLED | OUTPATIENT
Start: 2021-03-02

## 2021-02-22 ENCOUNTER — PATIENT OUTREACH (OUTPATIENT)
Dept: FAMILY MEDICINE CLINIC | Age: 36
End: 2021-02-22

## 2021-02-22 DIAGNOSIS — C76.0 HEAD AND NECK CANCER (HCC): Primary | ICD-10-CM

## 2021-02-22 RX ORDER — DEXAMETHASONE 4 MG/1
TABLET ORAL
Qty: 30 TAB | Refills: 1 | Status: SHIPPED | OUTPATIENT
Start: 2021-02-22 | End: 2021-05-19 | Stop reason: SDUPTHER

## 2021-02-22 RX ORDER — ONDANSETRON 8 MG/1
8 TABLET, ORALLY DISINTEGRATING ORAL
Qty: 30 TAB | Refills: 3 | Status: SHIPPED | OUTPATIENT
Start: 2021-02-22 | End: 2022-08-18

## 2021-02-22 RX ORDER — PROCHLORPERAZINE MALEATE 5 MG
5 TABLET ORAL
Qty: 30 TAB | Refills: 3 | Status: SHIPPED | OUTPATIENT
Start: 2021-02-22 | End: 2021-03-24

## 2021-02-22 RX ORDER — LIDOCAINE AND PRILOCAINE 25; 25 MG/G; MG/G
CREAM TOPICAL AS NEEDED
Qty: 30 G | Refills: 0 | Status: SHIPPED | OUTPATIENT
Start: 2021-02-22 | End: 2022-08-18

## 2021-02-22 NOTE — PROGRESS NOTES
21    Hospital Discharge Follow-Up      Date/Time:  2021 10:02 AM    Patient was admitted to [facility] on 21 and discharged on 21 for cancer to pharynx. The physician discharge summary was available at the time of outreach. Patient was contacted within 1 business days of discharge. Top Challenges reviewed with the provider   Luxembourgish speaking only, uninsured, new dx of cancer of pharynx, multiple appts with specialists. Method of communication with provider :chart routing, staff message, phone    Inpatient RRAT score: na  Was this a readmission? no   Patient stated reason for the readmission: na    Nurse Navigator (NN) contacted the caregiver by telephone to perform post hospital discharge assessment. Verified name and  with caregiver as identifiers. Provided introduction to self, and explanation of the Nurse Navigator role. Reviewed discharge instructions and red flags with caregiver who verbalized understanding. Caregiver given an opportunity to ask questions and does not have any further questions or concerns at this time. The caregiver agrees to contact the PCP office for questions related to their healthcare. NN provided contact information for future reference. Disease Specific:   new Ca dx    Summary of patient's top problems:  1. New Dx of cancer of pharynx  2. Luxem[place]ish speaking only, uninsured  3. Multiple appt with specialists.      Home Health orders at discharge: [address]: na  Date of initial visit: na    Durable Medical Equipment ordered/company: na  Durable Medical Equipment received: na    Barriers to care? lack of knowledge about disease    Advance Care Planning:   Does patient have an Advance Directive:  patient declined education     Medication(s):   New Medications at Discharge: oxycodone  Changed Medications at Discharge: na  Discontinued Medications at Discharge: na    Medication reconciliation was performed with caregiver, who verbalizes understanding of administration of home medications. There were no barriers to obtaining medications identified at this time. Referral to Pharm D needed: no     Current Outpatient Medications   Medication Sig    senna (SENOKOT) 8.6 mg tablet Take 2 Tabs by mouth daily.  polyethylene glycol (MIRALAX) 17 gram packet Take 1 Packet by mouth daily. Mix in 8 oz of water, juice, soda, coffee, or tea prior to administration    oxyCODONE IR (OXY-IR) 15 mg immediate release tablet Take 1 Tab by mouth every four (4) hours as needed for Pain for up to 14 days. Max Daily Amount: 90 mg.    verapamiL (CALAN) 80 mg tablet Take 1 Tab by mouth three (3) times daily.  ZOLMitriptan (ZOMIG) 2.5 mg tablet Take 1 Tab by mouth as needed for Migraine (May repeat in 2 hours if not improved. Max 10mg/24HRs).  magnesium 250 mg tab Si po qhs    topiramate (TOPAMAX) 50 mg tablet Take 1 Tab by mouth two (2) times a day.  fexofenadine (ALLEGRA) 180 mg tablet Take 1 Tab by mouth daily. Indications: seasonal runny nose    acetaminophen (TYLENOL) 500 mg tablet Take  by mouth every six (6) hours as needed for Pain. 2 or 3 po daily     No current facility-administered medications for this visit. There are no discontinued medications. [facility] follow up appointment(s):   Future Appointments   Date Time Provider Renae Fajardo   2021  7:30 AM B2 [place]UE [place] [facility]   2021  8:15 AM Horacio Jones  [address] BS AMB   3/2/2021  7:30 AM B2 [place] [facility]   3/9/2021  7:30 AM B2 [place]UE LONG [place] [facility]   3/16/2021  7:30 AM B2 [place]UE LONG [place] [facility]   3/23/2021  7:30 AM B2 [place]UE LONG [place] [facility]      Non-[facility] follow up appointment(s): listed above  [facility]:  offered and patient declined       Goals      Patient/Family verbalizes understanding of self-management of chronic disease.       21  Claudette Celeste spouse Brooks Blackmon called stating that Yesenia Nash c/o dizziness after taking verapamil. NN send crescencio Trujillo in-box asking how to proceed. NN asked that Yesenia Nash have the MD at his appt 2/17/21 to check his B/P. NN provided information regarding siting up for a moment before getting out of the bed. Waiting for directions form crescencio Trujillo. 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 Maryanne arellano of Bhaskar Valenzuela Belkis and NN are working together to coordinate appts, obtaining medications, addressing his uninsured status. NN called Dr Aurora Evans office, comfirmed appt 2/17/21 at 2 pm at [facility], [address] [place] [address] [address]. Spoke to JIMBO Smith, who assured NN that the [employer] application, medications, CL dressing to R chest will be taken care of at the first appt By Natalia Cuellar at [phone]. NN asked spose Travis Aleman to start putting together all documants needed for FA and keep records copies in a disgnated folder. NN reviewed all medications at length with side effects of oxycodone and safety. Covid 19 precautions reviewed  and sx/sx to report immediately. Bernicetrudy Skipyakov was given opportunity for questions, NN provided contact and hoursof operation. NN will brief Sanam Cho RN with this chart information so that in my absence she can handle this case. 1/26/21   Yesenia Nash will tell Crescencio Trevizo about his chronic Head aches. NO epistaxis lately. NN provided contact information.  No FU, IM

## 2021-02-23 ENCOUNTER — OFFICE VISIT (OUTPATIENT)
Dept: ONCOLOGY | Age: 36
End: 2021-02-23
Payer: SUBSIDIZED

## 2021-02-23 ENCOUNTER — HOSPITAL ENCOUNTER (OUTPATIENT)
Dept: INFUSION THERAPY | Age: 36
Discharge: HOME | End: 2021-02-23
Payer: SUBSIDIZED

## 2021-02-23 ENCOUNTER — DOCUMENTATION ONLY (OUTPATIENT)
Dept: ONCOLOGY | Age: 36
End: 2021-02-23

## 2021-02-23 VITALS
DIASTOLIC BLOOD PRESSURE: 92 MMHG | TEMPERATURE: 97.1 F | HEART RATE: 96 BPM | RESPIRATION RATE: 18 BRPM | SYSTOLIC BLOOD PRESSURE: 148 MMHG | BODY MASS INDEX: 28.72 KG/M2 | HEIGHT: 67 IN | WEIGHT: 183 LBS

## 2021-02-23 VITALS — WEIGHT: 183.1 LBS | BODY MASS INDEX: 28.68 KG/M2

## 2021-02-23 DIAGNOSIS — Z51.11 ENCOUNTER FOR ANTINEOPLASTIC CHEMOTHERAPY: ICD-10-CM

## 2021-02-23 DIAGNOSIS — C11.9 NASOPHARYNGEAL CARCINOMA (HCC): Primary | ICD-10-CM

## 2021-02-23 DIAGNOSIS — C76.0 HEAD AND NECK CANCER (HCC): Primary | ICD-10-CM

## 2021-02-23 DIAGNOSIS — Z95.828 PORT-A-CATH IN PLACE: ICD-10-CM

## 2021-02-23 LAB
ALBUMIN SERPL-MCNC: 3.4 G/DL (ref 3.5–5)
ALBUMIN/GLOB SERPL: 0.8 {RATIO} (ref 1.1–2.2)
ALP SERPL-CCNC: 155 U/L (ref 45–117)
ALT SERPL-CCNC: 72 U/L (ref 12–78)
ANION GAP SERPL CALC-SCNC: 9 MMOL/L (ref 5–15)
AST SERPL-CCNC: 21 U/L (ref 15–37)
BASOPHILS # BLD: 0.1 K/UL (ref 0–0.1)
BASOPHILS NFR BLD: 0 % (ref 0–1)
BILIRUB SERPL-MCNC: 0.3 MG/DL (ref 0.2–1)
BUN SERPL-MCNC: 10 MG/DL (ref 6–20)
BUN/CREAT SERPL: 13 (ref 12–20)
CALCIUM SERPL-MCNC: 9.5 MG/DL (ref 8.5–10.1)
CHLORIDE SERPL-SCNC: 102 MMOL/L (ref 97–108)
CO2 SERPL-SCNC: 23 MMOL/L (ref 21–32)
CREAT SERPL-MCNC: 0.8 MG/DL (ref 0.7–1.3)
DIFFERENTIAL METHOD BLD: ABNORMAL
EOSINOPHIL # BLD: 0.1 K/UL (ref 0–0.4)
EOSINOPHIL NFR BLD: 1 % (ref 0–7)
ERYTHROCYTE [DISTWIDTH] IN BLOOD BY AUTOMATED COUNT: 13.7 % (ref 11.5–14.5)
GLOBULIN SER CALC-MCNC: 4.5 G/DL (ref 2–4)
GLUCOSE SERPL-MCNC: 126 MG/DL (ref 65–100)
HCT VFR BLD AUTO: 40.8 % (ref 36.6–50.3)
HGB BLD-MCNC: 13.5 G/DL (ref 12.1–17)
IMM GRANULOCYTES # BLD AUTO: 0 K/UL (ref 0–0.04)
IMM GRANULOCYTES NFR BLD AUTO: 0 % (ref 0–0.5)
LYMPHOCYTES # BLD: 1.3 K/UL (ref 0.8–3.5)
LYMPHOCYTES NFR BLD: 12 % (ref 12–49)
MAGNESIUM SERPL-MCNC: 2 MG/DL (ref 1.6–2.4)
MCH RBC QN AUTO: 26.2 PG (ref 26–34)
MCHC RBC AUTO-ENTMCNC: 33.1 G/DL (ref 30–36.5)
MCV RBC AUTO: 79.1 FL (ref 80–99)
MONOCYTES # BLD: 0.7 K/UL (ref 0–1)
MONOCYTES NFR BLD: 6 % (ref 5–13)
NEUTS SEG # BLD: 9 K/UL (ref 1.8–8)
NEUTS SEG NFR BLD: 81 % (ref 32–75)
NRBC # BLD: 0 K/UL (ref 0–0.01)
NRBC BLD-RTO: 0 PER 100 WBC
PLATELET # BLD AUTO: 407 K/UL (ref 150–400)
PMV BLD AUTO: 10.1 FL (ref 8.9–12.9)
POTASSIUM SERPL-SCNC: 4 MMOL/L (ref 3.5–5.1)
PROT SERPL-MCNC: 7.9 G/DL (ref 6.4–8.2)
RBC # BLD AUTO: 5.16 M/UL (ref 4.1–5.7)
SODIUM SERPL-SCNC: 134 MMOL/L (ref 136–145)
WBC # BLD AUTO: 11.2 K/UL (ref 4.1–11.1)

## 2021-02-23 PROCEDURE — 74011250636 HC RX REV CODE- 250/636: Performed by: INTERNAL MEDICINE

## 2021-02-23 PROCEDURE — 96375 TX/PRO/DX INJ NEW DRUG ADDON: CPT

## 2021-02-23 PROCEDURE — 85025 COMPLETE CBC W/AUTO DIFF WBC: CPT

## 2021-02-23 PROCEDURE — 99215 OFFICE O/P EST HI 40 MIN: CPT | Performed by: INTERNAL MEDICINE

## 2021-02-23 PROCEDURE — 77030012965 HC NDL HUBR BBMI -A

## 2021-02-23 PROCEDURE — 74011000258 HC RX REV CODE- 258: Performed by: INTERNAL MEDICINE

## 2021-02-23 PROCEDURE — 96415 CHEMO IV INFUSION ADDL HR: CPT

## 2021-02-23 PROCEDURE — 36415 COLL VENOUS BLD VENIPUNCTURE: CPT

## 2021-02-23 PROCEDURE — 96361 HYDRATE IV INFUSION ADD-ON: CPT

## 2021-02-23 PROCEDURE — 80053 COMPREHEN METABOLIC PANEL: CPT

## 2021-02-23 PROCEDURE — 96413 CHEMO IV INFUSION 1 HR: CPT

## 2021-02-23 PROCEDURE — 96367 TX/PROPH/DG ADDL SEQ IV INF: CPT

## 2021-02-23 PROCEDURE — 83735 ASSAY OF MAGNESIUM: CPT

## 2021-02-23 PROCEDURE — 96417 CHEMO IV INFUS EACH ADDL SEQ: CPT

## 2021-02-23 RX ORDER — SODIUM CHLORIDE 9 MG/ML
10 INJECTION INTRAMUSCULAR; INTRAVENOUS; SUBCUTANEOUS AS NEEDED
Status: ACTIVE | OUTPATIENT
Start: 2021-02-23 | End: 2021-02-23

## 2021-02-23 RX ORDER — PALONOSETRON 0.05 MG/ML
0.25 INJECTION, SOLUTION INTRAVENOUS ONCE
Status: COMPLETED | OUTPATIENT
Start: 2021-02-23 | End: 2021-02-23

## 2021-02-23 RX ORDER — SODIUM CHLORIDE 0.9 % (FLUSH) 0.9 %
10 SYRINGE (ML) INJECTION AS NEEDED
Status: DISPENSED | OUTPATIENT
Start: 2021-02-23 | End: 2021-02-23

## 2021-02-23 RX ORDER — HEPARIN 100 UNIT/ML
300-500 SYRINGE INTRAVENOUS AS NEEDED
Status: ACTIVE | OUTPATIENT
Start: 2021-02-23 | End: 2021-02-23

## 2021-02-23 RX ORDER — SODIUM CHLORIDE 9 MG/ML
25 INJECTION, SOLUTION INTRAVENOUS CONTINUOUS
Status: DISPENSED | OUTPATIENT
Start: 2021-02-23 | End: 2021-02-23

## 2021-02-23 RX ADMIN — GEMCITABINE HYDROCHLORIDE 2000 MG: 2 INJECTION, SOLUTION INTRAVENOUS at 13:55

## 2021-02-23 RX ADMIN — MAGNESIUM SULFATE HEPTAHYDRATE: 500 INJECTION, SOLUTION INTRAMUSCULAR; INTRAVENOUS at 11:45

## 2021-02-23 RX ADMIN — DEXAMETHASONE SODIUM PHOSPHATE 12 MG: 4 INJECTION, SOLUTION INTRA-ARTICULAR; INTRALESIONAL; INTRAMUSCULAR; INTRAVENOUS; SOFT TISSUE at 13:09

## 2021-02-23 RX ADMIN — MAGNESIUM SULFATE HEPTAHYDRATE: 500 INJECTION, SOLUTION INTRAMUSCULAR; INTRAVENOUS at 13:54

## 2021-02-23 RX ADMIN — SODIUM CHLORIDE 150 MG: 900 INJECTION, SOLUTION INTRAVENOUS at 13:25

## 2021-02-23 RX ADMIN — HEPARIN 500 UNITS: 100 SYRINGE at 17:30

## 2021-02-23 RX ADMIN — PALONOSETRON 0.25 MG: 0.05 INJECTION, SOLUTION INTRAVENOUS at 13:07

## 2021-02-23 RX ADMIN — SODIUM CHLORIDE 25 ML/HR: 900 INJECTION, SOLUTION INTRAVENOUS at 13:02

## 2021-02-23 RX ADMIN — CISPLATIN 160 MG: 100 INJECTION, SOLUTION INTRAVENOUS at 14:52

## 2021-02-23 RX ADMIN — Medication 10 ML: at 09:00

## 2021-02-23 RX ADMIN — SODIUM CHLORIDE 10 ML: 9 INJECTION INTRAMUSCULAR; INTRAVENOUS; SUBCUTANEOUS at 09:00

## 2021-02-23 NOTE — PROGRESS NOTES
[facility] [place] [facility] [place] [address] [address], [address], [place]: [phone]  F: [phone]    Reason for visit   Anirudh Klein is a 30 y.o. 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 male with no significant PMH seen for above  He was seen by Dr. Gabriella Carrillo with ENT in December with c/o recurrent epistaxis x 2 months requiring ER visits and packing. His prior nasal endoscopy was clear. He was admitted on 2/4/2021 for recurrent HAs and epistaxis which lasted several minutes with improvement on pressure. He had a normal CBC for the most part and had no h/o bleeding growing up. MRI brain 2/5/2021 was obtained and showed a 4.2 x 4 cm posterior nasopharyngeal mass Extending into the sphenoid sinus with narrowing f the R distal internal carotid artery. CTA did not show active extravasation, but showed bilateral cervical adenopathy. He had a biopsy of the NP mass 2/6/2021 and pathology showed Nasopharyngeal carcinoma. He comes today for PET review and cycle 1 of cisplatin / gemzar. He is nervous. He comes with his friend. His headaches are controlled and improved. He has had rare epistaxis. He denies nausea/vomiting or diarrhea/constipation. No LE swelling. PAC looks good but slightly tender. No lumps or bumps. No complaints today. No past medical history on file.    Past Surgical History:   Procedure Laterality Date    IR INSERT TUNL CVC W PORT OVER 5 YEARS  2/11/2021         IR INSERT TUNL CVC W PORT OVER 5 YEARS  2/11/2021      Social History     Tobacco Use    Smoking status: Never Smoker    Smokeless tobacco: Never Used Substance Use Topics    Alcohol use: Not Currently      No family history on file. Current Outpatient Medications   Medication Sig    dexAMETHasone (DECADRON) 4 mg tablet Take 2 tabs (8mg) on days 2 & 3 of chemotherapy    ondansetron (ZOFRAN ODT) 8 mg disintegrating tablet Take 1 Tab by mouth every eight (8) hours as needed for Nausea or Vomiting.  prochlorperazine (Compazine) 5 mg tablet Take 1 Tab by mouth every six (6) hours as needed for Nausea or Vomiting for up to 30 days.  lidocaine-prilocaine (EMLA) topical cream Apply  to affected area as needed for Pain.  senna (SENOKOT) 8.6 mg tablet Take 2 Tabs by mouth daily.  polyethylene glycol (MIRALAX) 17 gram packet Take 1 Packet by mouth daily. Mix in 8 oz of water, juice, soda, coffee, or tea prior to administration    oxyCODONE IR (OXY-IR) 15 mg immediate release tablet Take 1 Tab by mouth every four (4) hours as needed for Pain for up to 14 days. Max Daily Amount: 90 mg.    verapamiL (CALAN) 80 mg tablet Take 1 Tab by mouth three (3) times daily.  ZOLMitriptan (ZOMIG) 2.5 mg tablet Take 1 Tab by mouth as needed for Migraine (May repeat in 2 hours if not improved. Max 10mg/24HRs).  magnesium 250 mg tab Si po qhs    topiramate (TOPAMAX) 50 mg tablet Take 1 Tab by mouth two (2) times a day.  fexofenadine (ALLEGRA) 180 mg tablet Take 1 Tab by mouth daily. Indications: seasonal runny nose    acetaminophen (TYLENOL) 500 mg tablet Take  by mouth every six (6) hours as needed for Pain. 2 or 3 po daily     No current facility-administered medications for this visit.       Facility-Administered Medications Ordered in Other Visits   Medication Dose Route Frequency    0.9% sodium chloride 1,000 mL with potassium chloride 10 mEq, magnesium sulfate 2 g infusion   IntraVENous ONCE    sodium chloride (NS) flush 10 mL  10 mL IntraVENous PRN    0.9% sodium chloride injection 10 mL  10 mL IntraVENous PRN    0.9% sodium chloride 1,000 mL with potassium chloride 10 mEq, magnesium sulfate 2 g infusion   IntraVENous ONCE      Allergies   Allergen Reactions    Iodinated Contrast Media Itching        Review of Systems: A complete review of systems was obtained, negative except as described above. Physical Exam:     Visit Vitals  Wt 183 lb 1.6 oz (83.1 kg)   BMI 28.68 kg/m²     General appearance - alert, well appearing, and in no distress, nervous, poor eye contact  Mental status - oriented to person, place, and time  Mouth - mucous membranes moist  Neck - supple, no appreciable thyromegaly, PAC in place   Lymphatics - no palpable lymphadenopathy  Skin - normal coloration and turgor, no new rashes, no suspicious skin lesions noted    ECOG PS:1    Results:     Lab Results   Component Value Date/Time    WBC 11.2 (H) 02/23/2021 09:45 AM    HGB 13.5 02/23/2021 09:45 AM    HCT 40.8 02/23/2021 09:45 AM    PLATELET 650 (H) 74/60/1759 09:45 AM    MCV 79.1 (L) 02/23/2021 09:45 AM    ABS. NEUTROPHILS 9.0 (H) 02/23/2021 09:45 AM     Lab Results   Component Value Date/Time    Sodium 134 (L) 02/23/2021 09:45 AM    Potassium 4.0 02/23/2021 09:45 AM    Chloride 102 02/23/2021 09:45 AM    CO2 23 02/23/2021 09:45 AM    Glucose 126 (H) 02/23/2021 09:45 AM    BUN 10 02/23/2021 09:45 AM    Creatinine 0.80 02/23/2021 09:45 AM    GFR est AA >60 02/23/2021 09:45 AM    GFR est non-AA >60 02/23/2021 09:45 AM    Calcium 9.5 02/23/2021 09:45 AM    Glucose (POC) 106 (H) 02/18/2021 10:16 AM     Lab Results   Component Value Date/Time    Bilirubin, total 0.3 02/23/2021 09:45 AM    ALT (SGPT) 72 02/23/2021 09:45 AM    Alk.  phosphatase 155 (H) 02/23/2021 09:45 AM    Protein, total 7.9 02/23/2021 09:45 AM    Albumin 3.4 (L) 02/23/2021 09:45 AM    Globulin 4.5 (H) 02/23/2021 09:45 AM     Component      Latest Ref Rng & Units 2/7/2021          12:26 PM   Cristóbal-Purdy DNA QT, PCR      Negative copies/mL 327   Cristóbal-Barr Virus log10      log10 copy/mL 2.515       Records reviewed and summarized above. Pathology report(s) reviewed     PATHOLOGY     FINAL PATHOLOGIC DIAGNOSIS   1. Right nasopharyngeal mass, biopsy:   Nasopharyngeal carcinoma, nonkeratinizing, undifferentiated type (see comment)   In situ hybridization for Cristóbal-Barr virus (SEMAJ) is positive   2. Right nasopharyngeal mass, biopsy:   Nasopharyngeal carcinoma, nonkeratinizing, undifferentiated type   Comment   Immunohistochemical stains performed from block 1A show that the invasive carcinoma expresses p40, AE1/AE3, and CAM5.2 while negative for p16, CK7, and CK20. This immunoprofile supports the above rendered diagnosis     Radiology report(s) reviewed above. CTA neck    IMPRESSION  1. Attenuation of the right internal carotid artery as it passes through the  right nasopharyngeal mass but no evidence of active extravasation or  intraluminal thrombus. 2.  Bilateral cervical lymphadenopathy in level 2 and level 5B. 3.  Right posterior nasopharyngeal mass with sphenoid sinus involvement and  diffuse sinus disease    MRI brain    IMPRESSION  1. 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 2.  Diffuse sinus mucosal thickening. 3.  Mass related narrowing of the right distal cervical internal carotid artery  but no large vessel occlusion. 4.  No evidence of infarct.       MRI orbit and face    IMPRESSION  Again demonstrated is large right nasopharyngeal tumor as previously described  and without significant change. Some involvement right internal carotid artery. Associated mucosal thickening and fluid right paranasal sinuses. PET 2/18/21:  IMPRESSION  The right nasopharyngeal mass lesion is hypermetabolic and  compatible with the known neoplasm. Bilateral hypermetabolic cervical lymph  nodes. Focal increased tracer activity is seen involving the very posterior  aspect of the nasal septum.   Assessment:   1) Nasopharyngeal carcinoma- R   SEMAJ positive    4.2 x 4 cm, extends into the sphenoid sinus, bilateral cervical nodes. [place] but does not involve carotid artery    Nasopharyngeal carcinoma - at least T3N2M0-Stage III disease. This is an aggressive malignancy with high risk of fatal local complications  In his case , the abutment of carotid artery is highly concerning for risk of tumor extension and catastrophic bleeding  He is very symptomatic with severe pain, epistaxis, hearing impediment    His PET was reviewed from 2/18/21 and shows a significant fco burden. Hence, after discussion with radiation decision was made to move forward with induction chemo with cisplatin + gemzar x 3 cycles  followed by chemoRT ( with weekly Cisplatin)     Teach & consent obtained today. All questions answered &  used     2) Epistaxis  Secondary to the mass  CTA without active extravasation  At high risk for catastrophic hemorrhage    3) Headaches  Secondary to the nasopharyngeal mass with splenoid sinus involvement  Controlled on Verapamil, Zomig, Oxycodone  Continue palliative care follow up    4) Nausea  Zofran prn    5) Management of high risk drugs such as chemotherapy  Teach & consent obtained today  All questions answered   Labs reviewed     6) Unsteadiness  Unclear etiology  Had a brain MRI inpatient   Will have pharm review med rec     Plan:     · Proceed today with cycle 1 day 1 of cisplatin on day 1 only (80mg/m2) + gemzar on day 1, 8 (1000mg/m2) of a 21 day cycle x 3 cycles   · Dexamethasone on days 2 , 3   · zofran / compazine PRN   · Continue to follow with palliative care  · Labs / type & cross on off weeks     Language interpretation services used    RTC in 3 weeks for cycle 2 day 1     I appreciate the opportunity to participate in Mr. Arian Mendoza's care. I performed a history and physical examination of the patient and discussed his management with the NPP.  I reviewed the NPP note and agree with the documented findings and plan of care  Patient with NP carcinoma  He has pain and epistaxis  Labs reviewed  Plan on induction chemotherapy to start today  At high risk for complications   Signed By: Dayanna Cutler MD

## 2021-02-23 NOTE — PROGRESS NOTES
Bhaskar Mendoza is a 35 y.o. male  Chief Complaint   Patient presents with   • Follow-up     nasopharyngeal mass   1. Have you been to the ER, urgent care clinic since your last visit?  Hospitalized since your last visit?No  2. Have you seen or consulted any other health care providers outside of the [facility] since your last visit?  Include any pap smears or colon screening.No

## 2021-02-23 NOTE — PROGRESS NOTES
[facility] [place] [facility] Chemo Progress Note    Date: 2021    Name: Yan Borden    MRN: [id number]         : 1985    0830 Mr. Megan Adler Arrived to [facility] for  C1D1 Gemzar/Cisplatin ambulatory in stable condition. Assessment was completed, no acute issues at this time, no new complaints voiced. Port accessed with positive blood return. Labs drawn and sent for processing. Patient to MD office. Patient returned to \[facility]", Normal Saline started at [facility]. Chemotherapy Flowsheet 2021   Cycle C1D1   Date 2021   Drug / Regimen Gemzar/Cisplatin   Pre Hydration given   Post Hydration given   Pre Meds given   Notes given         Patient denies SOB, fever, cough, general not feeling well. Patient denies recent exposure to someone who has tested positive for COVID-19. Patient denies having contact with anyone who has a pending COVID test.      Mr. Leatha Mendoza's vitals were reviewed. Patient Vitals for the past 12 hrs:   Temp Pulse Resp BP   21 1727  96 18 (!) 148/92   21 0841 97.1 °F (36.2 °C)  18 (!) 138/98         Lab results were obtained and reviewed. Recent Results (from the past 12 hour(s))   CBC WITH AUTOMATED DIFF    Collection Time: 21  9:45 AM   Result Value Ref Range    WBC 11.2 (H) 4.1 - 11.1 K/uL    RBC 5.16 4.10 - 5.70 M/uL    HGB 13.5 12.1 - 17.0 g/dL    HCT 40.8 36.6 - 50.3 %    MCV 79.1 (L) 80.0 - 99.0 FL    MCH 26.2 26.0 - 34.0 PG    MCHC 33.1 30.0 - 36.5 g/dL    RDW 13.7 11.5 - 14.5 %    PLATELET 925 (H) 325 - 400 K/uL    MPV 10.1 8.9 - 12.9 FL    NRBC 0.0 0  WBC    ABSOLUTE NRBC 0.00 0.00 - 0.01 K/uL    NEUTROPHILS 81 (H) 32 - 75 %    LYMPHOCYTES 12 12 - 49 %    MONOCYTES 6 5 - 13 %    EOSINOPHILS 1 0 - 7 %    BASOPHILS 0 0 - 1 %    IMMATURE GRANULOCYTES 0 0.0 - 0.5 %    ABS. NEUTROPHILS 9.0 (H) 1.8 - 8.0 K/UL    ABS. LYMPHOCYTES 1.3 0.8 - 3.5 K/UL    ABS. MONOCYTES 0.7 0.0 - 1.0 K/UL    ABS. EOSINOPHILS 0.1 0.0 - 0.4 K/UL    ABS.  BASOPHILS 0.1 0.0 - 0.1 K/UL    ABS. IMM. GRANS. 0.0 0.00 - 0.04 K/UL    DF AUTOMATED     METABOLIC PANEL, COMPREHENSIVE    Collection Time: 02/23/21  9:45 AM   Result Value Ref Range    Sodium 134 (L) 136 - 145 mmol/L    Potassium 4.0 3.5 - 5.1 mmol/L    Chloride 102 97 - 108 mmol/L    CO2 23 21 - 32 mmol/L    Anion gap 9 5 - 15 mmol/L    Glucose 126 (H) 65 - 100 mg/dL    BUN 10 6 - 20 MG/DL    Creatinine 0.80 0.70 - 1.30 MG/DL    BUN/Creatinine ratio 13 12 - 20      GFR est AA >60 >60 ml/min/1.73m2    GFR est non-AA >60 >60 ml/min/1.73m2    Calcium 9.5 8.5 - 10.1 MG/DL    Bilirubin, total 0.3 0.2 - 1.0 MG/DL    ALT (SGPT) 72 12 - 78 U/L    AST (SGOT) 21 15 - 37 U/L    Alk. phosphatase 155 (H) 45 - 117 U/L    Protein, total 7.9 6.4 - 8.2 g/dL    Albumin 3.4 (L) 3.5 - 5.0 g/dL    Globulin 4.5 (H) 2.0 - 4.0 g/dL    A-G Ratio 0.8 (L) 1.1 - 2.2     MAGNESIUM    Collection Time: 02/23/21  9:45 AM   Result Value Ref Range    Magnesium 2.0 1.6 - 2.4 mg/dL       Pre-medications  were administered as ordered and chemotherapy was initiated.  Medications Administered     0.9% sodium chloride 1,000 mL with potassium chloride 10 mEq, magnesium sulfate 2 g infusion     Admin Date  02/23/2021 Action  Given Dose   Rate  1,000 mL/hr Route  IntraVENous Administered By  Magalie Pandya, JIMBO           Admin Date  02/23/2021 Action  Given Dose   Rate  1,000 mL/hr Route  IntraVENous Administered By  Magalie Pandya, RN          0.9% sodium chloride infusion     Admin Date  02/23/2021 Action  New Bag Dose  25 mL/hr Rate  25 mL/hr Route  IntraVENous Administered By  Magalie Pandya, RN          0.9% sodium chloride injection 10 mL     Admin Date  02/23/2021 Action  Given Dose  10 mL Route  IntraVENous Administered By  Magalie Pandya, RN          CISplatin (PLATINOL) 160 mg in 0.9% sodium chloride 500 mL, overfill volume 50 mL chemo infusion     Admin Date  02/23/2021 Action  New Bag Dose  160 mg Rate  355 mL/hr Route  IntraVENous Administered  By  Isis Rodgers RN          dexamethasone (DECADRON) 12 mg in 0.9% sodium chloride 50 mL, overfill volume 5 mL IVPB     Admin Date  02/23/2021 Action  Given Dose  12 mg Rate  232 mL/hr Route  IntraVENous Administered By  June Urrutia RN          fosaprepitant (EMEND) 150 mg in 0.9% sodium chloride 150 mL IVPB     Admin Date  02/23/2021 Action  Given Dose  150 mg Rate  450 mL/hr Route  IntraVENous Administered By  June Urrutia RN          gemcitabine (GEMZAR) 2,000 mg in 0.9% sodium chloride 250 mL, overfill volume 25 mL chemo infusion     Admin Date  02/23/2021 Action  New Bag Dose  2,000 mg Rate  655.2 mL/hr Route  IntraVENous Administered By  June Urrutia RN          heparin (porcine) pf 300-500 Units     Admin Date  02/23/2021 Action  Given Dose  500 Units Route  InterCATHeter Administered By  June Urrutia RN          palonosetron HCl (ALOXI) injection 0.25 mg     Admin Date  02/23/2021 Action  Given Dose  0.25 mg Route  IntraVENous Administered By  June Urrutia RN          sodium chloride (NS) flush 10 mL     Admin Date  02/23/2021 Action  Given Dose  10 mL Route  IntraVENous Administered By  June Urrutia RN                  [id number] Patient tolerated treatment well. Port maintained positive blood return throughout treatment. Port flushed, heparinized and de accessed per protocol. Patient was discharged from [place] [address].      Future Appointments   Date Time Provider Renae Fajardo   3/1/2021 11:30 AM [facility]   3/2/2021  7:30 AM B2 JOSUE LONG [facility] H   3/9/2021  4:30 PM H2 JOSUE FASTR[facility]   3/16/2021  7:30 AM B2 [place] [facility]   3/16/2021  8:30 AM Sunita Rhodes,  [address] BS AMB   3/23/2021  7:30 AM B2 [place] [facility]   4/6/2021  7:30 AM B2 [place]UE [place] [facility]   4/13/2021  4:30 PM H2 JOSUE FASTRACK [facility]         Selwyn Mayfield, JIMBO  February 23, 2021

## 2021-02-23 NOTE — PROGRESS NOTES
Pharmacy Note- Chemotherapy Education     Abraham BLAKE Xander Allen is a  30 y.o.male  diagnosed with nasopharyngeal cancer here today for Cycle 1, Day 1 chemotherapy counseling and consent. Mr. Xander Allen is being treated with CISplatin and gemcitabine. Provided education on CISplatin, gemcitabine and premedications - fosaprepitant, palonosetron and dexamethasone.     Provided Mr. Kiera Whitten with a calendar and handout reviewing treatment dates and anti-nausea regimen. Reviewed with Mr. Xander Allen the change in treatment plan from weekly CISplatin to CISplatin Day 1 and Gemcitabine Day 1 and 8 every 21 days for 3 cycles, with hydration and labs on off weeks from treatment.      Reviewed side effects of chemotherapy to include s/s infection, anemia, appetite changes, thromboyctopenia, fatigue, hair loss/alopecia, bone pain, skin and nail changes, diarrhea/constipation, hearing changings, peripheral neuropathy and kidney changes.     Patient given ways to manage these side effects and when to contact office.      [address] Handout in [place] and windy handout in [facility] of medications provided to patient. Mr. Xander Allen verbalized understanding of the information presented and all of the patient's questions were answered.     Valorie from [facility] provided translation.      Reuben Garcia, PharmD, BCPS, BCOP    CLINICAL PHARMACY CONSULT: MED RECONCILIATION/REVIEW ADDENDUM    For Pharmacy Admin Tracking Only    PHSO: PHSO Patient?: No  Total # of Interventions Recommended: Count: 1    Total Interventions Accepted: 1  Time Spent (min): 20

## 2021-02-24 ENCOUNTER — DOCUMENTATION ONLY (OUTPATIENT)
Dept: ONCOLOGY | Age: 36
End: 2021-02-24

## 2021-02-24 ENCOUNTER — TELEPHONE (OUTPATIENT)
Dept: ONCOLOGY | Age: 36
End: 2021-02-24

## 2021-02-24 DIAGNOSIS — C11.9 NASOPHARYNGEAL CANCER (HCC): Primary | ICD-10-CM

## 2021-02-24 NOTE — PROGRESS NOTES
2/24/21    Goals      Patient/Family verbalizes understanding of self-management of chronic disease. 2/24/21  Bhaskar Brar c/o epistaxis x 10 minutes this morning. NN will call onc office of Dr Angi Dawn for orders. JIMBO Smith at Dr Angi Dawn office, stated that he should apply cold water compress, avoid blow nose forcibly, go to ED if epistaxis persists longer than 10 minutes. NN called Kvng Brown and provided orders for Onc office of Dr Angi Dawn. ENT appt pending. FU in 1 week. Im      2/22/21  Maryanne spouse of Cabrera Leal is having a challenging time with caring for patient and running the home, caring for the children. She asked the brother in law to help. Brother of patient can help with caring for his brother while Kvng Brown does errands and other duties. Lately MarJanine Brar is having anxiety when Kvng Brown leaves the home. Kvng Brown is asking for a letter with information regarding the increase family needs during the cancer treatments. NN sent a message to PCP, OW, and Bibi Rosados. Awaiting for reply. Reviewed upcoming appts. with Kvng Armstrongshara to confirm. 2/16/21  Cabrera Leal spouse Kvng Brown called stating that Cabrera Leal c/o dizziness after taking verapamil. NN send stefan Benton in-box asking how to proceed. NN asked that Cabrera Leal have the MD at his appt 2/17/21 to check his B/P. NN provided information regarding siting up for a moment before getting out of the bed. Waiting for directions from stefan Benton. 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 Maryanne arellano of Bhaskar Brar and NN are working together to coordinate appts, obtaining medications, addressing his uninsured status. NN called Dr David Shields office, comfirmed appt 2/17/21 at 2 pm at [address].  Spoke to JIMBO Smith, who assured NN that the FA application, medications, CL dressing to R chest will be taken care of at the first appt By Radha Castellano at [phone]. NN asked spose Kayode Lawrence to start putting together all documants needed for FA and keep records copies in a disgnated folder. NN reviewed all medications at length with side effects of oxycodone and safety. Covid 19 precautions reviewed  and sx/sx to report immediately. Kayode Lawrence was given opportunity for questions, NN provided contact and hoursof operation. NN will brief Vana WorkforceJIMBO with this chart information so that in my absence she can handle this case. 1/26/21   Maryanne Jackson will tell Crescencio Grewal about his chronic Head aches. NO epistaxis lately. NN provided contact information.  No MOIRA, IM

## 2021-02-24 NOTE — PROGRESS NOTES
Patient's friend called stating that patient had chemotherapy yesterday. He has had 3 nosebleeds yesterday and one today. He is already using nasal spray, which is not helping. Unclear on whether nosebleeds are stopping with few minutes of holding pressure or continuous. If nosebleeds are continuous despite holding pressure, I advised going to the ER. If nosebleeds are intermittent, I advised pt may be able to get in with ENT doctor for cauterization. Will notify primary team to please call and discuss with patient, place ENT referral if appropriate.

## 2021-02-24 NOTE — TELEPHONE ENCOUNTER
2/24/2021  1655:  Contacted ENT office to schedule appointment for appointment   Will await call back on 2/25/2021

## 2021-02-24 NOTE — TELEPHONE ENCOUNTER
Received call from patient's  Carla Recio  [phone]    Patient's spouse had contacted her to report of the nose bleed that occurred this morning   Informed Cinda of plan of care per MD on call encounter noted   Stated if nosebleed is still present or occurs again last longer than 5 mins, he will need to go to the ER     Cinda verbalized understanding and would notify spouse of the updates

## 2021-02-25 ENCOUNTER — TELEPHONE (OUTPATIENT)
Dept: ONCOLOGY | Age: 36
End: 2021-02-25

## 2021-02-25 NOTE — TELEPHONE ENCOUNTER
Called patient's spouse with assistance from Valorie with [employer]   Patient scheduled with ENT MD Navarrete on March 1st 2021 at 145 PM     Wife verbalized understanding   No new questions or concerns at this time

## 2021-02-26 NOTE — TELEPHONE ENCOUNTER
[employer] Palliative Medicine Office  Nursing Note  [phone]  Fax [phone]      Name:  Cole Shafer  YOB: 1985    Received outpatient Palliative Medicine referral from Dr. Ana Klein to see pt for symptom management and supportive care. Chart  reviewed. Cole Shafer is a 30 y.o. male with nasopharyngeal carcinoma. Pt was hospitalized at [facility] 2/4/2021-2/13/2021. He was admitted from home through the ED, he presented w/ epistaxis and recurrent headaches. MRI brain 2/5/21 showed 4.2x4cm posterior nasopharyngeal mass, bilat cervical adenopathy. 2/6/21 biopsy was positive for carcinoma. Nurse called and spoke with pt's wife,  introduced outpatient Palliative Medicine services. Appt scheduled for 3/1/21 at 11:30am.  Pt is Swedish speaking, will arrange for an .     LYNDA Valencia, RN-BC, [facility]

## 2021-03-01 ENCOUNTER — OFFICE VISIT (OUTPATIENT)
Dept: PALLATIVE CARE | Age: 36
End: 2021-03-01
Payer: SUBSIDIZED

## 2021-03-01 ENCOUNTER — TELEPHONE (OUTPATIENT)
Dept: FAMILY MEDICINE CLINIC | Age: 36
End: 2021-03-01

## 2021-03-01 ENCOUNTER — TELEPHONE (OUTPATIENT)
Dept: PALLATIVE CARE | Age: 36
End: 2021-03-01

## 2021-03-01 VITALS
HEART RATE: 113 BPM | BODY MASS INDEX: 28.19 KG/M2 | TEMPERATURE: 96.3 F | WEIGHT: 180 LBS | SYSTOLIC BLOOD PRESSURE: 133 MMHG | RESPIRATION RATE: 18 BRPM | DIASTOLIC BLOOD PRESSURE: 95 MMHG

## 2021-03-01 DIAGNOSIS — R51.9 HEADACHE DUE TO INTRACRANIAL DISEASE: ICD-10-CM

## 2021-03-01 DIAGNOSIS — C11.9 NASOPHARYNGEAL CARCINOMA (HCC): Primary | ICD-10-CM

## 2021-03-01 DIAGNOSIS — G93.9 HEADACHE DUE TO INTRACRANIAL DISEASE: ICD-10-CM

## 2021-03-01 PROCEDURE — 99215 OFFICE O/P EST HI 40 MIN: CPT | Performed by: INTERNAL MEDICINE

## 2021-03-01 RX ORDER — TOPIRAMATE 50 MG/1
50 TABLET, FILM COATED ORAL 2 TIMES DAILY
Qty: 60 TAB | Refills: 0 | Status: SHIPPED | OUTPATIENT
Start: 2021-03-01 | End: 2021-04-29

## 2021-03-01 NOTE — TELEPHONE ENCOUNTER
Palliative Medicine  Nursing Note  [phone]  Fax [phone]        Clinic Office Visit  Patient Name: Cole Or  YOB: 1985    3/1/2021      Advance Care Planning 3/1/2021   Patient's Healthcare Decision Maker is: Verbal statement (Legal Next of Kin remains as decision maker)   Confirm Advance Directive None     Call placed to [address] [place] [address] regarding cost for Topamax 50mg through foundation assistance fund. Cost is $16.98 for 30 day supply. Faxed required paper work to pharmacy for medication. Per Dr. Malik Chavez, medication ordered. Call placed to [facility] to assess what other medications he needs for , but they are closed for lunch. Call placed to  Leidy [place], [address] speaking friend of Mr. Tomeka Rajan,  to inquire what medications he is in need of. He was unsure at present. Palliative to call [facility] to inquire what medications were ready for . Call placed again to [facility] with no answer. Palliative to follow up. Follow up appointment scheduled for 4 weeks    Nurse Navigator will provide a follow up phone call as needed.       Lila Guerrero, BSN, RN, [facility]  Palliative Medicine

## 2021-03-01 NOTE — PATIENT INSTRUCTIONS
Dear Farhad Ann , It was a pleasure seeing you today in [address] We will see you again in 4 weeks for a virtual visit. If labs or imaging tests have been ordered for you today, please call the office  at [phone] 48 hours after completion to obtain the results. Your described symptoms were: Fatigue: 3 Drowsiness: 3 Depression: 2 Pain: 0 Anxiety: 0 Nausea: 1 Anorexia: 0 Dyspnea: 0 Constipation: No  
Other Problem (Comment): 0 This is the plan we talked about: 1. Headaches 
-Continue topiramate 50-mg twice daily 
-Continue tylenol 500-mg: take 2 pills every 6 hours as needed 
-I'm going to work on getting this medication paid for through our [employer] funds 
-We will call you when we have more information about this 2. Nose bleeds 
-These are related to the tumor 
-I included general information about nose bleeds below 
-Please go to the Emergency Department if you have a nose bleed which doesn't atop after a few minutes 
-You see Dr. Mauricio Merida today for follow up 3. Neck soreness 
-This occurs sometimes when you've slept in a certain position 
-You can use tylenol for this pain 4. Nausea 
-Continue ondansetron 
-Continue prochlorperazine 5. Mood 
-The cancer diagnosis hit you hard 
-You're staying positive, though 
-Your family and your friends are supporting you 6. Cancer 
-You started chemotherapy 
-You will receive radiation therapy, along with another chemotherapy, after this initial course (induction chemotherapy) 7. Advance care planning 
-You met with our inpatient Palliative Medicine Team during your hospital stay 
-They provided you with a copy of the [facility] Directive form for you to review at home 
-We will discuss this at your next visit This is what you have shared with us about Advance Care Planning: 
 
 
Advance Care Planning 3/1/2021 Patient's Healthcare Decision Maker is: Verbal statement (Legal Next of Kin remains as decision maker) Confirm Advance Directive None The Palliative Medicine Team is here to support you and your family. Sincerely, Pamela Thomson MD and the Palliative Medicine Team 
  
 
Hemorragias nasales: Instrucciones de cuidado Nosebleeds: Care Instructions Instrucciones de cuidado Las hemorragias (sangrados) nasales son comunes, sobre todo si usted tiene resfriados o alergias. Hay muchas cosas que pueden causar jovan hemorragia nasal. 
Algunas hemorragias nasales se detienen por sí solas con presión. Otras requieren taponamiento. Algunas se cauterizan (sellan). Si tiene gasa u otro material para taponar la nariz, deberá hacer jovan visita de seguimiento con padilla médico para le sea retirado el tapón. Puede que requiera tratamiento adicional si tiene hemorragias nasales con frecuencia. El médico lo andrade examinado minuciosamente, kevyn más tarde pueden presentarse problemas. Si nota algún problema o síntoma nuevo, busque tratamiento médico de inmediato. La atención de seguimiento es jovan parte clave de padilla tratamiento y seguridad. Asegúrese de hacer y acudir a todas las citas, y llame a padilla médico si está teniendo problemas. También es jovan buena idea saber los resultados de cristiana exámenes y mantener jovan lista de los medicamentos que le. Cómo puede cuidarse en el hogar? · Si usted tiene otra hemorragia nasal: ? Siéntese e incline la cele un poco hacia adelante. Foristell impide que la mignon vaya hacia la garganta. ? Use los dedos pulgar e índice para apretarse la nariz y cerrarla por 10 minutos. Use un reloj. No verifique si se ha detenido la hemorragia antes de que transcurran 10 minutos. Si no se detiene la hemorragia, apriétese la nariz por 10 minutos más. ? Cuando se haya detenido la hemorragia, trate de no hurgarse, frotarse ni sonarse la nariz por 12 horas. Evitar estas cosas ayuda a impedir que [employer] nariz de [place].  
· Si padilla médico le recetó antibióticos, tómelos según las indicaciones. No deje de tomarlos solo porque se sienta mejor. Debe byron todos los antibióticos hasta terminarlos. [address] hemorragias nasales · No se suene la nariz con mucha fuerza. · Evite levantar cosas o esforzarse después de jovan hemorragia nasal. 
· Eleve la cele con jovan almohada mientras duerme. · Póngase jovan capa delgada de gel nasal a base de [place] o de [address] [place] [address] Street, sravani [place]el, dentro de la Doristine Lessen. Póngaselo en el tabique, el cual divide las fosas nasales. [place] prevendrá la sequedad que puede causar hemorragias nasales. · Use un vaporizador o humidificador para añadirle humedad a padilla dormitorio. Siga las instrucciones para limpiar el aparato. · No tome aspirina, ibuprofeno (Advil, Motrin) o naproxeno (Aleve) por un período de 36 a 48 horas después de jovan hemorragia nasal, a menos que padilla médico se lo indique. Puede usar acetaminofén (Tylenol) para aliviar el dolor. · Hable con padilla médico acerca de suspender cualquier otro medicamento que esté tomando. Algunos medicamentos podrían aumentar las probabilidades de que tenga jovan hemorragia nasal. 
· No utilice medicamentos para el resfriado ni aerosoles nasales sin hablar aniceto con padilla médico. Pueden secarle la nariz. Cuándo debes pedir ayuda? Llama al 911 toda vez que pienses que puedes necesitar atención de [place]. Por ejemplo, llama si: 
  · Te desmayaste (perdiste el conocimiento). Blu Still a tu médico ahora mismo o busca atención médica inmediata si: 
  · Tienes otra hemorragia nasal y te sigue sangrando la nariz después de haberte hecho presión 3 veces por 10 minutos (30 minutos en total).  
  · Hay mucha mignon que te baja por detrás de la garganta aunque te hayas presionado la nariz e inclinado la cele hacia adelante.  
  · Tienes fiebre.  
  · Tienes dolor en los senos paranasales.   
Presta especial atención a los cambios en tu jay y asegúrate de comunicarte con tu médico si: 
  · Tienes hemorragias nasales a menudo, incluso si se detienen.  
  · No mejoras sravani se esperaba. Dónde puede encontrar más información en inglés? Maia Raman a http://www.shannon.com/ Anay Noon Z218 en la búsqueda para aprender más acerca de \"Hemorragias nasales: Instrucciones de cuidado. \" Revisado: 26 junio, 2019               Versión del contenido: 12.6 © 8598-2401 Healthwise, Incorporated. Las instrucciones de cuidado fueron adaptadas bajo licencia por [employer] (which disclaims liability or warranty for this information). Si usted tiene [place] afección médica o sobre estas instrucciones, siempre pregunte a padilla profesional de jay. Healthwise, Incorporated niega toda garantía o responsabilidad por padilla uso de esta información.

## 2021-03-01 NOTE — TELEPHONE ENCOUNTER
OW called patient and started financial screening for [employer]. OW explained the process to patient and mailed the forms he needs to sign, along with written instructions in Korean. Pending SL.

## 2021-03-01 NOTE — PROGRESS NOTES
Palliative Medicine Office Visit  Palliative Medicine Nurse Check In  [phone]    Patient Name: Gil Arriaga  YOB: 1985      Date of Office Visit:  3/1/2021    Patient states: \"  \"    From Check In Sheet (scanned in Media):  Has a medical provider talked with you about cardiopulmonary resuscitation (CPR)? [x] Yes   [] No   [] Unable to obtain    Nurse reminder to complete or update ACP FlowSheet:    Is ACP on the Problem List?    [] Yes    [x] No  IF ACP Document is ON FILE; Nurse to place ACP on Problem List     Is there an ACP Note in Chart Review/Note? [] Yes    [x] No   If NO: [address] Planning 2/23/2021   Patient's Healthcare Decision Maker is: Verbal statement (Legal Next of Kin remains as decision maker)   Confirm Advance Directive None       Is there anything that we should know about you as a person in order to provide you the best care possible? Have you been to the ER, urgent care clinic since your last visit? [] Yes   [] No   [] Unable to obtain    Have you been hospitalized since your last visit? [] Yes   [] No   [] Unable to obtain    Have you seen or consulted any other health care providers outside of the [address] [place] [address] since your last visit?    [] Yes   [] No   [] Unable to obtain    Functional status (describe):         Last BM: 3/1/2021     accessed (date): 3/1/2021    Bottle review (for opioid pain medication):  Medication 1:   Date filled:   Directions:   # filled:   # left:   # pills taking per day:  Last dose taken:    Medication 2:   Date filled:   Directions:   # filled:   # left:   # pills taking per day:  Last dose taken:    Medication 3:   Date filled:   Directions:   # filled:   # left:   # pills taking per day:  Last dose taken:    Medication 4:   Date filled:   Directions:   # filled:   # left:   # pills taking per day:  Last dose taken:

## 2021-03-01 NOTE — PROGRESS NOTES
Palliative Medicine Outpatient Services  [place]: [phone])    Patient Name: Johan Kenney  YOB: 1985    Date of Current Visit: 03/01/21  Location of Current Visit:    [x] [facility] Office  [] [facility] Office  [] [address] [address] [facility] Office  [] Home  []Synchronous real-time A/V virtual visit    Date of Initial Visit: 3/1/21   Referral from: Larry Delgadillo MD  Primary Care Physician: JESSICA Samano      SUMMARY:   Johan Kenney is a 30y.o. 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 [facility] but does not involve carotid artery), who was referred to Palliative Medicine by Dr. Zoran Juarez for symptom management and psychosocial support. He was diagnosed in 2/21 after presenting with months of recurrent headaches and epistaxis. He is currently being treated with induction chemo with cisplatin + gemzar x 3 cycles followed by chemoRT ( with weekly Cisplatin) under the care of Dr. Zoran Juarez.       The patients social history includes: he is  to [place]. They have an 6year old daughter, Crissy Danielle, and a 9year old son, Jaida Wilder. He worked in construction until 2019. He is an uninsured, undocumented resident from [facility]. Palliative Medicine is addressing the following current patient/family concerns: headaches, migrainous, likely related to malignancy; nausea; neck pain; symptom and psychosocial support over course of treatment; advance care planning     Initial Referral Intake note reviewed   PALLIATIVE DIAGNOSES:       ICD-10-CM ICD-9-CM    1. Nasopharyngeal carcinoma ([facility] [address]  C11.9 147.9    2. Headache due to intracranial disease  R51.9 784.0     G93.9 348.9           PLAN:   Patient Instructions     Dear Johan Kenney ,    It was a pleasure seeing you today in [address]    We will see you again in 4 weeks for a virtual visit.     If labs or imaging tests have been ordered for you today, please call the office  at [phone] 48 hours after completion to obtain the results. Your described symptoms were: Fatigue: 3 Drowsiness: 3   Depression: 2 Pain: 0   Anxiety: 0 Nausea: 1   Anorexia: 0 Dyspnea: 0     Constipation: No   Other Problem (Comment): 0       This is the plan we talked about:    1. Headaches  -Continue topiramate 50-mg twice daily  -Continue tylenol 500-mg: take 2 pills every 6 hours as needed  -I'm going to work on getting this medication paid for through our [employer] funds  -We will call you when we have more information about this    2. Nose bleeds  -These are related to the tumor  -I included general information about nose bleeds below  -Please go to the Emergency Department if you have a nose bleed which doesn't atop after a few minutes  -You see Dr. Nidia Win today for follow up    3. Neck soreness  -This occurs sometimes when you've slept in a certain position  -You can use tylenol for this pain    4. Nausea  -Continue ondansetron  -Continue prochlorperazine    5. Mood  -The cancer diagnosis hit you hard  -You're staying positive, though  -Your family and your friends are supporting you     6. Cancer  -You started chemotherapy  -You will receive radiation therapy, along with another chemotherapy, after this initial course (induction chemotherapy)    7. Advance care planning  -You met with our inpatient Palliative Medicine Team during your hospital stay  -They provided you with a copy of the [facility] Directive form for you to review at home  -We will discuss this at your next visit        This is what you have shared with us about Advance Care Planning:      Advance Care Planning 3/1/2021   Patient's [address] is: Verbal statement (Legal Next of Kin remains as decision maker)   Confirm Advance Directive None           The Palliative Medicine Team is here to support you and your family.        Sincerely,      Shannan Ximena Bryant MD and the Palliative Medicine Team       Hemorragias nasales: Instrucciones de cuidado  Nosebleeds: Care Instructions  Instrucciones de [address] [place] [address] hemorragias (sangrados) nasales son comunes, sobre todo si usted tiene resfriados o alergias. Hay muchas cosas que pueden causar jovan hemorragia nasal.  Algunas hemorragias nasales se detienen por sí solas con presión. Otras requieren taponamiento. Algunas se cauterizan (sellan). Si tiene gasa u otro material para taponar la nariz, deberá hacer jovan visita de seguimiento con padilla médico para le sea retirado el tapón. Puede que requiera tratamiento adicional si tiene hemorragias nasales con frecuencia. El médico lo andrade examinado minuciosamente, kevyn más tarde pueden presentarse problemas. Si nota algún problema o síntoma nuevo, busque tratamiento médico de inmediato. La atención de seguimiento es jovan parte clave de padilla tratamiento y seguridad. Asegúrese de hacer y acudir a todas las citas, y llame a padilla médico si está teniendo problemas. También es ojvan buena idea saber los resultados de cristiana exámenes y mantener jovan lista de los medicamentos que le. ¿Cómo puede cuidarse en el hogar? · Si usted tiene otra hemorragia nasal:  ? Siéntese e incline la cele un poco hacia adelante. [place] impide que la mignon vaya hacia la garganta. ? Use los dedos pulgar e índice para apretarse la nariz y cerrarla por 10 minutos. Use un reloj. No verifique si se ha detenido la hemorragia antes de que transcurran 10 minutos. Si no se detiene la hemorragia, apriétese la nariz por 10 minutos más. ? Cuando se haya detenido la hemorragia, trate de no hurgarse, frotarse ni sonarse la nariz por 12 horas. Evitar estas cosas ayuda a impedir que [employer] nariz de [place]. · Si padilla médico le recetó antibióticos, tómelos según las indicaciones. No deje de tomarlos solo porque se sienta mejor. Debe byron todos los antibióticos hasta terminarlos.   [address] hemorragias nasales  · No se suene la nariz con mucha fuerza. · Evite levantar cosas o esforzarse después de jovan hemorragia nasal.  · Eleve la cele con jovan almohada mientras duerme. · Póngase jovan capa delgada de gel nasal a base de [place] o de [address] [place] [address], sravani NasoGel, dentro de la Vera. Póngaselo en el tabique, el cual divide las fosas nasales. [place] prevendrá la sequedad que puede causar hemorragias nasales. · Use un vaporizador o humidificador para añadirle humedad a padilla dormitorio. Siga las instrucciones para limpiar el aparato. · No tome aspirina, ibuprofeno (Advil, Motrin) o naproxeno (Aleve) por un período de 36 a 48 horas después de jovan hemorragia nasal, a menos que padilla médico se lo indique. Puede usar acetaminofén (Tylenol) para aliviar el dolor. · Hable con padilla médico acerca de suspender cualquier otro medicamento que esté tomando. Algunos medicamentos podrían aumentar las probabilidades de que tenga jovan hemorragia nasal.  · No utilice medicamentos para el resfriado ni aerosoles nasales sin hablar aniceto con padilla médico. Pueden secarle la nariz. ¿Cuándo debes pedir ayuda? Llama al 911 toda vez que pienses que puedes necesitar atención de [place]. Por ejemplo, llama si:    · Te desmayaste (perdiste el conocimiento). Dayana Dunbar a tu médico ahora mismo o busca atención médica inmediata si:    · Tienes otra hemorragia nasal y te sigue sangrando la nariz después de haberte hecho presión 3 veces por 10 minutos (30 minutos en total).     · Hay mucha mignon que te baja por detrás de la garganta aunque te hayas presionado la nariz e inclinado la cele hacia adelante.     · Tienes fiebre.     · Tienes dolor en los senos paranasales. Presta especial atención a los cambios en tu jay y asegúrate de comunicarte con tu médico si:    · Tienes hemorragias nasales a menudo, incluso si se detienen.     · No mejoras sravani se esperaba. ¿Dónde puede encontrar más información en inglés?   Margarita Talavera a http://www.gray.com/  Escriba G568 en la búsqueda para aprender más acerca de \"Hemorragias nasales: Instrucciones de cuidado. \"  Revisado: 26 junio, 2556               SGPBTCJ del contenido: 12.6  © 1319-7674 Healthwise, Incorporated. Las instrucciones de cuidado fueron adaptadas bajo licencia por [employer] (which disclaims liability or warranty for this information). Si usted tiene [place] afección médica o sobre estas instrucciones, siempre pregunte a padilla profesional de jay. Healthwise, Incorporated niega toda garantía o responsabilidad por padilla uso de esta información. GOALS OF CARE / TREATMENT PREFERENCES:   [====Goals of Care====]  GOALS OF CARE:  Patient / health care proxy stated goals: See Patient Instructions / Summary    TREATMENT PREFERENCES:   Code Status:  [x] Attempt Resuscitation       [] Do Not Attempt Resuscitation    Advance Care Planning:  [x] The Pall Med Interdisciplinary Team has updated the ACP Navigator with Decision Maker and Patient Capacity      Primary Decision Maker (Active): [place] Ferm[place] - Spouse - [phone]    Secondary Decision Maker (Active): [address], [address] [phone]  Advance Care Planning 3/1/2021   Patient's [address] is: Legal Next of Kin   Confirm Advance Directive None       Other:  (If patient appropriate for POST, consider using PALLPOST smart phrase here)    The palliative care team has discussed with patient / health care proxy about goals of care / treatment preferences for patient.  [====Goals of Care====]     PRESCRIPTIONS GIVEN:   No orders of the defined types were placed in this encounter.           FOLLOW UP:     Future Appointments   Date Time Provider Renae Fajardo   3/2/2021  7:30 AM B2 JOSUE LONG [facility] [facility]   3/9/2021  4:30 PM H2 [facility]UE [facility]   3/16/2021  7:30 AM [facility]   3/16/2021  8:30 AM Nidia Masters NP [address] [facility] AMB   3/23/2021  7:30 AM B2 JOSUE LONG [facility] [facility]   3/31/2021 10:30 AM Neymar Campbell MD [address] [facility] BS AMB   4/6/2021  7:30 AM B2 JOSUE LONG [place] [facility]   4/13/2021  4:30 PM H2 JOSUE FASTRACK [facility]           PHYSICIANS INVOLVED IN CARE:   Patient Care Team:  JESSICA Canseco as PCP - General (Physician Assistant)  JESSICA Canseco as PCP - [facility] Empaneled Provider  Elke Sousa MD (Palliative Medicine)       HISTORY:   Reviewed patient-completed ESAS and advance care planning form. Reviewed patient record in prescription monitoring program.    CHIEF COMPLAINT:   Chief Complaint   Patient presents with    Headache       HPI/SUBJECTIVE:    The patient is: [x] Verbal / [] Nonverbal - history obtained with assistance of [facility] , Arvind Colby     He started having nosebleeds a few months ago. He went to the ED twice and had his nose packed. He started seeing Dr. Osbaldo Mccoy who used the scope (nasal endoscopy) which didn't show anything. Then he started having headaches and he continued to have nosebleeds, a lot of bleeding. He came to the ED again about a month ago. That's when he found out he had cancer. This news hit him hard. The doctors all got together and decided he should have chemotherapy first, then radiation therapy with another kind of chemotherapy. It has a 50-50 chance of working. The news hit him hard but he's trying to stay positive. He gets strength from his wife and children and from his friends. He talks with his family in [place] by phone. He isn't having headaches since he started taking the medicine (Topamax). He still gets nosebleeds, he had a small nosebleed lat week. Dr. Osbaldo Mccoy talked with him about having a catheter put in his vein and going up to try to stop the bleeding (embolization). He sees Dr. Osbaldo Mccoy again today. The muscles in his neck feel sore sometimes.   He thinks it's from how he sleeps sometimes. He doesn't have neck pain or soreness now. He had a little nausea after chemo last week. He has nausea medicine he can take. His appetite is good. He feels good. It's hard to to believe he has cancer. Clinical Pain Assessment (nonverbal scale for nonverbal patients):   [++++ Clinical Pain Assessment++++]  [++++Pain Severity++++]: Pain: 0  [++++Pain Character++++]:  [++++Pain Duration++++]:  [++++Pain Effect++++]:  [++++Pain Factors++++]:  [++++Pain Frequency++++]:  [++++Pain Location++++]:  [++++ Clinical Pain Assessment++++]       FUNCTIONAL ASSESSMENT:     Palliative Performance Scale (PPS):  PPS: 70       PSYCHOSOCIAL/SPIRITUAL SCREENING:     Any spiritual / Jainism concerns:  [] Yes /  [x] No    Caregiver Burnout:  [] Yes /  [x] No /  [] No Caregiver Present      Anticipatory grief assessment:   [x] Normal  / [] Maladaptive       ESAS Anxiety: Anxiety: 0    ESAS Depression: Depression: 2       REVIEW OF SYSTEMS:     The following systems were [x] reviewed / [] unable to be reviewed  Systems: constitutional, ears/nose/mouth/throat, respiratory, gastrointestinal, genitourinary, musculoskeletal, integumentary, neurologic, psychiatric, endocrine. Positive findings noted below. Modified ESAS Completed by: provider   Fatigue: 3 Drowsiness: 3   Depression: 2 Pain: 0   Anxiety: 0 Nausea: 1   Anorexia: 0 Dyspnea: 0     Constipation: No   Other Problem (Comment): 0          PHYSICAL EXAM:     Wt Readings from Last 3 Encounters:   03/01/21 180 lb (81.6 kg)   02/23/21 183 lb (83 kg)   02/23/21 183 lb 1.6 oz (83.1 kg)     Blood pressure (!) 133/95, pulse (!) 113, temperature (!) 96.3 °F (35.7 °C), temperature source Temporal, resp. rate 18, weight 180 lb (81.6 kg).   Last bowel movement: See Nursing Note    Constitutional: appears relaxed, wearing massk  Eyes: pupils equal, anicteric  ENMT: no nasal discharge, moist mucous membranes  Cardiovascular: regular rhythm, no peripheral edema  Respiratory: breathing not labored, symmetric  Gastrointestinal: soft non-tender, +bowel sounds  Musculoskeletal: no deformity, no tenderness to palpation  Skin: warm, dry  Neurologic: following commands, moving all extremities  Psychiatric: full affect, no hallucinations  Other:       HISTORY:     No past medical history on file. Past Surgical History:   Procedure Laterality Date    IR INSERT TUNL CVC W PORT OVER 5 YEARS  2/11/2021         IR INSERT TUNL CVC W PORT OVER 5 YEARS  2/11/2021      No family history on file. History reviewed, no pertinent family history. Social History     Tobacco Use    Smoking status: Never Smoker    Smokeless tobacco: Never Used   Substance Use Topics    Alcohol use: Not Currently     Allergies   Allergen Reactions    Iodinated Contrast Media Itching      Current Outpatient Medications   Medication Sig    dexAMETHasone (DECADRON) 4 mg tablet Take 2 tabs (8mg) on days 2 & 3 of chemotherapy    ondansetron (ZOFRAN ODT) 8 mg disintegrating tablet Take 1 Tab by mouth every eight (8) hours as needed for Nausea or Vomiting.  prochlorperazine (Compazine) 5 mg tablet Take 1 Tab by mouth every six (6) hours as needed for Nausea or Vomiting for up to 30 days.  lidocaine-prilocaine (EMLA) topical cream Apply  to affected area as needed for Pain.  polyethylene glycol (MIRALAX) 17 gram packet Take 1 Packet by mouth daily. Mix in 8 oz of water, juice, soda, coffee, or tea prior to administration    topiramate (TOPAMAX) 50 mg tablet Take 1 Tab by mouth two (2) times a day.  acetaminophen (TYLENOL) 500 mg tablet Take  by mouth every six (6) hours as needed for Pain. 2 or 3 po daily    topiramate (TOPAMAX) 50 mg tablet Take 1 Tab by mouth two (2) times a day.  senna (SENOKOT) 8.6 mg tablet Take 2 Tabs by mouth daily.  verapamiL (CALAN) 80 mg tablet Take 1 Tab by mouth three (3) times daily.     ZOLMitriptan (ZOMIG) 2.5 mg tablet Take 1 Tab by mouth as needed for Migraine (May repeat in 2 hours if not improved. Max 10mg/24HRs).  magnesium 250 mg tab Si po qhs    fexofenadine (ALLEGRA) 180 mg tablet Take 1 Tab by mouth daily. Indications: seasonal runny nose     No current facility-administered medications for this visit. LAB DATA REVIEWED:     Lab Results   Component Value Date/Time    WBC 11.2 (H) 2021 09:45 AM    HGB 13.5 2021 09:45 AM    PLATELET 116 (H) 10/28/0583 09:45 AM     Lab Results   Component Value Date/Time    Sodium 134 (L) 2021 09:45 AM    Potassium 4.0 2021 09:45 AM    Chloride 102 2021 09:45 AM    CO2 23 2021 09:45 AM    BUN 10 2021 09:45 AM    Creatinine 0.80 2021 09:45 AM    Calcium 9.5 2021 09:45 AM    Magnesium 2.0 2021 09:45 AM    Phosphorus 4.4 2021 02:31 AM      Lab Results   Component Value Date/Time    Alk. phosphatase 155 (H) 2021 09:45 AM    Protein, total 7.9 2021 09:45 AM    Albumin 3.4 (L) 2021 09:45 AM    Globulin 4.5 (H) 2021 09:45 AM     Lab Results   Component Value Date/Time    INR 1.1 2021 09:01 PM    Prothrombin time 11.0 2021 09:01 PM    aPTT 30.8 2021 09:01 PM      No results found for: IRON, FE, TIBC, IBCT, PSAT, FERR     PET/CT   FINDINGS:  HEAD/NECK: The right nasopharyngeal mass lesion is hypermetabolic, maximum SUV  is 15.3. Focal increased tracer activity is noted involving the very posterior  aspect of the septum. Bilateral hypermetabolic cervical lymph nodes are noted. CHEST: Small but hypermetabolic right supraclavicular lymph node is noted. ABDOMEN/PELVIS: No foci of abnormal hypermetabolism. SKELETON: No foci of abnormal hypermetabolism in the axial and visualized  appendicular skeleton. IMPRESSION  The right nasopharyngeal mass lesion is hypermetabolic and  compatible with the known neoplasm. Bilateral hypermetabolic cervical lymph  nodes.  Focal increased tracer activity is seen involving the very posterior  aspect of the nasal septum. MRI orbit/face/neck 2/7:  FINDINGS:   Agree with above description of no significant change in the large right  nasopharyngeal mass extending into the right sphenoid sinus extending down into  the pterygoid region is present demonstrated on MRI and CTA 2/5/21. The right  internal carotid artery traverses in area of the skull is in contact with the  tumor in the foramen transversarium and proximal siphon region at the posterior  cavernous sinus. Again demonstrated is generalized mucosal thickening in sphenoid, right ethmoid  and maxillary sinus. Fluid right mastoid air cells. No abnormal intracranial enhancement demonstrated. No other masses. IMPRESSION  Again demonstrated is large right nasopharyngeal tumor as previously described  and without significant change. Some involvement right internal carotid artery. Associated mucosal thickening and fluid right paranasal sinuses. MRI/MRA brain 2/5  FINDINGS:   MRI brain:  Ventricles and sulci are normal in size configuration. No evidence of acute  infarct. No edema or midline shift. No intracranial mass. In the right posterior nasopharynx there is a heterogeneously enhancing 4.2 x  4.0 cm soft tissue mass which extends superiorly into the sphenoid sinus and  obstructs the right ostiomeatal complex. There is circumferential lobulated  sinus mucosal thickening in the right maxillary sinus as well as the right  posterior ethmoid air cells. Right-sided mastoid effusion. MRA brain:  Narrowing of the right distal cervical internal carotid artery. Left distal  cervical internal carotid artery is normal. [place] is intact. Anterior  cerebral, middle cerebral, and posterior cerebral arteries are patent. Basilar  artery and distal vertebral arteries are patent. IMPRESSION  1.   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  2.  Diffuse sinus mucosal thickening.  3.  Mass related narrowing of the right distal cervical internal carotid artery  but no large vessel occlusion.  4.  No evidence of infarct.    CTA neck 2/5:  CTA NECK:     Great vessels: Four-vessel aortic arch with patent origins.     Right subclavian artery: Patent     Left subclavian artery: Patent      Right common carotid artery: Patent      Left common carotid artery: Patent      Cervical right internal carotid artery: Patent with no significant stenosis by  NASCET criteria. Attenuation of the right internal carotid artery as it passes  through the right nasopharyngeal mass, but no intraluminal thrombus or evidence  of active extravasation.     Cervical left internal carotid artery: Patent with no significant stenosis by  NASCET criteria.     Right vertebral artery: Patent     Left vertebral artery: Patent, arises directly off the aortic arch.     Imaged lung apices are clear. Thyroid gland is normal. Bilateral cervical  lymphadenopathy predominantly in level 2 and level 5A. 4.3 x 3.5 cm  heterogeneously enhancing right posterior nasopharyngeal soft tissue mass with  vessels coursing through the mass. No evidence of active extravasation.     CTA HEAD 2/5:  Right cavernous internal carotid artery: Attenuated but patent     Left cavernous internal carotid artery: Patent     Anterior cerebral arteries: Patent     Anterior communicating artery: Patent     Right middle cerebral artery: Patent     Left middle cerebral artery: Patent     Posterior communicating arteries: Patent     Posterior cerebral arteries: Patent     Basilar artery: Patent     Distal vertebral arteries: Patent     No evidence for intracranial aneurysm or hemodynamically significant stenosis.     IMPRESSION  1.  Attenuation of the right internal carotid artery as it passes through the  right nasopharyngeal mass but no evidence of active extravasation or  intraluminal thrombus.  2.  Bilateral  cervical lymphadenopathy in level 2 and level 5B. 3.  Right posterior nasopharyngeal mass with sphenoid sinus involvement and  diffuse sinus disease.             CONTROLLED SUBSTANCES SAFETY ASSESSMENT (IF ON CONTROLLED SUBSTANCES):     Reviewed opioid safety handout:  [] Yes   [] No  24 hour opioid dose >150mg morphine equivalent/day:  [] Yes   [] No  Benzodiazepines:  [] Yes   [] No  Sleep apnea:  [] Yes   [] No  Urine Toxicology Testing within last 6 months:  [] Yes   [] No  History of or new aberrant medication taking behaviors:  [] Yes   [] No  Has Narcan been prescribed [] Yes   [] No          Total time: 60 minutes  Counseling / coordination time: 45 minutes  > 50% counseling / coordination?: yes - chart review; obtaining history from patient; symptom/psychosocial assessment and management options; documentation

## 2021-03-02 ENCOUNTER — HOSPITAL ENCOUNTER (OUTPATIENT)
Dept: INFUSION THERAPY | Age: 36
Discharge: HOME | End: 2021-03-02
Payer: SUBSIDIZED

## 2021-03-02 ENCOUNTER — DOCUMENTATION ONLY (OUTPATIENT)
Dept: ONCOLOGY | Age: 36
End: 2021-03-02

## 2021-03-02 VITALS
HEIGHT: 67 IN | BODY MASS INDEX: 28.41 KG/M2 | SYSTOLIC BLOOD PRESSURE: 130 MMHG | WEIGHT: 181 LBS | HEART RATE: 114 BPM | DIASTOLIC BLOOD PRESSURE: 94 MMHG | TEMPERATURE: 97.1 F | RESPIRATION RATE: 18 BRPM

## 2021-03-02 DIAGNOSIS — C76.0 HEAD AND NECK CANCER (HCC): Primary | ICD-10-CM

## 2021-03-02 LAB
ANION GAP SERPL CALC-SCNC: 10 MMOL/L (ref 5–15)
BASOPHILS # BLD: 0 K/UL (ref 0–0.1)
BASOPHILS NFR BLD: 1 % (ref 0–1)
BUN SERPL-MCNC: 11 MG/DL (ref 6–20)
BUN/CREAT SERPL: 15 (ref 12–20)
CALCIUM SERPL-MCNC: 9.2 MG/DL (ref 8.5–10.1)
CHLORIDE SERPL-SCNC: 108 MMOL/L (ref 97–108)
CO2 SERPL-SCNC: 19 MMOL/L (ref 21–32)
CREAT SERPL-MCNC: 0.72 MG/DL (ref 0.7–1.3)
DIFFERENTIAL METHOD BLD: NORMAL
EOSINOPHIL # BLD: 0.1 K/UL (ref 0–0.4)
EOSINOPHIL NFR BLD: 2 % (ref 0–7)
ERYTHROCYTE [DISTWIDTH] IN BLOOD BY AUTOMATED COUNT: 13.2 % (ref 11.5–14.5)
GLUCOSE SERPL-MCNC: 100 MG/DL (ref 65–100)
HCT VFR BLD AUTO: 38.5 % (ref 36.6–50.3)
HGB BLD-MCNC: 12.7 G/DL (ref 12.1–17)
IMM GRANULOCYTES # BLD AUTO: 0 K/UL (ref 0–0.04)
IMM GRANULOCYTES NFR BLD AUTO: 0 % (ref 0–0.5)
LYMPHOCYTES # BLD: 1.6 K/UL (ref 0.8–3.5)
LYMPHOCYTES NFR BLD: 30 % (ref 12–49)
MAGNESIUM SERPL-MCNC: 2.2 MG/DL (ref 1.6–2.4)
MCH RBC QN AUTO: 26.4 PG (ref 26–34)
MCHC RBC AUTO-ENTMCNC: 33 G/DL (ref 30–36.5)
MCV RBC AUTO: 80 FL (ref 80–99)
MONOCYTES # BLD: 0.3 K/UL (ref 0–1)
MONOCYTES NFR BLD: 5 % (ref 5–13)
NEUTS SEG # BLD: 3.4 K/UL (ref 1.8–8)
NEUTS SEG NFR BLD: 62 % (ref 32–75)
NRBC # BLD: 0 K/UL (ref 0–0.01)
NRBC BLD-RTO: 0 PER 100 WBC
PLATELET # BLD AUTO: 250 K/UL (ref 150–400)
PMV BLD AUTO: 9.8 FL (ref 8.9–12.9)
POTASSIUM SERPL-SCNC: 4 MMOL/L (ref 3.5–5.1)
RBC # BLD AUTO: 4.81 M/UL (ref 4.1–5.7)
SODIUM SERPL-SCNC: 137 MMOL/L (ref 136–145)
WBC # BLD AUTO: 5.4 K/UL (ref 4.1–11.1)

## 2021-03-02 PROCEDURE — 85025 COMPLETE CBC W/AUTO DIFF WBC: CPT

## 2021-03-02 PROCEDURE — 74011250636 HC RX REV CODE- 250/636: Performed by: INTERNAL MEDICINE

## 2021-03-02 PROCEDURE — 74011000258 HC RX REV CODE- 258: Performed by: INTERNAL MEDICINE

## 2021-03-02 PROCEDURE — 36415 COLL VENOUS BLD VENIPUNCTURE: CPT

## 2021-03-02 PROCEDURE — 96375 TX/PRO/DX INJ NEW DRUG ADDON: CPT

## 2021-03-02 PROCEDURE — 77030012965 HC NDL HUBR BBMI -A

## 2021-03-02 PROCEDURE — 96413 CHEMO IV INFUSION 1 HR: CPT

## 2021-03-02 PROCEDURE — 80048 BASIC METABOLIC PNL TOTAL CA: CPT

## 2021-03-02 PROCEDURE — 83735 ASSAY OF MAGNESIUM: CPT

## 2021-03-02 RX ORDER — SODIUM CHLORIDE 0.9 % (FLUSH) 0.9 %
10 SYRINGE (ML) INJECTION AS NEEDED
Status: DISPENSED | OUTPATIENT
Start: 2021-03-02 | End: 2021-03-02

## 2021-03-02 RX ORDER — ONDANSETRON 2 MG/ML
8 INJECTION INTRAMUSCULAR; INTRAVENOUS ONCE
Status: COMPLETED | OUTPATIENT
Start: 2021-03-02 | End: 2021-03-02

## 2021-03-02 RX ORDER — SODIUM CHLORIDE 9 MG/ML
10 INJECTION INTRAMUSCULAR; INTRAVENOUS; SUBCUTANEOUS AS NEEDED
Status: ACTIVE | OUTPATIENT
Start: 2021-03-02 | End: 2021-03-02

## 2021-03-02 RX ORDER — SODIUM CHLORIDE 9 MG/ML
25 INJECTION, SOLUTION INTRAVENOUS CONTINUOUS
Status: DISPENSED | OUTPATIENT
Start: 2021-03-02 | End: 2021-03-02

## 2021-03-02 RX ORDER — HEPARIN 100 UNIT/ML
300-500 SYRINGE INTRAVENOUS AS NEEDED
Status: ACTIVE | OUTPATIENT
Start: 2021-03-02 | End: 2021-03-02

## 2021-03-02 RX ADMIN — SODIUM CHLORIDE 10 ML: 9 INJECTION INTRAMUSCULAR; INTRAVENOUS; SUBCUTANEOUS at 07:30

## 2021-03-02 RX ADMIN — Medication 10 ML: at 10:05

## 2021-03-02 RX ADMIN — GEMCITABINE HYDROCHLORIDE 2000 MG: 2 INJECTION, SOLUTION INTRAVENOUS at 09:13

## 2021-03-02 RX ADMIN — ONDANSETRON 8 MG: 2 INJECTION, SOLUTION INTRAMUSCULAR; INTRAVENOUS at 08:21

## 2021-03-02 RX ADMIN — SODIUM CHLORIDE 25 ML/HR: 900 INJECTION, SOLUTION INTRAVENOUS at 08:19

## 2021-03-02 RX ADMIN — HEPARIN 500 UNITS: 100 SYRINGE at 10:05

## 2021-03-02 NOTE — PROGRESS NOTES
[facility] Chemo Progress Note    Date: 2021    Name: Fara Chavez    MRN: [id number]         : 1985    0725 Mr. Benedetta Runner Arrived to [facility] for  C1D8 Gemzar ambulatory in stable condition. Assessment was completed, no acute issues at this time, no new complaints voiced. Port accessed with positive blood return. Labs drawn and sent for processing. Normal Saline started at [facility]. Chemotherapy Flowsheet 3/2/2021   Cycle C1D8   Date 3/2/2021   Drug / Regimen Gemzar    Pre Hydration -   Post Hydration -   Pre Meds given   Notes given         Patient denies SOB, fever, cough, general not feeling well. Patient denies recent exposure to someone who has tested positive for COVID-19. Patient denies having contact with anyone who has a pending COVID test.      Mr. Norene Claude Navarro's vitals were reviewed. Patient Vitals for the past 12 hrs:   Temp Pulse Resp BP   21 1004  (!) 114  (!) 130/94   21 0724 97.1 °F (36.2 °C) (!) 105 18 (!) 128/93         Lab results were obtained and reviewed. Recent Results (from the past 12 hour(s))   CBC WITH AUTOMATED DIFF    Collection Time: 21  7:30 AM   Result Value Ref Range    WBC 5.4 4.1 - 11.1 K/uL    RBC 4.81 4.10 - 5.70 M/uL    HGB 12.7 12.1 - 17.0 g/dL    HCT 38.5 36.6 - 50.3 %    MCV 80.0 80.0 - 99.0 FL    MCH 26.4 26.0 - 34.0 PG    MCHC 33.0 30.0 - 36.5 g/dL    RDW 13.2 11.5 - 14.5 %    PLATELET 661 179 - 105 K/uL    MPV 9.8 8.9 - 12.9 FL    NRBC 0.0 0  WBC    ABSOLUTE NRBC 0.00 0.00 - 0.01 K/uL    NEUTROPHILS 62 32 - 75 %    LYMPHOCYTES 30 12 - 49 %    MONOCYTES 5 5 - 13 %    EOSINOPHILS 2 0 - 7 %    BASOPHILS 1 0 - 1 %    IMMATURE GRANULOCYTES 0 0.0 - 0.5 %    ABS. NEUTROPHILS 3.4 1.8 - 8.0 K/UL    ABS. LYMPHOCYTES 1.6 0.8 - 3.5 K/UL    ABS. MONOCYTES 0.3 0.0 - 1.0 K/UL    ABS. EOSINOPHILS 0.1 0.0 - 0.4 K/UL    ABS. BASOPHILS 0.0 0.0 - 0.1 K/UL    ABS. IMM.  GRANS. 0.0 0.00 - 0.04 K/UL    DF AUTOMATED     METABOLIC PANEL, BASIC    Collection Time: 03/02/21  7:30 AM   Result Value Ref Range    Sodium 137 136 - 145 mmol/L    Potassium 4.0 3.5 - 5.1 mmol/L    Chloride 108 97 - 108 mmol/L    CO2 19 (L) 21 - 32 mmol/L    Anion gap 10 5 - 15 mmol/L    Glucose 100 65 - 100 mg/dL    BUN 11 6 - 20 MG/DL    Creatinine 0.72 0.70 - 1.30 MG/DL    BUN/Creatinine ratio 15 12 - 20      GFR est AA >60 >60 ml/min/1.73m2    GFR est non-AA >60 >60 ml/min/1.73m2    Calcium 9.2 8.5 - 10.1 MG/DL   MAGNESIUM    Collection Time: 03/02/21  7:30 AM   Result Value Ref Range    Magnesium 2.2 1.6 - 2.4 mg/dL       Pre-medications  were administered as ordered and chemotherapy was initiated. Medications Administered     0.9% sodium chloride infusion     Admin Date  03/02/2021 Action  New Bag Dose  25 mL/hr Rate  25 mL/hr Route  IntraVENous Administered By  Raza Barron RN          0.9% sodium chloride injection 10 mL     Admin Date  03/02/2021 Action  Given Dose  10 mL Route  IntraVENous Administered By  Raza Barron RN          gemcitabine (GEMZAR) 2,000 mg in 0.9% sodium chloride 250 mL, overfill volume 25 mL chemo infusion     Admin Date  03/02/2021 Action  New Bag Dose  2,000 mg Rate  655.2 mL/hr Route  IntraVENous Administered By  Raza Barron RN          heparin (porcine) pf 300-500 Units     Admin Date  03/02/2021 Action  Given Dose  500 Units Route  InterCATHeter Administered By  Raza Barron RN          ondansetron [facility]) injection 8 mg     Admin Date  03/02/2021 Action  Given Dose  8 mg Route  IntraVENous Administered By  Raza Barron RN          sodium chloride (NS) flush 10 mL     Admin Date  03/02/2021 Action  Given Dose  10 mL Route  IntraVENous Administered By  Raza Barron RN                  4123 Patient tolerated treatment well. Port maintained positive blood return throughout treatment. Port flushed, heparinized and de accessed per protocol. Patient was discharged from [address] Appointments   Date Time Provider Port [place]   3/9/2021  4:30 PM H2 JOSUE FASTRACK [facility]   3/16/2021  7:30 AM B2 JOSUE LONG [place] [facility]   3/16/2021  8:30 AM Bharti Jama  [address] BS AMB   3/23/2021  7:30 AM B2 JOSUE LONG [place] [facility]   3/31/2021 10:30 AM Akosua Umana MD [address] [facility] BS AMB   4/6/2021  7:30 AM B2 JOSUE LONG [place] [facility]   4/13/2021  4:30 PM H2 JOSUE FASTRACK [facility] [address] [facility] 18, RN  March 2, 2021

## 2021-03-02 NOTE — PROGRESS NOTES
Request for records sent to [facility] ENT for upcoming appointment

## 2021-03-03 ENCOUNTER — TELEPHONE (OUTPATIENT)
Dept: FAMILY MEDICINE CLINIC | Age: 36
End: 2021-03-03

## 2021-03-03 ENCOUNTER — PATIENT OUTREACH (OUTPATIENT)
Dept: FAMILY MEDICINE CLINIC | Age: 36
End: 2021-03-03

## 2021-03-03 NOTE — TELEPHONE ENCOUNTER
Received a message from the provider requesting the work note / letter to the pt's family be printed out and mailed to them. The letter was printed and signed by this nurse for the provider JESSICA Carver, and mailed to the pt's address listed in his chart. Sobia Hills RN

## 2021-03-04 RX ORDER — SODIUM CHLORIDE 0.9 % (FLUSH) 0.9 %
10 SYRINGE (ML) INJECTION AS NEEDED
Status: CANCELLED | OUTPATIENT
Start: 2021-03-09

## 2021-03-04 RX ORDER — HEPARIN 100 UNIT/ML
300-500 SYRINGE INTRAVENOUS AS NEEDED
Status: CANCELLED
Start: 2021-03-09

## 2021-03-04 RX ORDER — SODIUM CHLORIDE 9 MG/ML
10 INJECTION INTRAMUSCULAR; INTRAVENOUS; SUBCUTANEOUS AS NEEDED
Status: CANCELLED | OUTPATIENT
Start: 2021-03-09

## 2021-03-08 NOTE — PROGRESS NOTES
3/8/21    Goals      Patient/Family verbalizes understanding of self-management of chronic disease. 3/8/21  . Maryanne jones eof Bhaskar Stephens notarized forms mailed to her. She can not note where to return the forms to once completed. NN sent a message to [place] for her to call Joanna Bender with instructions regarding forms. FU in 2 weeks. Im.       2/24/21  Hai Kang c/o epistaxis x 10 minutes this morning. NN will call onc office of Dr Antoni Nielson for orders. Sarah, RN at Dr Antoni Nielson office, stated that he should apply cold water compress, avoid blow nose forcibly, go to ED if epistaxis persists longer than 10 minutes. NN called Joanna Bender and provided orders for Onc office of Dr Antoni Nielson. ENT appt pending. FU in 1 week. Im      2/22/21  Maryanne spouse of Hai Kang is having a challenging time with caring for patient and running the home, caring for the children. She asked the brother in law to help. Brother of patient can help with caring for his brother while Joanna Bender does errands and other duties. Lately Yaquelin Ganan HAYDEN Barksdale is having anxiety when Joanna Bender leaves the home. Joanna Bender is asking for a letter with information regarding the increase family needs during the cancer treatments. NN sent a message to PCP, TAHIR, and Vivek Gutierrez. Awaiting for reply. Reviewed upcoming appts. with Jonana Bender to confirm. 2/16/21  Hai Kang spouse Joanna Lambetrma called stating that Hai Kang c/o dizziness after taking verapamil. NN send Marko Goldmann, pa in-box asking how to proceed. NN asked that Hai Kang have the MD at his appt 2/17/21 to check his B/P. NN provided information regarding siting up for a moment before getting out of the bed. Waiting for directions from Marko Goldmann, pa. 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  Maryanne spomarybeth of [address] Shailesh Santana and NN are working together to coordinate appts, obtaining medications, addressing his uninsured status. NN called Dr Corbin Curtis office, comfirmed appt 2/17/21 at 2 pm at [facility], [address] [place] [address] [address]. Spoke to JIMBO Smith, who assured NN that the [address] [facility] application, medications, CL dressing to R chest will be taken care of at the first appt By Cedric Montemayor at [phone]. NN asked spose Ben Lee to start putting together all documants needed for FA and keep records copies in a disgnated folder. NN reviewed all medications at length with side effects of oxycodone and safety. Covid 19 precautions reviewed  and sx/sx to report immediately. Ben Lee was given opportunity for questions, NN provided contact and hoursof operation. NN will brief Wanda Duke RN with this chart information so that in my absence she can handle this case. 1/26/21   David Josue will tell Crescencio Moreland about his chronic Head aches. NO epistaxis lately. NN provided contact information.  No FU, IM

## 2021-03-09 ENCOUNTER — PATIENT OUTREACH (OUTPATIENT)
Dept: FAMILY MEDICINE CLINIC | Age: 36
End: 2021-03-09

## 2021-03-09 ENCOUNTER — APPOINTMENT (OUTPATIENT)
Dept: INFUSION THERAPY | Age: 36
End: 2021-03-09
Payer: SUBSIDIZED

## 2021-03-09 ENCOUNTER — HOSPITAL ENCOUNTER (OUTPATIENT)
Dept: INFUSION THERAPY | Age: 36
Discharge: HOME | End: 2021-03-09
Payer: SUBSIDIZED

## 2021-03-09 VITALS
DIASTOLIC BLOOD PRESSURE: 88 MMHG | SYSTOLIC BLOOD PRESSURE: 137 MMHG | HEART RATE: 95 BPM | TEMPERATURE: 97.3 F | RESPIRATION RATE: 18 BRPM

## 2021-03-09 DIAGNOSIS — C11.9 NASOPHARYNGEAL CARCINOMA (HCC): Primary | ICD-10-CM

## 2021-03-09 PROCEDURE — 74011250636 HC RX REV CODE- 250/636: Performed by: REGISTERED NURSE

## 2021-03-09 PROCEDURE — 96360 HYDRATION IV INFUSION INIT: CPT

## 2021-03-09 PROCEDURE — 77030012965 HC NDL HUBR BBMI -A

## 2021-03-09 RX ORDER — SODIUM CHLORIDE 9 MG/ML
10 INJECTION INTRAMUSCULAR; INTRAVENOUS; SUBCUTANEOUS AS NEEDED
Status: DISCONTINUED | OUTPATIENT
Start: 2021-03-09 | End: 2021-03-10 | Stop reason: HOSPADM

## 2021-03-09 RX ORDER — SODIUM CHLORIDE 0.9 % (FLUSH) 0.9 %
10 SYRINGE (ML) INJECTION AS NEEDED
Status: DISCONTINUED | OUTPATIENT
Start: 2021-03-09 | End: 2021-03-10 | Stop reason: HOSPADM

## 2021-03-09 RX ORDER — HEPARIN 100 UNIT/ML
300-500 SYRINGE INTRAVENOUS AS NEEDED
Status: DISCONTINUED | OUTPATIENT
Start: 2021-03-09 | End: 2021-03-10 | Stop reason: HOSPADM

## 2021-03-09 RX ADMIN — HEPARIN 500 UNITS: 100 SYRINGE at 17:15

## 2021-03-09 RX ADMIN — SODIUM CHLORIDE 1000 ML: 900 INJECTION, SOLUTION INTRAVENOUS at 16:12

## 2021-03-09 RX ADMIN — Medication 10 ML: at 17:15

## 2021-03-09 NOTE — PROGRESS NOTES
3/9/21    Goals      Patient/Family verbalizes understanding of self-management of chronic disease. 3/9/21    Maryanne spouse of Bhaskar Coffey received the letter Cloyce Cranker, mailed to the family. Letter was given to brother's employer and they are awaiting a reply. No FU at this time. Im    3/8/21  . Claudette Odonnell spouse of Manpreet Fuentes notarized forms mailed to her. She can not note where to return the forms to once completed. NN sent a message to [place] for her to call Claudette Odonnell with instructions regarding forms. FU in 2 weeks. Im.       2/24/21  Realdanilele Duarte c/o epistaxis x 10 minutes this morning. NN will call onc office of Dr Deepak Strange for orders. Sarah RN at Dr Deepak Strange office, stated that he should apply cold water compress, avoid blow nose forcibly, go to ED if epistaxis persists longer than 10 minutes. NN called Claudette Odonnell and provided orders for Onc office of Dr Deepak Strange. ENT appt pending. FU in 1 week. Im      2/22/21  Maryanne spouse of Real Duarte is having a challenging time with caring for patient and running the home, caring for the children. She asked the brother in law to help. Brother of patient can help with caring for his brother while Claudette Odonnell does errands and other duties. Lately Morro Coffey is having anxiety when Claudette Odonnell leaves the home. Claudette Odonnell is asking for a letter with information regarding the increase family needs during the cancer treatments. NN sent a message to PCP, OW, and Daniel Miller. Awaiting for reply. Reviewed upcoming appts. with Claudette Sanchesrosaline to confirm. 2/16/21  Realdanielle Jimenezs spouse Stephanietrudy Sanchesrosaline called stating that Real Duarte c/o dizziness after taking verapamil. NN send Cloyce Cranker, pa in-box asking how to proceed. NN asked that Realdanielle Duarte have the MD at his appt 2/17/21 to check his B/P. NN provided information regarding siting up for a moment before getting out of the bed.  Waiting for directions from Cloyce Cranker, pa. 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 Maryanne arellano of Bhaskar Wright and NN are working together to coordinate appts, obtaining medications, addressing his uninsured status. NN called Dr Luis Cramer office, comfirmed appt 2/17/21 at 2 pm at [facility] [place], [address] [place] [address] [address]. Spoke to JIMBO Smith, who assured NN that the [employer] application, medications, CL dressing to R chest will be taken care of at the first appt By Chester Long at [phone]. NN asked varinder Trevino to start putting together all documants needed for FA and keep records copies in a disgnated folder. NN reviewed all medications at length with side effects of oxycodone and safety. Covid 19 precautions reviewed  and sx/sx to report immediately. Kim Ibanezo was given opportunity for questions, NN provided contact and hoursof operation. NN will brief Kushal Willard RN with this chart information so that in my absence she can handle this case. 1/26/21   Zulma Rocha will tell Crescencio Medley about his chronic Head aches. NO epistaxis lately. NN provided contact information.  No FU, IM

## 2021-03-09 NOTE — PROGRESS NOTES
OPIC Short Note                     3773 Pt admit to [facility] for Hydration ambulatory in stable condition. Assessment completed. No new concerns voiced. Port accessed without difficulty. Port with positive blood return. Port connected to NS. Patient denies SOB, fever, cough, and/or general not feeling well. Patient denies recent exposure to someone who has tested positive for COVID-19. Patient denies contact with anyone who has a pending COVID test.       Patient Vitals for the past 12 hrs:   Temp Pulse Resp BP   03/09/21 1555 97.3 °F (36.3 °C) 95 18 137/88     Medications Administered     heparin (porcine) pf 300-500 Units     Admin Date  03/09/2021 Action  Given Dose  500 Units Route  InterCATHeter Administered By  Darvin Johns, JIMBO          sodium chloride (NS) flush 10 mL     Admin Date  03/09/2021 Action  Given Dose  10 mL Route  IntraVENous Administered By  Darvin Johns, JIMBO          sodium chloride 0.9 % bolus infusion 1,000 mL     Admin Date  03/09/2021 Action  New Bag Dose  1,000 mL Rate  1,000 mL/hr Route  IntraVENous Administered By  Darvin Johns, JIMBO              Mr. Stephany Wilder tolerated the infusion, and had no complaints. Port flushed, heparinized and de-accessed per protocol. Mr. Stephany Wilder was discharged from [place] [address] in stable condition at [phone]. Patient is aware of next appointment.     Future Appointments   Date Time Provider Renae Fajardo   3/16/2021  7:30 AM B2 JOSUE LONG [address] [facility] H   3/16/2021  8:30 AM Vianey Dillon  [address] BS AMB   3/23/2021  7:30 AM B2 JOSUE LONG [place] [facility] H   3/31/2021 10:30 AM Law Johnson MD [address] [facility] BS AMB   4/6/2021  7:30 AM B2 JOSUE LONG [place] [facility]   4/13/2021  4:30 PM H2 JOSUE FASTRACK [facility] [address] [facility] RANCHO Johnson RN  March 9, 2021  5:47 PM

## 2021-03-10 RX ORDER — EPINEPHRINE 1 MG/ML
0.3 INJECTION, SOLUTION, CONCENTRATE INTRAVENOUS AS NEEDED
Status: CANCELLED | OUTPATIENT
Start: 2021-03-16

## 2021-03-10 RX ORDER — PALONOSETRON 0.05 MG/ML
0.25 INJECTION, SOLUTION INTRAVENOUS ONCE
Status: CANCELLED | OUTPATIENT
Start: 2021-04-06 | End: 2021-04-06

## 2021-03-10 RX ORDER — HEPARIN 100 UNIT/ML
300-500 SYRINGE INTRAVENOUS AS NEEDED
Status: CANCELLED
Start: 2021-03-23

## 2021-03-10 RX ORDER — ACETAMINOPHEN 325 MG/1
650 TABLET ORAL AS NEEDED
Status: CANCELLED
Start: 2021-04-13

## 2021-03-10 RX ORDER — HEPARIN 100 UNIT/ML
300-500 SYRINGE INTRAVENOUS AS NEEDED
Status: CANCELLED
Start: 2021-04-13

## 2021-03-10 RX ORDER — HYDROCORTISONE SODIUM SUCCINATE 100 MG/2ML
100 INJECTION, POWDER, FOR SOLUTION INTRAMUSCULAR; INTRAVENOUS AS NEEDED
Status: CANCELLED | OUTPATIENT
Start: 2021-04-06

## 2021-03-10 RX ORDER — ONDANSETRON 2 MG/ML
8 INJECTION INTRAMUSCULAR; INTRAVENOUS AS NEEDED
Status: CANCELLED | OUTPATIENT
Start: 2021-03-16

## 2021-03-10 RX ORDER — SODIUM CHLORIDE 9 MG/ML
25 INJECTION, SOLUTION INTRAVENOUS CONTINUOUS
Status: CANCELLED | OUTPATIENT
Start: 2021-03-23

## 2021-03-10 RX ORDER — SODIUM CHLORIDE 9 MG/ML
10 INJECTION INTRAMUSCULAR; INTRAVENOUS; SUBCUTANEOUS AS NEEDED
Status: CANCELLED | OUTPATIENT
Start: 2021-03-23

## 2021-03-10 RX ORDER — DIPHENHYDRAMINE HYDROCHLORIDE 50 MG/ML
25 INJECTION, SOLUTION INTRAMUSCULAR; INTRAVENOUS AS NEEDED
Status: CANCELLED
Start: 2021-03-16

## 2021-03-10 RX ORDER — ALBUTEROL SULFATE 0.83 MG/ML
2.5 SOLUTION RESPIRATORY (INHALATION) AS NEEDED
Status: CANCELLED
Start: 2021-03-16

## 2021-03-10 RX ORDER — HYDROCORTISONE SODIUM SUCCINATE 100 MG/2ML
100 INJECTION, POWDER, FOR SOLUTION INTRAMUSCULAR; INTRAVENOUS AS NEEDED
Status: CANCELLED | OUTPATIENT
Start: 2021-03-16

## 2021-03-10 RX ORDER — SODIUM CHLORIDE 9 MG/ML
10 INJECTION INTRAMUSCULAR; INTRAVENOUS; SUBCUTANEOUS AS NEEDED
Status: CANCELLED | OUTPATIENT
Start: 2021-03-16

## 2021-03-10 RX ORDER — ACETAMINOPHEN 325 MG/1
650 TABLET ORAL AS NEEDED
Status: CANCELLED
Start: 2021-04-06

## 2021-03-10 RX ORDER — SODIUM CHLORIDE 9 MG/ML
10 INJECTION INTRAMUSCULAR; INTRAVENOUS; SUBCUTANEOUS AS NEEDED
Status: CANCELLED | OUTPATIENT
Start: 2021-04-13

## 2021-03-10 RX ORDER — DIPHENHYDRAMINE HYDROCHLORIDE 50 MG/ML
50 INJECTION, SOLUTION INTRAMUSCULAR; INTRAVENOUS AS NEEDED
Status: CANCELLED
Start: 2021-03-16

## 2021-03-10 RX ORDER — SODIUM CHLORIDE 9 MG/ML
10 INJECTION INTRAMUSCULAR; INTRAVENOUS; SUBCUTANEOUS AS NEEDED
Status: CANCELLED | OUTPATIENT
Start: 2021-04-06

## 2021-03-10 RX ORDER — SODIUM CHLORIDE 0.9 % (FLUSH) 0.9 %
10 SYRINGE (ML) INJECTION AS NEEDED
Status: CANCELLED | OUTPATIENT
Start: 2021-03-16

## 2021-03-10 RX ORDER — ONDANSETRON 2 MG/ML
8 INJECTION INTRAMUSCULAR; INTRAVENOUS ONCE
Status: CANCELLED
Start: 2021-03-23 | End: 2021-03-23

## 2021-03-10 RX ORDER — EPINEPHRINE 1 MG/ML
0.3 INJECTION, SOLUTION, CONCENTRATE INTRAVENOUS AS NEEDED
Status: CANCELLED | OUTPATIENT
Start: 2021-03-23

## 2021-03-10 RX ORDER — DIPHENHYDRAMINE HYDROCHLORIDE 50 MG/ML
50 INJECTION, SOLUTION INTRAMUSCULAR; INTRAVENOUS AS NEEDED
Status: CANCELLED
Start: 2021-03-23

## 2021-03-10 RX ORDER — DIPHENHYDRAMINE HYDROCHLORIDE 50 MG/ML
25 INJECTION, SOLUTION INTRAMUSCULAR; INTRAVENOUS AS NEEDED
Status: CANCELLED
Start: 2021-03-23

## 2021-03-10 RX ORDER — SODIUM CHLORIDE 9 MG/ML
25 INJECTION, SOLUTION INTRAVENOUS CONTINUOUS
Status: CANCELLED | OUTPATIENT
Start: 2021-04-13

## 2021-03-10 RX ORDER — ONDANSETRON 2 MG/ML
8 INJECTION INTRAMUSCULAR; INTRAVENOUS ONCE
Status: CANCELLED
Start: 2021-04-13 | End: 2021-04-13

## 2021-03-10 RX ORDER — SODIUM CHLORIDE 9 MG/ML
25 INJECTION, SOLUTION INTRAVENOUS CONTINUOUS
Status: CANCELLED | OUTPATIENT
Start: 2021-03-16

## 2021-03-10 RX ORDER — PALONOSETRON 0.05 MG/ML
0.25 INJECTION, SOLUTION INTRAVENOUS ONCE
Status: CANCELLED | OUTPATIENT
Start: 2021-03-16 | End: 2021-03-16

## 2021-03-10 RX ORDER — HEPARIN 100 UNIT/ML
300-500 SYRINGE INTRAVENOUS AS NEEDED
Status: CANCELLED
Start: 2021-03-16

## 2021-03-10 RX ORDER — ONDANSETRON 2 MG/ML
8 INJECTION INTRAMUSCULAR; INTRAVENOUS AS NEEDED
Status: CANCELLED | OUTPATIENT
Start: 2021-04-06

## 2021-03-10 RX ORDER — SODIUM CHLORIDE 0.9 % (FLUSH) 0.9 %
10 SYRINGE (ML) INJECTION AS NEEDED
Status: CANCELLED | OUTPATIENT
Start: 2021-04-13

## 2021-03-10 RX ORDER — HYDROCORTISONE SODIUM SUCCINATE 100 MG/2ML
100 INJECTION, POWDER, FOR SOLUTION INTRAMUSCULAR; INTRAVENOUS AS NEEDED
Status: CANCELLED | OUTPATIENT
Start: 2021-03-23

## 2021-03-10 RX ORDER — SODIUM CHLORIDE 0.9 % (FLUSH) 0.9 %
10 SYRINGE (ML) INJECTION AS NEEDED
Status: CANCELLED | OUTPATIENT
Start: 2021-03-23

## 2021-03-10 RX ORDER — ALBUTEROL SULFATE 0.83 MG/ML
2.5 SOLUTION RESPIRATORY (INHALATION) AS NEEDED
Status: CANCELLED
Start: 2021-04-13

## 2021-03-10 RX ORDER — SODIUM CHLORIDE 0.9 % (FLUSH) 0.9 %
10 SYRINGE (ML) INJECTION AS NEEDED
Status: CANCELLED | OUTPATIENT
Start: 2021-04-06

## 2021-03-10 RX ORDER — SODIUM CHLORIDE 9 MG/ML
25 INJECTION, SOLUTION INTRAVENOUS CONTINUOUS
Status: CANCELLED | OUTPATIENT
Start: 2021-04-06

## 2021-03-10 RX ORDER — EPINEPHRINE 1 MG/ML
0.3 INJECTION, SOLUTION, CONCENTRATE INTRAVENOUS AS NEEDED
Status: CANCELLED | OUTPATIENT
Start: 2021-04-13

## 2021-03-10 RX ORDER — ALBUTEROL SULFATE 0.83 MG/ML
2.5 SOLUTION RESPIRATORY (INHALATION) AS NEEDED
Status: CANCELLED
Start: 2021-04-06

## 2021-03-10 RX ORDER — DIPHENHYDRAMINE HYDROCHLORIDE 50 MG/ML
25 INJECTION, SOLUTION INTRAMUSCULAR; INTRAVENOUS AS NEEDED
Status: CANCELLED
Start: 2021-04-06

## 2021-03-10 RX ORDER — ALBUTEROL SULFATE 0.83 MG/ML
2.5 SOLUTION RESPIRATORY (INHALATION) AS NEEDED
Status: CANCELLED
Start: 2021-03-23

## 2021-03-10 RX ORDER — ONDANSETRON 2 MG/ML
8 INJECTION INTRAMUSCULAR; INTRAVENOUS AS NEEDED
Status: CANCELLED | OUTPATIENT
Start: 2021-03-23

## 2021-03-10 RX ORDER — DIPHENHYDRAMINE HYDROCHLORIDE 50 MG/ML
25 INJECTION, SOLUTION INTRAMUSCULAR; INTRAVENOUS AS NEEDED
Status: CANCELLED
Start: 2021-04-13

## 2021-03-10 RX ORDER — HYDROCORTISONE SODIUM SUCCINATE 100 MG/2ML
100 INJECTION, POWDER, FOR SOLUTION INTRAMUSCULAR; INTRAVENOUS AS NEEDED
Status: CANCELLED | OUTPATIENT
Start: 2021-04-13

## 2021-03-10 RX ORDER — HEPARIN 100 UNIT/ML
300-500 SYRINGE INTRAVENOUS AS NEEDED
Status: CANCELLED
Start: 2021-04-06

## 2021-03-10 RX ORDER — DIPHENHYDRAMINE HYDROCHLORIDE 50 MG/ML
50 INJECTION, SOLUTION INTRAMUSCULAR; INTRAVENOUS AS NEEDED
Status: CANCELLED
Start: 2021-04-06

## 2021-03-10 RX ORDER — ACETAMINOPHEN 325 MG/1
650 TABLET ORAL AS NEEDED
Status: CANCELLED
Start: 2021-03-16

## 2021-03-10 RX ORDER — EPINEPHRINE 1 MG/ML
0.3 INJECTION, SOLUTION, CONCENTRATE INTRAVENOUS AS NEEDED
Status: CANCELLED | OUTPATIENT
Start: 2021-04-06

## 2021-03-10 RX ORDER — DIPHENHYDRAMINE HYDROCHLORIDE 50 MG/ML
50 INJECTION, SOLUTION INTRAMUSCULAR; INTRAVENOUS AS NEEDED
Status: CANCELLED
Start: 2021-04-13

## 2021-03-10 RX ORDER — ONDANSETRON 2 MG/ML
8 INJECTION INTRAMUSCULAR; INTRAVENOUS AS NEEDED
Status: CANCELLED | OUTPATIENT
Start: 2021-04-13

## 2021-03-10 RX ORDER — ACETAMINOPHEN 325 MG/1
650 TABLET ORAL AS NEEDED
Status: CANCELLED
Start: 2021-03-23

## 2021-03-16 ENCOUNTER — TELEPHONE (OUTPATIENT)
Dept: FAMILY MEDICINE CLINIC | Age: 36
End: 2021-03-16

## 2021-03-16 ENCOUNTER — OFFICE VISIT (OUTPATIENT)
Dept: ONCOLOGY | Age: 36
End: 2021-03-16
Payer: SUBSIDIZED

## 2021-03-16 ENCOUNTER — APPOINTMENT (OUTPATIENT)
Dept: INFUSION THERAPY | Age: 36
End: 2021-03-16
Payer: SUBSIDIZED

## 2021-03-16 ENCOUNTER — HOSPITAL ENCOUNTER (OUTPATIENT)
Dept: INFUSION THERAPY | Age: 36
Discharge: HOME | End: 2021-03-16
Payer: SUBSIDIZED

## 2021-03-16 VITALS
DIASTOLIC BLOOD PRESSURE: 88 MMHG | HEIGHT: 67 IN | BODY MASS INDEX: 30.29 KG/M2 | SYSTOLIC BLOOD PRESSURE: 135 MMHG | TEMPERATURE: 97.1 F | OXYGEN SATURATION: 99 % | HEART RATE: 92 BPM | WEIGHT: 193 LBS

## 2021-03-16 VITALS
HEIGHT: 67 IN | SYSTOLIC BLOOD PRESSURE: 128 MMHG | RESPIRATION RATE: 16 BRPM | BODY MASS INDEX: 30.2 KG/M2 | TEMPERATURE: 97.1 F | HEART RATE: 92 BPM | DIASTOLIC BLOOD PRESSURE: 78 MMHG | WEIGHT: 192.4 LBS

## 2021-03-16 DIAGNOSIS — C76.0 HEAD AND NECK CANCER (HCC): Primary | ICD-10-CM

## 2021-03-16 DIAGNOSIS — C11.9 NASOPHARYNGEAL CARCINOMA (HCC): Primary | ICD-10-CM

## 2021-03-16 DIAGNOSIS — Z95.828 PORT-A-CATH IN PLACE: ICD-10-CM

## 2021-03-16 DIAGNOSIS — Z51.11 ENCOUNTER FOR ANTINEOPLASTIC CHEMOTHERAPY: ICD-10-CM

## 2021-03-16 LAB
ALBUMIN SERPL-MCNC: 3.4 G/DL (ref 3.5–5)
ALBUMIN/GLOB SERPL: 1 {RATIO} (ref 1.1–2.2)
ALP SERPL-CCNC: 120 U/L (ref 45–117)
ALT SERPL-CCNC: 76 U/L (ref 12–78)
ANION GAP SERPL CALC-SCNC: 9 MMOL/L (ref 5–15)
AST SERPL-CCNC: 24 U/L (ref 15–37)
BASOPHILS # BLD: 0 K/UL (ref 0–0.1)
BASOPHILS NFR BLD: 1 % (ref 0–1)
BILIRUB SERPL-MCNC: 0.2 MG/DL (ref 0.2–1)
BUN SERPL-MCNC: 9 MG/DL (ref 6–20)
BUN/CREAT SERPL: 13 (ref 12–20)
CALCIUM SERPL-MCNC: 9.1 MG/DL (ref 8.5–10.1)
CHLORIDE SERPL-SCNC: 110 MMOL/L (ref 97–108)
CO2 SERPL-SCNC: 23 MMOL/L (ref 21–32)
CREAT SERPL-MCNC: 0.68 MG/DL (ref 0.7–1.3)
DIFFERENTIAL METHOD BLD: ABNORMAL
EOSINOPHIL # BLD: 0.1 K/UL (ref 0–0.4)
EOSINOPHIL NFR BLD: 2 % (ref 0–7)
ERYTHROCYTE [DISTWIDTH] IN BLOOD BY AUTOMATED COUNT: 17.3 % (ref 11.5–14.5)
GLOBULIN SER CALC-MCNC: 3.4 G/DL (ref 2–4)
GLUCOSE SERPL-MCNC: 96 MG/DL (ref 65–100)
HCT VFR BLD AUTO: 36.3 % (ref 36.6–50.3)
HGB BLD-MCNC: 11.8 G/DL (ref 12.1–17)
IMM GRANULOCYTES # BLD AUTO: 0.1 K/UL (ref 0–0.04)
IMM GRANULOCYTES NFR BLD AUTO: 2 % (ref 0–0.5)
LYMPHOCYTES # BLD: 1.7 K/UL (ref 0.8–3.5)
LYMPHOCYTES NFR BLD: 32 % (ref 12–49)
MAGNESIUM SERPL-MCNC: 2.1 MG/DL (ref 1.6–2.4)
MCH RBC QN AUTO: 27 PG (ref 26–34)
MCHC RBC AUTO-ENTMCNC: 32.5 G/DL (ref 30–36.5)
MCV RBC AUTO: 83.1 FL (ref 80–99)
MONOCYTES # BLD: 0.7 K/UL (ref 0–1)
MONOCYTES NFR BLD: 13 % (ref 5–13)
NEUTS SEG # BLD: 2.8 K/UL (ref 1.8–8)
NEUTS SEG NFR BLD: 50 % (ref 32–75)
NRBC # BLD: 0 K/UL (ref 0–0.01)
NRBC BLD-RTO: 0 PER 100 WBC
PLATELET # BLD AUTO: 365 K/UL (ref 150–400)
PMV BLD AUTO: 9.3 FL (ref 8.9–12.9)
POTASSIUM SERPL-SCNC: 3.9 MMOL/L (ref 3.5–5.1)
PROT SERPL-MCNC: 6.8 G/DL (ref 6.4–8.2)
RBC # BLD AUTO: 4.37 M/UL (ref 4.1–5.7)
SODIUM SERPL-SCNC: 142 MMOL/L (ref 136–145)
WBC # BLD AUTO: 5.4 K/UL (ref 4.1–11.1)

## 2021-03-16 PROCEDURE — 96367 TX/PROPH/DG ADDL SEQ IV INF: CPT

## 2021-03-16 PROCEDURE — 96415 CHEMO IV INFUSION ADDL HR: CPT

## 2021-03-16 PROCEDURE — 83735 ASSAY OF MAGNESIUM: CPT

## 2021-03-16 PROCEDURE — 99215 OFFICE O/P EST HI 40 MIN: CPT | Performed by: INTERNAL MEDICINE

## 2021-03-16 PROCEDURE — 74011250636 HC RX REV CODE- 250/636: Performed by: REGISTERED NURSE

## 2021-03-16 PROCEDURE — 96417 CHEMO IV INFUS EACH ADDL SEQ: CPT

## 2021-03-16 PROCEDURE — 96375 TX/PRO/DX INJ NEW DRUG ADDON: CPT

## 2021-03-16 PROCEDURE — 80053 COMPREHEN METABOLIC PANEL: CPT

## 2021-03-16 PROCEDURE — 96361 HYDRATE IV INFUSION ADD-ON: CPT

## 2021-03-16 PROCEDURE — 74011000258 HC RX REV CODE- 258: Performed by: REGISTERED NURSE

## 2021-03-16 PROCEDURE — 85025 COMPLETE CBC W/AUTO DIFF WBC: CPT

## 2021-03-16 PROCEDURE — 36415 COLL VENOUS BLD VENIPUNCTURE: CPT

## 2021-03-16 PROCEDURE — 96413 CHEMO IV INFUSION 1 HR: CPT

## 2021-03-16 PROCEDURE — 77030012965 HC NDL HUBR BBMI -A

## 2021-03-16 RX ORDER — HEPARIN 100 UNIT/ML
300-500 SYRINGE INTRAVENOUS AS NEEDED
Status: ACTIVE | OUTPATIENT
Start: 2021-03-16 | End: 2021-03-16

## 2021-03-16 RX ORDER — PALONOSETRON 0.05 MG/ML
0.25 INJECTION, SOLUTION INTRAVENOUS ONCE
Status: COMPLETED | OUTPATIENT
Start: 2021-03-16 | End: 2021-03-16

## 2021-03-16 RX ORDER — SODIUM CHLORIDE 0.9 % (FLUSH) 0.9 %
10 SYRINGE (ML) INJECTION AS NEEDED
Status: DISPENSED | OUTPATIENT
Start: 2021-03-16 | End: 2021-03-16

## 2021-03-16 RX ORDER — SODIUM CHLORIDE 9 MG/ML
10 INJECTION INTRAMUSCULAR; INTRAVENOUS; SUBCUTANEOUS AS NEEDED
Status: ACTIVE | OUTPATIENT
Start: 2021-03-16 | End: 2021-03-16

## 2021-03-16 RX ORDER — SODIUM CHLORIDE 9 MG/ML
25 INJECTION, SOLUTION INTRAVENOUS CONTINUOUS
Status: DISPENSED | OUTPATIENT
Start: 2021-03-16 | End: 2021-03-16

## 2021-03-16 RX ADMIN — SODIUM CHLORIDE 10 ML: 9 INJECTION INTRAMUSCULAR; INTRAVENOUS; SUBCUTANEOUS at 13:52

## 2021-03-16 RX ADMIN — Medication 10 ML: at 07:20

## 2021-03-16 RX ADMIN — DEXAMETHASONE SODIUM PHOSPHATE 12 MG: 4 INJECTION, SOLUTION INTRA-ARTICULAR; INTRALESIONAL; INTRAMUSCULAR; INTRAVENOUS; SOFT TISSUE at 10:18

## 2021-03-16 RX ADMIN — SODIUM CHLORIDE 25 ML/HR: 900 INJECTION, SOLUTION INTRAVENOUS at 10:15

## 2021-03-16 RX ADMIN — CISPLATIN 160 MG: 100 INJECTION, SOLUTION INTRAVENOUS at 11:48

## 2021-03-16 RX ADMIN — SODIUM CHLORIDE 150 MG: 900 INJECTION, SOLUTION INTRAVENOUS at 10:40

## 2021-03-16 RX ADMIN — MAGNESIUM SULFATE HEPTAHYDRATE: 500 INJECTION, SOLUTION INTRAMUSCULAR; INTRAVENOUS at 11:07

## 2021-03-16 RX ADMIN — MAGNESIUM SULFATE HEPTAHYDRATE: 500 INJECTION, SOLUTION INTRAMUSCULAR; INTRAVENOUS at 09:15

## 2021-03-16 RX ADMIN — GEMCITABINE HYDROCHLORIDE 2000 MG: 2 INJECTION, SOLUTION INTRAVENOUS at 11:10

## 2021-03-16 RX ADMIN — HEPARIN 500 UNITS: 100 SYRINGE at 13:52

## 2021-03-16 RX ADMIN — PALONOSETRON 0.25 MG: 0.05 INJECTION, SOLUTION INTRAVENOUS at 10:17

## 2021-03-16 NOTE — TELEPHONE ENCOUNTER
Patient's wife called OW to say she did receive documents and e-mailed them to OW today. OW checked her e-mail and there was no new email. OW confirmed e-mail. Patient's wife said she'd send the documents again to OW.

## 2021-03-16 NOTE — TELEPHONE ENCOUNTER
OW called patient to follow up with pending documents. Unable to speak with patient, OW left a vm asking patient to call back.

## 2021-03-16 NOTE — PROGRESS NOTES
[facility] [place] [address] [address], [address], [place]: [phone]  F: [phone]    Reason for visit   Adrianne Obrien is a 30 y.o. male who is seen for follow up of nasopharyngeal Carcinoma- non keratinizing / undifferentiated    Treatment History:   · 2/6/2021: Endoscopic biopsy of the nasopharyngeal mass ( R)   · 2/18/21 PET/CT: R nasopharyngeal mass is hypermetabolic, b/l hypermetabolic cervical LN hypermetabolic   · 2/84/30: cycle 1 cisplatin / gemzar     History of Present Illness:   Patient is a 30 y.o. male with no significant PMH seen for above  He was seen by Dr. Jaya Wilks with ENT in December with c/o recurrent epistaxis x 2 months requiring ER visits and packing. His prior nasal endoscopy was clear. He was admitted on 2/4/2021 for recurrent HAs and epistaxis which lasted several minutes with improvement on pressure. He had a normal CBC for the most part and had no h/o bleeding growing up. MRI brain 2/5/2021 was obtained and showed a 4.2 x 4 cm posterior nasopharyngeal mass Extending into the sphenoid sinus with narrowing f the R distal internal carotid artery. CTA did not show active extravasation, but showed bilateral cervical adenopathy. He had a biopsy of the NP mass 2/6/2021 and pathology showed Nasopharyngeal carcinoma. He comes for cycle 2 day 1 of cisplatin / gemzar. He tolerated chemo well. He denies nausea/vomiting or diarrhea/constipation. He has had no recent headaches. He has had no nosebleeds. He denies LE swelling. He denies SOB, CP, cough, fevers/chills. He states he has intermittent ringing in his R ear but this has been going on since before starting chemo and unchanged since cycle 1. No other complaints today. Comes with his mother today. History reviewed. No pertinent past medical history.    Past Surgical History:   Procedure Laterality Date    IR INSERT TUNL CVC W PORT OVER 5 YEARS  2/11/2021         IR INSERT TUNL CVC W PORT OVER 5 YEARS  2021      Social History     Tobacco Use    Smoking status: Never Smoker    Smokeless tobacco: Never Used   Substance Use Topics    Alcohol use: Not Currently      History reviewed. No pertinent family history. Current Outpatient Medications   Medication Sig    topiramate (TOPAMAX) 50 mg tablet Take 1 Tab by mouth two (2) times a day.  dexAMETHasone (DECADRON) 4 mg tablet Take 2 tabs (8mg) on days 2 & 3 of chemotherapy    ondansetron (ZOFRAN ODT) 8 mg disintegrating tablet Take 1 Tab by mouth every eight (8) hours as needed for Nausea or Vomiting.  prochlorperazine (Compazine) 5 mg tablet Take 1 Tab by mouth every six (6) hours as needed for Nausea or Vomiting for up to 30 days.  lidocaine-prilocaine (EMLA) topical cream Apply  to affected area as needed for Pain.  senna (SENOKOT) 8.6 mg tablet Take 2 Tabs by mouth daily.  polyethylene glycol (MIRALAX) 17 gram packet Take 1 Packet by mouth daily. Mix in 8 oz of water, juice, soda, coffee, or tea prior to administration    verapamiL (CALAN) 80 mg tablet Take 1 Tab by mouth three (3) times daily.  ZOLMitriptan (ZOMIG) 2.5 mg tablet Take 1 Tab by mouth as needed for Migraine (May repeat in 2 hours if not improved. Max 10mg/24HRs).  magnesium 250 mg tab Si po qhs    topiramate (TOPAMAX) 50 mg tablet Take 1 Tab by mouth two (2) times a day.  fexofenadine (ALLEGRA) 180 mg tablet Take 1 Tab by mouth daily. Indications: seasonal runny nose    acetaminophen (TYLENOL) 500 mg tablet Take  by mouth every six (6) hours as needed for Pain. 2 or 3 po daily     No current facility-administered medications for this visit.       Facility-Administered Medications Ordered in Other Visits   Medication Dose Route Frequency    sodium chloride (NS) flush 10 mL  10 mL IntraVENous PRN    0.9% sodium chloride 1,000 mL with potassium chloride 10 mEq, magnesium sulfate 2 g infusion   IntraVENous ONCE    0.9% sodium chloride injection 10 mL  10 mL IntraVENous PRN    heparin (porcine) pf 300-500 Units  300-500 Units InterCATHeter PRN    0.9% sodium chloride 1,000 mL with potassium chloride 10 mEq, magnesium sulfate 2 g infusion   IntraVENous ONCE      Allergies   Allergen Reactions    Iodinated Contrast Media Itching        Review of Systems: A complete review of systems was obtained, negative except as described above. Physical Exam:     Visit Vitals  /88 (BP 1 Location: Left upper arm, BP Patient Position: Sitting)   Pulse 92   Temp 97.1 °F (36.2 °C) (Temporal)   Ht 5' 7\" (1.702 m)   BMI 30.13 kg/m²     General appearance - alert, well appearing, and in no distress, nervous, poor eye contact  Mental status - oriented to person, place, and time  Mouth - mucous membranes moist  Neck - supple, PAC in place   Lymphatics - no palpable lymphadenopathy  Skin - normal coloration and turgor, no new rashes, no suspicious skin lesions noted    ECOG PS:1    Results:     Lab Results   Component Value Date/Time    WBC 5.4 03/16/2021 07:25 AM    HGB 11.8 (L) 03/16/2021 07:25 AM    HCT 36.3 (L) 03/16/2021 07:25 AM    PLATELET 437 80/57/5857 07:25 AM    MCV 83.1 03/16/2021 07:25 AM    ABS. NEUTROPHILS 2.8 03/16/2021 07:25 AM     Lab Results   Component Value Date/Time    Sodium 142 03/16/2021 07:25 AM    Potassium 3.9 03/16/2021 07:25 AM    Chloride 110 (H) 03/16/2021 07:25 AM    CO2 23 03/16/2021 07:25 AM    Glucose 96 03/16/2021 07:25 AM    BUN 9 03/16/2021 07:25 AM    Creatinine 0.68 (L) 03/16/2021 07:25 AM    GFR est AA >60 03/16/2021 07:25 AM    GFR est non-AA >60 03/16/2021 07:25 AM    Calcium 9.1 03/16/2021 07:25 AM    Glucose (POC) 106 (H) 02/18/2021 10:16 AM     Lab Results   Component Value Date/Time    Bilirubin, total 0.2 03/16/2021 07:25 AM    ALT (SGPT) 76 03/16/2021 07:25 AM    Alk.  phosphatase 120 (H) 03/16/2021 07:25 AM    Protein, total 6.8 03/16/2021 07:25 AM    Albumin 3.4 (L) 03/16/2021 07:25 AM    Globulin 3.4 03/16/2021 07:25 AM     Component      Latest Ref Rng & Units 2/7/2021          12:26 PM   Cristóbal-Purdy DNA QT, PCR      Negative copies/mL 327   Cristóbal-Barr Virus log10      log10 copy/mL 2.515       Records reviewed and summarized above. Pathology report(s) reviewed     PATHOLOGY     FINAL PATHOLOGIC DIAGNOSIS   1. Right nasopharyngeal mass, biopsy:   Nasopharyngeal carcinoma, nonkeratinizing, undifferentiated type (see comment)   In situ hybridization for Cristóbal-Barr virus (SEMAJ) is positive   2. Right nasopharyngeal mass, biopsy:   Nasopharyngeal carcinoma, nonkeratinizing, undifferentiated type   Comment   Immunohistochemical stains performed from block 1A show that the invasive carcinoma expresses p40, AE1/AE3, and CAM5.2 while negative for p16, CK7, and CK20. This immunoprofile supports the above rendered diagnosis     Radiology report(s) reviewed above. CTA neck    IMPRESSION  1. Attenuation of the right internal carotid artery as it passes through the  right nasopharyngeal mass but no evidence of active extravasation or  intraluminal thrombus. 2.  Bilateral cervical lymphadenopathy in level 2 and level 5B. 3.  Right posterior nasopharyngeal mass with sphenoid sinus involvement and  diffuse sinus disease    MRI brain    IMPRESSION  1. 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 2.  Diffuse sinus mucosal thickening. 3.  Mass related narrowing of the right distal cervical internal carotid artery  but no large vessel occlusion. 4.  No evidence of infarct.       MRI orbit and face    IMPRESSION  Again demonstrated is large right nasopharyngeal tumor as previously described  and without significant change. Some involvement right internal carotid artery. Associated mucosal thickening and fluid right paranasal sinuses.     PET 2/18/21:  IMPRESSION  The right nasopharyngeal mass lesion is hypermetabolic and  compatible with the known neoplasm. Bilateral hypermetabolic cervical lymph  nodes. Focal increased tracer activity is seen involving the very posterior  aspect of the nasal septum. Assessment:   1) Nasopharyngeal carcinoma- R   SEMAJ positive    4.2 x 4 cm, extends into the sphenoid sinus, bilateral cervical nodes. [place] but does not involve carotid artery    Nasopharyngeal carcinoma - at least T3N2M0-Stage III disease. This is an aggressive malignancy with high risk of fatal local complications  In his case , the abutment of carotid artery is highly concerning for risk of tumor extension and catastrophic bleeding  He is very symptomatic with severe pain, epistaxis, hearing impediment    His PET was reviewed from 2/18/21 and shows a significant fco burden. Hence, after discussion with radiation decision was made to move forward with induction chemo with cisplatin + gemzar x 3 cycles followed by chemoRT ( with weekly Cisplatin)     Today is cycle 2 day 1. Tolerated cycle 1 well.   His epistaxis and HA have resolved   All questions answered &  used     2) Epistaxis  Secondary to the mass  CTA without active extravasation  At high risk for catastrophic hemorrhage    3) Headaches  Secondary to the nasopharyngeal mass with splenoid sinus involvement  Controlled on Verapamil, Zomig, Oxycodone  Continue palliative care follow up    4) Nausea  Zofran prn    5) Management of high risk drugs such as chemotherapy  All questions answered   Labs reviewed & stable   Tolerated cycle 1 well     6) Unsteadiness  Resolved   Had a brain MRI inpatient   States IVF helps, will continue     7) Tinnitus  In R ear prior to initiation of chemo  Monitor closely while on cisplatin   Unilateral and likely secondary to mass     Plan:     · Proceed today with cycle 2 day 1 of cisplatin on day 1 only (80mg/m2) + gemzar on day 1, 8 (1000mg/m2) of a 21 day cycle x 3 cycles   · Dexamethasone on days 2 , 3   · zofran / compazine PRN   · Continue to follow with palliative care  · Labs / type & cross on off weeks & IVF   · PET after 3 cycles to be ordered at next visit     Language interpretation services used    RTC in 3 weeks for cycle 3 day 1     I appreciate the opportunity to participate in Mr. Hang Mendoza's care. I performed a history and physical examination of the patient and discussed his management with the NPP. I reviewed the NPP note and agree with the documented findings and plan of care  NP carcinoma  Discussed diagnosis with his mother today, he tolerated cycle 1 of chemotherapy well with improved epistaxis and HA.  Plan on scans after cycle 3 then [address] reviewed  Signed By: Cipriano Hogue MD

## 2021-03-16 NOTE — PROGRESS NOTES
Anirudh Klein is a 30 y.o. 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 Have you been to the ER, urgent care clinic since your last visit? Hospitalized since your last visit? No.    2. Have you seen or consulted any other health care providers outside of the [address] [place] [address] since your last visit? Include any pap smears or colon screening.  No.

## 2021-03-16 NOTE — TELEPHONE ENCOUNTER
OW received pending forms from patient's partner, Mrs Paige Wong. Access Now was completed and sent to Cady Scott via [employer]. OW called patient's wife and instructed her to call AN on or after March 30. OW also said she'd let her know if they can call earlier to schedule appt. OW also provided Information about [employer] close to patient's house. Mrs Paige Wong verbalized understanding and thanked the OW.

## 2021-03-23 ENCOUNTER — HOSPITAL ENCOUNTER (OUTPATIENT)
Dept: INFUSION THERAPY | Age: 36
Discharge: HOME | End: 2021-03-23
Payer: SUBSIDIZED

## 2021-03-23 VITALS
SYSTOLIC BLOOD PRESSURE: 133 MMHG | HEIGHT: 67 IN | WEIGHT: 186.4 LBS | BODY MASS INDEX: 29.26 KG/M2 | TEMPERATURE: 97.3 F | HEART RATE: 90 BPM | DIASTOLIC BLOOD PRESSURE: 86 MMHG | RESPIRATION RATE: 16 BRPM

## 2021-03-23 DIAGNOSIS — C76.0 HEAD AND NECK CANCER (HCC): Primary | ICD-10-CM

## 2021-03-23 LAB
ANION GAP SERPL CALC-SCNC: 10 MMOL/L (ref 5–15)
BASOPHILS # BLD: 0.1 K/UL (ref 0–0.1)
BASOPHILS NFR BLD: 1 % (ref 0–1)
BUN SERPL-MCNC: 11 MG/DL (ref 6–20)
BUN/CREAT SERPL: 15 (ref 12–20)
CALCIUM SERPL-MCNC: 9.6 MG/DL (ref 8.5–10.1)
CHLORIDE SERPL-SCNC: 107 MMOL/L (ref 97–108)
CO2 SERPL-SCNC: 21 MMOL/L (ref 21–32)
CREAT SERPL-MCNC: 0.72 MG/DL (ref 0.7–1.3)
DIFFERENTIAL METHOD BLD: ABNORMAL
EOSINOPHIL # BLD: 0.1 K/UL (ref 0–0.4)
EOSINOPHIL NFR BLD: 2 % (ref 0–7)
ERYTHROCYTE [DISTWIDTH] IN BLOOD BY AUTOMATED COUNT: 16.6 % (ref 11.5–14.5)
GLUCOSE SERPL-MCNC: 105 MG/DL (ref 65–100)
HCT VFR BLD AUTO: 40.1 % (ref 36.6–50.3)
HGB BLD-MCNC: 13.2 G/DL (ref 12.1–17)
IMM GRANULOCYTES # BLD AUTO: 0 K/UL
IMM GRANULOCYTES NFR BLD AUTO: 0 %
LYMPHOCYTES # BLD: 1.6 K/UL (ref 0.8–3.5)
LYMPHOCYTES NFR BLD: 27 % (ref 12–49)
MAGNESIUM SERPL-MCNC: 2.1 MG/DL (ref 1.6–2.4)
MCH RBC QN AUTO: 27.1 PG (ref 26–34)
MCHC RBC AUTO-ENTMCNC: 32.9 G/DL (ref 30–36.5)
MCV RBC AUTO: 82.3 FL (ref 80–99)
METAMYELOCYTES NFR BLD MANUAL: 4 %
MONOCYTES # BLD: 0.4 K/UL (ref 0–1)
MONOCYTES NFR BLD: 7 % (ref 5–13)
MYELOCYTES NFR BLD MANUAL: 3 %
NEUTS BAND NFR BLD MANUAL: 4 % (ref 0–6)
NEUTS SEG # BLD: 3.4 K/UL (ref 1.8–8)
NEUTS SEG NFR BLD: 52 % (ref 32–75)
NRBC # BLD: 0 K/UL (ref 0–0.01)
NRBC BLD-RTO: 0 PER 100 WBC
PLATELET # BLD AUTO: 384 K/UL (ref 150–400)
PMV BLD AUTO: 9.5 FL (ref 8.9–12.9)
POTASSIUM SERPL-SCNC: 4.1 MMOL/L (ref 3.5–5.1)
RBC # BLD AUTO: 4.87 M/UL (ref 4.1–5.7)
RBC MORPH BLD: ABNORMAL
SODIUM SERPL-SCNC: 138 MMOL/L (ref 136–145)
WBC # BLD AUTO: 6.1 K/UL (ref 4.1–11.1)

## 2021-03-23 PROCEDURE — 74011250636 HC RX REV CODE- 250/636: Performed by: REGISTERED NURSE

## 2021-03-23 PROCEDURE — 96375 TX/PRO/DX INJ NEW DRUG ADDON: CPT

## 2021-03-23 PROCEDURE — 77030012965 HC NDL HUBR BBMI -A

## 2021-03-23 PROCEDURE — 85025 COMPLETE CBC W/AUTO DIFF WBC: CPT

## 2021-03-23 PROCEDURE — 96413 CHEMO IV INFUSION 1 HR: CPT

## 2021-03-23 PROCEDURE — 83735 ASSAY OF MAGNESIUM: CPT

## 2021-03-23 PROCEDURE — 36415 COLL VENOUS BLD VENIPUNCTURE: CPT

## 2021-03-23 PROCEDURE — 80048 BASIC METABOLIC PNL TOTAL CA: CPT

## 2021-03-23 RX ORDER — HEPARIN 100 UNIT/ML
300-500 SYRINGE INTRAVENOUS AS NEEDED
Status: ACTIVE | OUTPATIENT
Start: 2021-03-23 | End: 2021-03-23

## 2021-03-23 RX ORDER — ONDANSETRON 2 MG/ML
8 INJECTION INTRAMUSCULAR; INTRAVENOUS ONCE
Status: COMPLETED | OUTPATIENT
Start: 2021-03-23 | End: 2021-03-23

## 2021-03-23 RX ORDER — SODIUM CHLORIDE 0.9 % (FLUSH) 0.9 %
10 SYRINGE (ML) INJECTION AS NEEDED
Status: DISPENSED | OUTPATIENT
Start: 2021-03-23 | End: 2021-03-23

## 2021-03-23 RX ORDER — SODIUM CHLORIDE 9 MG/ML
10 INJECTION INTRAMUSCULAR; INTRAVENOUS; SUBCUTANEOUS AS NEEDED
Status: DISPENSED | OUTPATIENT
Start: 2021-03-23 | End: 2021-03-23

## 2021-03-23 RX ORDER — SODIUM CHLORIDE 9 MG/ML
25 INJECTION, SOLUTION INTRAVENOUS CONTINUOUS
Status: DISPENSED | OUTPATIENT
Start: 2021-03-23 | End: 2021-03-23

## 2021-03-23 RX ADMIN — Medication 10 ML: at 07:30

## 2021-03-23 RX ADMIN — HEPARIN 500 UNITS: 100 SYRINGE at 11:30

## 2021-03-23 RX ADMIN — SODIUM CHLORIDE 10 ML: 9 INJECTION INTRAMUSCULAR; INTRAVENOUS; SUBCUTANEOUS at 07:30

## 2021-03-23 RX ADMIN — ONDANSETRON 8 MG: 2 INJECTION, SOLUTION INTRAMUSCULAR; INTRAVENOUS at 09:52

## 2021-03-23 RX ADMIN — GEMCITABINE 2000 MG: 38 INJECTION, SOLUTION INTRAVENOUS at 10:49

## 2021-03-23 RX ADMIN — SODIUM CHLORIDE 25 ML/HR: 900 INJECTION, SOLUTION INTRAVENOUS at 08:00

## 2021-03-23 RX ADMIN — Medication 10 ML: at 11:30

## 2021-03-23 NOTE — PROGRESS NOTES
[facility] Chemo Progress Note    Date: 2021    Name: Anirudh Klein    MRN: [id number]         : 1985    [address] Mr[address] Corbin Mcdaniels Arrived to [facility] for  C2D8 Gemzar ambulatory in stable condition. Assessment was completed, no acute issues at this time, no new complaints voiced. Port accessed with positive blood return. Labs drawn and sent for processing. Normal Saline started at [facility]. Chemotherapy Flowsheet 3/23/2021   Cycle C2D8   Date 3/23/2021   Drug / Regimen Gemzar   Pre Hydration -   Post Hydration -   Pre Meds -   Notes -         Patient denies SOB, fever, cough, general not feeling well. Patient denies recent exposure to someone who has tested positive for COVID-19. Patient denies having contact with anyone who has a pending COVID test.      Mr. Emily Mendoza's vitals were reviewed. Patient Vitals for the past 12 hrs:   Temp Pulse Resp BP   21 1126  90 16 133/86   21 0732 97.3 °F (36.3 °C) 95 16 (!) 122/92         Lab results were obtained and reviewed. Recent Results (from the past 12 hour(s))   CBC WITH AUTOMATED DIFF    Collection Time: 21  7:31 AM   Result Value Ref Range    WBC 6.1 4.1 - 11.1 K/uL    RBC 4.87 4.10 - 5.70 M/uL    HGB 13.2 12.1 - 17.0 g/dL    HCT 40.1 36.6 - 50.3 %    MCV 82.3 80.0 - 99.0 FL    MCH 27.1 26.0 - 34.0 PG    MCHC 32.9 30.0 - 36.5 g/dL    RDW 16.6 (H) 11.5 - 14.5 %    PLATELET 182 928 - 100 K/uL    MPV 9.5 8.9 - 12.9 FL    NRBC 0.0 0  WBC    ABSOLUTE NRBC 0.00 0.00 - 0.01 K/uL    NEUTROPHILS 52 32 - 75 %    BAND NEUTROPHILS 4 0 - 6 %    LYMPHOCYTES 27 12 - 49 %    MONOCYTES 7 5 - 13 %    EOSINOPHILS 2 0 - 7 %    BASOPHILS 1 0 - 1 %    METAMYELOCYTES 4 (H) 0 %    MYELOCYTES 3 (H) 0 %    IMMATURE GRANULOCYTES 0 %    ABS. NEUTROPHILS 3.4 1.8 - 8.0 K/UL    ABS. LYMPHOCYTES 1.6 0.8 - 3.5 K/UL    ABS. MONOCYTES 0.4 0.0 - 1.0 K/UL    ABS. EOSINOPHILS 0.1 0.0 - 0.4 K/UL    ABS. BASOPHILS 0.1 0.0 - 0.1 K/UL    ABS. IMM. GRANS. 0.0 K/UL    DF MANUAL      RBC COMMENTS ANISOCYTOSIS  1+       MAGNESIUM    Collection Time: 03/23/21  7:31 AM   Result Value Ref Range    Magnesium 2.1 1.6 - 2.4 mg/dL   METABOLIC PANEL, BASIC    Collection Time: 03/23/21  7:31 AM   Result Value Ref Range    Sodium 138 136 - 145 mmol/L    Potassium 4.1 3.5 - 5.1 mmol/L    Chloride 107 97 - 108 mmol/L    CO2 21 21 - 32 mmol/L    Anion gap 10 5 - 15 mmol/L    Glucose 105 (H) 65 - 100 mg/dL    BUN 11 6 - 20 MG/DL    Creatinine 0.72 0.70 - 1.30 MG/DL    BUN/Creatinine ratio 15 12 - 20      GFR est AA >60 >60 ml/min/1.73m2    GFR est non-AA >60 >60 ml/min/1.73m2    Calcium 9.6 8.5 - 10.1 MG/DL       Pre-medications  were administered as ordered and chemotherapy was initiated. Medications Administered     0.9% sodium chloride infusion     Admin Date  03/23/2021 Action  New Bag Dose  25 mL/hr Rate  25 mL/hr Route  IntraVENous Administered By  Toney Kwon RN          0.9% sodium chloride injection 10 mL     Admin Date  03/23/2021 Action  Given Dose  10 mL Route  IntraVENous Administered By  Toney Kwon RN          gemcitabine (GEMZAR) 2,000 mg in 0.9% sodium chloride 250 mL, overfill volume 25 mL chemo infusion     Admin Date  03/23/2021 Action  New Bag Dose  2,000 mg Rate  655.2 mL/hr Route  IntraVENous Administered By  Toney Kwon RN          heparin (porcine) pf 300-500 Units     Admin Date  03/23/2021 Action  Given Dose  500 Units Route  InterCATHeter Administered By  Toney Kwon RN          [facility]ansetron [facility]) injection 8 mg     Admin Date  03/23/2021 Action  Given Dose  8 mg Route  IntraVENous Administered By  Toney Kwon RN          sodium chloride (NS) flush 10 mL     Admin Date  03/23/2021 Action  Given Dose  10 mL Route  IntraVENous Administered By  Toney Kwon RN           Admin Date  03/23/2021 Action  Given Dose  10 mL Route  IntraVENous Administered By  Toney Kwon RN                  6142 Patient tolerated treatment well. Port maintained positive blood return throughout treatment. Port flushed, heparinized and de accessed per protocol. Patient was discharged from [place] [address].     Future Appointments   Date Time Provider Renae Tana   3/30/2021  5:00 PM [facility]   3/31/2021 10:30 AM [facility]   4/6/2021  7:30 AM B2 JOSUE LONG 1370 [facility] H   4/6/2021  8:15 AM Ishan Flood  N [facility] BS AMB   4/13/2021  7:30 AM B2 JOSUE LONG [facility], RN  March 23, 2021

## 2021-03-30 ENCOUNTER — HOSPITAL ENCOUNTER (OUTPATIENT)
Dept: INFUSION THERAPY | Age: 36
Discharge: HOME | End: 2021-03-30
Payer: SUBSIDIZED

## 2021-03-30 VITALS
HEART RATE: 108 BPM | TEMPERATURE: 97.8 F | SYSTOLIC BLOOD PRESSURE: 125 MMHG | RESPIRATION RATE: 18 BRPM | DIASTOLIC BLOOD PRESSURE: 85 MMHG

## 2021-03-30 DIAGNOSIS — C11.9 NASOPHARYNGEAL CARCINOMA (HCC): Primary | ICD-10-CM

## 2021-03-30 PROCEDURE — 77030012965 HC NDL HUBR BBMI -A

## 2021-03-30 PROCEDURE — 96360 HYDRATION IV INFUSION INIT: CPT

## 2021-03-30 PROCEDURE — 74011250636 HC RX REV CODE- 250/636: Performed by: INTERNAL MEDICINE

## 2021-03-30 RX ORDER — SODIUM CHLORIDE 9 MG/ML
10 INJECTION INTRAMUSCULAR; INTRAVENOUS; SUBCUTANEOUS AS NEEDED
Status: DISCONTINUED | OUTPATIENT
Start: 2021-03-30 | End: 2021-03-31 | Stop reason: HOSPADM

## 2021-03-30 RX ORDER — HEPARIN 100 UNIT/ML
300-500 SYRINGE INTRAVENOUS AS NEEDED
Status: DISCONTINUED | OUTPATIENT
Start: 2021-03-30 | End: 2021-03-31 | Stop reason: HOSPADM

## 2021-03-30 RX ORDER — SODIUM CHLORIDE 0.9 % (FLUSH) 0.9 %
10 SYRINGE (ML) INJECTION AS NEEDED
Status: DISCONTINUED | OUTPATIENT
Start: 2021-03-30 | End: 2021-03-31 | Stop reason: HOSPADM

## 2021-03-30 RX ADMIN — Medication 10 ML: at 18:04

## 2021-03-30 RX ADMIN — HEPARIN 500 UNITS: 100 SYRINGE at 18:04

## 2021-03-30 RX ADMIN — SODIUM CHLORIDE 1000 ML: 900 INJECTION, SOLUTION INTRAVENOUS at 17:03

## 2021-03-30 NOTE — PROGRESS NOTES
Outpatient Infusion Center Progress Note    4322 Pt admit to [facility] for Iv Hydration ambulatory in stable condition. Assessment completed. No new concerns voiced. Port accessed per protocol with positive blood return, Normal saline infused over one hour, port flushed and de accessed per protocol at end of infusion. Patient denies any Covid contact, denies sob, fever, cough or feeling un well    Visit Vitals  /85 (BP 1 Location: Right upper arm)   Pulse (!) 108   Temp 97.8 °F (36.6 °C)   Resp 18       Medications:  Medications Administered     heparin (porcine) pf 300-500 Units     Admin Date  03/30/2021 Action  Given Dose  500 Units Route  InterCATHeter Administered By  Chetna Recinos RN          sodium chloride (NS) flush 10 mL     Admin Date  03/30/2021 Action  Given Dose  10 mL Route  IntraVENous Administered By  Chetna Recinos RN          sodium chloride 0.9 % bolus infusion 1,000 mL     Admin Date  03/30/2021 Action  New Bag Dose  1,000 mL Rate  1,000 mL/hr Route  IntraVENous Administered By  Chetna Recinos RN                412 Pt tolerated treatment well. D/c home ambulatory in no distress. Pt aware of next appointment scheduled for 4/6/21.

## 2021-03-31 ENCOUNTER — VIRTUAL VISIT (OUTPATIENT)
Dept: PALLATIVE CARE | Age: 36
End: 2021-03-31
Payer: SUBSIDIZED

## 2021-03-31 DIAGNOSIS — R51.9 HEADACHE DUE TO INTRACRANIAL DISEASE: Primary | ICD-10-CM

## 2021-03-31 DIAGNOSIS — R11.0 NAUSEA WITHOUT VOMITING: ICD-10-CM

## 2021-03-31 DIAGNOSIS — G93.9 HEADACHE DUE TO INTRACRANIAL DISEASE: Primary | ICD-10-CM

## 2021-03-31 DIAGNOSIS — C11.9 NASOPHARYNGEAL CARCINOMA (HCC): ICD-10-CM

## 2021-03-31 DIAGNOSIS — R04.0 EPISTAXIS: ICD-10-CM

## 2021-03-31 DIAGNOSIS — R53.83 FATIGUE, UNSPECIFIED TYPE: ICD-10-CM

## 2021-03-31 DIAGNOSIS — Z71.89 COUNSELING REGARDING ADVANCE DIRECTIVES AND GOALS OF CARE: ICD-10-CM

## 2021-03-31 PROCEDURE — 99214 OFFICE O/P EST MOD 30 MIN: CPT | Performed by: INTERNAL MEDICINE

## 2021-03-31 PROCEDURE — 99497 ADVNCD CARE PLAN 30 MIN: CPT | Performed by: INTERNAL MEDICINE

## 2021-03-31 RX ORDER — ZOLMITRIPTAN 2.5 MG/1
2.5 TABLET, FILM COATED ORAL AS NEEDED
Qty: 9 TAB | Refills: 1 | Status: CANCELLED | OUTPATIENT
Start: 2021-03-31

## 2021-03-31 NOTE — ACP (ADVANCE CARE PLANNING)
Advance Care Planning     Advance Care Planning (ACP) Physician/NP/PA Conversation      Date of Conversation: 3/31/2021  Conducted with: Patient with Decision Making Capacity - with Ginger Diaz, [address] Decision Maker:     Primary Decision Maker (Active): Dea Stout - Spouse - [phone]    Secondary Decision Maker (Active): Carrie Sandoval - [phone]      Care Preferences:    Hospitalization: \"If your health worsens and it becomes clear that your chance of recovery is unlikely, what would be your preference regarding hospitalization? \"  The patient is unsure. It is a lot of information to assimilate right now. Ventilation: \"If you were unable to breathe on your own and your chance of recovery was unlikely, what would be your preference about the use of a ventilator (breathing machine) if it was available to you? \"   The patient is unsure. He would want ventilation if there is a chance of recovery. If recovery is uncertain, he wants his medical-decision makers to make a decision about ventilation depending upon the clinical circumstances. Resuscitation: \"In the event your heart stopped as a result of an underlying serious health condition, would you want attempts to be made to restart your heart, or would you prefer a natural death? \"   Yes, attempt to resuscitate.       Conversation Outcomes / Follow-Up Plan:   ACP incomplete - refer to ACP Clinical Specialist  Reviewed DNR/DNI and patient elects Full Code (Attempt Resuscitation)     Length of Voluntary ACP Conversation in minutes:  30 minutes    Zayda Cody MD
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
## 2021-03-31 NOTE — PROGRESS NOTES
Palliative Medicine Outpatient Services  [address]: [phone] ([phone])    Patient Name: Joann Patterson  YOB: 1985    Date of Current Visit: 03/31/21  Location of Current Visit:    [] [facility] Office  [] [facility] Office  [] [facility] Office  [] Home  [x]Synchronous real-time A/V virtual visit    Date of Initial Visit: 3/1/21   Referral from: Cindy Villalobos MD  Primary Care Physician: JESSICA De La Cruz      SUMMARY:   Joann Patterson is a 29y.o. 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 [place] but does not involve carotid artery), who was referred to Palliative Medicine by Dr. Vicki Arreola for symptom management and psychosocial support. He was diagnosed in 2/21 after presenting with months of recurrent headaches and epistaxis. He is currently being treated with induction chemo with cisplatin + gemzar x 3 cycles followed by chemoRT ( with weekly Cisplatin) under the care of Dr. Vicki Arreola.       The patients social history includes: he is  to Vance. They have an 6year old daughter, Blane Parkinson, and a 9year old son, Kyle Blackmon. He worked in construction until 2019. He is an uninsured, undocumented resident from [place]. Palliative Medicine is addressing the following current patient/family concerns: headaches, migrainous, likely related to malignancy; nausea; neck pain; symptom and psychosocial support over course of treatment; advance care planning     Initial Referral Intake note reviewed   PALLIATIVE DIAGNOSES:       ICD-10-CM ICD-9-CM    1. Headache due to intracranial disease  R51.9 784.0     G93.9 348.9    2. Fatigue, unspecified type  R53.83 780.79    3. Nausea without vomiting  R11.0 787.02    4. Epistaxis  R04.0 784.7    5. Counseling regarding advance directives and goals of care  Z71.89 V65.49    6.  Nasopharyngeal carcinoma ([facility] [address]  C11.9 147.9           PLAN:   Patient Instructions     Dear Vania Arora A Nicolas Tamayo ,    It was a pleasure seeing you today via virtual visit. We will see you again in 4 weeks for a virtual visit. If labs or imaging tests have been ordered for you today, please call the office  at [phone] 48 hours after completion to obtain the results. Your described symptoms were: Fatigue: 1 Drowsiness: 2   Depression: 0 Pain: 0   Anxiety: 0 Nausea: 1   Anorexia: 1 Dyspnea: 1   Best Well-Bein Constipation: No   Other Problem (Comment): 0       This is the plan we talked about:    1. Headaches  -Continue topiramate 50-mg twice daily  -Continue tylenol 500-mg: take 2 pills every 6 hours as needed  -Continue zolmitriptan (Zomig) 2.5-mg as needed    2. Fatigue  -You feel more fatigued after chemo  -You sleep well at night    3. Nausea  -Continue ondansetron  -Continue prochlorperazine    4. Nose bleeds  -You have a small nose bleed after a sneezing bout  -Otherwise, this hasn't been a problem    5.  Advance care planning  -Today we talked about your wishes for care and who you would want to make medical decisions for you if you were to be too sick to make these decisions for yourself  -You confirmed you want your wife, Tk Myles, to be your primary medical decision maker and your Namita Gupta, to be your secondary medical decision maker  -You wish to be hospitalized for any medical conditions at this point  -We talked about mechanical ventilation and you shared the story of your cousin who was seriously injured in a motorcycle accident  -He was placed on a ventilator to allow time for his family to visit and did not survive when the ventilator was removed  -We also talked in depth about CPR (cardiopulmonary resuscitation)  -You wish to have mechanical ventilation and CPR, even if it appears as if these would not improve your medical condition  -I recommended you complete an Advance Medical Directive to have a copy of your wishes with you in your home  -I also documented your wishes in your medical record     6. Cancer  -You started chemotherapy  -You will receive radiation therapy, along with another chemotherapy, after this initial course (induction chemotherapy)          This is what you have shared with us about Advance Care Planning:      Primary Decision Maker (Active): Ravi Pac - Spouse - [phone]    Secondary Decision Maker (Active): [address], [address] [phone]  Advance Care Planning 3/31/2021   Patient's Healthcare Decision Maker is: Verbal statement (Legal Next of Kin remains as decision maker)   Confirm Advance Directive None           The Palliative Medicine Team is here to support you and your family. Sincerely,      Lm Lopez MD and the Palliative Medicine Team            GOALS OF CARE / TREATMENT PREFERENCES:   [====Goals of Care====]  GOALS OF CARE:  Patient / health care proxy stated goals: See Patient Instructions / Summary    TREATMENT PREFERENCES:   Code Status:  [x] Attempt Resuscitation       [] Do Not Attempt Resuscitation    Advance Care Planning:  [x] The [facility] Interdisciplinary Team has updated the ACP Navigator with Decision Maker and Patient Capacity      Primary Decision Maker (Active): Ravi Schafer - Spouse - [phone]    Secondary Decision Maker (Active): Dianelys Triplett - [phone]  Advance Care Planning 3/31/2021   Patient's Healthcare Decision Maker is: Legal Next of Kin   Confirm Advance Directive None   Patient Would Like to Complete Advance Directive Yes       Other:  (If patient appropriate for POST, consider using PALLPOST smart phrase here)    The palliative care team has discussed with patient / health care proxy about goals of care / treatment preferences for patient.  [====Goals of Care====]     PRESCRIPTIONS GIVEN:   No orders of the defined types were placed in this encounter.           FOLLOW UP:     Future Appointments   Date Time Provider Renae Fajardo   4/6/2021 7:30 AM MALAIKA SALAS LONG [address] [facility]   4/6/2021  8:15 AM Lucy White  [address] BS [facility]   4/13/2021  7:30 AM [facility]            PHYSICIANS INVOLVED IN CARE:   Patient Care Team:  JESSICA Dougherty as PCP - General (Physician Assistant)  JESSICA Dougherty as PCP - [facility] Empaneled Provider  Zita Homans, MD (Palliative Medicine)       HISTORY:   Reviewed patient-completed ESAS and advance care planning form. Reviewed patient record in prescription monitoring program.    CHIEF COMPLAINT:   Chief Complaint   Patient presents with    Fatigue       HPI/SUBJECTIVE:    The patient is: [x] Verbal / [] Nonverbal - history obtained with assistance of [facility] , Iftikhar Meza    He's been OK. He hasn't had any headaches recently. He feels tired after he gets the \"long\" chemo that lasts 6 hours. He usually sleeps a lot for a few days afterwards. He sleeps well at night. His appetite is good. He sometimes feels nauseous, his nausea medicines help with that. He was sneezing a lot the other day from all the pollen in the air. He had a small nose bleed then, otherwise, he's had no nose bleeds. He continues to get chemotherapy. From IV 3/1/21:  He started having nosebleeds a few months ago. He went to the ED twice and had his nose packed. He started seeing Dr. Marielle Spivey who used the scope (nasal endoscopy) which didn't show anything. Then he started having headaches and he continued to have nosebleeds, a lot of bleeding. He came to the ED again about a month ago. That's when he found out he had cancer. This news hit him hard. The doctors all got together and decided he should have chemotherapy first, then radiation therapy with another kind of chemotherapy. It has a 50-50 chance of working. The news hit him hard but he's trying to stay positive. He gets strength from his wife and children and from his friends.   He talks with his family in [place] by phone. He isn't having headaches since he started taking the medicine (Topamax). He still gets nosebleeds, he had a small nosebleed lat week. Dr. Dae Doll talked with him about having a catheter put in his vein and going up to try to stop the bleeding (embolization). He sees Dr. Dae Doll again today. The muscles in his neck feel sore sometimes. He thinks it's from how he sleeps sometimes. He doesn't have neck pain or soreness now. He had a little nausea after chemo last week. He has nausea medicine he can take. His appetite is good. He feels good. It's hard to to believe he has cancer. Clinical Pain Assessment (nonverbal scale for nonverbal patients):   [++++ Clinical Pain Assessment++++]  [++++Pain Severity++++]: Pain: 0  [++++Pain Character++++]:  [++++Pain Duration++++]:  [++++Pain Effect++++]:  [++++Pain Factors++++]:  [++++Pain Frequency++++]:  [++++Pain Location++++]:  [++++ Clinical Pain Assessment++++]       FUNCTIONAL ASSESSMENT:     Palliative Performance Scale (PPS):  PPS: 80       PSYCHOSOCIAL/SPIRITUAL SCREENING:     Any spiritual / Adventist concerns:  [] Yes /  [x] No    Caregiver Burnout:  [] Yes /  [] No /  [x] No Caregiver Present      Anticipatory grief assessment:   [x] Normal  / [] Maladaptive       ESAS Anxiety: Anxiety: 0    ESAS Depression: Depression: 0       REVIEW OF SYSTEMS:     The following systems were [x] reviewed / [] unable to be reviewed  Systems: constitutional, ears/nose/mouth/throat, respiratory, gastrointestinal, genitourinary, musculoskeletal, integumentary, neurologic, psychiatric, endocrine. Positive findings noted below.   Modified ESAS Completed by: provider   Fatigue: 1 Drowsiness: 2   Depression: 0 Pain: 0   Anxiety: 0 Nausea: 1   Anorexia: 1 Dyspnea: 1   Best Well-Bein Constipation: No   Other Problem (Comment): 0          PHYSICAL EXAM:     Wt Readings from Last 3 Encounters:   21 186 lb 6.4 oz (84.6 kg)   21 192 lb 6.4 oz (87.3 kg)   03/16/21 193 lb (87.5 kg)     There were no vitals taken for this visit. Last bowel movement: See Nursing Note    Constitutional    [x] Appears well-developed and well-nourished in no apparent distress    [] Abnormal:  Mental status  [x] Alert and awake  [x] Oriented to person/place/time  [x] Able to follow commands  [] Abnormal:   Eyes  [x] EOM normal   [x] Sclera normal   [x] No visible ocular discharge  [] Abnormal:   HENT  [x] Normocephalic, atraumatic  [x] Mouth/Throat: Moist mucous membranes   [x] External Ears normal  [] Abnormal:  Neck  [x] No visualized mass  [] Abnormal:  Pulmonary/Chest   [x] Respiratory effort normal  [x] No visualized signs of difficulty breathing or respiratory distress  [] Abnormal:  Musculoskeletal  [] Normal gait with no signs of ataxia  [x] Normal range of motion of neck  [x] Abnormal: sitting  Neurological:   [x] No facial asymmetry (Cranial nerve 7 motor function)  [x] No gaze palsy  [] Abnormal:   Skin  [x] No significant exanthematous lesions or discoloration noted on facial skin  [] Abnormal:                                  Psychiatric  [x] Normal affect  [x] No hallucinations  [] Abnormal:    Other pertinent observable physical exam findings:    Due to this being a TeleHealth evaluation, many elements of the physical examination are unable to be assessed. HISTORY:     No past medical history on file. Past Surgical History:   Procedure Laterality Date    IR INSERT TUNL CVC W PORT OVER 5 YEARS  2/11/2021         IR INSERT TUNL CVC W PORT OVER 5 YEARS  2/11/2021      No family history on file. History reviewed, no pertinent family history.   Social History     Tobacco Use    Smoking status: Never Smoker    Smokeless tobacco: Never Used   Substance Use Topics    Alcohol use: Not Currently     Allergies   Allergen Reactions    Iodinated Contrast Media Itching      Current Outpatient Medications   Medication Sig    topiramate (TOPAMAX) 50 mg tablet Take 1 Tab by mouth two (2) times a day.  dexAMETHasone (DECADRON) 4 mg tablet Take 2 tabs (8mg) on days 2 & 3 of chemotherapy    ondansetron (ZOFRAN ODT) 8 mg disintegrating tablet Take 1 Tab by mouth every eight (8) hours as needed for Nausea or Vomiting.  ZOLMitriptan (ZOMIG) 2.5 mg tablet Take 1 Tab by mouth as needed for Migraine (May repeat in 2 hours if not improved. Max 10mg/24HRs).  topiramate (TOPAMAX) 50 mg tablet Take 1 Tab by mouth two (2) times a day.  acetaminophen (TYLENOL) 500 mg tablet Take  by mouth every six (6) hours as needed for Pain. 2 or 3 po daily    lidocaine-prilocaine (EMLA) topical cream Apply  to affected area as needed for Pain.  senna (SENOKOT) 8.6 mg tablet Take 2 Tabs by mouth daily.  polyethylene glycol (MIRALAX) 17 gram packet Take 1 Packet by mouth daily. Mix in 8 oz of water, juice, soda, coffee, or tea prior to administration    verapamiL (CALAN) 80 mg tablet Take 1 Tab by mouth three (3) times daily.  magnesium 250 mg tab Si po qhs    fexofenadine (ALLEGRA) 180 mg tablet Take 1 Tab by mouth daily. Indications: seasonal runny nose     No current facility-administered medications for this visit. LAB DATA REVIEWED:     Lab Results   Component Value Date/Time    WBC 6.1 2021 07:31 AM    HGB 13.2 2021 07:31 AM    PLATELET 101  07:31 AM     Lab Results   Component Value Date/Time    Sodium 138 2021 07:31 AM    Potassium 4.1 2021 07:31 AM    Chloride 107 2021 07:31 AM    CO2 21 2021 07:31 AM    BUN 11 2021 07:31 AM    Creatinine 0.72 2021 07:31 AM    Calcium 9.6 2021 07:31 AM    Magnesium 2.1 2021 07:31 AM    Phosphorus 4.4 2021 02:31 AM      Lab Results   Component Value Date/Time    Alk.  phosphatase 120 (H) 2021 07:25 AM    Protein, total 6.8 2021 07:25 AM    Albumin 3.4 (L) 2021 07:25 AM    Globulin 3.4 2021 07:25 AM     Lab Results   Component Value Date/Time    INR 1.1 02/04/2021 09:01 PM    Prothrombin time 11.0 02/04/2021 09:01 PM    aPTT 30.8 02/04/2021 09:01 PM      No results found for: IRON, FE, TIBC, IBCT, PSAT, FERR     PET/CT 2/18  FINDINGS:  HEAD/NECK: The right nasopharyngeal mass lesion is hypermetabolic, maximum SUV  is 15.3. Focal increased tracer activity is noted involving the very posterior  aspect of the septum. Bilateral hypermetabolic cervical lymph nodes are noted. CHEST: Small but hypermetabolic right supraclavicular lymph node is noted. ABDOMEN/PELVIS: No foci of abnormal hypermetabolism. SKELETON: No foci of abnormal hypermetabolism in the axial and visualized  appendicular skeleton. IMPRESSION  The right nasopharyngeal mass lesion is hypermetabolic and  compatible with the known neoplasm. Bilateral hypermetabolic cervical lymph  nodes. Focal increased tracer activity is seen involving the very posterior  aspect of the nasal septum. MRI orbit/face/neck 2/7:  FINDINGS:   Agree with above description of no significant change in the large right  nasopharyngeal mass extending into the right sphenoid sinus extending down into  the pterygoid region is present demonstrated on MRI and CTA 2/5/21. The right  internal carotid artery traverses in area of the skull is in contact with the  tumor in the foramen transversarium and proximal siphon region at the posterior  cavernous sinus. Again demonstrated is generalized mucosal thickening in sphenoid, right ethmoid  and maxillary sinus. Fluid right mastoid air cells. No abnormal intracranial enhancement demonstrated. No other masses. IMPRESSION  Again demonstrated is large right nasopharyngeal tumor as previously described  and without significant change. Some involvement right internal carotid artery. Associated mucosal thickening and fluid right paranasal sinuses.     MRI/MRA brain 2/5  FINDINGS:   MRI brain:  Ventricles and sulci are normal in size configuration. No evidence of acute  infarct. No edema or midline shift. No intracranial mass. In the right posterior nasopharynx there is a heterogeneously enhancing 4.2 x  4.0 cm soft tissue mass which extends superiorly into the sphenoid sinus and  obstructs the right ostiomeatal complex. There is circumferential lobulated  sinus mucosal thickening in the right maxillary sinus as well as the right  posterior ethmoid air cells. Right-sided mastoid effusion. MRA brain:  Narrowing of the right distal cervical internal carotid artery. Left distal  cervical internal carotid artery is normal. [place] is intact. Anterior  cerebral, middle cerebral, and posterior cerebral arteries are patent. Basilar  artery and distal vertebral arteries are patent. IMPRESSION  1. 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 2.  Diffuse sinus mucosal thickening. 3.  Mass related narrowing of the right distal cervical internal carotid artery  but no large vessel occlusion. 4.  No evidence of infarct. CTA neck 2/5:  CTA NECK:     Great vessels: Four-vessel aortic arch with patent origins. Right subclavian artery: Patent     Left subclavian artery: Patent      Right common carotid artery: Patent      Left common carotid artery: Patent      Cervical right internal carotid artery: Patent with no significant stenosis by  NASCET criteria. Attenuation of the right internal carotid artery as it passes  through the right nasopharyngeal mass, but no intraluminal thrombus or evidence  of active extravasation. Cervical left internal carotid artery: Patent with no significant stenosis by  NASCET criteria. Right vertebral artery: Patent     Left vertebral artery: Patent, arises directly off the aortic arch. Imaged lung apices are clear.  Thyroid gland is normal. Bilateral cervical  lymphadenopathy predominantly in level 2 and level 5A. 4.3 x 3.5 cm  heterogeneously enhancing right posterior nasopharyngeal soft tissue mass with  vessels coursing through the mass. No evidence of active extravasation. CTA HEAD 2/5:  Right cavernous internal carotid artery: Attenuated but patent     Left cavernous internal carotid artery: Patent     Anterior cerebral arteries: Patent     Anterior communicating artery: Patent     Right middle cerebral artery: Patent     Left middle cerebral artery: Patent     Posterior communicating arteries: Patent     Posterior cerebral arteries: Patent     Basilar artery: Patent     Distal vertebral arteries: Patent     No evidence for intracranial aneurysm or hemodynamically significant stenosis. IMPRESSION  1. Attenuation of the right internal carotid artery as it passes through the  right nasopharyngeal mass but no evidence of active extravasation or  intraluminal thrombus. 2.  Bilateral cervical lymphadenopathy in level 2 and level 5B. 3.  Right posterior nasopharyngeal mass with sphenoid sinus involvement and  diffuse sinus disease.             CONTROLLED SUBSTANCES SAFETY ASSESSMENT (IF ON CONTROLLED SUBSTANCES):     Reviewed opioid safety handout:  [] Yes   [] No  24 hour opioid dose >150mg morphine equivalent/day:  [] Yes   [] No  Benzodiazepines:  [] Yes   [] No  Sleep apnea:  [] Yes   [] No  Urine Toxicology Testing within last 6 months:  [] Yes   [] No  History of or new aberrant medication taking behaviors:  [] Yes   [] No  Has Narcan been prescribed [] Yes   [] No          Total time:   Counseling / coordination time:   > 50% counseling / coordination?:     Consent:  He and/or health care decision maker is aware that that he may receive a bill for this telehealth service, depending on his insurance coverage, and has provided verbal consent to proceed: Yes    CPT Codes 78284-54611 for Established Patients may apply to this Telehealth Visit    Pursuant to the emergency declaration under the [place] Act and the [address] [facility] authority and the [employer] and [employer] Act, this Virtual  Visit was conducted, with patient's consent, to reduce the patient's risk of exposure to COVID-19 and provide continuity of care for an established patient. Services were provided through a video synchronous discussion virtually to substitute for in-person clinic visit.

## 2021-03-31 NOTE — PROGRESS NOTES
Palliative Medicine Office Visit  Palliative Medicine Nurse Check In  [phone]    Patient Name: Maryanne Jackson  YOB: 1985      Date of Office Visit: 3/31/2021    Patient states: \"  \"    From Check In Sheet (scanned in Media):  Has a medical provider talked with you about cardiopulmonary resuscitation (CPR)? [x] Yes   [] No   [] Unable to obtain    Nurse reminder to complete or update ACP FlowSheet:    Is ACP on the Problem List?    [] Yes    [x] No  IF ACP Document is ON FILE; Nurse to place ACP on Problem List     Is there an ACP Note in Chart Review/Note? [] Yes    [x] No   If NO: ALERT PROVIDER       Primary Decision Maker (Active): Corazon Martin - [facility] [phone]    Secondary Decision Maker (Active): [phone], [phone] [facility] [phone]  Advance Care Planning 3/31/2021   Patient's Healthcare Decision Maker is: Verbal statement (Legal Next of Kin remains as decision maker)   Confirm Advance Directive None         Is there anything that we should know about you as a person in order to provide you the best care possible? Have you been to the ER, urgent care clinic since your last visit? [] Yes   [x] No   [] Unable to obtain    Have you been hospitalized since your last visit? [] Yes   [x] No   [] Unable to obtain    Have you seen or consulted any other health care providers outside of the [address] [place] [address] since your last visit?    [] Yes   [x] No   [] Unable to obtain    Functional status (describe):         Last BM: 3/31/2021     accessed (date): 3/31/2021    Bottle review (for opioid pain medication):  Medication 1:   Date filled:   Directions:   # filled:   # left:   # pills taking per day:  Last dose taken:    Medication 2:   Date filled:   Directions:   # filled:   # left:   # pills taking per day:  Last dose taken:    Medication 3:   Date filled:   Directions:   # filled:   # left:   # pills taking per day:  Last dose taken:     Medication 4:   Date filled:   Directions:   # filled:   # left:   # pills taking per day:  Last dose taken:

## 2021-04-05 NOTE — PROGRESS NOTES
[facility] [place] [facility] [place] [address] [address], [address], [place]: [phone]  F: [phone]    Reason for visit   Romana Hunter is a 29 y.o. 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 male with no significant PMH seen for above  He was seen by Dr. Dali Webber with ENT in December with c/o recurrent epistaxis x 2 months requiring ER visits and packing. His prior nasal endoscopy was clear. He was admitted on 2/4/2021 for recurrent HAs and epistaxis which lasted several minutes with improvement on pressure. He had a normal CBC for the most part and had no h/o bleeding growing up. MRI brain 2/5/2021 was obtained and showed a 4.2 x 4 cm posterior nasopharyngeal mass Extending into the sphenoid sinus with narrowing f the R distal internal carotid artery. CTA did not show active extravasation, but showed bilateral cervical adenopathy. He had a biopsy of the NP mass 2/6/2021 and pathology showed Nasopharyngeal carcinoma. He comes for cycle 3 day 1 of cisplatin / gemzar. 1 episode of nausea, 1 episode of minor epistaxis, no HA, no neuropathy      No past medical history on file. Past Surgical History:   Procedure Laterality Date    IR INSERT TUNL CVC W PORT OVER 5 YEARS  2/11/2021         IR INSERT TUNL CVC W PORT OVER 5 YEARS  2/11/2021      Social History     Tobacco Use    Smoking status: Never Smoker    Smokeless tobacco: Never Used   Substance Use Topics    Alcohol use: Not Currently      No family history on file.   Current Outpatient Medications   Medication Sig    topiramate (TOPAMAX) 50 mg tablet Take 1 Tab by mouth two (2) times a day.  dexAMETHasone (DECADRON) 4 mg tablet Take 2 tabs (8mg) on days 2 & 3 of chemotherapy    ondansetron (ZOFRAN ODT) 8 mg disintegrating tablet Take 1 Tab by mouth every eight (8) hours as needed for Nausea or Vomiting.  lidocaine-prilocaine (EMLA) topical cream Apply  to affected area as needed for Pain.  senna (SENOKOT) 8.6 mg tablet Take 2 Tabs by mouth daily.  polyethylene glycol (MIRALAX) 17 gram packet Take 1 Packet by mouth daily. Mix in 8 oz of water, juice, soda, coffee, or tea prior to administration    verapamiL (CALAN) 80 mg tablet Take 1 Tab by mouth three (3) times daily.  ZOLMitriptan (ZOMIG) 2.5 mg tablet Take 1 Tab by mouth as needed for Migraine (May repeat in 2 hours if not improved. Max 10mg/24HRs).  magnesium 250 mg tab Si po qhs    topiramate (TOPAMAX) 50 mg tablet Take 1 Tab by mouth two (2) times a day.  fexofenadine (ALLEGRA) 180 mg tablet Take 1 Tab by mouth daily. Indications: seasonal runny nose    acetaminophen (TYLENOL) 500 mg tablet Take  by mouth every six (6) hours as needed for Pain. 2 or 3 po daily     No current facility-administered medications for this visit. Allergies   Allergen Reactions    Iodinated Contrast Media Itching        Review of Systems: A complete review of systems was obtained, negative except as described above.     Physical Exam:     Visit Vitals  /88 (BP 1 Location: Left upper arm, BP Patient Position: Sitting)   Pulse 90   Temp 97.5 °F (36.4 °C)   Resp 16   Wt 199 lb (90.3 kg)   SpO2 98%   BMI 31.17 kg/m²     General appearance - alert, well appearing, and in no distress, nervous, poor eye contact  Mental status - oriented to person, place, and time  Mouth - mucous membranes moist  Neck - supple, PAC in place   Lymphatics - no palpable lymphadenopathy  Skin - normal coloration and turgor, no new rashes, no suspicious skin lesions noted    ECOG PS:1    Results:     Lab Results   Component Value Date/Time WBC 6.1 03/23/2021 07:31 AM    HGB 13.2 03/23/2021 07:31 AM    HCT 40.1 03/23/2021 07:31 AM    PLATELET 448 95/51/3546 07:31 AM    MCV 82.3 03/23/2021 07:31 AM    ABS. NEUTROPHILS 3.4 03/23/2021 07:31 AM     Lab Results   Component Value Date/Time    Sodium 138 03/23/2021 07:31 AM    Potassium 4.1 03/23/2021 07:31 AM    Chloride 107 03/23/2021 07:31 AM    CO2 21 03/23/2021 07:31 AM    Glucose 105 (H) 03/23/2021 07:31 AM    BUN 11 03/23/2021 07:31 AM    Creatinine 0.72 03/23/2021 07:31 AM    GFR est AA >60 03/23/2021 07:31 AM    GFR est non-AA >60 03/23/2021 07:31 AM    Calcium 9.6 03/23/2021 07:31 AM    Glucose (POC) 106 (H) 02/18/2021 10:16 AM     Lab Results   Component Value Date/Time    Bilirubin, total 0.2 03/16/2021 07:25 AM    ALT (SGPT) 76 03/16/2021 07:25 AM    Alk. phosphatase 120 (H) 03/16/2021 07:25 AM    Protein, total 6.8 03/16/2021 07:25 AM    Albumin 3.4 (L) 03/16/2021 07:25 AM    Globulin 3.4 03/16/2021 07:25 AM     Component      Latest Ref Rng & Units 2/7/2021          12:26 PM   Cristóbal-Purdy DNA QT, PCR      Negative copies/mL 327   Cristóbal-Barr Virus log10      log10 copy/mL 2.515       Records reviewed and summarized above. Pathology report(s) reviewed     PATHOLOGY     FINAL PATHOLOGIC DIAGNOSIS   1. Right nasopharyngeal mass, biopsy:   Nasopharyngeal carcinoma, nonkeratinizing, undifferentiated type (see comment)   In situ hybridization for Cristóbal-Barr virus (SEMAJ) is positive   2. Right nasopharyngeal mass, biopsy:   Nasopharyngeal carcinoma, nonkeratinizing, undifferentiated type   Comment   Immunohistochemical stains performed from block 1A show that the invasive carcinoma expresses p40, AE1/AE3, and CAM5.2 while negative for p16, CK7, and CK20. This immunoprofile supports the above rendered diagnosis     Radiology report(s) reviewed above. CTA neck    IMPRESSION  1.   Attenuation of the right internal carotid artery as it passes through the  right nasopharyngeal mass but no evidence of active extravasation or  intraluminal thrombus. 2.  Bilateral cervical lymphadenopathy in level 2 and level 5B. 3.  Right posterior nasopharyngeal mass with sphenoid sinus involvement and  diffuse sinus disease    MRI brain    IMPRESSION  1. 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 2.  Diffuse sinus mucosal thickening. 3.  Mass related narrowing of the right distal cervical internal carotid artery  but no large vessel occlusion. 4.  No evidence of infarct.       MRI orbit and face    IMPRESSION  Again demonstrated is large right nasopharyngeal tumor as previously described  and without significant change. Some involvement right internal carotid artery. Associated mucosal thickening and fluid right paranasal sinuses. PET 2/18/21:  IMPRESSION  The right nasopharyngeal mass lesion is hypermetabolic and  compatible with the known neoplasm. Bilateral hypermetabolic cervical lymph  nodes. Focal increased tracer activity is seen involving the very posterior  aspect of the nasal septum. Assessment:   1) Nasopharyngeal carcinoma- R   SEMAJ positive    4.2 x 4 cm, extends into the sphenoid sinus, bilateral cervical nodes. [place] but does not involve carotid artery    Nasopharyngeal carcinoma - at least T3N2M0-Stage III disease. This is an aggressive malignancy with high risk of fatal local complications  In his case , the abutment of carotid artery is highly concerning for risk of tumor extension and catastrophic bleeding  He is very symptomatic with severe pain, epistaxis, hearing impediment    His PET was reviewed from 2/18/21 and shows a significant fco burden. Hence, after discussion with radiation decision was made to move forward with induction chemo with cisplatin + gemzar x 3 cycles followed by chemoRT (with weekly Cisplatin)     Today is cycle 3 day 1.    Tolerating well with grade 1 nausea  Plan on PET CT after this cycle, he will also follow up with Dr. Hi [place]   His epistaxis and HA have resolved   All questions answered &  used     2) Epistaxis  Secondary to the mass  CTA without active extravasation  Resolved     3) Headaches  Secondary to the nasopharyngeal mass with splenoid sinus involvement  Controlled on Verapamil, Zomig, Oxycodone  Continue palliative care follow up    4) Nausea  Zofran prn    5) Management of high risk drugs such as chemotherapy  All questions answered   Labs reviewed & stable   Tolerated cycle 2 well         Plan:     · Proceed today with cycle 3 day 1 of cisplatin on day 1 only (80mg/m2) + gemzar on day 1, 8 (1000mg/m2) of a 21 day cycle x 3 cycles   · Dexamethasone on days 2 , 3   · zofran / compazine PRN   · Continue to follow with palliative care  · Labs / type & cross on off weeks & IVF   · PET after 3 cycles ordered   · Reached out to Dr. Sona Bennett    RTC 3 weeks tentatively to start chemoRT        I appreciate the opportunity to participate in Mr. Rola Mendoza's care.     Signed By: Ramsey Real NP

## 2021-04-06 ENCOUNTER — HOSPITAL ENCOUNTER (OUTPATIENT)
Dept: INFUSION THERAPY | Age: 36
Discharge: HOME | End: 2021-04-06
Payer: SUBSIDIZED

## 2021-04-06 ENCOUNTER — OFFICE VISIT (OUTPATIENT)
Dept: ONCOLOGY | Age: 36
End: 2021-04-06
Payer: SUBSIDIZED

## 2021-04-06 VITALS
BODY MASS INDEX: 31.17 KG/M2 | RESPIRATION RATE: 16 BRPM | HEART RATE: 90 BPM | SYSTOLIC BLOOD PRESSURE: 129 MMHG | WEIGHT: 199 LBS | DIASTOLIC BLOOD PRESSURE: 88 MMHG | TEMPERATURE: 97.5 F | OXYGEN SATURATION: 98 %

## 2021-04-06 VITALS
SYSTOLIC BLOOD PRESSURE: 131 MMHG | DIASTOLIC BLOOD PRESSURE: 89 MMHG | HEART RATE: 100 BPM | TEMPERATURE: 97.5 F | RESPIRATION RATE: 16 BRPM | HEIGHT: 67 IN | BODY MASS INDEX: 31.23 KG/M2 | WEIGHT: 199 LBS

## 2021-04-06 DIAGNOSIS — C76.0 HEAD AND NECK CANCER (HCC): Primary | ICD-10-CM

## 2021-04-06 DIAGNOSIS — C11.9 NPC (NASOPHARYNGEAL CARCINOMA) (HCC): Primary | ICD-10-CM

## 2021-04-06 LAB
ALBUMIN SERPL-MCNC: 3.6 G/DL (ref 3.5–5)
ALBUMIN/GLOB SERPL: 1 {RATIO} (ref 1.1–2.2)
ALP SERPL-CCNC: 100 U/L (ref 45–117)
ALT SERPL-CCNC: 23 U/L (ref 12–78)
ANION GAP SERPL CALC-SCNC: 7 MMOL/L (ref 5–15)
AST SERPL-CCNC: 9 U/L (ref 15–37)
BASOPHILS # BLD: 0.1 K/UL (ref 0–0.1)
BASOPHILS NFR BLD: 1 % (ref 0–1)
BILIRUB SERPL-MCNC: 0.2 MG/DL (ref 0.2–1)
BUN SERPL-MCNC: 11 MG/DL (ref 6–20)
BUN/CREAT SERPL: 18 (ref 12–20)
CALCIUM SERPL-MCNC: 8.6 MG/DL (ref 8.5–10.1)
CHLORIDE SERPL-SCNC: 113 MMOL/L (ref 97–108)
CO2 SERPL-SCNC: 22 MMOL/L (ref 21–32)
CREAT SERPL-MCNC: 0.61 MG/DL (ref 0.7–1.3)
DIFFERENTIAL METHOD BLD: ABNORMAL
EOSINOPHIL # BLD: 0.2 K/UL (ref 0–0.4)
EOSINOPHIL NFR BLD: 3 % (ref 0–7)
ERYTHROCYTE [DISTWIDTH] IN BLOOD BY AUTOMATED COUNT: 20.7 % (ref 11.5–14.5)
GLOBULIN SER CALC-MCNC: 3.5 G/DL (ref 2–4)
GLUCOSE SERPL-MCNC: 76 MG/DL (ref 65–100)
HCT VFR BLD AUTO: 37.7 % (ref 36.6–50.3)
HGB BLD-MCNC: 12.2 G/DL (ref 12.1–17)
IMM GRANULOCYTES # BLD AUTO: 0.1 K/UL (ref 0–0.04)
IMM GRANULOCYTES NFR BLD AUTO: 1 % (ref 0–0.5)
LYMPHOCYTES # BLD: 1.9 K/UL (ref 0.8–3.5)
LYMPHOCYTES NFR BLD: 25 % (ref 12–49)
MAGNESIUM SERPL-MCNC: 2.2 MG/DL (ref 1.6–2.4)
MCH RBC QN AUTO: 27.1 PG (ref 26–34)
MCHC RBC AUTO-ENTMCNC: 32.4 G/DL (ref 30–36.5)
MCV RBC AUTO: 83.8 FL (ref 80–99)
MONOCYTES # BLD: 1 K/UL (ref 0–1)
MONOCYTES NFR BLD: 13 % (ref 5–13)
NEUTS SEG # BLD: 4.3 K/UL (ref 1.8–8)
NEUTS SEG NFR BLD: 57 % (ref 32–75)
NRBC # BLD: 0 K/UL (ref 0–0.01)
NRBC BLD-RTO: 0 PER 100 WBC
PLATELET # BLD AUTO: 272 K/UL (ref 150–400)
PMV BLD AUTO: 9.6 FL (ref 8.9–12.9)
POTASSIUM SERPL-SCNC: 4 MMOL/L (ref 3.5–5.1)
PROT SERPL-MCNC: 7.1 G/DL (ref 6.4–8.2)
RBC # BLD AUTO: 4.5 M/UL (ref 4.1–5.7)
RBC MORPH BLD: ABNORMAL
SODIUM SERPL-SCNC: 142 MMOL/L (ref 136–145)
WBC # BLD AUTO: 7.6 K/UL (ref 4.1–11.1)

## 2021-04-06 PROCEDURE — 96415 CHEMO IV INFUSION ADDL HR: CPT

## 2021-04-06 PROCEDURE — 96417 CHEMO IV INFUS EACH ADDL SEQ: CPT

## 2021-04-06 PROCEDURE — 80053 COMPREHEN METABOLIC PANEL: CPT

## 2021-04-06 PROCEDURE — 74011250636 HC RX REV CODE- 250/636: Performed by: REGISTERED NURSE

## 2021-04-06 PROCEDURE — 77030012965 HC NDL HUBR BBMI -A

## 2021-04-06 PROCEDURE — 74011000258 HC RX REV CODE- 258: Performed by: REGISTERED NURSE

## 2021-04-06 PROCEDURE — 83735 ASSAY OF MAGNESIUM: CPT

## 2021-04-06 PROCEDURE — 96413 CHEMO IV INFUSION 1 HR: CPT

## 2021-04-06 PROCEDURE — 36415 COLL VENOUS BLD VENIPUNCTURE: CPT

## 2021-04-06 PROCEDURE — 85025 COMPLETE CBC W/AUTO DIFF WBC: CPT

## 2021-04-06 PROCEDURE — 96375 TX/PRO/DX INJ NEW DRUG ADDON: CPT

## 2021-04-06 PROCEDURE — 96368 THER/DIAG CONCURRENT INF: CPT

## 2021-04-06 PROCEDURE — 96367 TX/PROPH/DG ADDL SEQ IV INF: CPT

## 2021-04-06 PROCEDURE — 99215 OFFICE O/P EST HI 40 MIN: CPT | Performed by: INTERNAL MEDICINE

## 2021-04-06 PROCEDURE — 96361 HYDRATE IV INFUSION ADD-ON: CPT

## 2021-04-06 RX ORDER — PALONOSETRON 0.05 MG/ML
0.25 INJECTION, SOLUTION INTRAVENOUS ONCE
Status: COMPLETED | OUTPATIENT
Start: 2021-04-06 | End: 2021-04-06

## 2021-04-06 RX ORDER — SODIUM CHLORIDE 9 MG/ML
10 INJECTION INTRAMUSCULAR; INTRAVENOUS; SUBCUTANEOUS AS NEEDED
Status: DISPENSED | OUTPATIENT
Start: 2021-04-06 | End: 2021-04-06

## 2021-04-06 RX ORDER — SODIUM CHLORIDE 9 MG/ML
25 INJECTION, SOLUTION INTRAVENOUS CONTINUOUS
Status: DISPENSED | OUTPATIENT
Start: 2021-04-06 | End: 2021-04-06

## 2021-04-06 RX ORDER — SODIUM CHLORIDE 0.9 % (FLUSH) 0.9 %
10 SYRINGE (ML) INJECTION AS NEEDED
Status: DISPENSED | OUTPATIENT
Start: 2021-04-06 | End: 2021-04-06

## 2021-04-06 RX ORDER — HEPARIN 100 UNIT/ML
300-500 SYRINGE INTRAVENOUS AS NEEDED
Status: ACTIVE | OUTPATIENT
Start: 2021-04-06 | End: 2021-04-06

## 2021-04-06 RX ADMIN — DEXAMETHASONE SODIUM PHOSPHATE 12 MG: 4 INJECTION, SOLUTION INTRA-ARTICULAR; INTRALESIONAL; INTRAMUSCULAR; INTRAVENOUS; SOFT TISSUE at 10:41

## 2021-04-06 RX ADMIN — SODIUM CHLORIDE 25 ML/HR: 900 INJECTION, SOLUTION INTRAVENOUS at 09:24

## 2021-04-06 RX ADMIN — SODIUM CHLORIDE 10 ML: 9 INJECTION INTRAMUSCULAR; INTRAVENOUS; SUBCUTANEOUS at 07:30

## 2021-04-06 RX ADMIN — MAGNESIUM SULFATE HEPTAHYDRATE: 500 INJECTION, SOLUTION INTRAMUSCULAR; INTRAVENOUS at 11:27

## 2021-04-06 RX ADMIN — Medication 10 ML: at 14:50

## 2021-04-06 RX ADMIN — SODIUM CHLORIDE 150 MG: 900 INJECTION, SOLUTION INTRAVENOUS at 11:00

## 2021-04-06 RX ADMIN — MAGNESIUM SULFATE HEPTAHYDRATE: 500 INJECTION, SOLUTION INTRAMUSCULAR; INTRAVENOUS at 09:25

## 2021-04-06 RX ADMIN — PALONOSETRON 0.25 MG: 0.05 INJECTION, SOLUTION INTRAVENOUS at 10:39

## 2021-04-06 RX ADMIN — GEMCITABINE 2000 MG: 38 INJECTION, SOLUTION INTRAVENOUS at 11:27

## 2021-04-06 RX ADMIN — HEPARIN 500 UNITS: 100 SYRINGE at 14:50

## 2021-04-06 RX ADMIN — Medication 10 ML: at 07:30

## 2021-04-06 RX ADMIN — CISPLATIN 160 MG: 100 INJECTION, SOLUTION INTRAVENOUS at 12:09

## 2021-04-06 NOTE — PROGRESS NOTES
Laila Teixeira is a 29 y.o. male    Chief Complaint   Patient presents with    Chemotherapy     nasopharyngeal Carcinoma       1. Have you been to the ER, urgent care clinic since your last visit? Hospitalized since your last visit? No    2. Have you seen or consulted any other health care providers outside of the [address] [place] [address] since your last visit? Include any pap smears or colon screening.  No    Patient states he has had both covid vaccines

## 2021-04-06 NOTE — PROGRESS NOTES
[facility] Chemo Progress Note    Date: 2021    Name: Kirby Nash    MRN: [id number]         : 1985    0730 Mr. Casey Wang Arrived to [facility] for  C3 Gemzar/Cisplatin ambulatory in stable condition. Assessment was completed, no acute issues at this time, no new complaints voiced. Port accessed with positive blood return. Labs drawn and sent for processing. Patient to MD office. Patient returned to [place] [address]. Normal Saline started at [facility]. Chemotherapy Flowsheet 2021   Cycle C3D1   Date 2021   Drug / Regimen Gemzar/Cisplatin   Pre Hydration given   Post Hydration given   Pre Meds given   Notes given         Patient denies SOB, fever, cough, general not feeling well. Patient denies recent exposure to someone who has tested positive for COVID-19. Patient denies having contact with anyone who has a pending COVID test.      Mr. Hang Mendoza's vitals were reviewed. Patient Vitals for the past 12 hrs:   Temp Pulse Resp BP   21 1450  100 16 131/89   21 0732 97.5 °F (36.4 °C) 90 16 129/88         Lab results were obtained and reviewed. Recent Results (from the past 12 hour(s))   CBC WITH AUTOMATED DIFF    Collection Time: 21  7:34 AM   Result Value Ref Range    WBC 7.6 4.1 - 11.1 K/uL    RBC 4.50 4. 10 - 5.70 M/uL    HGB 12.2 12.1 - 17.0 g/dL    HCT 37.7 36.6 - 50.3 %    MCV 83.8 80.0 - 99.0 FL    MCH 27.1 26.0 - 34.0 PG    MCHC 32.4 30.0 - 36.5 g/dL    RDW 20.7 (H) 11.5 - 14.5 %    PLATELET 039 170 - 389 K/uL    MPV 9.6 8.9 - 12.9 FL    NRBC 0.0 0  WBC    ABSOLUTE NRBC 0.00 0.00 - 0.01 K/uL    NEUTROPHILS 57 32 - 75 %    LYMPHOCYTES 25 12 - 49 %    MONOCYTES 13 5 - 13 %    EOSINOPHILS 3 0 - 7 %    BASOPHILS 1 0 - 1 %    IMMATURE GRANULOCYTES 1 (H) 0.0 - 0.5 %    ABS. NEUTROPHILS 4.3 1.8 - 8.0 K/UL    ABS. LYMPHOCYTES 1.9 0.8 - 3.5 K/UL    ABS. MONOCYTES 1.0 0.0 - 1.0 K/UL    ABS. EOSINOPHILS 0.2 0.0 - 0.4 K/UL    ABS. BASOPHILS 0.1 0.0 - 0.1 K/UL    ABS. IMM. Ana Paularandaana Roldan. 0.1 (H) 0.00 - 0.04 K/UL    DF SMEAR SCANNED      RBC COMMENTS ANISOCYTOSIS  2+       METABOLIC PANEL, COMPREHENSIVE    Collection Time: 04/06/21  7:34 AM   Result Value Ref Range    Sodium 142 136 - 145 mmol/L    Potassium 4.0 3.5 - 5.1 mmol/L    Chloride 113 (H) 97 - 108 mmol/L    CO2 22 21 - 32 mmol/L    Anion gap 7 5 - 15 mmol/L    Glucose 76 65 - 100 mg/dL    BUN 11 6 - 20 MG/DL    Creatinine 0.61 (L) 0.70 - 1.30 MG/DL    BUN/Creatinine ratio 18 12 - 20      GFR est AA >60 >60 ml/min/1.73m2    GFR est non-AA >60 >60 ml/min/1.73m2    Calcium 8.6 8.5 - 10.1 MG/DL    Bilirubin, total 0.2 0.2 - 1.0 MG/DL    ALT (SGPT) 23 12 - 78 U/L    AST (SGOT) 9 (L) 15 - 37 U/L    Alk. phosphatase 100 45 - 117 U/L    Protein, total 7.1 6.4 - 8.2 g/dL    Albumin 3.6 3.5 - 5.0 g/dL    Globulin 3.5 2.0 - 4.0 g/dL    A-G Ratio 1.0 (L) 1.1 - 2.2     MAGNESIUM    Collection Time: 04/06/21  7:34 AM   Result Value Ref Range    Magnesium 2.2 1.6 - 2.4 mg/dL       Pre-medications  were administered as ordered and chemotherapy was initiated.   Medications Administered     0.9% sodium chloride 1,000 mL with potassium chloride 10 mEq, magnesium sulfate 2 g infusion     Admin Date  04/06/2021 Action  Given Dose   Rate  1,000 mL/hr Route  IntraVENous Administered By  Tyrell Walker RN           Admin Date  04/06/2021 Action  Given Dose   Rate  1,000 mL/hr Route  IntraVENous Administered By  Tyrell Walker RN          0.9% sodium chloride infusion     Admin Date  04/06/2021 Action  New Bag Dose  25 mL/hr Rate  25 mL/hr Route  IntraVENous Administered By  Tyrell Walker RN          0.9% sodium chloride injection 10 mL     Admin Date  04/06/2021 Action  Given Dose  10 mL Route  IntraVENous Administered By  Tyrell Walker RN          CISplatin (PLATINOL) 160 mg in 0.9% sodium chloride 500 mL, overfill volume 50 mL chemo infusion     Admin Date  04/06/2021 Action  New Bag Dose  160 mg Rate  355 mL/hr Route  IntraVENous Administered By  Lew Delaney RN          dexamethasone (DECADRON) 12 mg in 0.9% sodium chloride 50 mL, overfill volume 5 mL IVPB     Admin Date  04/06/2021 Action  Given Dose  12 mg Rate  232 mL/hr Route  IntraVENous Administered By  Lew Delaney RN          fosaprepitant (EMEND) 150 mg in 0.9% sodium chloride 150 mL IVPB     Admin Date  04/06/2021 Action  Given Dose  150 mg Rate  450 mL/hr Route  IntraVENous Administered By  Lew Delaney RN          gemcitabine (GEMZAR) 2,000 mg in 0.9% sodium chloride 250 mL, overfill volume 25 mL chemo infusion     Admin Date  04/06/2021 Action  New Bag Dose  2,000 mg Rate  655.2 mL/hr Route  IntraVENous Administered By  Lew Delaney RN          palonosetron HCl (ALOXI) injection 0.25 mg     Admin Date  04/06/2021 Action  Given Dose  0.25 mg Route  IntraVENous Administered By  Lew Delaney RN          sodium chloride (NS) flush 10 mL     Admin Date  04/06/2021 Action  Given Dose  10 mL Route  IntraVENous Administered By  Lew Delaney RN                  [id number] Patient tolerated treatment well. Port maintained positive blood return throughout treatment. Port flushed, heparinized and de accessed per protocol. Patient was discharged from [place] [address].       Future Appointments   Date Time Provider Renae Fajardo   4/13/2021  7:30 AM  JOSUE LONG [address] [place] [address]   4/15/2021  8:00 AM [facility] RCR PET DOSE 1 SANDER MENDEZ   4/15/2021  9:00 AM [facility] RCR PET 1 SMHRCHPET REYNOLDS IMA [place] Meigs, RN  April 6, 2021

## 2021-04-06 NOTE — Clinical Note
Ollie Parker    In 3 weeks planning tos start chemoRT  Could you suggest weekly dose of Cisplatin for this?  Ana Rosa please do orders

## 2021-04-07 ENCOUNTER — TELEPHONE (OUTPATIENT)
Dept: FAMILY MEDICINE CLINIC | Age: 36
End: 2021-04-07

## 2021-04-07 NOTE — TELEPHONE ENCOUNTER
Patient called the morning line for appointments and urgently needs a refill. The pharmacy informed him that they did not a refill for him? We tried to get him scheduled but he has been under cancer treatment and is not able to drive - for today. He needs refills.

## 2021-04-07 NOTE — TELEPHONE ENCOUNTER
Tc to the pt to ask him which medication he needs refilled so we can send it to a provider who is available today. The pt stated he needs his BP medication refilled. He is not sure what the name of the medication is he does not it is for his BP. He stated he is on only 1 medication for BP. The Verapamil is the medication noted in the chart for BP. The pt stated that he thought the BP medication may be Verapamil. This medication was ordered in 02/01.21, and it is for #90, with 3 refills. Meaning he should have enough for a year until next Feb 2022. The pt stated he did not know this he had not tried to pick it up from the Pharmacy yet. The pt was encouraged to get the bottle of the medication and call the Pharmacy and use the Japanese option when he calls and put in the Rx number located on the bottle. He should be able to call in for his refills. The pt was advised to call this nurse back if he has difficulty obtaining his refills from the Pharmacy.  Lord Alejandra RN

## 2021-04-13 ENCOUNTER — APPOINTMENT (OUTPATIENT)
Dept: INFUSION THERAPY | Age: 36
End: 2021-04-13

## 2021-04-13 ENCOUNTER — HOSPITAL ENCOUNTER (OUTPATIENT)
Dept: INFUSION THERAPY | Age: 36
Discharge: HOME | End: 2021-04-13
Payer: SUBSIDIZED

## 2021-04-13 VITALS
BODY MASS INDEX: 30.48 KG/M2 | SYSTOLIC BLOOD PRESSURE: 110 MMHG | TEMPERATURE: 96.5 F | HEIGHT: 67 IN | HEART RATE: 84 BPM | WEIGHT: 194.2 LBS | DIASTOLIC BLOOD PRESSURE: 77 MMHG | RESPIRATION RATE: 18 BRPM

## 2021-04-13 DIAGNOSIS — C76.0 HEAD AND NECK CANCER (HCC): Primary | ICD-10-CM

## 2021-04-13 LAB
ANION GAP SERPL CALC-SCNC: 9 MMOL/L (ref 5–15)
BASOPHILS # BLD: 0.1 K/UL (ref 0–0.1)
BASOPHILS NFR BLD: 1 % (ref 0–1)
BUN SERPL-MCNC: 9 MG/DL (ref 6–20)
BUN/CREAT SERPL: 14 (ref 12–20)
CALCIUM SERPL-MCNC: 9.4 MG/DL (ref 8.5–10.1)
CHLORIDE SERPL-SCNC: 107 MMOL/L (ref 97–108)
CO2 SERPL-SCNC: 22 MMOL/L (ref 21–32)
CREAT SERPL-MCNC: 0.66 MG/DL (ref 0.7–1.3)
DIFFERENTIAL METHOD BLD: ABNORMAL
EOSINOPHIL # BLD: 0 K/UL (ref 0–0.4)
EOSINOPHIL NFR BLD: 0 % (ref 0–7)
ERYTHROCYTE [DISTWIDTH] IN BLOOD BY AUTOMATED COUNT: 19.6 % (ref 11.5–14.5)
GLUCOSE SERPL-MCNC: 84 MG/DL (ref 65–100)
HCT VFR BLD AUTO: 39.9 % (ref 36.6–50.3)
HGB BLD-MCNC: 13.4 G/DL (ref 12.1–17)
IMM GRANULOCYTES # BLD AUTO: 0 K/UL
IMM GRANULOCYTES NFR BLD AUTO: 0 %
LYMPHOCYTES # BLD: 2.2 K/UL (ref 0.8–3.5)
LYMPHOCYTES NFR BLD: 35 % (ref 12–49)
MAGNESIUM SERPL-MCNC: 2.1 MG/DL (ref 1.6–2.4)
MCH RBC QN AUTO: 27.5 PG (ref 26–34)
MCHC RBC AUTO-ENTMCNC: 33.6 G/DL (ref 30–36.5)
MCV RBC AUTO: 81.8 FL (ref 80–99)
MONOCYTES # BLD: 0.4 K/UL (ref 0–1)
MONOCYTES NFR BLD: 6 % (ref 5–13)
MYELOCYTES NFR BLD MANUAL: 2 %
NEUTS SEG # BLD: 3.5 K/UL (ref 1.8–8)
NEUTS SEG NFR BLD: 56 % (ref 32–75)
NRBC # BLD: 0 K/UL (ref 0–0.01)
NRBC BLD-RTO: 0 PER 100 WBC
PLATELET # BLD AUTO: 308 K/UL (ref 150–400)
PLATELET COMMENTS,PCOM: ABNORMAL
PMV BLD AUTO: 9.3 FL (ref 8.9–12.9)
POTASSIUM SERPL-SCNC: 4.3 MMOL/L (ref 3.5–5.1)
RBC # BLD AUTO: 4.88 M/UL (ref 4.1–5.7)
RBC MORPH BLD: ABNORMAL
SODIUM SERPL-SCNC: 138 MMOL/L (ref 136–145)
WBC # BLD AUTO: 6.2 K/UL (ref 4.1–11.1)

## 2021-04-13 PROCEDURE — 74011250636 HC RX REV CODE- 250/636: Performed by: REGISTERED NURSE

## 2021-04-13 PROCEDURE — 77030012965 HC NDL HUBR BBMI -A

## 2021-04-13 PROCEDURE — 83735 ASSAY OF MAGNESIUM: CPT

## 2021-04-13 PROCEDURE — 85025 COMPLETE CBC W/AUTO DIFF WBC: CPT

## 2021-04-13 PROCEDURE — 96375 TX/PRO/DX INJ NEW DRUG ADDON: CPT

## 2021-04-13 PROCEDURE — 36415 COLL VENOUS BLD VENIPUNCTURE: CPT

## 2021-04-13 PROCEDURE — 96413 CHEMO IV INFUSION 1 HR: CPT

## 2021-04-13 PROCEDURE — 80048 BASIC METABOLIC PNL TOTAL CA: CPT

## 2021-04-13 RX ORDER — SODIUM CHLORIDE 9 MG/ML
10 INJECTION INTRAMUSCULAR; INTRAVENOUS; SUBCUTANEOUS AS NEEDED
Status: ACTIVE | OUTPATIENT
Start: 2021-04-13 | End: 2021-04-13

## 2021-04-13 RX ORDER — ONDANSETRON 2 MG/ML
8 INJECTION INTRAMUSCULAR; INTRAVENOUS ONCE
Status: COMPLETED | OUTPATIENT
Start: 2021-04-13 | End: 2021-04-13

## 2021-04-13 RX ORDER — SODIUM CHLORIDE 0.9 % (FLUSH) 0.9 %
10 SYRINGE (ML) INJECTION AS NEEDED
Status: DISPENSED | OUTPATIENT
Start: 2021-04-13 | End: 2021-04-13

## 2021-04-13 RX ORDER — HEPARIN 100 UNIT/ML
300-500 SYRINGE INTRAVENOUS AS NEEDED
Status: ACTIVE | OUTPATIENT
Start: 2021-04-13 | End: 2021-04-13

## 2021-04-13 RX ORDER — SODIUM CHLORIDE 9 MG/ML
25 INJECTION, SOLUTION INTRAVENOUS CONTINUOUS
Status: DISPENSED | OUTPATIENT
Start: 2021-04-13 | End: 2021-04-13

## 2021-04-13 RX ADMIN — SODIUM CHLORIDE 10 ML: 9 INJECTION INTRAMUSCULAR; INTRAVENOUS; SUBCUTANEOUS at 07:45

## 2021-04-13 RX ADMIN — SODIUM CHLORIDE 25 ML/HR: 900 INJECTION, SOLUTION INTRAVENOUS at 09:05

## 2021-04-13 RX ADMIN — Medication 10 ML: at 10:29

## 2021-04-13 RX ADMIN — ONDANSETRON 8 MG: 2 INJECTION, SOLUTION INTRAMUSCULAR; INTRAVENOUS at 09:07

## 2021-04-13 RX ADMIN — GEMCITABINE 2000 MG: 38 INJECTION, SOLUTION INTRAVENOUS at 09:57

## 2021-04-13 RX ADMIN — HEPARIN 500 UNITS: 100 SYRINGE at 10:30

## 2021-04-13 RX ADMIN — HEPARIN 500 UNITS: 100 SYRINGE at 07:45

## 2021-04-13 NOTE — PROGRESS NOTES
[facility] Chemotherapy Progress Note    0730 Pt admit to [facility] for Gemzar ambulatory in stable condition. Assessment completed. No new concerns voiced. Port accessed with positive blood return. Labs drawn and sent for processing. [place]    Patient denies SOB, fever, cough, and/or general not feeling well. Patient denies recent exposure to someone who has tested positive for COVID-19.  Patient denies contact with anyone who has a pending COVID test.     Chemotherapy Flowsheet 4/13/2021   Cycle C3 D8   Date 4/13/2021   Drug / Regimen Gemzar   Pre Hydration -   Post Hydration -   Pre Meds given   Notes given       Visit Vitals  /77   Pulse 84   Temp (!) 96.5 °F (35.8 °C)   Resp 18   Ht 5' 7\" (1.702 m)   Wt 88.1 kg (194 lb 3.2 oz)   BMI 30.42 kg/m²       Medications:  Medications Administered     0.9% sodium chloride infusion     Admin Date  04/13/2021 Action  New Bag Dose  25 mL/hr Rate  25 mL/hr Route  IntraVENous Administered By  Warren Gupta RN          0.9% sodium chloride injection 10 mL     Admin Date  04/13/2021 Action  Given Dose  10 mL Route  IntraVENous Administered By  Warren Gupta RN          gemcitabine (GEMZAR) 2,000 mg in 0.9% sodium chloride 250 mL, overfill volume 25 mL chemo infusion     Admin Date  04/13/2021 Action  New Bag Dose  2,000 mg Rate  655.2 mL/hr Route  IntraVENous Administered By  Warren Gupta RN          heparin (porcine) pf 300-500 Units     Admin Date  04/13/2021 Action  Given Dose  500 Units Route  InterCATHeter Administered By  Warren Gutpa RN           Admin Date  04/13/2021 Action  Given Dose  500 Units Route  InterCATHeter Administered By  Warren Gupta RN          ondansetron [facility]) injection 8 mg     Admin Date  04/13/2021 Action  Given Dose  8 mg Route  IntraVENous Administered By  Warren Gupta RN          sodium chloride (NS) flush 10 mL     Admin Date  04/13/2021 Action  Given Dose  10 mL Route  IntraVENous Administered By  Janae Mayer RN                1030 Pt tolerated treatment well. Port maintained positive blood return throughout treatment, flushed with positive blood return at conclusion. D/c home ambulatory in no distress. Pt aware of next appointment scheduled for 4/27/21    Recent Results (from the past 12 hour(s))   CBC WITH AUTOMATED DIFF    Collection Time: 04/13/21  7:40 AM   Result Value Ref Range    WBC 6.2 4.1 - 11.1 K/uL    RBC 4.88 4.10 - 5.70 M/uL    HGB 13.4 12.1 - 17.0 g/dL    HCT 39.9 36.6 - 50.3 %    MCV 81.8 80.0 - 99.0 FL    MCH 27.5 26.0 - 34.0 PG    MCHC 33.6 30.0 - 36.5 g/dL    RDW 19.6 (H) 11.5 - 14.5 %    PLATELET 352 897 - 258 K/uL    MPV 9.3 8.9 - 12.9 FL    NRBC 0.0 0  WBC    ABSOLUTE NRBC 0.00 0.00 - 0.01 K/uL    NEUTROPHILS 56 32 - 75 %    LYMPHOCYTES 35 12 - 49 %    MONOCYTES 6 5 - 13 %    EOSINOPHILS 0 0 - 7 %    BASOPHILS 1 0 - 1 %    MYELOCYTES 2 (H) 0 %    IMMATURE GRANULOCYTES 0 %    ABS. NEUTROPHILS 3.5 1.8 - 8.0 K/UL    ABS. LYMPHOCYTES 2.2 0.8 - 3.5 K/UL    ABS. MONOCYTES 0.4 0.0 - 1.0 K/UL    ABS. EOSINOPHILS 0.0 0.0 - 0.4 K/UL    ABS. BASOPHILS 0.1 0.0 - 0.1 K/UL    ABS. IMM.  GRANS. 0.0 K/UL    DF MANUAL      PLATELET COMMENTS Large Platelets      RBC COMMENTS ANISOCYTOSIS  1+       METABOLIC PANEL, BASIC    Collection Time: 04/13/21  7:40 AM   Result Value Ref Range    Sodium 138 136 - 145 mmol/L    Potassium 4.3 3.5 - 5.1 mmol/L    Chloride 107 97 - 108 mmol/L    CO2 22 21 - 32 mmol/L    Anion gap 9 5 - 15 mmol/L    Glucose 84 65 - 100 mg/dL    BUN 9 6 - 20 MG/DL    Creatinine 0.66 (L) 0.70 - 1.30 MG/DL    BUN/Creatinine ratio 14 12 - 20      GFR est AA >60 >60 ml/min/1.73m2    GFR est non-AA >60 >60 ml/min/1.73m2    Calcium 9.4 8.5 - 10.1 MG/DL   MAGNESIUM    Collection Time: 04/13/21  7:40 AM   Result Value Ref Range    Magnesium 2.1 1.6 - 2.4 mg/dL

## 2021-04-15 ENCOUNTER — HOSPITAL ENCOUNTER (OUTPATIENT)
Dept: PET IMAGING | Age: 36
Discharge: HOME | End: 2021-04-15
Attending: INTERNAL MEDICINE
Payer: SUBSIDIZED

## 2021-04-15 VITALS — HEIGHT: 67 IN | WEIGHT: 195 LBS | BODY MASS INDEX: 30.61 KG/M2

## 2021-04-15 DIAGNOSIS — C11.9 NPC (NASOPHARYNGEAL CARCINOMA) (HCC): ICD-10-CM

## 2021-04-15 LAB
GLUCOSE BLD STRIP.AUTO-MCNC: 113 MG/DL (ref 65–100)
SERVICE CMNT-IMP: ABNORMAL

## 2021-04-15 PROCEDURE — A9552 F18 FDG: HCPCS

## 2021-04-15 RX ORDER — SODIUM CHLORIDE 0.9 % (FLUSH) 0.9 %
10 SYRINGE (ML) INJECTION
Status: COMPLETED | OUTPATIENT
Start: 2021-04-15 | End: 2021-04-15

## 2021-04-15 RX ADMIN — Medication 10 ML: at 08:12

## 2021-04-20 ENCOUNTER — HOSPITAL ENCOUNTER (OUTPATIENT)
Dept: INFUSION THERAPY | Age: 36
Discharge: HOME | End: 2021-04-20

## 2021-04-22 RX ORDER — SODIUM CHLORIDE 9 MG/ML
10 INJECTION INTRAMUSCULAR; INTRAVENOUS; SUBCUTANEOUS AS NEEDED
Status: CANCELLED | OUTPATIENT
Start: 2021-06-23

## 2021-04-22 RX ORDER — EPINEPHRINE 1 MG/ML
0.3 INJECTION, SOLUTION, CONCENTRATE INTRAVENOUS AS NEEDED
Status: CANCELLED | OUTPATIENT
Start: 2021-05-26

## 2021-04-22 RX ORDER — ALBUTEROL SULFATE 0.83 MG/ML
2.5 SOLUTION RESPIRATORY (INHALATION) AS NEEDED
Status: CANCELLED
Start: 2021-06-23

## 2021-04-22 RX ORDER — EPINEPHRINE 1 MG/ML
0.3 INJECTION, SOLUTION, CONCENTRATE INTRAVENOUS AS NEEDED
Status: CANCELLED | OUTPATIENT
Start: 2021-05-19

## 2021-04-22 RX ORDER — EPINEPHRINE 1 MG/ML
0.3 INJECTION, SOLUTION, CONCENTRATE INTRAVENOUS AS NEEDED
Status: CANCELLED | OUTPATIENT
Start: 2021-06-09

## 2021-04-22 RX ORDER — DIPHENHYDRAMINE HYDROCHLORIDE 50 MG/ML
25 INJECTION, SOLUTION INTRAMUSCULAR; INTRAVENOUS AS NEEDED
Status: CANCELLED
Start: 2021-04-27

## 2021-04-22 RX ORDER — DIPHENHYDRAMINE HYDROCHLORIDE 50 MG/ML
50 INJECTION, SOLUTION INTRAMUSCULAR; INTRAVENOUS AS NEEDED
Status: CANCELLED
Start: 2021-06-03

## 2021-04-22 RX ORDER — SODIUM CHLORIDE 9 MG/ML
25 INJECTION, SOLUTION INTRAVENOUS CONTINUOUS
Status: CANCELLED | OUTPATIENT
Start: 2021-06-23

## 2021-04-22 RX ORDER — DIPHENHYDRAMINE HYDROCHLORIDE 50 MG/ML
50 INJECTION, SOLUTION INTRAMUSCULAR; INTRAVENOUS AS NEEDED
Status: CANCELLED
Start: 2021-06-09

## 2021-04-22 RX ORDER — HEPARIN 100 UNIT/ML
300-500 SYRINGE INTRAVENOUS AS NEEDED
Status: CANCELLED
Start: 2021-06-03

## 2021-04-22 RX ORDER — SODIUM CHLORIDE 0.9 % (FLUSH) 0.9 %
10 SYRINGE (ML) INJECTION AS NEEDED
Status: CANCELLED | OUTPATIENT
Start: 2021-06-09

## 2021-04-22 RX ORDER — ONDANSETRON 2 MG/ML
8 INJECTION INTRAMUSCULAR; INTRAVENOUS AS NEEDED
Status: CANCELLED | OUTPATIENT
Start: 2021-04-27

## 2021-04-22 RX ORDER — DIPHENHYDRAMINE HYDROCHLORIDE 50 MG/ML
25 INJECTION, SOLUTION INTRAMUSCULAR; INTRAVENOUS AS NEEDED
Status: CANCELLED
Start: 2021-06-23

## 2021-04-22 RX ORDER — PALONOSETRON 0.05 MG/ML
0.25 INJECTION, SOLUTION INTRAVENOUS ONCE
Status: CANCELLED | OUTPATIENT
Start: 2021-06-23 | End: 2021-06-08

## 2021-04-22 RX ORDER — PALONOSETRON 0.05 MG/ML
0.25 INJECTION, SOLUTION INTRAVENOUS ONCE
Status: CANCELLED | OUTPATIENT
Start: 2021-05-26 | End: 2021-05-11

## 2021-04-22 RX ORDER — HYDROCORTISONE SODIUM SUCCINATE 100 MG/2ML
100 INJECTION, POWDER, FOR SOLUTION INTRAMUSCULAR; INTRAVENOUS AS NEEDED
Status: CANCELLED | OUTPATIENT
Start: 2021-05-19

## 2021-04-22 RX ORDER — ACETAMINOPHEN 325 MG/1
650 TABLET ORAL AS NEEDED
Status: CANCELLED
Start: 2021-04-27

## 2021-04-22 RX ORDER — HEPARIN 100 UNIT/ML
300-500 SYRINGE INTRAVENOUS AS NEEDED
Status: CANCELLED
Start: 2021-04-27

## 2021-04-22 RX ORDER — ALBUTEROL SULFATE 0.83 MG/ML
2.5 SOLUTION RESPIRATORY (INHALATION) AS NEEDED
Status: CANCELLED
Start: 2021-06-03

## 2021-04-22 RX ORDER — DIPHENHYDRAMINE HYDROCHLORIDE 50 MG/ML
50 INJECTION, SOLUTION INTRAMUSCULAR; INTRAVENOUS AS NEEDED
Status: CANCELLED
Start: 2021-05-19

## 2021-04-22 RX ORDER — PALONOSETRON 0.05 MG/ML
0.25 INJECTION, SOLUTION INTRAVENOUS ONCE
Status: CANCELLED | OUTPATIENT
Start: 2021-05-19 | End: 2021-05-04

## 2021-04-22 RX ORDER — DIPHENHYDRAMINE HYDROCHLORIDE 50 MG/ML
50 INJECTION, SOLUTION INTRAMUSCULAR; INTRAVENOUS AS NEEDED
Status: CANCELLED
Start: 2021-04-27

## 2021-04-22 RX ORDER — SODIUM CHLORIDE 0.9 % (FLUSH) 0.9 %
10 SYRINGE (ML) INJECTION AS NEEDED
Status: CANCELLED | OUTPATIENT
Start: 2021-05-19

## 2021-04-22 RX ORDER — DIPHENHYDRAMINE HYDROCHLORIDE 50 MG/ML
50 INJECTION, SOLUTION INTRAMUSCULAR; INTRAVENOUS AS NEEDED
Status: CANCELLED
Start: 2021-06-16

## 2021-04-22 RX ORDER — HEPARIN 100 UNIT/ML
300-500 SYRINGE INTRAVENOUS AS NEEDED
Status: CANCELLED
Start: 2021-05-19

## 2021-04-22 RX ORDER — ONDANSETRON 2 MG/ML
8 INJECTION INTRAMUSCULAR; INTRAVENOUS AS NEEDED
Status: CANCELLED | OUTPATIENT
Start: 2021-06-09

## 2021-04-22 RX ORDER — SODIUM CHLORIDE 9 MG/ML
25 INJECTION, SOLUTION INTRAVENOUS CONTINUOUS
Status: CANCELLED | OUTPATIENT
Start: 2021-06-09

## 2021-04-22 RX ORDER — SODIUM CHLORIDE 0.9 % (FLUSH) 0.9 %
10 SYRINGE (ML) INJECTION AS NEEDED
Status: CANCELLED | OUTPATIENT
Start: 2021-04-27

## 2021-04-22 RX ORDER — ALBUTEROL SULFATE 0.83 MG/ML
2.5 SOLUTION RESPIRATORY (INHALATION) AS NEEDED
Status: CANCELLED
Start: 2021-06-09

## 2021-04-22 RX ORDER — SODIUM CHLORIDE 9 MG/ML
25 INJECTION, SOLUTION INTRAVENOUS CONTINUOUS
Status: CANCELLED | OUTPATIENT
Start: 2021-05-26

## 2021-04-22 RX ORDER — ONDANSETRON 2 MG/ML
8 INJECTION INTRAMUSCULAR; INTRAVENOUS AS NEEDED
Status: CANCELLED | OUTPATIENT
Start: 2021-06-16

## 2021-04-22 RX ORDER — PALONOSETRON 0.05 MG/ML
0.25 INJECTION, SOLUTION INTRAVENOUS ONCE
Status: CANCELLED | OUTPATIENT
Start: 2021-06-16 | End: 2021-06-01

## 2021-04-22 RX ORDER — ALBUTEROL SULFATE 0.83 MG/ML
2.5 SOLUTION RESPIRATORY (INHALATION) AS NEEDED
Status: CANCELLED
Start: 2021-04-27

## 2021-04-22 RX ORDER — EPINEPHRINE 1 MG/ML
0.3 INJECTION, SOLUTION, CONCENTRATE INTRAVENOUS AS NEEDED
Status: CANCELLED | OUTPATIENT
Start: 2021-06-16

## 2021-04-22 RX ORDER — HEPARIN 100 UNIT/ML
300-500 SYRINGE INTRAVENOUS AS NEEDED
Status: CANCELLED
Start: 2021-05-26

## 2021-04-22 RX ORDER — PALONOSETRON 0.05 MG/ML
0.25 INJECTION, SOLUTION INTRAVENOUS ONCE
Status: CANCELLED | OUTPATIENT
Start: 2021-04-27 | End: 2021-04-27

## 2021-04-22 RX ORDER — SODIUM CHLORIDE 9 MG/ML
10 INJECTION INTRAMUSCULAR; INTRAVENOUS; SUBCUTANEOUS AS NEEDED
Status: CANCELLED | OUTPATIENT
Start: 2021-06-16

## 2021-04-22 RX ORDER — ACETAMINOPHEN 325 MG/1
650 TABLET ORAL AS NEEDED
Status: CANCELLED
Start: 2021-05-19

## 2021-04-22 RX ORDER — HYDROCORTISONE SODIUM SUCCINATE 100 MG/2ML
100 INJECTION, POWDER, FOR SOLUTION INTRAMUSCULAR; INTRAVENOUS AS NEEDED
Status: CANCELLED | OUTPATIENT
Start: 2021-05-26

## 2021-04-22 RX ORDER — DIPHENHYDRAMINE HYDROCHLORIDE 50 MG/ML
25 INJECTION, SOLUTION INTRAMUSCULAR; INTRAVENOUS AS NEEDED
Status: CANCELLED
Start: 2021-06-09

## 2021-04-22 RX ORDER — SODIUM CHLORIDE 9 MG/ML
25 INJECTION, SOLUTION INTRAVENOUS CONTINUOUS
Status: CANCELLED | OUTPATIENT
Start: 2021-06-16

## 2021-04-22 RX ORDER — PALONOSETRON 0.05 MG/ML
0.25 INJECTION, SOLUTION INTRAVENOUS ONCE
Status: CANCELLED | OUTPATIENT
Start: 2021-06-03 | End: 2021-05-18

## 2021-04-22 RX ORDER — SODIUM CHLORIDE 9 MG/ML
10 INJECTION INTRAMUSCULAR; INTRAVENOUS; SUBCUTANEOUS AS NEEDED
Status: CANCELLED | OUTPATIENT
Start: 2021-05-26

## 2021-04-22 RX ORDER — HYDROCORTISONE SODIUM SUCCINATE 100 MG/2ML
100 INJECTION, POWDER, FOR SOLUTION INTRAMUSCULAR; INTRAVENOUS AS NEEDED
Status: CANCELLED | OUTPATIENT
Start: 2021-06-16

## 2021-04-22 RX ORDER — SODIUM CHLORIDE 0.9 % (FLUSH) 0.9 %
10 SYRINGE (ML) INJECTION AS NEEDED
Status: CANCELLED | OUTPATIENT
Start: 2021-06-23

## 2021-04-22 RX ORDER — SODIUM CHLORIDE 9 MG/ML
10 INJECTION INTRAMUSCULAR; INTRAVENOUS; SUBCUTANEOUS AS NEEDED
Status: CANCELLED | OUTPATIENT
Start: 2021-06-09

## 2021-04-22 RX ORDER — HYDROCORTISONE SODIUM SUCCINATE 100 MG/2ML
100 INJECTION, POWDER, FOR SOLUTION INTRAMUSCULAR; INTRAVENOUS AS NEEDED
Status: CANCELLED | OUTPATIENT
Start: 2021-04-27

## 2021-04-22 RX ORDER — DIPHENHYDRAMINE HYDROCHLORIDE 50 MG/ML
25 INJECTION, SOLUTION INTRAMUSCULAR; INTRAVENOUS AS NEEDED
Status: CANCELLED
Start: 2021-05-26

## 2021-04-22 RX ORDER — HYDROCORTISONE SODIUM SUCCINATE 100 MG/2ML
100 INJECTION, POWDER, FOR SOLUTION INTRAMUSCULAR; INTRAVENOUS AS NEEDED
Status: CANCELLED | OUTPATIENT
Start: 2021-06-03

## 2021-04-22 RX ORDER — DIPHENHYDRAMINE HYDROCHLORIDE 50 MG/ML
25 INJECTION, SOLUTION INTRAMUSCULAR; INTRAVENOUS AS NEEDED
Status: CANCELLED
Start: 2021-05-19

## 2021-04-22 RX ORDER — DIPHENHYDRAMINE HYDROCHLORIDE 50 MG/ML
25 INJECTION, SOLUTION INTRAMUSCULAR; INTRAVENOUS AS NEEDED
Status: CANCELLED
Start: 2021-06-16

## 2021-04-22 RX ORDER — SODIUM CHLORIDE 9 MG/ML
25 INJECTION, SOLUTION INTRAVENOUS CONTINUOUS
Status: CANCELLED | OUTPATIENT
Start: 2021-04-27

## 2021-04-22 RX ORDER — SODIUM CHLORIDE 9 MG/ML
10 INJECTION INTRAMUSCULAR; INTRAVENOUS; SUBCUTANEOUS AS NEEDED
Status: CANCELLED | OUTPATIENT
Start: 2021-06-03

## 2021-04-22 RX ORDER — SODIUM CHLORIDE 9 MG/ML
25 INJECTION, SOLUTION INTRAVENOUS CONTINUOUS
Status: CANCELLED | OUTPATIENT
Start: 2021-06-03

## 2021-04-22 RX ORDER — EPINEPHRINE 1 MG/ML
0.3 INJECTION, SOLUTION, CONCENTRATE INTRAVENOUS AS NEEDED
Status: CANCELLED | OUTPATIENT
Start: 2021-06-23

## 2021-04-22 RX ORDER — DIPHENHYDRAMINE HYDROCHLORIDE 50 MG/ML
50 INJECTION, SOLUTION INTRAMUSCULAR; INTRAVENOUS AS NEEDED
Status: CANCELLED
Start: 2021-05-26

## 2021-04-22 RX ORDER — ONDANSETRON 2 MG/ML
8 INJECTION INTRAMUSCULAR; INTRAVENOUS AS NEEDED
Status: CANCELLED | OUTPATIENT
Start: 2021-05-19

## 2021-04-22 RX ORDER — DIPHENHYDRAMINE HYDROCHLORIDE 50 MG/ML
50 INJECTION, SOLUTION INTRAMUSCULAR; INTRAVENOUS AS NEEDED
Status: CANCELLED
Start: 2021-06-23

## 2021-04-22 RX ORDER — ACETAMINOPHEN 325 MG/1
650 TABLET ORAL AS NEEDED
Status: CANCELLED
Start: 2021-06-16

## 2021-04-22 RX ORDER — ONDANSETRON 2 MG/ML
8 INJECTION INTRAMUSCULAR; INTRAVENOUS AS NEEDED
Status: CANCELLED | OUTPATIENT
Start: 2021-05-26

## 2021-04-22 RX ORDER — ACETAMINOPHEN 325 MG/1
650 TABLET ORAL AS NEEDED
Status: CANCELLED
Start: 2021-05-26

## 2021-04-22 RX ORDER — SODIUM CHLORIDE 9 MG/ML
25 INJECTION, SOLUTION INTRAVENOUS CONTINUOUS
Status: CANCELLED | OUTPATIENT
Start: 2021-05-19

## 2021-04-22 RX ORDER — SODIUM CHLORIDE 0.9 % (FLUSH) 0.9 %
10 SYRINGE (ML) INJECTION AS NEEDED
Status: CANCELLED | OUTPATIENT
Start: 2021-06-03

## 2021-04-22 RX ORDER — ACETAMINOPHEN 325 MG/1
650 TABLET ORAL AS NEEDED
Status: CANCELLED
Start: 2021-06-09

## 2021-04-22 RX ORDER — HYDROCORTISONE SODIUM SUCCINATE 100 MG/2ML
100 INJECTION, POWDER, FOR SOLUTION INTRAMUSCULAR; INTRAVENOUS AS NEEDED
Status: CANCELLED | OUTPATIENT
Start: 2021-06-09

## 2021-04-22 RX ORDER — ONDANSETRON 2 MG/ML
8 INJECTION INTRAMUSCULAR; INTRAVENOUS AS NEEDED
Status: CANCELLED | OUTPATIENT
Start: 2021-06-23

## 2021-04-22 RX ORDER — HEPARIN 100 UNIT/ML
300-500 SYRINGE INTRAVENOUS AS NEEDED
Status: CANCELLED
Start: 2021-06-16

## 2021-04-22 RX ORDER — HEPARIN 100 UNIT/ML
300-500 SYRINGE INTRAVENOUS AS NEEDED
Status: CANCELLED
Start: 2021-06-09

## 2021-04-22 RX ORDER — DIPHENHYDRAMINE HYDROCHLORIDE 50 MG/ML
25 INJECTION, SOLUTION INTRAMUSCULAR; INTRAVENOUS AS NEEDED
Status: CANCELLED
Start: 2021-06-03

## 2021-04-22 RX ORDER — HEPARIN 100 UNIT/ML
300-500 SYRINGE INTRAVENOUS AS NEEDED
Status: CANCELLED
Start: 2021-06-23

## 2021-04-22 RX ORDER — ACETAMINOPHEN 325 MG/1
650 TABLET ORAL AS NEEDED
Status: CANCELLED
Start: 2021-06-23

## 2021-04-22 RX ORDER — ALBUTEROL SULFATE 0.83 MG/ML
2.5 SOLUTION RESPIRATORY (INHALATION) AS NEEDED
Status: CANCELLED
Start: 2021-05-19

## 2021-04-22 RX ORDER — ALBUTEROL SULFATE 0.83 MG/ML
2.5 SOLUTION RESPIRATORY (INHALATION) AS NEEDED
Status: CANCELLED
Start: 2021-05-26

## 2021-04-22 RX ORDER — ALBUTEROL SULFATE 0.83 MG/ML
2.5 SOLUTION RESPIRATORY (INHALATION) AS NEEDED
Status: CANCELLED
Start: 2021-06-16

## 2021-04-22 RX ORDER — EPINEPHRINE 1 MG/ML
0.3 INJECTION, SOLUTION, CONCENTRATE INTRAVENOUS AS NEEDED
Status: CANCELLED | OUTPATIENT
Start: 2021-04-27

## 2021-04-22 RX ORDER — SODIUM CHLORIDE 0.9 % (FLUSH) 0.9 %
10 SYRINGE (ML) INJECTION AS NEEDED
Status: CANCELLED | OUTPATIENT
Start: 2021-06-16

## 2021-04-22 RX ORDER — SODIUM CHLORIDE 9 MG/ML
10 INJECTION INTRAMUSCULAR; INTRAVENOUS; SUBCUTANEOUS AS NEEDED
Status: CANCELLED | OUTPATIENT
Start: 2021-04-27

## 2021-04-22 RX ORDER — ACETAMINOPHEN 325 MG/1
650 TABLET ORAL AS NEEDED
Status: CANCELLED
Start: 2021-06-03

## 2021-04-22 RX ORDER — EPINEPHRINE 1 MG/ML
0.3 INJECTION, SOLUTION, CONCENTRATE INTRAVENOUS AS NEEDED
Status: CANCELLED | OUTPATIENT
Start: 2021-06-03

## 2021-04-22 RX ORDER — ONDANSETRON 2 MG/ML
8 INJECTION INTRAMUSCULAR; INTRAVENOUS AS NEEDED
Status: CANCELLED | OUTPATIENT
Start: 2021-06-03

## 2021-04-22 RX ORDER — SODIUM CHLORIDE 9 MG/ML
10 INJECTION INTRAMUSCULAR; INTRAVENOUS; SUBCUTANEOUS AS NEEDED
Status: CANCELLED | OUTPATIENT
Start: 2021-05-19

## 2021-04-22 RX ORDER — HYDROCORTISONE SODIUM SUCCINATE 100 MG/2ML
100 INJECTION, POWDER, FOR SOLUTION INTRAMUSCULAR; INTRAVENOUS AS NEEDED
Status: CANCELLED | OUTPATIENT
Start: 2021-06-23

## 2021-04-22 RX ORDER — PALONOSETRON 0.05 MG/ML
0.25 INJECTION, SOLUTION INTRAVENOUS ONCE
Status: CANCELLED | OUTPATIENT
Start: 2021-06-09 | End: 2021-05-25

## 2021-04-22 RX ORDER — SODIUM CHLORIDE 0.9 % (FLUSH) 0.9 %
10 SYRINGE (ML) INJECTION AS NEEDED
Status: CANCELLED | OUTPATIENT
Start: 2021-05-26

## 2021-04-27 ENCOUNTER — OFFICE VISIT (OUTPATIENT)
Dept: ONCOLOGY | Age: 36
End: 2021-04-27

## 2021-04-27 ENCOUNTER — HOSPITAL ENCOUNTER (OUTPATIENT)
Dept: INFUSION THERAPY | Age: 36
Discharge: HOME | End: 2021-04-27
Payer: SUBSIDIZED

## 2021-04-27 VITALS
TEMPERATURE: 98.5 F | OXYGEN SATURATION: 98 % | BODY MASS INDEX: 32.42 KG/M2 | SYSTOLIC BLOOD PRESSURE: 146 MMHG | WEIGHT: 207 LBS | RESPIRATION RATE: 18 BRPM | DIASTOLIC BLOOD PRESSURE: 105 MMHG | HEART RATE: 93 BPM

## 2021-04-27 VITALS
HEART RATE: 94 BPM | DIASTOLIC BLOOD PRESSURE: 102 MMHG | TEMPERATURE: 97.2 F | HEIGHT: 67 IN | BODY MASS INDEX: 32.54 KG/M2 | SYSTOLIC BLOOD PRESSURE: 142 MMHG | RESPIRATION RATE: 18 BRPM | WEIGHT: 207.3 LBS

## 2021-04-27 DIAGNOSIS — Z95.828 PORT-A-CATH IN PLACE: ICD-10-CM

## 2021-04-27 DIAGNOSIS — Z51.11 ENCOUNTER FOR ANTINEOPLASTIC CHEMOTHERAPY: ICD-10-CM

## 2021-04-27 DIAGNOSIS — C11.9 NASOPHARYNGEAL CARCINOMA (HCC): Primary | ICD-10-CM

## 2021-04-27 DIAGNOSIS — C76.0 HEAD AND NECK CANCER (HCC): Primary | ICD-10-CM

## 2021-04-27 LAB
ALBUMIN SERPL-MCNC: 3.8 G/DL (ref 3.5–5)
ALBUMIN/GLOB SERPL: 1.3 {RATIO} (ref 1.1–2.2)
ALP SERPL-CCNC: 82 U/L (ref 45–117)
ALT SERPL-CCNC: 21 U/L (ref 12–78)
ANION GAP SERPL CALC-SCNC: 8 MMOL/L (ref 5–15)
AST SERPL-CCNC: 12 U/L (ref 15–37)
BASOPHILS # BLD: 0.1 K/UL (ref 0–0.1)
BASOPHILS NFR BLD: 1 % (ref 0–1)
BILIRUB SERPL-MCNC: 0.2 MG/DL (ref 0.2–1)
BUN SERPL-MCNC: 10 MG/DL (ref 6–20)
BUN/CREAT SERPL: 14 (ref 12–20)
CALCIUM SERPL-MCNC: 9 MG/DL (ref 8.5–10.1)
CHLORIDE SERPL-SCNC: 112 MMOL/L (ref 97–108)
CO2 SERPL-SCNC: 20 MMOL/L (ref 21–32)
CREAT SERPL-MCNC: 0.69 MG/DL (ref 0.7–1.3)
DIFFERENTIAL METHOD BLD: ABNORMAL
EOSINOPHIL # BLD: 0.2 K/UL (ref 0–0.4)
EOSINOPHIL NFR BLD: 3 % (ref 0–7)
ERYTHROCYTE [DISTWIDTH] IN BLOOD BY AUTOMATED COUNT: 21.4 % (ref 11.5–14.5)
GLOBULIN SER CALC-MCNC: 3 G/DL (ref 2–4)
GLUCOSE SERPL-MCNC: 112 MG/DL (ref 65–100)
HCT VFR BLD AUTO: 38.6 % (ref 36.6–50.3)
HGB BLD-MCNC: 12.6 G/DL (ref 12.1–17)
IMM GRANULOCYTES # BLD AUTO: 0.1 K/UL (ref 0–0.04)
IMM GRANULOCYTES NFR BLD AUTO: 1 % (ref 0–0.5)
LYMPHOCYTES # BLD: 2 K/UL (ref 0.8–3.5)
LYMPHOCYTES NFR BLD: 35 % (ref 12–49)
MAGNESIUM SERPL-MCNC: 2 MG/DL (ref 1.6–2.4)
MCH RBC QN AUTO: 28.2 PG (ref 26–34)
MCHC RBC AUTO-ENTMCNC: 32.6 G/DL (ref 30–36.5)
MCV RBC AUTO: 86.4 FL (ref 80–99)
MONOCYTES # BLD: 0.7 K/UL (ref 0–1)
MONOCYTES NFR BLD: 12 % (ref 5–13)
NEUTS SEG # BLD: 2.5 K/UL (ref 1.8–8)
NEUTS SEG NFR BLD: 48 % (ref 32–75)
NRBC # BLD: 0 K/UL (ref 0–0.01)
NRBC BLD-RTO: 0 PER 100 WBC
PLATELET # BLD AUTO: 276 K/UL (ref 150–400)
PMV BLD AUTO: 9.7 FL (ref 8.9–12.9)
POTASSIUM SERPL-SCNC: 3.8 MMOL/L (ref 3.5–5.1)
PROT SERPL-MCNC: 6.8 G/DL (ref 6.4–8.2)
RBC # BLD AUTO: 4.47 M/UL (ref 4.1–5.7)
RBC MORPH BLD: ABNORMAL
SODIUM SERPL-SCNC: 140 MMOL/L (ref 136–145)
WBC # BLD AUTO: 5.6 K/UL (ref 4.1–11.1)

## 2021-04-27 PROCEDURE — 99214 OFFICE O/P EST MOD 30 MIN: CPT | Performed by: INTERNAL MEDICINE

## 2021-04-27 PROCEDURE — 80053 COMPREHEN METABOLIC PANEL: CPT

## 2021-04-27 PROCEDURE — 74011250636 HC RX REV CODE- 250/636: Performed by: INTERNAL MEDICINE

## 2021-04-27 PROCEDURE — 85025 COMPLETE CBC W/AUTO DIFF WBC: CPT

## 2021-04-27 PROCEDURE — 77030012965 HC NDL HUBR BBMI -A

## 2021-04-27 PROCEDURE — 83735 ASSAY OF MAGNESIUM: CPT

## 2021-04-27 PROCEDURE — 36591 DRAW BLOOD OFF VENOUS DEVICE: CPT

## 2021-04-27 RX ORDER — HEPARIN 100 UNIT/ML
500 SYRINGE INTRAVENOUS AS NEEDED
Status: DISCONTINUED | OUTPATIENT
Start: 2021-04-27 | End: 2021-04-28 | Stop reason: HOSPADM

## 2021-04-27 RX ORDER — SODIUM CHLORIDE 0.9 % (FLUSH) 0.9 %
5-10 SYRINGE (ML) INJECTION AS NEEDED
Status: DISCONTINUED | OUTPATIENT
Start: 2021-04-27 | End: 2021-04-28 | Stop reason: HOSPADM

## 2021-04-27 RX ADMIN — Medication 10 ML: at 09:15

## 2021-04-27 RX ADMIN — Medication 10 ML: at 08:15

## 2021-04-27 RX ADMIN — HEPARIN 500 UNITS: 100 SYRINGE at 09:15

## 2021-04-27 NOTE — PROGRESS NOTES
Wilmer Castillo is a 29 y.o. male    No chief complaint on file. 1. Have you been to the ER, urgent care clinic since your last visit? Hospitalized since your last visit? No    2. Have you seen or consulted any other health care providers outside of the [address] [place] [address] since your last visit? Include any pap smears or colon screening.  No

## 2021-04-27 NOTE — PROGRESS NOTES
[facility] [place] [facility] [place] [address] [address], [address], [place]: [phone]  F: [phone]    Reason for visit   Harshal Sheridan is a 29 y.o. 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 male with no significant PMH seen for above  He was seen by Dr. Unique Myles with ENT in December with c/o recurrent epistaxis x 2 months requiring ER visits and packing. His prior nasal endoscopy was clear. He was admitted on 2/4/2021 for recurrent HAs and epistaxis which lasted several minutes with improvement on pressure. He had a normal CBC for the most part and had no h/o bleeding growing up. MRI brain 2/5/2021 was obtained and showed a 4.2 x 4 cm posterior nasopharyngeal mass Extending into the sphenoid sinus with narrowing f the R distal internal carotid artery. CTA did not show active extravasation, but showed bilateral cervical adenopathy. He had a biopsy of the NP mass 2/6/2021 and pathology showed Nasopharyngeal carcinoma. He completed 3 cycles of cisplatin / gemzar. He comes today for cisplatin + RT week 1 however he has not met with rad onc yet. He has appointment tomorrow. He feels great. He has no epistaxis or other bleeding. He denies headaches or ear pain. He feels very well. Denies nausea/vomiting diarrhea/constipation, LE swelling, SOB, CP, cough. He has no complaints today. No past medical history on file.    Past Surgical History:   Procedure Laterality Date    IR INSERT TUNL CVC W PORT OVER 5 YEARS  2/11/2021         IR INSERT TUNL CVC W PORT OVER 5 YEARS  2/11/2021      Social History     Tobacco Use  Smoking status: Never Smoker    Smokeless tobacco: Never Used   Substance Use Topics    Alcohol use: Not Currently      No family history on file. Current Outpatient Medications   Medication Sig    topiramate (TOPAMAX) 50 mg tablet Take 1 Tab by mouth two (2) times a day.  dexAMETHasone (DECADRON) 4 mg tablet Take 2 tabs (8mg) on days 2 & 3 of chemotherapy    ondansetron (ZOFRAN ODT) 8 mg disintegrating tablet Take 1 Tab by mouth every eight (8) hours as needed for Nausea or Vomiting.  lidocaine-prilocaine (EMLA) topical cream Apply  to affected area as needed for Pain.  senna (SENOKOT) 8.6 mg tablet Take 2 Tabs by mouth daily.  polyethylene glycol (MIRALAX) 17 gram packet Take 1 Packet by mouth daily. Mix in 8 oz of water, juice, soda, coffee, or tea prior to administration    verapamiL (CALAN) 80 mg tablet Take 1 Tab by mouth three (3) times daily.  ZOLMitriptan (ZOMIG) 2.5 mg tablet Take 1 Tab by mouth as needed for Migraine (May repeat in 2 hours if not improved. Max 10mg/24HRs).  magnesium 250 mg tab Si po qhs    topiramate (TOPAMAX) 50 mg tablet Take 1 Tab by mouth two (2) times a day.  fexofenadine (ALLEGRA) 180 mg tablet Take 1 Tab by mouth daily. Indications: seasonal runny nose    acetaminophen (TYLENOL) 500 mg tablet Take  by mouth every six (6) hours as needed for Pain. 2 or 3 po daily     No current facility-administered medications for this visit. Facility-Administered Medications Ordered in Other Visits   Medication Dose Route Frequency    0.9% sodium chloride 1,000 mL with potassium chloride 10 mEq, magnesium sulfate 2 g infusion   IntraVENous ONCE      Allergies   Allergen Reactions    Iodinated Contrast Media Itching        Review of Systems: A complete review of systems was obtained, negative except as described above.     Physical Exam:     Visit Vitals  BP (!) 146/105 (BP 1 Location: Right arm, BP Patient Position: Sitting)   Pulse 93   Temp 98.5 °F (36.9 °C)   Resp 18   Wt 207 lb (93.9 kg)   SpO2 98%   BMI 32.42 kg/m²     General appearance - alert, well appearing, and in no distress, nervous, poor eye contact  Mental status - oriented to person, place, and time  Mouth - mucous membranes moist  Neck - supple, PAC in place   Lymphatics - no palpable lymphadenopathy  Skin - normal coloration and turgor, no new rashes, no suspicious skin lesions noted    ECOG PS:1    Results:     Lab Results   Component Value Date/Time    WBC 6.2 04/13/2021 07:40 AM    HGB 13.4 04/13/2021 07:40 AM    HCT 39.9 04/13/2021 07:40 AM    PLATELET 485 71/18/4002 07:40 AM    MCV 81.8 04/13/2021 07:40 AM    ABS. NEUTROPHILS 3.5 04/13/2021 07:40 AM     Lab Results   Component Value Date/Time    Sodium 138 04/13/2021 07:40 AM    Potassium 4.3 04/13/2021 07:40 AM    Chloride 107 04/13/2021 07:40 AM    CO2 22 04/13/2021 07:40 AM    Glucose 84 04/13/2021 07:40 AM    BUN 9 04/13/2021 07:40 AM    Creatinine 0.66 (L) 04/13/2021 07:40 AM    GFR est AA >60 04/13/2021 07:40 AM    GFR est non-AA >60 04/13/2021 07:40 AM    Calcium 9.4 04/13/2021 07:40 AM    Glucose (POC) 113 (H) 04/15/2021 08:07 AM     Lab Results   Component Value Date/Time    Bilirubin, total 0.2 04/06/2021 07:34 AM    ALT (SGPT) 23 04/06/2021 07:34 AM    Alk. phosphatase 100 04/06/2021 07:34 AM    Protein, total 7.1 04/06/2021 07:34 AM    Albumin 3.6 04/06/2021 07:34 AM    Globulin 3.5 04/06/2021 07:34 AM     Component      Latest Ref Rng & Units 2/7/2021          12:26 PM   Cristóbal-Purdy DNA QT, PCR      Negative copies/mL 327   Cristóbal-Barr Virus log10      log10 copy/mL 2.515       Records reviewed and summarized above. Pathology report(s) reviewed     PATHOLOGY     FINAL PATHOLOGIC DIAGNOSIS   1. Right nasopharyngeal mass, biopsy:   Nasopharyngeal carcinoma, nonkeratinizing, undifferentiated type (see comment)   In situ hybridization for Cristóbal-Barr virus (SEMAJ) is positive   2.  Right nasopharyngeal mass, biopsy: Nasopharyngeal carcinoma, nonkeratinizing, undifferentiated type   Comment   Immunohistochemical stains performed from block 1A show that the invasive carcinoma expresses p40, AE1/AE3, and CAM5.2 while negative for p16, CK7, and CK20. This immunoprofile supports the above rendered diagnosis     Radiology report(s) reviewed above. CTA neck    IMPRESSION  1. Attenuation of the right internal carotid artery as it passes through the  right nasopharyngeal mass but no evidence of active extravasation or  intraluminal thrombus. 2.  Bilateral cervical lymphadenopathy in level 2 and level 5B. 3.  Right posterior nasopharyngeal mass with sphenoid sinus involvement and  diffuse sinus disease    MRI brain    IMPRESSION  1. 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 2.  Diffuse sinus mucosal thickening. 3.  Mass related narrowing of the right distal cervical internal carotid artery  but no large vessel occlusion. 4.  No evidence of infarct.       MRI orbit and face    IMPRESSION  Again demonstrated is large right nasopharyngeal tumor as previously described  and without significant change. Some involvement right internal carotid artery. Associated mucosal thickening and fluid right paranasal sinuses. PET 2/18/21:  IMPRESSION  The right nasopharyngeal mass lesion is hypermetabolic and  compatible with the known neoplasm. Bilateral hypermetabolic cervical lymph  nodes. Focal increased tracer activity is seen involving the very posterior  aspect of the nasal septum. PET 4/15/21:  IMPRESSION  No foci evident to suggest recurrent or metastatic disease. Resolved  nasopharyngeal mass uptake and bilateral cervical lymph node tracer  Distribution. Assessment:   1) Nasopharyngeal carcinoma- R   SEMAJ positive    4.2 x 4 cm, extends into the sphenoid sinus, bilateral cervical nodes.  Reagan but does not involve carotid artery    Nasopharyngeal carcinoma - at least T3N2M0-Stage III disease. This is an aggressive malignancy with high risk of fatal local complications  In his case , the abutment of carotid artery is highly concerning for risk of tumor extension and catastrophic bleeding  He is very symptomatic with severe pain, epistaxis, hearing impediment    His PET was reviewed from 2/18/21 and shows a significant fco burden. Hence, after discussion with radiation decision was made to move forward with induction chemo with cisplatin + gemzar x 3 cycles followed by chemoRT (with weekly Cisplatin). He completed 3 cycles of cisplatin / gemzar. PET obtained 4/15/21 was reviewed and shows a CR. Plan for 6 weeks of chemoRT with cisplatin. He meets with rad onc tomorrow so will plan to start in 2 weeks. 2) Epistaxis  Secondary to the mass  CTA without active extravasation  Resolved     3) Headaches  Secondary to the nasopharyngeal mass with splenoid sinus involvement  Controlled on Verapamil, Zomig, Oxycodone  Continue palliative care follow up  Resolved     4) Nausea  Zofran prn    Plan:     · Plan for weekly Cisplatin + RT to start in 2 weeks - has apt with rad onc tomorrow   · zofran / compazine PRN   · Continue to follow with palliative care  · Reached out to Dr. Steve Up    RTC in 2 weeks to start chemoRT     I appreciate the opportunity to participate in Mr. Yenny Mendoza's care. I performed a history and physical examination of the patient and discussed his management with the NPP.  I reviewed the NPP note and agree with the documented findings and plan of care  NP carcinoma with CR on chemotherapy  He is to start ChemoRT but is not set up for RT yet  His pain and epistaxis have resolved  D/W Dr. Castro Mendoza and reviewed labs   Signed By: Margot Aguilera MD

## 2021-04-27 NOTE — PROGRESS NOTES
[facility] Progress Note    Date: 2021    Name: Emily Jauregui    MRN: [id number]         : 1985    Mr. Karey Perez Arrived ambulatory and in no distress for cycle 1 day 1 of Cisplatin regimen. Assessment was completed, no acute issues at this time, no new complaints voiced. Patient is noted to be hypertensive this morning in bilateral arms. He reports that this is not normal for him. Denies any headaches, vision changes or chest pain. Port accessed without difficulty, labs drawn and processed. Port capped and patient left the department for his medical oncology appointment. Prior to treatment, patient was screened for COVID 19. Denies any signs or symptoms of COVID. Denies any known physical contact with anyone exposed to, diagnosed with or with pending or positive COVID test. Denies any pending or positive COVID test himself. At MD appointment, decision made to HOLD chemotherapy today and delay 2 weeks until patient has started radiation. Patient subsequently returned to the department. His port was flushed and heparinized per protocol. Patient discharged in stable condition at 0915. Chemotherapy Flowsheet 2021   Cycle C1D   Date 2021   Drug / Regimen Cisplatin   Dosage 82.8mg   Pre Hydration -   Post Hydration -   Pre Meds -   Notes HELD - delayed until patient starts radiation         Mr. Nany Mendoza's vitals were reviewed. Patient Vitals for the past 12 hrs:   Temp Pulse Resp BP   21 0815 97.2 °F (36.2 °C) 94 18 (!) 142/102         Lab results were obtained and reviewed.   Recent Results (from the past 12 hour(s))   CBC WITH AUTOMATED DIFF    Collection Time: 21  8:12 AM   Result Value Ref Range    WBC 5.6 4.1 - 11.1 K/uL    RBC 4.47 4.10 - 5.70 M/uL    HGB 12.6 12.1 - 17.0 g/dL    HCT 38.6 36.6 - 50.3 %    MCV 86.4 80.0 - 99.0 FL    MCH 28.2 26.0 - 34.0 PG    MCHC 32.6 30.0 - 36.5 g/dL    RDW 21.4 (H) 11.5 - 14.5 %    PLATELET 943 457 - 125 K/uL MPV 9.7 8.9 - 12.9 FL    NRBC 0.0 0  WBC    ABSOLUTE NRBC 0.00 0.00 - 0.01 K/uL    NEUTROPHILS 48 32 - 75 %    LYMPHOCYTES 35 12 - 49 %    MONOCYTES 12 5 - 13 %    EOSINOPHILS 3 0 - 7 %    BASOPHILS 1 0 - 1 %    IMMATURE GRANULOCYTES 1 (H) 0.0 - 0.5 %    ABS. NEUTROPHILS 2.5 1.8 - 8.0 K/UL    ABS. LYMPHOCYTES 2.0 0.8 - 3.5 K/UL    ABS. MONOCYTES 0.7 0.0 - 1.0 K/UL    ABS. EOSINOPHILS 0.2 0.0 - 0.4 K/UL    ABS. BASOPHILS 0.1 0.0 - 0.1 K/UL    ABS. IMM.  GRANS. 0.1 (H) 0.00 - 0.04 K/UL    DF SMEAR SCANNED      RBC COMMENTS ANISOCYTOSIS  2+           Medications Administered     heparin (porcine) pf 500 Units     Admin Date  04/27/2021 Action  Given Dose  500 Units Route  IntraVENous Administered By  Althea AmpIdea          sodium chloride (NS) flush 5-10 mL     Admin Date  04/27/2021 Action  Given Dose  10 mL Route  IntraVENous Administered By  Juan Dover Date  04/27/2021 Action  Given Dose  10 mL Route  IntraVENous Administered By  Althea Sleight                  Future Appointments   Date Time Provider Renae Lindquisti   4/29/2021 10:00 AM Jeannette Atkinson PA [facility] BS AMB   5/4/2021  7:30 AM C2 JOSUE LONG [place] [facility]   5/6/2021 11:30 AM Mehul Victor MD [facility] BS AMB   5/11/2021  8:00 AM C2 JOSUE LONG [place] [facility]   5/11/2021  8:15 AM Riley Nazario  [address] BS AMB         Augustus Smallwood  April 27, 2021

## 2021-04-29 ENCOUNTER — VIRTUAL VISIT (OUTPATIENT)
Dept: FAMILY MEDICINE CLINIC | Age: 36
End: 2021-04-29

## 2021-04-29 DIAGNOSIS — I10 ESSENTIAL HYPERTENSION: Primary | ICD-10-CM

## 2021-04-29 DIAGNOSIS — G43.909 MIGRAINE SYNDROME: ICD-10-CM

## 2021-04-29 PROCEDURE — 99442 PR PHYS/QHP TELEPHONE EVALUATION 11-20 MIN: CPT | Performed by: PHYSICIAN ASSISTANT

## 2021-04-29 RX ORDER — AMLODIPINE BESYLATE 5 MG/1
5 TABLET ORAL DAILY
Qty: 90 TAB | Refills: 3 | Status: SHIPPED | OUTPATIENT
Start: 2021-04-29 | End: 2021-11-03 | Stop reason: DRUGHIGH

## 2021-04-29 RX ORDER — TOPIRAMATE 50 MG/1
50 TABLET, FILM COATED ORAL 2 TIMES DAILY
Qty: 60 TAB | Refills: 5 | Status: SHIPPED | OUTPATIENT
Start: 2021-04-29 | End: 2021-06-01 | Stop reason: SDUPTHER

## 2021-04-29 NOTE — PROGRESS NOTES
Assessment/Plan:    Diagnoses and all orders for this visit:    1. Essential hypertension  -     amLODIPine (NORVASC) 5 mg tablet; Take 1 Tab by mouth daily. 2. Migraine syndrome  -     topiramate (TOPAMAX) 50 mg tablet; Take 1 Tab by mouth two (2) times a day. Pt well known to me  Will continue to check his bp, increase his bp med from amlodipine 2.5 mg daily to amlodipine 5 mg daily  He was listed to be currently taking verapamil 80 mg 1 po tid but he stopped this medication due to dizziness and restarted amlodipine  His bp readings at home are variable but often higher then normal  COVID vaccine clinic next available     Follow-up and Dispositions    · Return in about 1 month (around 2021) for vv with me please, covid vaccine tomorrow please . Jakob Atkinson, NIESHA Colby expressed understanding of this plan. An AVS was printed and given to the patient.      ----------------------------------------------------------------------    Chief Complaint   Patient presents with    Follow Up Chronic Condition     Cancer       History of Present Illness:  Pt called and consented to virtual telephone call, he declined video  He is identified by name and     He is being seen today for refill on his topamax and for recheck on his htn  He is not taking verapamil, instead he restarted his low dose amlodipine  He checks his bp at home and the readings are \"high and low\", often over 140/90  He is not in any pain today he states  We discussed his current health status, I shared with him that I am praying for him and that we are here to support him in any way that we can  He asks for an appt for COVID vaccine  He agrees to f/up in one month for recheck bp on higher dose of amlodipine       No past medical history on file. Current Outpatient Medications   Medication Sig Dispense Refill    amLODIPine (NORVASC) 5 mg tablet Take 1 Tab by mouth daily.  90 Tab 3    topiramate (TOPAMAX) 50 mg tablet Take 1 Tab by mouth two (2) times a day. 60 Tab 5    dexAMETHasone (DECADRON) 4 mg tablet Take 2 tabs (8mg) on days 2 & 3 of chemotherapy 30 Tab 1    ondansetron (ZOFRAN ODT) 8 mg disintegrating tablet Take 1 Tab by mouth every eight (8) hours as needed for Nausea or Vomiting. 30 Tab 3    ZOLMitriptan (ZOMIG) 2.5 mg tablet Take 1 Tab by mouth as needed for Migraine (May repeat in 2 hours if not improved. Max 10mg/24HRs). 9 Tab 1    lidocaine-prilocaine (EMLA) topical cream Apply  to affected area as needed for Pain. 30 g 0    senna (SENOKOT) 8.6 mg tablet Take 2 Tabs by mouth daily. 60 Tab 0    polyethylene glycol (MIRALAX) 17 gram packet Take 1 Packet by mouth daily. Mix in 8 oz of water, juice, soda, coffee, or tea prior to administration 30 Each 0    magnesium 250 mg tab Si po qhs 30 Tab 3    fexofenadine (ALLEGRA) 180 mg tablet Take 1 Tab by mouth daily. Indications: seasonal runny nose 30 Tab 0    acetaminophen (TYLENOL) 500 mg tablet Take  by mouth every six (6) hours as needed for Pain. 2 or 3 po daily         Allergies   Allergen Reactions    Iodinated Contrast Media Itching       Social History     Tobacco Use    Smoking status: Never Smoker    Smokeless tobacco: Never Used   Substance Use Topics    Alcohol use: Not Currently    Drug use: Never       No family history on file. Physical Exam:     There were no vitals taken for this visit.     A&Ox3  WDWN NAD  Respirations normal and non labored

## 2021-04-29 NOTE — PROGRESS NOTES
Tc to the pt for intake. AMN Int # [id number]. Coordination of Care  1. Have you been to the ER, urgent care clinic since your last visit? Hospitalized since your last visit? Yes When: Feb 4th 2021, [facility]    2. Have you seen or consulted any other health care providers outside of the [address] [place] [address] since your last visit? Include any pap smears or colon screening. No    Does the patient need refills?  YES    Learning Assessment Complete? no  Depression Screening complete in the past 12 months? yes

## 2021-04-29 NOTE — PROGRESS NOTES
Check-out Note: Sobia, can we get him into covid vaccine clinic for tomorrow? Good rx both meds     Tc to the pt for discharge. AMN Int # [id number]. Told pt that rx's have been sent to pharmacy and they should be ready for  in approximately 2 hrs. [facility] rx did not have a coupon for the [address]. [facility]  [facility]. The pt had already been scheduled for the Covid vaccine.  Beatrice Silver, RN

## 2021-05-04 ENCOUNTER — APPOINTMENT (OUTPATIENT)
Dept: INFUSION THERAPY | Age: 36
End: 2021-05-04

## 2021-05-06 ENCOUNTER — VIRTUAL VISIT (OUTPATIENT)
Dept: PALLATIVE CARE | Age: 36
End: 2021-05-06

## 2021-05-06 NOTE — PROGRESS NOTES
Palliative Medicine Office Visit  Palliative Medicine Nurse Check In  [phone]    Patient Name: Sobia Medrano  YOB: 1985      Date of Office Visit: 5/6/2021    Patient states: \"  \"    From Check In Sheet (scanned in Media):  Has a medical provider talked with you about cardiopulmonary resuscitation (CPR)? [x] Yes   [] No   [] Unable to obtain    Nurse reminder to complete or update ACP FlowSheet:    Is ACP on the Problem List?    [] Yes    [x] No  IF ACP Document is ON FILE; Nurse to place ACP on Problem List     Is there an ACP Note in Chart Review/Note? [] Yes    [x] No   If NO: ALERT PROVIDER         Is there anything that we should know about you as a person in order to provide you the best care possible? Have you been to the ER, urgent care clinic since your last visit? [] Yes   [x] No   [] Unable to obtain    Have you been hospitalized since your last visit? [] Yes   [x] No   [] Unable to obtain    Have you seen or consulted any other health care providers outside of the [address] [place] [address] since your last visit?    [] Yes   [x] No   [] Unable to obtain    Functional status (describe):         Last BM: 5/6/2021     accessed (date): 5/6/2021    Bottle review (for opioid pain medication):  Medication 1:   Date filled:   Directions:   # filled:   # left:   # pills taking per day:  Last dose taken:    Medication 2:   Date filled:   Directions:   # filled:   # left:   # pills taking per day:  Last dose taken:    Medication 3:   Date filled:   Directions:   # filled:   # left:   # pills taking per day:  Last dose taken:    Medication 4:   Date filled:   Directions:   # filled:   # left:   # pills taking per day:  Last dose taken:

## 2021-05-07 RX ORDER — DIPHENHYDRAMINE HYDROCHLORIDE 50 MG/ML
50 INJECTION, SOLUTION INTRAMUSCULAR; INTRAVENOUS AS NEEDED
Status: CANCELLED
Start: 2021-05-12

## 2021-05-07 RX ORDER — SODIUM CHLORIDE 9 MG/ML
25 INJECTION, SOLUTION INTRAVENOUS CONTINUOUS
Status: CANCELLED | OUTPATIENT
Start: 2021-05-12

## 2021-05-07 RX ORDER — HEPARIN 100 UNIT/ML
300-500 SYRINGE INTRAVENOUS AS NEEDED
Status: CANCELLED
Start: 2021-05-12

## 2021-05-07 RX ORDER — PALONOSETRON 0.05 MG/ML
0.25 INJECTION, SOLUTION INTRAVENOUS ONCE
Status: CANCELLED | OUTPATIENT
Start: 2021-05-12 | End: 2021-05-12

## 2021-05-07 RX ORDER — SODIUM CHLORIDE 0.9 % (FLUSH) 0.9 %
10 SYRINGE (ML) INJECTION AS NEEDED
Status: CANCELLED | OUTPATIENT
Start: 2021-05-12

## 2021-05-07 RX ORDER — ALBUTEROL SULFATE 0.83 MG/ML
2.5 SOLUTION RESPIRATORY (INHALATION) AS NEEDED
Status: CANCELLED
Start: 2021-05-12

## 2021-05-07 RX ORDER — SODIUM CHLORIDE 9 MG/ML
10 INJECTION INTRAMUSCULAR; INTRAVENOUS; SUBCUTANEOUS AS NEEDED
Status: CANCELLED | OUTPATIENT
Start: 2021-05-12

## 2021-05-07 RX ORDER — ACETAMINOPHEN 325 MG/1
650 TABLET ORAL AS NEEDED
Status: CANCELLED
Start: 2021-05-12

## 2021-05-07 RX ORDER — ONDANSETRON 2 MG/ML
8 INJECTION INTRAMUSCULAR; INTRAVENOUS AS NEEDED
Status: CANCELLED | OUTPATIENT
Start: 2021-05-12

## 2021-05-07 RX ORDER — EPINEPHRINE 1 MG/ML
0.3 INJECTION, SOLUTION, CONCENTRATE INTRAVENOUS AS NEEDED
Status: CANCELLED | OUTPATIENT
Start: 2021-05-12

## 2021-05-07 RX ORDER — DIPHENHYDRAMINE HYDROCHLORIDE 50 MG/ML
25 INJECTION, SOLUTION INTRAMUSCULAR; INTRAVENOUS AS NEEDED
Status: CANCELLED
Start: 2021-05-12

## 2021-05-07 RX ORDER — HYDROCORTISONE SODIUM SUCCINATE 100 MG/2ML
100 INJECTION, POWDER, FOR SOLUTION INTRAMUSCULAR; INTRAVENOUS AS NEEDED
Status: CANCELLED | OUTPATIENT
Start: 2021-05-12

## 2021-05-11 ENCOUNTER — APPOINTMENT (OUTPATIENT)
Dept: INFUSION THERAPY | Age: 36
End: 2021-05-11

## 2021-05-12 ENCOUNTER — DOCUMENTATION ONLY (OUTPATIENT)
Dept: ONCOLOGY | Age: 36
End: 2021-05-12

## 2021-05-12 ENCOUNTER — OFFICE VISIT (OUTPATIENT)
Dept: ONCOLOGY | Age: 36
End: 2021-05-12

## 2021-05-12 ENCOUNTER — HOSPITAL ENCOUNTER (OUTPATIENT)
Dept: INFUSION THERAPY | Age: 36
Discharge: HOME | End: 2021-05-12
Payer: SUBSIDIZED

## 2021-05-12 VITALS
DIASTOLIC BLOOD PRESSURE: 92 MMHG | TEMPERATURE: 97.7 F | HEIGHT: 67 IN | SYSTOLIC BLOOD PRESSURE: 143 MMHG | WEIGHT: 210.2 LBS | BODY MASS INDEX: 32.99 KG/M2 | RESPIRATION RATE: 18 BRPM | HEART RATE: 94 BPM

## 2021-05-12 VITALS
BODY MASS INDEX: 32.89 KG/M2 | OXYGEN SATURATION: 98 % | SYSTOLIC BLOOD PRESSURE: 147 MMHG | RESPIRATION RATE: 18 BRPM | HEART RATE: 107 BPM | WEIGHT: 210 LBS | TEMPERATURE: 98.3 F | DIASTOLIC BLOOD PRESSURE: 105 MMHG

## 2021-05-12 DIAGNOSIS — Z51.11 ENCOUNTER FOR ANTINEOPLASTIC CHEMOTHERAPY: ICD-10-CM

## 2021-05-12 DIAGNOSIS — C11.9 NASOPHARYNGEAL CARCINOMA (HCC): Primary | ICD-10-CM

## 2021-05-12 DIAGNOSIS — Z95.828 PORT-A-CATH IN PLACE: ICD-10-CM

## 2021-05-12 LAB
ALBUMIN SERPL-MCNC: 3.9 G/DL (ref 3.5–5)
ALBUMIN/GLOB SERPL: 1.2 {RATIO} (ref 1.1–2.2)
ALP SERPL-CCNC: 95 U/L (ref 45–117)
ALT SERPL-CCNC: 26 U/L (ref 12–78)
ANION GAP SERPL CALC-SCNC: 9 MMOL/L (ref 5–15)
AST SERPL-CCNC: 18 U/L (ref 15–37)
BASOPHILS # BLD: 0.1 K/UL (ref 0–0.1)
BASOPHILS NFR BLD: 1 % (ref 0–1)
BILIRUB SERPL-MCNC: 0.2 MG/DL (ref 0.2–1)
BUN SERPL-MCNC: 14 MG/DL (ref 6–20)
BUN/CREAT SERPL: 17 (ref 12–20)
CALCIUM SERPL-MCNC: 9 MG/DL (ref 8.5–10.1)
CHLORIDE SERPL-SCNC: 111 MMOL/L (ref 97–108)
CO2 SERPL-SCNC: 21 MMOL/L (ref 21–32)
CREAT SERPL-MCNC: 0.84 MG/DL (ref 0.7–1.3)
DIFFERENTIAL METHOD BLD: ABNORMAL
EOSINOPHIL # BLD: 0.2 K/UL (ref 0–0.4)
EOSINOPHIL NFR BLD: 3 % (ref 0–7)
ERYTHROCYTE [DISTWIDTH] IN BLOOD BY AUTOMATED COUNT: 18.8 % (ref 11.5–14.5)
GLOBULIN SER CALC-MCNC: 3.3 G/DL (ref 2–4)
GLUCOSE SERPL-MCNC: 142 MG/DL (ref 65–100)
HCT VFR BLD AUTO: 42.1 % (ref 36.6–50.3)
HGB BLD-MCNC: 13.8 G/DL (ref 12.1–17)
IMM GRANULOCYTES # BLD AUTO: 0.1 K/UL (ref 0–0.04)
IMM GRANULOCYTES NFR BLD AUTO: 1 % (ref 0–0.5)
LYMPHOCYTES # BLD: 1.9 K/UL (ref 0.8–3.5)
LYMPHOCYTES NFR BLD: 30 % (ref 12–49)
MAGNESIUM SERPL-MCNC: 2 MG/DL (ref 1.6–2.4)
MCH RBC QN AUTO: 28.5 PG (ref 26–34)
MCHC RBC AUTO-ENTMCNC: 32.8 G/DL (ref 30–36.5)
MCV RBC AUTO: 87 FL (ref 80–99)
MONOCYTES # BLD: 0.5 K/UL (ref 0–1)
MONOCYTES NFR BLD: 9 % (ref 5–13)
NEUTS SEG # BLD: 3.5 K/UL (ref 1.8–8)
NEUTS SEG NFR BLD: 56 % (ref 32–75)
NRBC # BLD: 0 K/UL (ref 0–0.01)
NRBC BLD-RTO: 0 PER 100 WBC
PLATELET # BLD AUTO: 255 K/UL (ref 150–400)
PMV BLD AUTO: 10 FL (ref 8.9–12.9)
POTASSIUM SERPL-SCNC: 3.8 MMOL/L (ref 3.5–5.1)
PROT SERPL-MCNC: 7.2 G/DL (ref 6.4–8.2)
RBC # BLD AUTO: 4.84 M/UL (ref 4.1–5.7)
SODIUM SERPL-SCNC: 141 MMOL/L (ref 136–145)
WBC # BLD AUTO: 6.2 K/UL (ref 4.1–11.1)

## 2021-05-12 PROCEDURE — 85025 COMPLETE CBC W/AUTO DIFF WBC: CPT

## 2021-05-12 PROCEDURE — 80053 COMPREHEN METABOLIC PANEL: CPT

## 2021-05-12 PROCEDURE — 83735 ASSAY OF MAGNESIUM: CPT

## 2021-05-12 PROCEDURE — 77030012965 HC NDL HUBR BBMI -A

## 2021-05-12 PROCEDURE — 99213 OFFICE O/P EST LOW 20 MIN: CPT | Performed by: INTERNAL MEDICINE

## 2021-05-12 PROCEDURE — 36591 DRAW BLOOD OFF VENOUS DEVICE: CPT

## 2021-05-12 NOTE — PROGRESS NOTES
[facility] Progress Note    Date: May 12, 2021    Name: Hildred Libman    MRN: [id number]         : 1985    Mr. Tamara Grubbs Arrived ambulatory and in no distress for cycle 1 day 1 of Cisplatin regimen. Assessment was completed, no acute issues at this time, no new complaints voiced. Patient reports that he has his mask for radiation is being made tomorrow, treatments will start after that. No complaints at this time. Port accessed without difficulty, labs drawn and processed. Port capped and patient left the department for his medical oncology follow up. Prior to treatment, patient was screened for COVID 19. Denies any signs or symptoms of COVID. Denies any known physical contact with anyone exposed to, diagnosed with or with pending or positive COVID test. Denies any pending or positive COVID test themself. Cisplatin therapy is supposed to be completed with concurrent radiation therapy. Patient will not start radiation until next week. Patient's chemotherapy will be delayed x 1 week. Patient returned to [facility], had port flushed, heparinized and de-accessed per protocol. Chemotherapy Flowsheet 2021   Cycle C1D1   Date 2021   Drug / Regimen Cisplatin   Dosage 82.8mg   Pre Hydration given   Post Hydration none   Pre Meds given   Notes given         Mr. Héctor Mendoza's vitals were reviewed.   Patient Vitals for the past 12 hrs:   Temp Pulse Resp BP   21 0910 97.7 °F (36.5 °C) 94 18 (!) 143/92         Recent Results (from the past 12 hour(s))   CBC WITH AUTOMATED DIFF    Collection Time: 21  9:11 AM   Result Value Ref Range    WBC 6.2 4.1 - 11.1 K/uL    RBC 4.84 4.10 - 5.70 M/uL    HGB 13.8 12.1 - 17.0 g/dL    HCT 42.1 36.6 - 50.3 %    MCV 87.0 80.0 - 99.0 FL    MCH 28.5 26.0 - 34.0 PG    MCHC 32.8 30.0 - 36.5 g/dL    RDW 18.8 (H) 11.5 - 14.5 %    PLATELET 162 294 - 129 K/uL    MPV 10.0 8.9 - 12.9 FL    NRBC 0.0 0  WBC    ABSOLUTE NRBC 0.00 0.00 - 0.01 K/uL NEUTROPHILS 56 32 - 75 %    LYMPHOCYTES 30 12 - 49 %    MONOCYTES 9 5 - 13 %    EOSINOPHILS 3 0 - 7 %    BASOPHILS 1 0 - 1 %    IMMATURE GRANULOCYTES 1 (H) 0.0 - 0.5 %    ABS. NEUTROPHILS 3.5 1.8 - 8.0 K/UL    ABS. LYMPHOCYTES 1.9 0.8 - 3.5 K/UL    ABS. MONOCYTES 0.5 0.0 - 1.0 K/UL    ABS. EOSINOPHILS 0.2 0.0 - 0.4 K/UL    ABS. BASOPHILS 0.1 0.0 - 0.1 K/UL    ABS. IMM. GRANS. 0.1 (H) 0.00 - 0.04 K/UL    DF AUTOMATED     METABOLIC PANEL, COMPREHENSIVE    Collection Time: 05/12/21  9:11 AM   Result Value Ref Range    Sodium 141 136 - 145 mmol/L    Potassium 3.8 3.5 - 5.1 mmol/L    Chloride 111 (H) 97 - 108 mmol/L    CO2 21 21 - 32 mmol/L    Anion gap 9 5 - 15 mmol/L    Glucose 142 (H) 65 - 100 mg/dL    BUN 14 6 - 20 MG/DL    Creatinine 0.84 0.70 - 1.30 MG/DL    BUN/Creatinine ratio 17 12 - 20      GFR est AA >60 >60 ml/min/1.73m2    GFR est non-AA >60 >60 ml/min/1.73m2    Calcium 9.0 8.5 - 10.1 MG/DL    Bilirubin, total 0.2 0.2 - 1.0 MG/DL    ALT (SGPT) 26 12 - 78 U/L    AST (SGOT) 18 15 - 37 U/L    Alk.  phosphatase 95 45 - 117 U/L    Protein, total 7.2 6.4 - 8.2 g/dL    Albumin 3.9 3.5 - 5.0 g/dL    Globulin 3.3 2.0 - 4.0 g/dL    A-G Ratio 1.2 1.1 - 2.2     MAGNESIUM    Collection Time: 05/12/21  9:11 AM   Result Value Ref Range    Magnesium 2.0 1.6 - 2.4 mg/dL           Future Appointments   Date Time Provider Renae Fajardo   5/19/2021 11:00 AM F1 JOSUE LONG [place] [facility]   5/26/2021  9:00 AM F1 JOSUE LONG [place] [facility]   5/28/2021  8:45 AM VACCINE NURSE [facility] BS ULICES Wilson  May 12, 2021

## 2021-05-12 NOTE — PROGRESS NOTES
Pharmacy Note- Chemotherapy Education    Leah Zhang is a  29 y. o.male  diagnosed with nasopharyngeal cancer here today for chemotherapy counseling and consent. Mr. Rajni Zhang is being treated with CISplatin and radiation. Provided education on CISplatin and premedications - fosaprepitant, palonosetron and dexamethasone. Side effects of chemotherapy reviewed included s/s infection, anemia, appetite changes, thromboyctopenia, fatigue, hair loss/alopecia, bone pain, skin and nail changes, diarrhea/constipation, kidney changes and hearing changes. Patient given ways to manage these side effects and when to contact office. [address] Handout of medications provided to patient. Mr. Rajni Zhang verbalized understanding of the information presented and all of the patient's questions were answered. Shoaib Garvey from [employer] provided translation via telephone.      Narda Chilel, PharmD, BCPS, BCOP    For Pharmacy Admin Tracking Only     CPA in place: No   Recommendation Provided To: Patient/Caregiver: 1 via In person     Total # of Interventions Recommended: 1   Total # of Interventions Accepted: 1   Intervention Accepted By: Patient/Caregiver: 1   Time Spent (min): 20

## 2021-05-12 NOTE — PROGRESS NOTES
Matty Dowling is a 29 y.o. 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 Have you been to the ER, urgent care clinic since your last visit? Hospitalized since your last visit? No    2. Have you seen or consulted any other health care providers outside of the [address] [place] [address] since your last visit? Include any pap smears or colon screening.  No

## 2021-05-12 NOTE — PROGRESS NOTES
[facility] at [address]  [address], [place]: [phone]  F: [phone]    Reason for visit   Usha Gonzales is a 29 y.o. 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 male with no significant PMH seen for above  He was seen by Dr. Anne Multani with ENT in December with c/o recurrent epistaxis x 2 months requiring ER visits and packing. His prior nasal endoscopy was clear. He was admitted on 2/4/2021 for recurrent HAs and epistaxis which lasted several minutes with improvement on pressure. He had a normal CBC for the most part and had no h/o bleeding growing up. MRI brain 2/5/2021 was obtained and showed a 4.2 x 4 cm posterior nasopharyngeal mass Extending into the sphenoid sinus with narrowing f the R distal internal carotid artery. CTA did not show active extravasation, but showed bilateral cervical adenopathy. He had a biopsy of the NP mass 2/6/2021 and pathology showed Nasopharyngeal carcinoma. He completed 3 cycles of cisplatin / gemzar. He comes today for cisplatin + RT week 1 however he has not heard from rad onc yet about start date. He had mapping done. He feels great. He has no epistaxis or other bleeding. He denies headaches or ear pain. He feels very well. Denies nausea/vomiting diarrhea/constipation, LE swelling, SOB, CP, cough. He has no complaints today. History reviewed. No pertinent past medical history.    Past Surgical History:   Procedure Laterality Date    IR INSERT TUNL CVC W PORT OVER 5 YEARS  2/11/2021         IR INSERT TUNL CVC W PORT OVER 5 YEARS  2/11/2021 Social History     Tobacco Use    Smoking status: Never Smoker    Smokeless tobacco: Never Used   Substance Use Topics    Alcohol use: Not Currently      History reviewed. No pertinent family history. Current Outpatient Medications   Medication Sig    amLODIPine (NORVASC) 5 mg tablet Take 1 Tab by mouth daily.  topiramate (TOPAMAX) 50 mg tablet Take 1 Tab by mouth two (2) times a day.  dexAMETHasone (DECADRON) 4 mg tablet Take 2 tabs (8mg) on days 2 & 3 of chemotherapy    ondansetron (ZOFRAN ODT) 8 mg disintegrating tablet Take 1 Tab by mouth every eight (8) hours as needed for Nausea or Vomiting.  lidocaine-prilocaine (EMLA) topical cream Apply  to affected area as needed for Pain.  senna (SENOKOT) 8.6 mg tablet Take 2 Tabs by mouth daily.  polyethylene glycol (MIRALAX) 17 gram packet Take 1 Packet by mouth daily. Mix in 8 oz of water, juice, soda, coffee, or tea prior to administration    ZOLMitriptan (ZOMIG) 2.5 mg tablet Take 1 Tab by mouth as needed for Migraine (May repeat in 2 hours if not improved. Max 10mg/24HRs).  magnesium 250 mg tab Si po qhs    fexofenadine (ALLEGRA) 180 mg tablet Take 1 Tab by mouth daily. Indications: seasonal runny nose    acetaminophen (TYLENOL) 500 mg tablet Take  by mouth every six (6) hours as needed for Pain. 2 or 3 po daily     No current facility-administered medications for this visit. Allergies   Allergen Reactions    Iodinated Contrast Media Itching        Review of Systems: A complete review of systems was obtained, negative except as described above.     Physical Exam:     Visit Vitals  BP (!) 147/105 (BP 1 Location: Left upper arm, BP Patient Position: Sitting)   Pulse (!) 107   Temp 98.3 °F (36.8 °C)   Resp 18   Wt 210 lb (95.3 kg)   SpO2 98%   BMI 32.89 kg/m²     General appearance - alert, well appearing, and in no distress, nervous, poor eye contact  Mental status - oriented to person, place, and time  Mouth - mucous membranes moist  Neck - supple, PAC in place   Lymphatics - no palpable lymphadenopathy  Skin - normal coloration and turgor, no new rashes, no suspicious skin lesions noted    ECOG PS:1    Results:     Lab Results   Component Value Date/Time    WBC 5.6 04/27/2021 08:12 AM    HGB 12.6 04/27/2021 08:12 AM    HCT 38.6 04/27/2021 08:12 AM    PLATELET 269 16/31/5400 08:12 AM    MCV 86.4 04/27/2021 08:12 AM    ABS. NEUTROPHILS 2.5 04/27/2021 08:12 AM     Lab Results   Component Value Date/Time    Sodium 140 04/27/2021 08:12 AM    Potassium 3.8 04/27/2021 08:12 AM    Chloride 112 (H) 04/27/2021 08:12 AM    CO2 20 (L) 04/27/2021 08:12 AM    Glucose 112 (H) 04/27/2021 08:12 AM    BUN 10 04/27/2021 08:12 AM    Creatinine 0.69 (L) 04/27/2021 08:12 AM    GFR est AA >60 04/27/2021 08:12 AM    GFR est non-AA >60 04/27/2021 08:12 AM    Calcium 9.0 04/27/2021 08:12 AM    Glucose (POC) 113 (H) 04/15/2021 08:07 AM     Lab Results   Component Value Date/Time    Bilirubin, total 0.2 04/27/2021 08:12 AM    ALT (SGPT) 21 04/27/2021 08:12 AM    Alk. phosphatase 82 04/27/2021 08:12 AM    Protein, total 6.8 04/27/2021 08:12 AM    Albumin 3.8 04/27/2021 08:12 AM    Globulin 3.0 04/27/2021 08:12 AM     Component      Latest Ref Rng & Units 2/7/2021          12:26 PM   Cristóbal-Purdy DNA QT, PCR      Negative copies/mL 327   Cristóbal-Barr Virus log10      log10 copy/mL 2.515       Records reviewed and summarized above. Pathology report(s) reviewed     PATHOLOGY     FINAL PATHOLOGIC DIAGNOSIS   1. Right nasopharyngeal mass, biopsy:   Nasopharyngeal carcinoma, nonkeratinizing, undifferentiated type (see comment)   In situ hybridization for Cristóbal-Barr virus (SEMAJ) is positive   2. Right nasopharyngeal mass, biopsy:   Nasopharyngeal carcinoma, nonkeratinizing, undifferentiated type   Comment   Immunohistochemical stains performed from block 1A show that the invasive carcinoma expresses p40, AE1/AE3, and CAM5.2 while negative for p16, CK7, and CK20.  This immunoprofile supports the above rendered diagnosis     Radiology report(s) reviewed above. CTA neck    IMPRESSION  1. Attenuation of the right internal carotid artery as it passes through the  right nasopharyngeal mass but no evidence of active extravasation or  intraluminal thrombus. 2.  Bilateral cervical lymphadenopathy in level 2 and level 5B. 3.  Right posterior nasopharyngeal mass with sphenoid sinus involvement and  diffuse sinus disease    MRI brain    IMPRESSION  1. 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 2.  Diffuse sinus mucosal thickening. 3.  Mass related narrowing of the right distal cervical internal carotid artery  but no large vessel occlusion. 4.  No evidence of infarct.       MRI orbit and face    IMPRESSION  Again demonstrated is large right nasopharyngeal tumor as previously described  and without significant change. Some involvement right internal carotid artery. Associated mucosal thickening and fluid right paranasal sinuses. PET 2/18/21:  IMPRESSION  The right nasopharyngeal mass lesion is hypermetabolic and  compatible with the known neoplasm. Bilateral hypermetabolic cervical lymph  nodes. Focal increased tracer activity is seen involving the very posterior  aspect of the nasal septum. PET 4/15/21:  IMPRESSION  No foci evident to suggest recurrent or metastatic disease. Resolved  nasopharyngeal mass uptake and bilateral cervical lymph node tracer  Distribution. Assessment:   1) Nasopharyngeal carcinoma- R   SEMAJ positive    4.2 x 4 cm, extends into the sphenoid sinus, bilateral cervical nodes. [place] but does not involve carotid artery    Nasopharyngeal carcinoma - at least T3N2M0-Stage III disease.   This is an aggressive malignancy with high risk of fatal local complications  In his case , the abutment of carotid artery is highly concerning for risk of tumor extension and catastrophic bleeding  He is very symptomatic with severe pain, epistaxis, hearing impediment    His PET was reviewed from 2/18/21 and shows a significant fco burden. Hence, after discussion with radiation decision was made to move forward with induction chemo with cisplatin + gemzar x 3 cycles followed by chemoRT (with weekly Cisplatin). He completed 3 cycles of cisplatin / gemzar. PET obtained 4/15/21 was reviewed and shows a CR. Plan for 6 weeks of chemoRT with cisplatin. He had RT mapping but has not yet received a start date. D/w Dr. Andrei Liu - will plan to start chemoRT in 1 week. 2) Epistaxis  Secondary to the mass  CTA without active extravasation  Resolved     3) Headaches  Secondary to the nasopharyngeal mass with splenoid sinus involvement  Controlled on Verapamil, Zomig, Oxycodone  Continue palliative care follow up  Resolved     4) Nausea  Zofran prn    Plan:     · Plan for weekly Cisplatin + RT to start in 1 week - d/w Dr. Andrei Liu  · zofran / compazine PRN   · Continue to follow with palliative care  · Reached out to Dr. Tyson Monterroso    RTC in 1 week to start chemoRT     I appreciate the opportunity to participate in Mr. Ave Mendoza's care. I performed a history and physical examination of the patient and discussed his management with the NPP.  I reviewed the NPP note and agree with the documented findings and plan of care  NP carcinoma with CR post chemotherapy- awaiting RT planning to start concurrent Cis+ RT  No epistaxis    Signed By: Delgado Cloud MD

## 2021-05-18 ENCOUNTER — APPOINTMENT (OUTPATIENT)
Dept: INFUSION THERAPY | Age: 36
End: 2021-05-18

## 2021-05-19 ENCOUNTER — OFFICE VISIT (OUTPATIENT)
Dept: ONCOLOGY | Age: 36
End: 2021-05-19
Payer: SUBSIDIZED

## 2021-05-19 ENCOUNTER — HOSPITAL ENCOUNTER (OUTPATIENT)
Dept: INFUSION THERAPY | Age: 36
Discharge: HOME | End: 2021-05-19
Payer: SUBSIDIZED

## 2021-05-19 ENCOUNTER — HOSPITAL ENCOUNTER (OUTPATIENT)
Dept: RADIATION THERAPY | Age: 36
Discharge: HOME | End: 2021-05-19

## 2021-05-19 VITALS
DIASTOLIC BLOOD PRESSURE: 74 MMHG | TEMPERATURE: 98.5 F | BODY MASS INDEX: 33.09 KG/M2 | WEIGHT: 210.8 LBS | RESPIRATION RATE: 18 BRPM | HEART RATE: 87 BPM | SYSTOLIC BLOOD PRESSURE: 135 MMHG | HEIGHT: 67 IN

## 2021-05-19 VITALS
HEART RATE: 95 BPM | WEIGHT: 211 LBS | RESPIRATION RATE: 18 BRPM | SYSTOLIC BLOOD PRESSURE: 125 MMHG | TEMPERATURE: 98.3 F | OXYGEN SATURATION: 98 % | DIASTOLIC BLOOD PRESSURE: 85 MMHG | BODY MASS INDEX: 33.05 KG/M2

## 2021-05-19 DIAGNOSIS — C76.0 HEAD AND NECK CANCER (HCC): ICD-10-CM

## 2021-05-19 DIAGNOSIS — Z95.828 PORT-A-CATH IN PLACE: ICD-10-CM

## 2021-05-19 DIAGNOSIS — C76.0 HEAD AND NECK CANCER (HCC): Primary | ICD-10-CM

## 2021-05-19 DIAGNOSIS — Z51.11 ENCOUNTER FOR ANTINEOPLASTIC CHEMOTHERAPY: Primary | ICD-10-CM

## 2021-05-19 LAB
ALBUMIN SERPL-MCNC: 4.2 G/DL (ref 3.5–5)
ALBUMIN/GLOB SERPL: 1.2 {RATIO} (ref 1.1–2.2)
ALP SERPL-CCNC: 96 U/L (ref 45–117)
ALT SERPL-CCNC: 35 U/L (ref 12–78)
ANION GAP SERPL CALC-SCNC: 7 MMOL/L (ref 5–15)
AST SERPL-CCNC: 20 U/L (ref 15–37)
BASOPHILS # BLD: 0.1 K/UL (ref 0–0.1)
BASOPHILS NFR BLD: 1 % (ref 0–1)
BILIRUB SERPL-MCNC: 0.3 MG/DL (ref 0.2–1)
BUN SERPL-MCNC: 19 MG/DL (ref 6–20)
BUN/CREAT SERPL: 27 (ref 12–20)
CALCIUM SERPL-MCNC: 9 MG/DL (ref 8.5–10.1)
CHLORIDE SERPL-SCNC: 110 MMOL/L (ref 97–108)
CO2 SERPL-SCNC: 23 MMOL/L (ref 21–32)
CREAT SERPL-MCNC: 0.7 MG/DL (ref 0.7–1.3)
DIFFERENTIAL METHOD BLD: ABNORMAL
EOSINOPHIL # BLD: 0.3 K/UL (ref 0–0.4)
EOSINOPHIL NFR BLD: 3 % (ref 0–7)
ERYTHROCYTE [DISTWIDTH] IN BLOOD BY AUTOMATED COUNT: 18.3 % (ref 11.5–14.5)
GLOBULIN SER CALC-MCNC: 3.4 G/DL (ref 2–4)
GLUCOSE SERPL-MCNC: 78 MG/DL (ref 65–100)
HCT VFR BLD AUTO: 43.8 % (ref 36.6–50.3)
HGB BLD-MCNC: 14.6 G/DL (ref 12.1–17)
IMM GRANULOCYTES # BLD AUTO: 0.1 K/UL (ref 0–0.04)
IMM GRANULOCYTES NFR BLD AUTO: 1 % (ref 0–0.5)
LYMPHOCYTES # BLD: 2.3 K/UL (ref 0.8–3.5)
LYMPHOCYTES NFR BLD: 27 % (ref 12–49)
MAGNESIUM SERPL-MCNC: 2.2 MG/DL (ref 1.6–2.4)
MCH RBC QN AUTO: 28.5 PG (ref 26–34)
MCHC RBC AUTO-ENTMCNC: 33.3 G/DL (ref 30–36.5)
MCV RBC AUTO: 85.5 FL (ref 80–99)
MONOCYTES # BLD: 0.8 K/UL (ref 0–1)
MONOCYTES NFR BLD: 9 % (ref 5–13)
NEUTS SEG # BLD: 4.9 K/UL (ref 1.8–8)
NEUTS SEG NFR BLD: 59 % (ref 32–75)
NRBC # BLD: 0 K/UL (ref 0–0.01)
NRBC BLD-RTO: 0 PER 100 WBC
PLATELET # BLD AUTO: 196 K/UL (ref 150–400)
PMV BLD AUTO: 10.2 FL (ref 8.9–12.9)
POTASSIUM SERPL-SCNC: 4.1 MMOL/L (ref 3.5–5.1)
PROT SERPL-MCNC: 7.6 G/DL (ref 6.4–8.2)
RBC # BLD AUTO: 5.12 M/UL (ref 4.1–5.7)
SODIUM SERPL-SCNC: 140 MMOL/L (ref 136–145)
WBC # BLD AUTO: 8.4 K/UL (ref 4.1–11.1)

## 2021-05-19 PROCEDURE — 96413 CHEMO IV INFUSION 1 HR: CPT

## 2021-05-19 PROCEDURE — 99214 OFFICE O/P EST MOD 30 MIN: CPT | Performed by: INTERNAL MEDICINE

## 2021-05-19 PROCEDURE — 77030012965 HC NDL HUBR BBMI -A

## 2021-05-19 PROCEDURE — 85025 COMPLETE CBC W/AUTO DIFF WBC: CPT

## 2021-05-19 PROCEDURE — 74011250636 HC RX REV CODE- 250/636: Performed by: INTERNAL MEDICINE

## 2021-05-19 PROCEDURE — 96375 TX/PRO/DX INJ NEW DRUG ADDON: CPT

## 2021-05-19 PROCEDURE — 36415 COLL VENOUS BLD VENIPUNCTURE: CPT

## 2021-05-19 PROCEDURE — 96361 HYDRATE IV INFUSION ADD-ON: CPT

## 2021-05-19 PROCEDURE — 74011000258 HC RX REV CODE- 258: Performed by: INTERNAL MEDICINE

## 2021-05-19 PROCEDURE — 96367 TX/PROPH/DG ADDL SEQ IV INF: CPT

## 2021-05-19 PROCEDURE — 83735 ASSAY OF MAGNESIUM: CPT

## 2021-05-19 PROCEDURE — 80053 COMPREHEN METABOLIC PANEL: CPT

## 2021-05-19 RX ORDER — SODIUM CHLORIDE 9 MG/ML
25 INJECTION, SOLUTION INTRAVENOUS CONTINUOUS
Status: DISCONTINUED | OUTPATIENT
Start: 2021-05-19 | End: 2021-05-20 | Stop reason: HOSPADM

## 2021-05-19 RX ORDER — PALONOSETRON 0.05 MG/ML
0.25 INJECTION, SOLUTION INTRAVENOUS ONCE
Status: COMPLETED | OUTPATIENT
Start: 2021-05-19 | End: 2021-05-19

## 2021-05-19 RX ORDER — DEXAMETHASONE 4 MG/1
TABLET ORAL
Qty: 30 TABLET | Refills: 1 | Status: SHIPPED | OUTPATIENT
Start: 2021-05-19 | End: 2022-08-10

## 2021-05-19 RX ORDER — SODIUM CHLORIDE 9 MG/ML
10 INJECTION INTRAMUSCULAR; INTRAVENOUS; SUBCUTANEOUS AS NEEDED
Status: DISCONTINUED | OUTPATIENT
Start: 2021-05-19 | End: 2021-05-20 | Stop reason: HOSPADM

## 2021-05-19 RX ORDER — SODIUM CHLORIDE 0.9 % (FLUSH) 0.9 %
10 SYRINGE (ML) INJECTION AS NEEDED
Status: DISCONTINUED | OUTPATIENT
Start: 2021-05-19 | End: 2021-05-20 | Stop reason: HOSPADM

## 2021-05-19 RX ORDER — HEPARIN 100 UNIT/ML
300-500 SYRINGE INTRAVENOUS AS NEEDED
Status: DISCONTINUED | OUTPATIENT
Start: 2021-05-19 | End: 2021-05-20 | Stop reason: HOSPADM

## 2021-05-19 RX ADMIN — CISPLATIN 82.8 MG: 100 INJECTION, SOLUTION INTRAVENOUS at 15:26

## 2021-05-19 RX ADMIN — SODIUM CHLORIDE 150 MG: 900 INJECTION, SOLUTION INTRAVENOUS at 14:52

## 2021-05-19 RX ADMIN — SODIUM CHLORIDE 25 ML/HR: 900 INJECTION, SOLUTION INTRAVENOUS at 14:28

## 2021-05-19 RX ADMIN — DEXAMETHASONE SODIUM PHOSPHATE 12 MG: 4 INJECTION, SOLUTION INTRA-ARTICULAR; INTRALESIONAL; INTRAMUSCULAR; INTRAVENOUS; SOFT TISSUE at 14:29

## 2021-05-19 RX ADMIN — MAGNESIUM SULFATE HEPTAHYDRATE: 500 INJECTION, SOLUTION INTRAMUSCULAR; INTRAVENOUS at 13:23

## 2021-05-19 RX ADMIN — SODIUM CHLORIDE 10 ML: 9 INJECTION INTRAMUSCULAR; INTRAVENOUS; SUBCUTANEOUS at 13:23

## 2021-05-19 RX ADMIN — PALONOSETRON 0.25 MG: 0.05 INJECTION, SOLUTION INTRAVENOUS at 14:29

## 2021-05-19 NOTE — PROGRESS NOTES
[facility] [place] [address], [place]: [phone]  F: [phone]    Reason for visit   Bethany Lara is a 29 y.o. 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 male with no significant PMH seen for above  He was seen by Dr. Alia Elkins with ENT in December with c/o recurrent epistaxis x 2 months requiring ER visits and packing. His prior nasal endoscopy was clear. He was admitted on 2/4/2021 for recurrent HAs and epistaxis which lasted several minutes with improvement on pressure. He had a normal CBC for the most part and had no h/o bleeding growing up. MRI brain 2/5/2021 was obtained and showed a 4.2 x 4 cm posterior nasopharyngeal mass Extending into the sphenoid sinus with narrowing f the R distal internal carotid artery. CTA did not show active extravasation, but showed bilateral cervical adenopathy. He had a biopsy of the NP mass 2/6/2021 and pathology showed Nasopharyngeal carcinoma. He completed 3 cycles of cisplatin / gemzar. He comes today for cisplatin + RT week 1. He feels great. He has no epistaxis or other bleeding. He denies headaches or ear pain. He feels very well. Denies nausea/vomiting diarrhea/constipation, LE swelling, SOB, CP, cough. He has no complaints today. No past medical history on file.    Past Surgical History:   Procedure Laterality Date    IR INSERT TUNL CVC W PORT OVER 5 YEARS  2/11/2021         IR INSERT TUNL CVC W PORT OVER 5 YEARS  2/11/2021      Social History     Tobacco Use    Smoking status: Never Smoker    Smokeless tobacco: Never Used   Substance Use Topics    Alcohol use: Not Currently      No family history on file. Current Outpatient Medications   Medication Sig    amLODIPine (NORVASC) 5 mg tablet Take 1 Tab by mouth daily.  topiramate (TOPAMAX) 50 mg tablet Take 1 Tab by mouth two (2) times a day.  dexAMETHasone (DECADRON) 4 mg tablet Take 2 tabs (8mg) on days 2 & 3 of chemotherapy    ondansetron (ZOFRAN ODT) 8 mg disintegrating tablet Take 1 Tab by mouth every eight (8) hours as needed for Nausea or Vomiting.  lidocaine-prilocaine (EMLA) topical cream Apply  to affected area as needed for Pain.  senna (SENOKOT) 8.6 mg tablet Take 2 Tabs by mouth daily.  polyethylene glycol (MIRALAX) 17 gram packet Take 1 Packet by mouth daily. Mix in 8 oz of water, juice, soda, coffee, or tea prior to administration    ZOLMitriptan (ZOMIG) 2.5 mg tablet Take 1 Tab by mouth as needed for Migraine (May repeat in 2 hours if not improved. Max 10mg/24HRs).  magnesium 250 mg tab Si po qhs    fexofenadine (ALLEGRA) 180 mg tablet Take 1 Tab by mouth daily. Indications: seasonal runny nose    acetaminophen (TYLENOL) 500 mg tablet Take  by mouth every six (6) hours as needed for Pain. 2 or 3 po daily     No current facility-administered medications for this visit. Allergies   Allergen Reactions    Iodinated Contrast Media Itching        Review of Systems: A complete review of systems was obtained, negative except as described above.     Physical Exam:     Visit Vitals  /85 (BP 1 Location: Left upper arm, BP Patient Position: Sitting)   Pulse 95   Temp 98.3 °F (36.8 °C)   Resp 18   Wt 211 lb (95.7 kg)   SpO2 98%   BMI 33.05 kg/m²     General appearance - alert, well appearing, and in no distress, nervous, poor eye contact  Mental status - oriented to person, place, and time  Mouth - mucous membranes moist  Neck - supple, PAC in place   Lymphatics - no palpable lymphadenopathy  Skin - normal coloration and turgor, no new rashes, no suspicious skin lesions noted    ECOG PS:1    Results:     Lab Results   Component Value Date/Time    WBC 6.2 05/12/2021 09:11 AM    HGB 13.8 05/12/2021 09:11 AM    HCT 42.1 05/12/2021 09:11 AM    PLATELET 062 83/99/4803 09:11 AM    MCV 87.0 05/12/2021 09:11 AM    ABS. NEUTROPHILS 3.5 05/12/2021 09:11 AM     Lab Results   Component Value Date/Time    Sodium 141 05/12/2021 09:11 AM    Potassium 3.8 05/12/2021 09:11 AM    Chloride 111 (H) 05/12/2021 09:11 AM    CO2 21 05/12/2021 09:11 AM    Glucose 142 (H) 05/12/2021 09:11 AM    BUN 14 05/12/2021 09:11 AM    Creatinine 0.84 05/12/2021 09:11 AM    GFR est AA >60 05/12/2021 09:11 AM    GFR est non-AA >60 05/12/2021 09:11 AM    Calcium 9.0 05/12/2021 09:11 AM    Glucose (POC) 113 (H) 04/15/2021 08:07 AM     Lab Results   Component Value Date/Time    Bilirubin, total 0.2 05/12/2021 09:11 AM    ALT (SGPT) 26 05/12/2021 09:11 AM    Alk. phosphatase 95 05/12/2021 09:11 AM    Protein, total 7.2 05/12/2021 09:11 AM    Albumin 3.9 05/12/2021 09:11 AM    Globulin 3.3 05/12/2021 09:11 AM     Component      Latest Ref Rng & Units 2/7/2021          12:26 PM   Cristóbal-Purdy DNA QT, PCR      Negative copies/mL 327   Cristóbal-Barr Virus log10      log10 copy/mL 2.515       Records reviewed and summarized above. Pathology report(s) reviewed     PATHOLOGY     FINAL PATHOLOGIC DIAGNOSIS   1. Right nasopharyngeal mass, biopsy:   Nasopharyngeal carcinoma, nonkeratinizing, undifferentiated type (see comment)   In situ hybridization for Cristóbal-Barr virus (SEMAJ) is positive   2. Right nasopharyngeal mass, biopsy:   Nasopharyngeal carcinoma, nonkeratinizing, undifferentiated type   Comment   Immunohistochemical stains performed from block 1A show that the invasive carcinoma expresses p40, AE1/AE3, and CAM5.2 while negative for p16, CK7, and CK20. This immunoprofile supports the above rendered diagnosis     Radiology report(s) reviewed above.   CTA neck    IMPRESSION  1. Attenuation of the right internal carotid artery as it passes through the  right nasopharyngeal mass but no evidence of active extravasation or  intraluminal thrombus. 2.  Bilateral cervical lymphadenopathy in level 2 and level 5B. 3.  Right posterior nasopharyngeal mass with sphenoid sinus involvement and  diffuse sinus disease    MRI brain    IMPRESSION  1. 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 2.  Diffuse sinus mucosal thickening. 3.  Mass related narrowing of the right distal cervical internal carotid artery  but no large vessel occlusion. 4.  No evidence of infarct.       MRI orbit and face    IMPRESSION  Again demonstrated is large right nasopharyngeal tumor as previously described  and without significant change. Some involvement right internal carotid artery. Associated mucosal thickening and fluid right paranasal sinuses. PET 2/18/21:  IMPRESSION  The right nasopharyngeal mass lesion is hypermetabolic and  compatible with the known neoplasm. Bilateral hypermetabolic cervical lymph  nodes. Focal increased tracer activity is seen involving the very posterior  aspect of the nasal septum. PET 4/15/21:  IMPRESSION  No foci evident to suggest recurrent or metastatic disease. Resolved  nasopharyngeal mass uptake and bilateral cervical lymph node tracer  Distribution. Assessment:   1) Nasopharyngeal carcinoma- R   SEMAJ positive    4.2 x 4 cm, extends into the sphenoid sinus, bilateral cervical nodes. [place] but does not involve carotid artery    Nasopharyngeal carcinoma - at least T3N2M0-Stage III disease.   This is an aggressive malignancy with high risk of fatal local complications  In his case , the abutment of carotid artery is highly concerning for risk of tumor extension and catastrophic bleeding  He is very symptomatic with severe pain, epistaxis, hearing impediment    His PET was reviewed from 2/18/21 and shows a significant fco burden. Hence, after discussion with radiation decision was made to move forward with induction chemo with cisplatin + gemzar x 3 cycles followed by chemoRT (with weekly Cisplatin). He completed 3 cycles of cisplatin / gemzar. PET obtained 4/15/21 was reviewed and shows a CR. Plan for 6 weeks of chemoRT with cisplatin. Today is week 1 of chemoRT      2) Epistaxis  Secondary to the mass  CTA without active extravasation  Resolved     3) Headaches  Secondary to the nasopharyngeal mass with splenoid sinus involvement  Controlled on Verapamil, Zomig, Oxycodone  Continue palliative care follow up  Resolved     4) Management of high risk drugs like chemotherapy  Chemo calendar provided to patient  Side effects reviewed     Plan:     · Proceed today for week 1 of weekly Cisplatin + RT   · zofran / compazine PRN   · Dexamethasone on days 2 , 3   · Continue to follow with palliative care    RTC in 1 week for week 2     I appreciate the opportunity to participate in Mr. Carol Mendoza's care. I performed a history and physical examination of the patient and discussed his management with the NPP.  I reviewed the NPP note and agree with the documented findings and plan of care  NP carcinoma with CR on Chemotherapy    Today he starts day 1 week 1 of Cisplatin with concurrent RT  He has no epistaxis, his HA are controlled, denies neuropathy  OK to proceed and d/w Rad Onc  Signed By: Lorraine Garner MD

## 2021-05-19 NOTE — PROGRESS NOTES
Matha Phoenix is a 29 y.o. 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 Have you been to the ER, urgent care clinic since your last visit? Hospitalized since your last visit? No    2. Have you seen or consulted any other health care providers outside of the [address] [place] [address] since your last visit? Include any pap smears or colon screening.  No

## 2021-05-19 NOTE — PROGRESS NOTES
[facility] Progress Note    Date: May 19, 2021    Name: Marvin Barraza    MRN: [id number]         : 1985    Mr. Abbie Yan Arrived ambulatory and in no distress for cycle 1 day 1 of Cisplatin regimen. Assessment was completed, no acute issues at this time, no new complaints voiced. Patient does report that he has some pain to the right side of his face. No other complaints at this time. Port accessed without difficulty, labs drawn and processed. Port capped and patient left the department for his medical oncology follow up. Prior to treatment, patient was screened for COVID 19. Denies any signs or symptoms of COVID. Denies any known physical contact with anyone exposed to, diagnosed with or with pending or positive COVID test. Denies any pending or positive COVID test themself. Chemotherapy Flowsheet 2021   Cycle C1D1   Date 2021   Drug / Regimen Cisplatin   Dosage 82.8mg   Time Up 1526   Time Down 1626   Pre Hydration given   Post Hydration none   Pre Meds given   Notes given         Mr. Ashley Mendoza's vitals were reviewed. Patient Vitals for the past 12 hrs:   Temp Pulse Resp BP   21 1620  87 18 135/74   21 1110 98.5 °F (36.9 °C) 91 18 138/84         Lab results were obtained and reviewed. Recent Results (from the past 12 hour(s))   CBC WITH AUTOMATED DIFF    Collection Time: 21 11:07 AM   Result Value Ref Range    WBC 8.4 4.1 - 11.1 K/uL    RBC 5.12 4.10 - 5.70 M/uL    HGB 14.6 12.1 - 17.0 g/dL    HCT 43.8 36.6 - 50.3 %    MCV 85.5 80.0 - 99.0 FL    MCH 28.5 26.0 - 34.0 PG    MCHC 33.3 30.0 - 36.5 g/dL    RDW 18.3 (H) 11.5 - 14.5 %    PLATELET 368 776 - 278 K/uL    MPV 10.2 8.9 - 12.9 FL    NRBC 0.0 0  WBC    ABSOLUTE NRBC 0.00 0.00 - 0.01 K/uL    NEUTROPHILS 59 32 - 75 %    LYMPHOCYTES 27 12 - 49 %    MONOCYTES 9 5 - 13 %    EOSINOPHILS 3 0 - 7 %    BASOPHILS 1 0 - 1 %    IMMATURE GRANULOCYTES 1 (H) 0.0 - 0.5 %    ABS.  NEUTROPHILS 4.9 1.8 - 8.0 K/UL    ABS. LYMPHOCYTES 2.3 0.8 - 3.5 K/UL    ABS. MONOCYTES 0.8 0.0 - 1.0 K/UL    ABS. EOSINOPHILS 0.3 0.0 - 0.4 K/UL    ABS. BASOPHILS 0.1 0.0 - 0.1 K/UL    ABS. IMM. GRANS. 0.1 (H) 0.00 - 0.04 K/UL    DF AUTOMATED     METABOLIC PANEL, COMPREHENSIVE    Collection Time: 05/19/21 11:07 AM   Result Value Ref Range    Sodium 140 136 - 145 mmol/L    Potassium 4.1 3.5 - 5.1 mmol/L    Chloride 110 (H) 97 - 108 mmol/L    CO2 23 21 - 32 mmol/L    Anion gap 7 5 - 15 mmol/L    Glucose 78 65 - 100 mg/dL    BUN 19 6 - 20 MG/DL    Creatinine 0.70 0.70 - 1.30 MG/DL    BUN/Creatinine ratio 27 (H) 12 - 20      GFR est AA >60 >60 ml/min/1.73m2    GFR est non-AA >60 >60 ml/min/1.73m2    Calcium 9.0 8.5 - 10.1 MG/DL    Bilirubin, total 0.3 0.2 - 1.0 MG/DL    ALT (SGPT) 35 12 - 78 U/L    AST (SGOT) 20 15 - 37 U/L    Alk.  phosphatase 96 45 - 117 U/L    Protein, total 7.6 6.4 - 8.2 g/dL    Albumin 4.2 3.5 - 5.0 g/dL    Globulin 3.4 2.0 - 4.0 g/dL    A-G Ratio 1.2 1.1 - 2.2     MAGNESIUM    Collection Time: 05/19/21 11:07 AM   Result Value Ref Range    Magnesium 2.2 1.6 - 2.4 mg/dL     Medications Administered     0.9% sodium chloride 1,000 mL with potassium chloride 10 mEq, magnesium sulfate 2 g infusion     Admin Date  05/19/2021 Action  Given Dose   Rate  1,000 mL/hr Route  IntraVENous Administered By  Poly Kerr RN          0.9% sodium chloride infusion     Admin Date  05/19/2021 Action  New Bag Dose  25 mL/hr Rate  25 mL/hr Route  IntraVENous Administered By  Kaci Palemr          0.9% sodium chloride injection 10 mL     Admin Date  05/19/2021 Action  Given Dose  10 mL Route  IntraVENous Administered By  Poly Kerr, RN          CISplatin (PLATINOL) 82.8 mg in 0.9% sodium chloride 500 mL, overfill volume 50 mL chemo infusion     Admin Date  05/19/2021 Action  New Bag Dose  82.8 mg Rate  632.8 mL/hr Route  IntraVENous Administered By  Kaci Palmer          dexamethasone (DECADRON) 12 mg in 0.9% sodium chloride 50 mL IVPB     Admin Date  05/19/2021 Action  Given Dose  12 mg Route  IntraVENous Administered By  Irwin Perry          fosaprepitant (EMEND) 150 mg in 0.9% sodium chloride 150 mL IVPB     Admin Date  05/19/2021 Action  Given Dose  150 mg Rate  450 mL/hr Route  IntraVENous Administered By  Irwin Perry          palonosetron HCl (ALOXI) injection 0.25 mg     Admin Date  05/19/2021 Action  Given Dose  0.25 mg Route  IntraVENous Administered By  Irwin Perry                      Mr. Jd Vallejo tolerated treatment well, port flushed and de accessed, patient was discharged from [place] [address] in stable condition at 1625.      Future Appointments   Date Time Provider Renae Fajardo   5/19/2021  8:00 PM RAD [address] [place]   5/20/2021  7:00 PM RAD [address] [place]   5/21/2021  5:45 PM RAD [address] [place]   5/24/2021  6:15 PM RAD [address] [place]   5/25/2021  3:15 PM RAD ONC THERAPY RCR [address] [place]   5/26/2021  9:00 AM F1 JOSUE LONG [place] [facility]   5/26/2021  9:15 AM Héctor Santizo  [address] BS AMB   5/26/2021 10:30 AM RAD ONC THERAPY RCR [address] [place] [address] [facility]   5/27/2021 10:30 AM RAD [address] [place]   5/28/2021  8:45 AM VACCINE NURSE [facility] BS AMB   5/28/2021 10:30 AM RAD ONC THERAPY RCR [address] [place]   6/1/2021  8:30 AM Jakob Atkinson PA [facility] BS AMB   6/1/2021 10:30 AM RAD [address] [place]   6/2/2021 10:30 AM RAD [address] [place]   6/3/2021 10:30 AM RAD [address] [place]   6/3/2021 11:00 AM F1 JOSUE LONG [place] [facility]   6/4/2021 10:30 AM RAD ONC THERAPY RCR [address] [place] [address] [facility]   6/7/2021  8:30 AM Maureen Campbell MD [address] [facility] BS AMB   6/7/2021 10:30 AM RAD [address] [place]   6/8/2021 10:30 AM RAD [address] [place]   6/9/2021  9:00 AM F1 JOSUE SINGH [facility]   6/9/2021 10:30 AM RAD ONC THERAPY [facility]CR [facility]   6/10/2021 10:30 AM RAD [address] [place]   6/11/2021 10:30 AM RAD [address] [place]   6/14/2021 10:30 AM RAD [address] [place]   6/15/2021 10:30 AM RAD [address] [place]   6/16/2021 10:00 AM E4 JOSUE LONG [place] [facility]   6/16/2021 10:30 AM RAD ONC THERAPY RCR [address] [place]   6/17/2021 10:30 AM RAD [address] [place]   6/18/2021 10:30 AM RAD [address] [place]   6/21/2021 10:30 AM RAD [address] [place]   6/22/2021 10:30 AM RAD [address] [place]   6/23/2021 10:30 AM RAD [address] [place]   6/24/2021 10:30 AM RAD [address] [place]   6/25/2021 10:30 AM RAD [address] [place]   6/28/2021 10:30 AM RAD [address] [place]   6/29/2021 10:30 AM RAD [address] [place]   6/30/2021 10:30 AM RAD [address] [place]   7/1/2021 10:30 AM RAD [address] [place]   7/2/2021 10:30 AM RAD [address] [place]   7/6/2021 10:30 AM RAD [address] [place]   7/7/2021 10:30 AM RAD [address] [place]   7/8/2021 10:30 AM RAD [address] [place]         Marco Antonio Mendenhall  May 19, 2021

## 2021-05-20 ENCOUNTER — HOSPITAL ENCOUNTER (OUTPATIENT)
Dept: RADIATION THERAPY | Age: 36
Discharge: HOME | End: 2021-05-20

## 2021-05-24 ENCOUNTER — HOSPITAL ENCOUNTER (OUTPATIENT)
Dept: RADIATION THERAPY | Age: 36
Discharge: HOME | End: 2021-05-24

## 2021-05-25 ENCOUNTER — HOSPITAL ENCOUNTER (OUTPATIENT)
Dept: RADIATION THERAPY | Age: 36
Discharge: HOME | End: 2021-05-25

## 2021-05-26 ENCOUNTER — OFFICE VISIT (OUTPATIENT)
Dept: ONCOLOGY | Age: 36
End: 2021-05-26

## 2021-05-26 ENCOUNTER — HOSPITAL ENCOUNTER (OUTPATIENT)
Dept: RADIATION THERAPY | Age: 36
Discharge: HOME | End: 2021-05-26

## 2021-05-26 ENCOUNTER — HOSPITAL ENCOUNTER (OUTPATIENT)
Dept: INFUSION THERAPY | Age: 36
Discharge: HOME | End: 2021-05-26
Payer: SUBSIDIZED

## 2021-05-26 VITALS
WEIGHT: 207 LBS | HEART RATE: 98 BPM | SYSTOLIC BLOOD PRESSURE: 137 MMHG | HEIGHT: 67 IN | BODY MASS INDEX: 32.49 KG/M2 | TEMPERATURE: 97.5 F | RESPIRATION RATE: 18 BRPM | DIASTOLIC BLOOD PRESSURE: 86 MMHG

## 2021-05-26 VITALS
SYSTOLIC BLOOD PRESSURE: 134 MMHG | RESPIRATION RATE: 18 BRPM | DIASTOLIC BLOOD PRESSURE: 78 MMHG | HEART RATE: 92 BPM | TEMPERATURE: 97.5 F | BODY MASS INDEX: 32.42 KG/M2 | WEIGHT: 207 LBS | OXYGEN SATURATION: 98 %

## 2021-05-26 DIAGNOSIS — C76.0 HEAD AND NECK CANCER (HCC): Primary | ICD-10-CM

## 2021-05-26 DIAGNOSIS — Z95.828 PORT-A-CATH IN PLACE: ICD-10-CM

## 2021-05-26 DIAGNOSIS — Z51.11 ENCOUNTER FOR ANTINEOPLASTIC CHEMOTHERAPY: ICD-10-CM

## 2021-05-26 LAB
ALBUMIN SERPL-MCNC: 4.1 G/DL (ref 3.5–5)
ALBUMIN/GLOB SERPL: 1.3 {RATIO} (ref 1.1–2.2)
ALP SERPL-CCNC: 93 U/L (ref 45–117)
ALT SERPL-CCNC: 26 U/L (ref 12–78)
ANION GAP SERPL CALC-SCNC: 7 MMOL/L (ref 5–15)
AST SERPL-CCNC: 14 U/L (ref 15–37)
BASOPHILS # BLD: 0.1 K/UL (ref 0–0.1)
BASOPHILS NFR BLD: 1 % (ref 0–1)
BILIRUB SERPL-MCNC: 0.3 MG/DL (ref 0.2–1)
BUN SERPL-MCNC: 14 MG/DL (ref 6–20)
BUN/CREAT SERPL: 18 (ref 12–20)
CALCIUM SERPL-MCNC: 8.9 MG/DL (ref 8.5–10.1)
CHLORIDE SERPL-SCNC: 109 MMOL/L (ref 97–108)
CO2 SERPL-SCNC: 21 MMOL/L (ref 21–32)
CREAT SERPL-MCNC: 0.76 MG/DL (ref 0.7–1.3)
DIFFERENTIAL METHOD BLD: ABNORMAL
EOSINOPHIL # BLD: 0.2 K/UL (ref 0–0.4)
EOSINOPHIL NFR BLD: 4 % (ref 0–7)
ERYTHROCYTE [DISTWIDTH] IN BLOOD BY AUTOMATED COUNT: 16.5 % (ref 11.5–14.5)
GLOBULIN SER CALC-MCNC: 3.2 G/DL (ref 2–4)
GLUCOSE SERPL-MCNC: 111 MG/DL (ref 65–100)
HCT VFR BLD AUTO: 42.6 % (ref 36.6–50.3)
HGB BLD-MCNC: 14.4 G/DL (ref 12.1–17)
IMM GRANULOCYTES # BLD AUTO: 0 K/UL (ref 0–0.04)
IMM GRANULOCYTES NFR BLD AUTO: 1 % (ref 0–0.5)
LYMPHOCYTES # BLD: 1.1 K/UL (ref 0.8–3.5)
LYMPHOCYTES NFR BLD: 18 % (ref 12–49)
MAGNESIUM SERPL-MCNC: 2 MG/DL (ref 1.6–2.4)
MCH RBC QN AUTO: 29 PG (ref 26–34)
MCHC RBC AUTO-ENTMCNC: 33.8 G/DL (ref 30–36.5)
MCV RBC AUTO: 85.7 FL (ref 80–99)
MONOCYTES # BLD: 0.4 K/UL (ref 0–1)
MONOCYTES NFR BLD: 8 % (ref 5–13)
NEUTS SEG # BLD: 4 K/UL (ref 1.8–8)
NEUTS SEG NFR BLD: 68 % (ref 32–75)
NRBC # BLD: 0 K/UL (ref 0–0.01)
NRBC BLD-RTO: 0 PER 100 WBC
PLATELET # BLD AUTO: 206 K/UL (ref 150–400)
PMV BLD AUTO: 10.1 FL (ref 8.9–12.9)
POTASSIUM SERPL-SCNC: 4 MMOL/L (ref 3.5–5.1)
PROT SERPL-MCNC: 7.3 G/DL (ref 6.4–8.2)
RBC # BLD AUTO: 4.97 M/UL (ref 4.1–5.7)
SODIUM SERPL-SCNC: 137 MMOL/L (ref 136–145)
WBC # BLD AUTO: 5.7 K/UL (ref 4.1–11.1)

## 2021-05-26 PROCEDURE — 83735 ASSAY OF MAGNESIUM: CPT

## 2021-05-26 PROCEDURE — 74011250636 HC RX REV CODE- 250/636: Performed by: INTERNAL MEDICINE

## 2021-05-26 PROCEDURE — 36415 COLL VENOUS BLD VENIPUNCTURE: CPT

## 2021-05-26 PROCEDURE — 85025 COMPLETE CBC W/AUTO DIFF WBC: CPT

## 2021-05-26 PROCEDURE — 96413 CHEMO IV INFUSION 1 HR: CPT

## 2021-05-26 PROCEDURE — 96375 TX/PRO/DX INJ NEW DRUG ADDON: CPT

## 2021-05-26 PROCEDURE — 74011000258 HC RX REV CODE- 258: Performed by: INTERNAL MEDICINE

## 2021-05-26 PROCEDURE — 99214 OFFICE O/P EST MOD 30 MIN: CPT | Performed by: INTERNAL MEDICINE

## 2021-05-26 PROCEDURE — 80053 COMPREHEN METABOLIC PANEL: CPT

## 2021-05-26 PROCEDURE — 96367 TX/PROPH/DG ADDL SEQ IV INF: CPT

## 2021-05-26 PROCEDURE — 77030012965 HC NDL HUBR BBMI -A

## 2021-05-26 RX ORDER — SODIUM CHLORIDE 9 MG/ML
10 INJECTION INTRAMUSCULAR; INTRAVENOUS; SUBCUTANEOUS AS NEEDED
Status: DISCONTINUED | OUTPATIENT
Start: 2021-05-26 | End: 2021-05-27 | Stop reason: HOSPADM

## 2021-05-26 RX ORDER — PALONOSETRON 0.05 MG/ML
0.25 INJECTION, SOLUTION INTRAVENOUS ONCE
Status: COMPLETED | OUTPATIENT
Start: 2021-05-26 | End: 2021-05-26

## 2021-05-26 RX ORDER — SODIUM CHLORIDE 9 MG/ML
25 INJECTION, SOLUTION INTRAVENOUS CONTINUOUS
Status: DISCONTINUED | OUTPATIENT
Start: 2021-05-26 | End: 2021-05-27 | Stop reason: HOSPADM

## 2021-05-26 RX ORDER — HEPARIN 100 UNIT/ML
300-500 SYRINGE INTRAVENOUS AS NEEDED
Status: DISCONTINUED | OUTPATIENT
Start: 2021-05-26 | End: 2021-05-27 | Stop reason: HOSPADM

## 2021-05-26 RX ORDER — SODIUM CHLORIDE 0.9 % (FLUSH) 0.9 %
10 SYRINGE (ML) INJECTION AS NEEDED
Status: DISCONTINUED | OUTPATIENT
Start: 2021-05-26 | End: 2021-05-27 | Stop reason: HOSPADM

## 2021-05-26 RX ADMIN — SODIUM CHLORIDE 10 ML: 9 INJECTION INTRAMUSCULAR; INTRAVENOUS; SUBCUTANEOUS at 09:15

## 2021-05-26 RX ADMIN — SODIUM CHLORIDE 150 MG: 900 INJECTION, SOLUTION INTRAVENOUS at 15:38

## 2021-05-26 RX ADMIN — PALONOSETRON 0.25 MG: 0.05 INJECTION, SOLUTION INTRAVENOUS at 15:15

## 2021-05-26 RX ADMIN — HEPARIN 500 UNITS: 100 SYRINGE at 17:10

## 2021-05-26 RX ADMIN — MAGNESIUM SULFATE HEPTAHYDRATE: 500 INJECTION, SOLUTION INTRAMUSCULAR; INTRAVENOUS at 14:07

## 2021-05-26 RX ADMIN — DEXAMETHASONE SODIUM PHOSPHATE 12 MG: 4 INJECTION, SOLUTION INTRA-ARTICULAR; INTRALESIONAL; INTRAMUSCULAR; INTRAVENOUS; SOFT TISSUE at 15:18

## 2021-05-26 RX ADMIN — CISPLATIN 82.8 MG: 100 INJECTION, SOLUTION INTRAVENOUS at 16:07

## 2021-05-26 RX ADMIN — Medication 10 ML: at 17:10

## 2021-05-26 RX ADMIN — SODIUM CHLORIDE 25 ML/HR: 900 INJECTION, SOLUTION INTRAVENOUS at 15:13

## 2021-05-26 NOTE — PROGRESS NOTES
[facility] Progress Note    Date: May 26, 2021    Name: Marialuisa Brian    MRN: [id number]         : 1985    Mr. Dallas Fontaine Arrived ambulatory and in no distress for cycle 1 day 8 of Cisplatin regimen. Assessment was completed, no acute issues at this time, no new complaints voiced. Patient reports that he has a mild headache, but it is only rated at a 1. Denies any other side effects of his chemotherapy. States he was hydrating well over the past week. No other changes reported. Port accessed without difficulty, labs drawn and processed. Port capped while waiting for results. Prior to treatment, patient was screened for COVID 19. Denies any signs or symptoms of COVID. Denies any known physical contact with anyone exposed to, diagnosed with or with pending or positive COVID test. Denies any pending or positive COVID test themself. Chemotherapy Flowsheet 2021   Cycle C1D8   Date 2021   Drug / Regimen Cisplatin   Dosage 82.8mg   Time Up 1607   Time Down 1717   Pre Hydration given   Post Hydration none   Pre Meds given   Notes given         Mr. Isi Mendoza's vitals were reviewed. Patient Vitals for the past 12 hrs:   Temp Pulse Resp BP   21 0929 97.5 °F (36.4 °C) 92 18 134/78         Lab results were obtained and reviewed. Recent Results (from the past 12 hour(s))   CBC WITH AUTOMATED DIFF    Collection Time: 21  9:27 AM   Result Value Ref Range    WBC 5.7 4.1 - 11.1 K/uL    RBC 4.97 4.10 - 5.70 M/uL    HGB 14.4 12.1 - 17.0 g/dL    HCT 42.6 36.6 - 50.3 %    MCV 85.7 80.0 - 99.0 FL    MCH 29.0 26.0 - 34.0 PG    MCHC 33.8 30.0 - 36.5 g/dL    RDW 16.5 (H) 11.5 - 14.5 %    PLATELET 659 687 - 235 K/uL    MPV 10.1 8.9 - 12.9 FL    NRBC 0.0 0  WBC    ABSOLUTE NRBC 0.00 0.00 - 0.01 K/uL    NEUTROPHILS 68 32 - 75 %    LYMPHOCYTES 18 12 - 49 %    MONOCYTES 8 5 - 13 %    EOSINOPHILS 4 0 - 7 %    BASOPHILS 1 0 - 1 %    IMMATURE GRANULOCYTES 1 (H) 0.0 - 0.5 %    ABS. NEUTROPHILS 4.0 1.8 - 8.0 K/UL    ABS. LYMPHOCYTES 1.1 0.8 - 3.5 K/UL    ABS. MONOCYTES 0.4 0.0 - 1.0 K/UL    ABS. EOSINOPHILS 0.2 0.0 - 0.4 K/UL    ABS. BASOPHILS 0.1 0.0 - 0.1 K/UL    ABS. IMM. GRANS. 0.0 0.00 - 0.04 K/UL    DF AUTOMATED     METABOLIC PANEL, COMPREHENSIVE    Collection Time: 05/26/21  9:27 AM   Result Value Ref Range    Sodium 137 136 - 145 mmol/L    Potassium 4.0 3.5 - 5.1 mmol/L    Chloride 109 (H) 97 - 108 mmol/L    CO2 21 21 - 32 mmol/L    Anion gap 7 5 - 15 mmol/L    Glucose 111 (H) 65 - 100 mg/dL    BUN 14 6 - 20 MG/DL    Creatinine 0.76 0.70 - 1.30 MG/DL    BUN/Creatinine ratio 18 12 - 20      GFR est AA >60 >60 ml/min/1.73m2    GFR est non-AA >60 >60 ml/min/1.73m2    Calcium 8.9 8.5 - 10.1 MG/DL    Bilirubin, total 0.3 0.2 - 1.0 MG/DL    ALT (SGPT) 26 12 - 78 U/L    AST (SGOT) 14 (L) 15 - 37 U/L    Alk.  phosphatase 93 45 - 117 U/L    Protein, total 7.3 6.4 - 8.2 g/dL    Albumin 4.1 3.5 - 5.0 g/dL    Globulin 3.2 2.0 - 4.0 g/dL    A-G Ratio 1.3 1.1 - 2.2     MAGNESIUM    Collection Time: 05/26/21  9:27 AM   Result Value Ref Range    Magnesium 2.0 1.6 - 2.4 mg/dL       Medications Administered     0.9% sodium chloride 1,000 mL with potassium chloride 10 mEq, magnesium sulfate 2 g infusion     Admin Date  05/26/2021 Action  New Bag Dose   Rate  1,000 mL/hr Route  IntraVENous Administered By  Faith Dias          0.9% sodium chloride infusion     Admin Date  05/26/2021 Action  New Bag Dose  25 mL/hr Rate  25 mL/hr Route  IntraVENous Administered By  Jayla Lawson RN          0.9% sodium chloride injection 10 mL     Admin Date  05/26/2021 Action  Given Dose  10 mL Route  IntraVENous Administered By  Faith Dias          CISplatin (PLATINOL) 82.8 mg in 0.9% sodium chloride 500 mL, overfill volume 50 mL chemo infusion     Admin Date  05/26/2021 Action  New Bag Dose  82.8 mg Rate  632.8 mL/hr Route  IntraVENous Administered By  Faith Dias          dexamethasone (DECADRON) 12 mg in 0.9% sodium chloride 50 mL, overfill volume 5 mL IVPB     Admin Date  05/26/2021 Action  New Bag Dose  12 mg Rate  232 mL/hr Route  IntraVENous Administered By  Cherylene Press, RN          fosaprepitant (EMEND) 150 mg in 0.9% sodium chloride 150 mL IVPB     Admin Date  05/26/2021 Action  New Bag Dose  150 mg Rate  450 mL/hr Route  IntraVENous Administered By  Cherylene Press, RN          heparin (porcine) pf 300-500 Units     Admin Date  05/26/2021 Action  Given Dose  500 Units Route  InterCATHeter Administered By  Althea Sleight          palonosetron HCl (ALOXI) injection 0.25 mg     Admin Date  05/26/2021 Action  Given Dose  0.25 mg Route  IntraVENous Administered By  Cherylene Press, RN          sodium chloride (NS) flush 10 mL     Admin Date  05/26/2021 Action  Given Dose  10 mL Route  IntraVENous Administered By  Althea Sleight                      Mr. Sarah Fowler tolerated treatment well, port flushed and de accessed, patient was discharged from [place] [address] in stable condition at [phone].      Future Appointments   Date Time Provider Renae Fajardo   5/27/2021 10:30 AM RAD ONC THERAPY RCR [address] [place]   5/28/2021  8:45 AM VACCINE NURSE [facility] BS AMB   5/28/2021 10:30 AM RAD ONC THERAPY RCR [address] [place]   6/1/2021  8:30 AM Jeannette Atkinson PA [facility] BS AMB   6/1/2021 10:30 AM RAD [address] [place]   6/2/2021 10:30 AM RAD [address] [place]   6/3/2021 10:30 AM RAD [address] [place]   6/3/2021 11:00 AM F1 JOSUE LONG [address] [facility]   6/3/2021 11:15 AM Riley Nazario  [address] BS AMB   6/4/2021 10:30 AM RAD ONC THERAPY RCR [address] [place] [address] [place]   6/7/2021  8:30 AM Mehul Victor MD 1000 [facility] BS AMB   6/7/2021 10:30 AM RAD ONC THERAPY RCR [address]   6/8/2021 10:30 AM RAD [address] [place]   6/9/2021  9:00 AM F1 JOSUE LONG [place] [facility]   6/9/2021 10:30 AM RAD ONC THERAPY RCR [address] [place]   6/10/2021 10:30 AM RAD [address] [place]   6/11/2021 10:30 AM RAD [address] [place]   6/14/2021 10:30 AM RAD [address] [place]   6/15/2021 10:30 AM RAD [address] [place]   6/16/2021 10:00 AM E4 JOSUE LONG [place] [facility]   6/16/2021 10:30 AM RAD ONC THERAPY RCR [address] [place]   6/17/2021 10:30 AM RAD [address] [place]   6/18/2021 10:30 AM RAD [address] [place]   6/21/2021 10:30 AM RAD [address] [place]   6/22/2021 10:30 AM RAD [address] [place]   6/23/2021 10:30 AM RAD [address] [place]   6/24/2021 10:30 AM RAD [address] [place]   6/25/2021 10:30 AM RAD [address] [place]   6/28/2021 10:30 AM RAD [address] [place]   6/29/2021 10:30 AM RAD [address] [place]   6/30/2021 10:00 AM E4 JOSUE LONG [place] [facility]   6/30/2021 10:30 AM RAD ONC THERAPY RCR [address] [place]   7/1/2021  9:00 AM E4 JOSUE LONG [place] [facility]   7/1/2021 10:30 AM RAD ONC THERAPY RCR [address] [place]   7/2/2021 10:30 AM RAD [address] [place]   7/6/2021 10:30 AM RAD [address] [place]   7/7/2021 10:30 AM RAD [address] [place]   7/8/2021 10:30 AM RAD ONC THERAPY R [facility]         [facility]  May 26, 2021

## 2021-05-26 NOTE — PROGRESS NOTES
[facility] [place] at [place] [address] [address], [address], [place]: [phone]  F: [phone]    Reason for visit   Holly Purvis is a 29 y.o. 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 male with no significant PMH seen for above  He was seen by Dr. Kvng Otero with ENT in December with c/o recurrent epistaxis x 2 months requiring ER visits and packing. His prior nasal endoscopy was clear. He was admitted on 2/4/2021 for recurrent HAs and epistaxis which lasted several minutes with improvement on pressure. He had a normal CBC for the most part and had no h/o bleeding growing up. MRI brain 2/5/2021 was obtained and showed a 4.2 x 4 cm posterior nasopharyngeal mass Extending into the sphenoid sinus with narrowing f the R distal internal carotid artery. CTA did not show active extravasation, but showed bilateral cervical adenopathy. He had a biopsy of the NP mass 2/6/2021 and pathology showed Nasopharyngeal carcinoma. He completed 3 cycles of cisplatin / gemzar. He comes today for week 2 of cisplatin + RT. He feels great. He has no epistaxis or other bleeding. He denies headaches or ear pain. He feels very well. Denies nausea/vomiting diarrhea/constipation, LE swelling, SOB, CP, cough. He has no complaints today. No past medical history on file.    Past Surgical History:   Procedure Laterality Date    IR INSERT TUNL CVC W PORT OVER 5 YEARS  2/11/2021         IR INSERT TUNL CVC W PORT OVER 5 YEARS  2/11/2021      Social History     Tobacco Use    Smoking status: Never Smoker  Smokeless tobacco: Never Used   Substance Use Topics    Alcohol use: Not Currently      No family history on file. Current Outpatient Medications   Medication Sig    dexAMETHasone (DECADRON) 4 mg tablet Take 2 tabs (8mg) on days 2 & 3 of chemotherapy    amLODIPine (NORVASC) 5 mg tablet Take 1 Tab by mouth daily.  topiramate (TOPAMAX) 50 mg tablet Take 1 Tab by mouth two (2) times a day.  ondansetron (ZOFRAN ODT) 8 mg disintegrating tablet Take 1 Tab by mouth every eight (8) hours as needed for Nausea or Vomiting.  lidocaine-prilocaine (EMLA) topical cream Apply  to affected area as needed for Pain.  senna (SENOKOT) 8.6 mg tablet Take 2 Tabs by mouth daily.  polyethylene glycol (MIRALAX) 17 gram packet Take 1 Packet by mouth daily. Mix in 8 oz of water, juice, soda, coffee, or tea prior to administration    ZOLMitriptan (ZOMIG) 2.5 mg tablet Take 1 Tab by mouth as needed for Migraine (May repeat in 2 hours if not improved. Max 10mg/24HRs).  magnesium 250 mg tab Si po qhs    fexofenadine (ALLEGRA) 180 mg tablet Take 1 Tab by mouth daily. Indications: seasonal runny nose    acetaminophen (TYLENOL) 500 mg tablet Take  by mouth every six (6) hours as needed for Pain. 2 or 3 po daily     No current facility-administered medications for this visit. Allergies   Allergen Reactions    Iodinated Contrast Media Itching        Review of Systems: A complete review of systems was obtained, negative except as described above.     Physical Exam:     Visit Vitals  /78 (BP 1 Location: Left upper arm, BP Patient Position: Sitting)   Pulse 92   Temp 97.5 °F (36.4 °C)   Resp 18   Wt 207 lb (93.9 kg)   SpO2 98%   BMI 32.42 kg/m²     General appearance - alert, well appearing, and in no distress, nervous, poor eye contact  Mental status - oriented to person, place, and time  Mouth - mucous membranes moist, OP clear   Neck - supple, PAC in place   Lymphatics - no palpable lymphadenopathy  Skin - normal coloration and turgor, no new rashes, no suspicious skin lesions noted    ECOG PS:1    Results:     Lab Results   Component Value Date/Time    WBC 5.7 05/26/2021 09:27 AM    HGB 14.4 05/26/2021 09:27 AM    HCT 42.6 05/26/2021 09:27 AM    PLATELET 228 98/19/9690 09:27 AM    MCV 85.7 05/26/2021 09:27 AM    ABS. NEUTROPHILS 4.0 05/26/2021 09:27 AM     Lab Results   Component Value Date/Time    Sodium 140 05/19/2021 11:07 AM    Potassium 4.1 05/19/2021 11:07 AM    Chloride 110 (H) 05/19/2021 11:07 AM    CO2 23 05/19/2021 11:07 AM    Glucose 78 05/19/2021 11:07 AM    BUN 19 05/19/2021 11:07 AM    Creatinine 0.70 05/19/2021 11:07 AM    GFR est AA >60 05/19/2021 11:07 AM    GFR est non-AA >60 05/19/2021 11:07 AM    Calcium 9.0 05/19/2021 11:07 AM    Glucose (POC) 113 (H) 04/15/2021 08:07 AM     Lab Results   Component Value Date/Time    Bilirubin, total 0.3 05/19/2021 11:07 AM    ALT (SGPT) 35 05/19/2021 11:07 AM    Alk. phosphatase 96 05/19/2021 11:07 AM    Protein, total 7.6 05/19/2021 11:07 AM    Albumin 4.2 05/19/2021 11:07 AM    Globulin 3.4 05/19/2021 11:07 AM     Component      Latest Ref Rng & Units 2/7/2021          12:26 PM   Cristóbal-Purdy DNA QT, PCR      Negative copies/mL 327   Cristóbal-Barr Virus log10      log10 copy/mL 2.515       Records reviewed and summarized above. Pathology report(s) reviewed     PATHOLOGY     FINAL PATHOLOGIC DIAGNOSIS   1. Right nasopharyngeal mass, biopsy:   Nasopharyngeal carcinoma, nonkeratinizing, undifferentiated type (see comment)   In situ hybridization for Crsitóbal-Barr virus (SEMAJ) is positive   2. Right nasopharyngeal mass, biopsy:   Nasopharyngeal carcinoma, nonkeratinizing, undifferentiated type   Comment   Immunohistochemical stains performed from block 1A show that the invasive carcinoma expresses p40, AE1/AE3, and CAM5.2 while negative for p16, CK7, and CK20. This immunoprofile supports the above rendered diagnosis     Radiology report(s) reviewed above.   CTA neck    IMPRESSION  1. Attenuation of the right internal carotid artery as it passes through the  right nasopharyngeal mass but no evidence of active extravasation or  intraluminal thrombus. 2.  Bilateral cervical lymphadenopathy in level 2 and level 5B. 3.  Right posterior nasopharyngeal mass with sphenoid sinus involvement and  diffuse sinus disease    MRI brain    IMPRESSION  1. 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 2.  Diffuse sinus mucosal thickening. 3.  Mass related narrowing of the right distal cervical internal carotid artery  but no large vessel occlusion. 4.  No evidence of infarct.       MRI orbit and face    IMPRESSION  Again demonstrated is large right nasopharyngeal tumor as previously described  and without significant change. Some involvement right internal carotid artery. Associated mucosal thickening and fluid right paranasal sinuses. PET 2/18/21:  IMPRESSION  The right nasopharyngeal mass lesion is hypermetabolic and  compatible with the known neoplasm. Bilateral hypermetabolic cervical lymph  nodes. Focal increased tracer activity is seen involving the very posterior  aspect of the nasal septum. PET 4/15/21:  IMPRESSION  No foci evident to suggest recurrent or metastatic disease. Resolved  nasopharyngeal mass uptake and bilateral cervical lymph node tracer  Distribution. Assessment:   1) Nasopharyngeal carcinoma- R   SEMAJ positive    4.2 x 4 cm, extends into the sphenoid sinus, bilateral cervical nodes. Breaks but does not involve carotid artery    Nasopharyngeal carcinoma - at least T3N2M0-Stage III disease.   This is an aggressive malignancy with high risk of fatal local complications  In his case , the abutment of carotid artery is highly concerning for risk of tumor extension and catastrophic bleeding  He is very symptomatic with severe pain, epistaxis, hearing impediment    His PET was reviewed from 2/18/21 and shows a significant fco burden. Hence, after discussion with radiation decision was made to move forward with induction chemo with cisplatin + gemzar x 3 cycles followed by chemoRT (with weekly Cisplatin). He completed 3 cycles of cisplatin / gemzar. PET obtained 4/15/21 was reviewed and shows a CR. Plan for 6 weeks of chemoRT with cisplatin. Today is week 2 of chemoRT w/ cisplatin. Tolerated week 1 very well. 2) Epistaxis  Secondary to the mass  CTA without active extravasation  Resolved     3) Headaches  Secondary to the nasopharyngeal mass with splenoid sinus involvement  Controlled on Verapamil, Zomig, Oxycodone  Continue palliative care follow up  Resolved     4) Management of high risk drugs like chemotherapy  Chemo calendar provided to patient  Labs reviewed   Tolerated week 1 we ll     Plan:     · Proceed today for week 2 of weekly Cisplatin (40mg/m2) + RT   · Labs to include cbc with diff, CMP prior to each infusion   · zofran / compazine PRN   · Dexamethasone on days 2 , 3   · Follow with radiation oncology as scheduled   · Continue to follow with palliative care    RTC in 1 week for week 3     I appreciate the opportunity to participate in Mr. Rossi Mendoza's care. I performed a history and physical examination of the patient and discussed his management with the NPP.  I reviewed the NPP note and agree with the documented findings and plan of care    NP carcinoma with CR to induction chemotherapy  This is week 2 of concurrent RT  Labs reviewed and stable  He has no nausea, denies pain and no epistaxis  Plan 6 weeks of ChemoRT  Signed By: Bhupendra Pineda MD

## 2021-05-26 NOTE — PROGRESS NOTES
Everett Colby is a 29 y.o. 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 Have you been to the ER, urgent care clinic since your last visit? Hospitalized since your last visit? No    2. Have you seen or consulted any other health care providers outside of the [address] [place] [address] since your last visit? Include any pap smears or colon screening.  No

## 2021-05-27 ENCOUNTER — HOSPITAL ENCOUNTER (OUTPATIENT)
Dept: RADIATION THERAPY | Age: 36
Discharge: HOME | End: 2021-05-27

## 2021-05-28 ENCOUNTER — IMMUNIZATION (OUTPATIENT)
Dept: FAMILY MEDICINE CLINIC | Age: 36
End: 2021-05-28

## 2021-05-28 ENCOUNTER — HOSPITAL ENCOUNTER (OUTPATIENT)
Dept: RADIATION THERAPY | Age: 36
Discharge: HOME | End: 2021-05-28

## 2021-05-28 DIAGNOSIS — Z23 ENCOUNTER FOR IMMUNIZATION: Primary | ICD-10-CM

## 2021-05-28 PROCEDURE — 0012A COVID-19, MRNA, LNP-S, PF, 100MCG/0.5ML DOSE(MODERNA): CPT

## 2021-05-28 PROCEDURE — 91301 COVID-19, MRNA, LNP-S, PF, 100MCG/0.5ML DOSE(MODERNA): CPT

## 2021-06-01 ENCOUNTER — OFFICE VISIT (OUTPATIENT)
Dept: FAMILY MEDICINE CLINIC | Age: 36
End: 2021-06-01

## 2021-06-01 ENCOUNTER — HOSPITAL ENCOUNTER (OUTPATIENT)
Dept: RADIATION THERAPY | Age: 36
Discharge: HOME | End: 2021-06-01

## 2021-06-01 DIAGNOSIS — G43.909 MIGRAINE SYNDROME: ICD-10-CM

## 2021-06-01 PROCEDURE — 99442 PR PHYS/QHP TELEPHONE EVALUATION 11-20 MIN: CPT | Performed by: PHYSICIAN ASSISTANT

## 2021-06-01 RX ORDER — TOPIRAMATE 50 MG/1
50 TABLET, FILM COATED ORAL 2 TIMES DAILY
Qty: 60 TABLET | Refills: 11 | Status: SHIPPED | OUTPATIENT
Start: 2021-06-01 | End: 2022-07-26

## 2021-06-01 NOTE — PROGRESS NOTES
Assessment/Plan:    Diagnoses and all orders for this visit:    1. Migraine syndrome  -     topiramate (TOPAMAX) 50 mg tablet; Take 1 Tablet by mouth two (2) times a day. Follow-up and Dispositions    · Return in about 3 months (around 2021) for f/up virtual okay . NIESHA Dasilva expressed understanding of this plan. An AVS was printed and given to the patient.      ----------------------------------------------------------------------    Chief Complaint   Patient presents with    Follow-up       History of Present Illness:    Pt called and consented to virtual telephone call, he declined video  He was identified by name and   I reviewed his last hemo/onc note last week when I received it and his last lab results from 21. Glucose 111     He is calling today for a refill on his topiramate for migraines. He actually has refills available but he wanted to talk about the medication. With the medication, he is not having any significant headache or migraine. My advice is to continue the medication as it may be the reason that he is not having migraines. 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 Currently, he is in the early part of a 6 week radiation tx. He is not having fevers, n/v/d, pain. He does have some burning on his neck at the radiation site and he has been given cream to use. He is not having headaches. He feels emotionally stable and has the support of his family. He is not feeling depressed at this time. We discussed that he can call for any concern or problem but that I would ask him to schedule for his next visit in 3 months. He agrees to this plan     No past medical history on file. Current Outpatient Medications   Medication Sig Dispense Refill    topiramate (TOPAMAX) 50 mg tablet Take 1 Tablet by mouth two (2) times a day. 60 Tablet 11    amLODIPine (NORVASC) 5 mg tablet Take 1 Tab by mouth daily.  80 Tab 3    ZOLMitriptan (ZOMIG) 2.5 mg tablet Take 1 Tab by mouth as needed for Migraine (May repeat in 2 hours if not improved. Max 10mg/24HRs). 9 Tab 1    dexAMETHasone (DECADRON) 4 mg tablet Take 2 tabs (8mg) on days 2 & 3 of chemotherapy (Patient not taking: Reported on 2021) 30 Tablet 1    ondansetron (ZOFRAN ODT) 8 mg disintegrating tablet Take 1 Tab by mouth every eight (8) hours as needed for Nausea or Vomiting. (Patient not taking: Reported on 2021) 30 Tab 3    lidocaine-prilocaine (EMLA) topical cream Apply  to affected area as needed for Pain. (Patient not taking: Reported on 2021) 30 g 0    senna (SENOKOT) 8.6 mg tablet Take 2 Tabs by mouth daily. (Patient not taking: Reported on 2021) 60 Tab 0    polyethylene glycol (MIRALAX) 17 gram packet Take 1 Packet by mouth daily. Mix in 8 oz of water, juice, soda, coffee, or tea prior to administration (Patient not taking: Reported on 2021) 30 Each 0    magnesium 250 mg tab Si po qhs (Patient not taking: Reported on 2021) 30 Tab 3    fexofenadine (ALLEGRA) 180 mg tablet Take 1 Tab by mouth daily. Indications: seasonal runny nose (Patient not taking: Reported on 2021) 30 Tab 0    acetaminophen (TYLENOL) 500 mg tablet Take  by mouth every six (6) hours as needed for Pain. 2 or 3 po daily (Patient not taking: Reported on 2021)         Allergies   Allergen Reactions    Iodinated Contrast Media Itching       Social History     Tobacco Use    Smoking status: Never Smoker    Smokeless tobacco: Never Used   Substance Use Topics    Alcohol use: Not Currently    Drug use: Never       No family history on file. Physical Exam:     There were no vitals taken for this visit.     A&Ox3  WDWN NAD  Respirations normal and non labored

## 2021-06-01 NOTE — PROGRESS NOTES
Coordination of Care  1. Have you been to the ER, urgent care clinic since your last visit? Hospitalized since your last visit? No    2. Have you seen or consulted any other health care providers outside of the [address] [place] [address] since your last visit? Include any pap smears or colon screening. No    Does the patient need refills? YES    Learning Assessment Complete?  yes  Depression Screening complete in the past 12 months? yes     Pt has not vitals signs available

## 2021-06-02 ENCOUNTER — HOSPITAL ENCOUNTER (OUTPATIENT)
Dept: RADIATION THERAPY | Age: 36
Discharge: HOME | End: 2021-06-02

## 2021-06-03 ENCOUNTER — HOSPITAL ENCOUNTER (OUTPATIENT)
Dept: RADIATION THERAPY | Age: 36
Discharge: HOME | End: 2021-06-03

## 2021-06-03 ENCOUNTER — DOCUMENTATION ONLY (OUTPATIENT)
Dept: ONCOLOGY | Age: 36
End: 2021-06-03

## 2021-06-03 ENCOUNTER — OFFICE VISIT (OUTPATIENT)
Dept: ONCOLOGY | Age: 36
End: 2021-06-03
Payer: SUBSIDIZED

## 2021-06-03 ENCOUNTER — HOSPITAL ENCOUNTER (OUTPATIENT)
Dept: INFUSION THERAPY | Age: 36
Discharge: HOME | End: 2021-06-03
Payer: SUBSIDIZED

## 2021-06-03 VITALS
SYSTOLIC BLOOD PRESSURE: 132 MMHG | BODY MASS INDEX: 31.95 KG/M2 | WEIGHT: 204 LBS | HEART RATE: 83 BPM | DIASTOLIC BLOOD PRESSURE: 81 MMHG | RESPIRATION RATE: 18 BRPM | TEMPERATURE: 98.2 F | OXYGEN SATURATION: 98 %

## 2021-06-03 VITALS
HEIGHT: 67 IN | RESPIRATION RATE: 18 BRPM | HEART RATE: 84 BPM | BODY MASS INDEX: 31.99 KG/M2 | DIASTOLIC BLOOD PRESSURE: 74 MMHG | SYSTOLIC BLOOD PRESSURE: 128 MMHG | TEMPERATURE: 98.1 F | WEIGHT: 203.8 LBS

## 2021-06-03 DIAGNOSIS — C11.9 NASOPHARYNGEAL CARCINOMA (HCC): ICD-10-CM

## 2021-06-03 DIAGNOSIS — Z95.828 PORT-A-CATH IN PLACE: ICD-10-CM

## 2021-06-03 DIAGNOSIS — C76.0 HEAD AND NECK CANCER (HCC): Primary | ICD-10-CM

## 2021-06-03 DIAGNOSIS — Z51.11 ENCOUNTER FOR ANTINEOPLASTIC CHEMOTHERAPY: Primary | ICD-10-CM

## 2021-06-03 LAB
ALBUMIN SERPL-MCNC: 4 G/DL (ref 3.5–5)
ALBUMIN/GLOB SERPL: 1.2 {RATIO} (ref 1.1–2.2)
ALP SERPL-CCNC: 94 U/L (ref 45–117)
ALT SERPL-CCNC: 25 U/L (ref 12–78)
ANION GAP SERPL CALC-SCNC: 6 MMOL/L (ref 5–15)
AST SERPL-CCNC: 11 U/L (ref 15–37)
BASOPHILS # BLD: 0 K/UL (ref 0–0.1)
BASOPHILS NFR BLD: 1 % (ref 0–1)
BILIRUB SERPL-MCNC: 0.2 MG/DL (ref 0.2–1)
BUN SERPL-MCNC: 14 MG/DL (ref 6–20)
BUN/CREAT SERPL: 19 (ref 12–20)
CALCIUM SERPL-MCNC: 9 MG/DL (ref 8.5–10.1)
CHLORIDE SERPL-SCNC: 109 MMOL/L (ref 97–108)
CO2 SERPL-SCNC: 24 MMOL/L (ref 21–32)
CREAT SERPL-MCNC: 0.72 MG/DL (ref 0.7–1.3)
DIFFERENTIAL METHOD BLD: ABNORMAL
EOSINOPHIL # BLD: 0.3 K/UL (ref 0–0.4)
EOSINOPHIL NFR BLD: 4 % (ref 0–7)
ERYTHROCYTE [DISTWIDTH] IN BLOOD BY AUTOMATED COUNT: 16 % (ref 11.5–14.5)
GLOBULIN SER CALC-MCNC: 3.3 G/DL (ref 2–4)
GLUCOSE SERPL-MCNC: 84 MG/DL (ref 65–100)
HCT VFR BLD AUTO: 42.7 % (ref 36.6–50.3)
HGB BLD-MCNC: 14 G/DL (ref 12.1–17)
IMM GRANULOCYTES # BLD AUTO: 0 K/UL (ref 0–0.04)
IMM GRANULOCYTES NFR BLD AUTO: 1 % (ref 0–0.5)
LYMPHOCYTES # BLD: 1.2 K/UL (ref 0.8–3.5)
LYMPHOCYTES NFR BLD: 19 % (ref 12–49)
MAGNESIUM SERPL-MCNC: 2.2 MG/DL (ref 1.6–2.4)
MCH RBC QN AUTO: 28.7 PG (ref 26–34)
MCHC RBC AUTO-ENTMCNC: 32.8 G/DL (ref 30–36.5)
MCV RBC AUTO: 87.5 FL (ref 80–99)
MONOCYTES # BLD: 0.7 K/UL (ref 0–1)
MONOCYTES NFR BLD: 11 % (ref 5–13)
NEUTS SEG # BLD: 4.2 K/UL (ref 1.8–8)
NEUTS SEG NFR BLD: 64 % (ref 32–75)
NRBC # BLD: 0 K/UL (ref 0–0.01)
NRBC BLD-RTO: 0 PER 100 WBC
PLATELET # BLD AUTO: 234 K/UL (ref 150–400)
PMV BLD AUTO: 9.9 FL (ref 8.9–12.9)
POTASSIUM SERPL-SCNC: 4.2 MMOL/L (ref 3.5–5.1)
PROT SERPL-MCNC: 7.3 G/DL (ref 6.4–8.2)
RBC # BLD AUTO: 4.88 M/UL (ref 4.1–5.7)
SODIUM SERPL-SCNC: 139 MMOL/L (ref 136–145)
WBC # BLD AUTO: 6.4 K/UL (ref 4.1–11.1)

## 2021-06-03 PROCEDURE — 74011250636 HC RX REV CODE- 250/636: Performed by: INTERNAL MEDICINE

## 2021-06-03 PROCEDURE — 36415 COLL VENOUS BLD VENIPUNCTURE: CPT

## 2021-06-03 PROCEDURE — 96367 TX/PROPH/DG ADDL SEQ IV INF: CPT

## 2021-06-03 PROCEDURE — 83735 ASSAY OF MAGNESIUM: CPT

## 2021-06-03 PROCEDURE — 80053 COMPREHEN METABOLIC PANEL: CPT

## 2021-06-03 PROCEDURE — 77030012965 HC NDL HUBR BBMI -A

## 2021-06-03 PROCEDURE — 99214 OFFICE O/P EST MOD 30 MIN: CPT | Performed by: INTERNAL MEDICINE

## 2021-06-03 PROCEDURE — 85025 COMPLETE CBC W/AUTO DIFF WBC: CPT

## 2021-06-03 PROCEDURE — 96361 HYDRATE IV INFUSION ADD-ON: CPT

## 2021-06-03 PROCEDURE — 96413 CHEMO IV INFUSION 1 HR: CPT

## 2021-06-03 PROCEDURE — 74011000258 HC RX REV CODE- 258: Performed by: INTERNAL MEDICINE

## 2021-06-03 PROCEDURE — 96375 TX/PRO/DX INJ NEW DRUG ADDON: CPT

## 2021-06-03 RX ORDER — SODIUM CHLORIDE 9 MG/ML
10 INJECTION INTRAMUSCULAR; INTRAVENOUS; SUBCUTANEOUS AS NEEDED
Status: DISCONTINUED | OUTPATIENT
Start: 2021-06-03 | End: 2021-06-04 | Stop reason: HOSPADM

## 2021-06-03 RX ORDER — HEPARIN 100 UNIT/ML
300-500 SYRINGE INTRAVENOUS AS NEEDED
Status: DISCONTINUED | OUTPATIENT
Start: 2021-06-03 | End: 2021-06-04 | Stop reason: HOSPADM

## 2021-06-03 RX ORDER — PALONOSETRON 0.05 MG/ML
0.25 INJECTION, SOLUTION INTRAVENOUS ONCE
Status: COMPLETED | OUTPATIENT
Start: 2021-06-03 | End: 2021-06-03

## 2021-06-03 RX ORDER — SODIUM CHLORIDE 9 MG/ML
25 INJECTION, SOLUTION INTRAVENOUS CONTINUOUS
Status: DISCONTINUED | OUTPATIENT
Start: 2021-06-03 | End: 2021-06-04 | Stop reason: HOSPADM

## 2021-06-03 RX ORDER — SODIUM CHLORIDE 0.9 % (FLUSH) 0.9 %
10 SYRINGE (ML) INJECTION AS NEEDED
Status: DISCONTINUED | OUTPATIENT
Start: 2021-06-03 | End: 2021-06-04 | Stop reason: HOSPADM

## 2021-06-03 RX ADMIN — MAGNESIUM SULFATE HEPTAHYDRATE: 500 INJECTION, SOLUTION INTRAMUSCULAR; INTRAVENOUS at 12:53

## 2021-06-03 RX ADMIN — SODIUM CHLORIDE 25 ML/HR: 900 INJECTION, SOLUTION INTRAVENOUS at 12:52

## 2021-06-03 RX ADMIN — DEXAMETHASONE SODIUM PHOSPHATE 12 MG: 4 INJECTION, SOLUTION INTRA-ARTICULAR; INTRALESIONAL; INTRAMUSCULAR; INTRAVENOUS; SOFT TISSUE at 14:05

## 2021-06-03 RX ADMIN — SODIUM CHLORIDE 10 ML: 9 INJECTION INTRAMUSCULAR; INTRAVENOUS; SUBCUTANEOUS at 11:10

## 2021-06-03 RX ADMIN — HEPARIN 500 UNITS: 100 SYRINGE at 16:10

## 2021-06-03 RX ADMIN — Medication 10 ML: at 16:10

## 2021-06-03 RX ADMIN — CISPLATIN 82.8 MG: 100 INJECTION, SOLUTION INTRAVENOUS at 15:06

## 2021-06-03 RX ADMIN — SODIUM CHLORIDE 150 MG: 900 INJECTION, SOLUTION INTRAVENOUS at 14:24

## 2021-06-03 RX ADMIN — PALONOSETRON 0.25 MG: 0.05 INJECTION, SOLUTION INTRAVENOUS at 14:05

## 2021-06-03 NOTE — PROGRESS NOTES
[facility] Progress Note    Date: Indira 3, 2021    Name: Wilmer Castillo    MRN: [id number]         : 1985    Mr. Bouchra Acosta Arrived ambulatory and in no distress for cycle 1 day 15 of Cisplatin regimen. Assessment was completed. Patient has a radiation burn noted to his neck and face. There is no broken down skin, it is just re at this time. He reports that his radiation nurses are aware. Additionally, patient reports some taste changes which are making it difficult to eat. No other changes reported at this time. Port accessed without difficulty, labs drawn and processed. Port capped and patient left the department for his medical oncology follow up. Prior to treatment, patient was screened for COVID 19. Denies any signs or symptoms of COVID. Denies any known physical contact with anyone exposed to, diagnosed with or with pending or positive COVID test. Denies any pending or positive COVID test themself. Chemotherapy Flowsheet 6/3/2021   Cycle C1D15   Date 6/3/2021   Drug / Regimen Cisplatin   Dosage 82.8mg   Time Up 1506   Time Down 1606   Pre Hydration given   Post Hydration none   Pre Meds given   Notes given         Mr. Dennis Mendoza's vitals were reviewed. Patient Vitals for the past 12 hrs:   Temp Pulse Resp BP   21 1110 98.1 °F (36.7 °C) 84 18 128/74         Lab results were obtained and reviewed.   Recent Results (from the past 12 hour(s))   CBC WITH AUTOMATED DIFF    Collection Time: 21 11:18 AM   Result Value Ref Range    WBC 6.4 4.1 - 11.1 K/uL    RBC 4.88 4.10 - 5.70 M/uL    HGB 14.0 12.1 - 17.0 g/dL    HCT 42.7 36.6 - 50.3 %    MCV 87.5 80.0 - 99.0 FL    MCH 28.7 26.0 - 34.0 PG    MCHC 32.8 30.0 - 36.5 g/dL    RDW 16.0 (H) 11.5 - 14.5 %    PLATELET 086 830 - 604 K/uL    MPV 9.9 8.9 - 12.9 FL    NRBC 0.0 0  WBC    ABSOLUTE NRBC 0.00 0.00 - 0.01 K/uL    NEUTROPHILS 64 32 - 75 %    LYMPHOCYTES 19 12 - 49 %    MONOCYTES 11 5 - 13 %    EOSINOPHILS 4 0 - 7 %    BASOPHILS 1 0 - 1 %    IMMATURE GRANULOCYTES 1 (H) 0.0 - 0.5 %    ABS. NEUTROPHILS 4.2 1.8 - 8.0 K/UL    ABS. LYMPHOCYTES 1.2 0.8 - 3.5 K/UL    ABS. MONOCYTES 0.7 0.0 - 1.0 K/UL    ABS. EOSINOPHILS 0.3 0.0 - 0.4 K/UL    ABS. BASOPHILS 0.0 0.0 - 0.1 K/UL    ABS. IMM. GRANS. 0.0 0.00 - 0.04 K/UL    DF AUTOMATED     METABOLIC PANEL, COMPREHENSIVE    Collection Time: 06/03/21 11:18 AM   Result Value Ref Range    Sodium 139 136 - 145 mmol/L    Potassium 4.2 3.5 - 5.1 mmol/L    Chloride 109 (H) 97 - 108 mmol/L    CO2 24 21 - 32 mmol/L    Anion gap 6 5 - 15 mmol/L    Glucose 84 65 - 100 mg/dL    BUN 14 6 - 20 MG/DL    Creatinine 0.72 0.70 - 1.30 MG/DL    BUN/Creatinine ratio 19 12 - 20      GFR est AA >60 >60 ml/min/1.73m2    GFR est non-AA >60 >60 ml/min/1.73m2    Calcium 9.0 8.5 - 10.1 MG/DL    Bilirubin, total 0.2 0.2 - 1.0 MG/DL    ALT (SGPT) 25 12 - 78 U/L    AST (SGOT) 11 (L) 15 - 37 U/L    Alk.  phosphatase 94 45 - 117 U/L    Protein, total 7.3 6.4 - 8.2 g/dL    Albumin 4.0 3.5 - 5.0 g/dL    Globulin 3.3 2.0 - 4.0 g/dL    A-G Ratio 1.2 1.1 - 2.2     MAGNESIUM    Collection Time: 06/03/21 11:18 AM   Result Value Ref Range    Magnesium 2.2 1.6 - 2.4 mg/dL     Medications Administered       0.9% sodium chloride 1,000 mL with potassium chloride 10 mEq, magnesium sulfate 2 g infusion       Admin Date  06/03/2021 Action  New Bag Dose   Rate  1,000 mL/hr Route  IntraVENous Administered By  Faith Dias              0.9% sodium chloride infusion       Admin Date  06/03/2021 Action  New Bag Dose  25 mL/hr Rate  25 mL/hr Route  IntraVENous Administered By  Faith Dias              0.9% sodium chloride injection 10 mL       Admin Date  06/03/2021 Action  Given Dose  10 mL Route  IntraVENous Administered By  Faith Dias              CISplatin (PLATINOL) 82.8 mg in 0.9% sodium chloride 500 mL, overfill volume 50 mL chemo infusion       Admin Date  06/03/2021 Action  New Bag Dose  82.8 mg Rate  632.8 mL/hr Route  IntraVENous Administered By  Rohini Elia              dexamethasone (DECADRON) 12 mg in 0.9% sodium chloride 50 mL, overfill volume 5 mL IVPB       Admin Date  06/03/2021 Action  New Bag Dose  12 mg Rate  232 mL/hr Route  IntraVENous Administered By  Rohini Elia              fosaprepitant (EMEND) 150 mg in 0.9% sodium chloride 150 mL IVPB       Admin Date  06/03/2021 Action  New Bag Dose  150 mg Rate  450 mL/hr Route  IntraVENous Administered By  Rohini Elia              heparin (porcine) pf 300-500 Units       Admin Date  06/03/2021 Action  Given Dose  500 Units Route  InterCATHeter Administered By  Rohini Elia              palonosetron HCl (ALOXI) injection 0.25 mg       Admin Date  06/03/2021 Action  Given Dose  0.25 mg Route  IntraVENous Administered By  Rohini Elia              sodium chloride (NS) flush 10 mL       Admin Date  06/03/2021 Action  Given Dose  10 mL Route  IntraVENous Administered By  Rohini Elia                           Mr. Dallas Fontaine tolerated treatment well, port flushed and de accessed, patient was discharged from [place] [address] in stable condition at [phone] 74.     Future Appointments   Date Time Provider Renae Fajardo   6/4/2021 10:30 AM RAD [address] [place]   6/7/2021  8:30 AM Tre Campbell MD [facility] BS AMB   6/7/2021 10:30 AM RAD [address] [place]   6/8/2021 10:30 AM RAD [address] [place]   6/9/2021  9:00 AM F1 J[place] [facility] H   6/9/2021  9:15 AM Hardeep Vazquez  N [facility] BS AMB   6/9/2021 10:30 AM RAD ONC THERAPY RCR [address] [place]   6/10/2021 10:30 AM RAD [address] [place]   6/11/2021 10:30 AM RAD [address] [place]   6/14/2021 10:30 AM RAD [address] [place]   6/15/2021 10:30 AM RAD [address] [place]   6/16/2021 10:00 AM E4 [facility] H   6/16/2021 10:30 AM RAD ONC THERAPY RCR [facility] [place]   6/17/2021 10:30 AM RAD [address] [place]   6/18/2021 10:30 AM RAD [address] [place]   6/21/2021 10:30 AM RAD [address] [place]   6/22/2021 10:30 AM RAD [address] [place]   6/23/2021 10:30 AM RAD [address] [place]   6/24/2021 10:30 AM RAD [address] [place]   6/25/2021 10:30 AM RAD [address] [place]   6/28/2021 10:30 AM RAD [address] [place]   6/29/2021 10:30 AM RAD [address] [place]   6/30/2021 10:00 AM E4 [place] [facility] H   6/30/2021 10:30 AM RAD ONC THERAPY RCR [address] [place]   7/1/2021  9:00 AM E4 [place] [facility] H   7/1/2021 10:30 AM RAD ONC THERAPY RCR [address] [place]   7/2/2021 10:30 AM RAD [address] [place]   7/6/2021 10:30 AM RAD [address] [place]   7/7/2021 10:30 AM RAD [address] [place]   7/8/2021  9:00 AM E4 [place] [facility] H   7/8/2021 10:30 AM RAD ONC THERAPY RCR [address] [place]         Lambert Aleman  Indira 3, 2021

## 2021-06-03 NOTE — PROGRESS NOTES
[facility] [place] at [place] [address] [address], [address], [place]: [phone]  F: [phone]    Reason for visit   Usha Gonzales is a 29 y.o. 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 male with no significant PMH seen for above  He was seen by Dr. Anne Multani with ENT in December with c/o recurrent epistaxis x 2 months requiring ER visits and packing. His prior nasal endoscopy was clear. He was admitted on 2/4/2021 for recurrent HAs and epistaxis which lasted several minutes with improvement on pressure. He had a normal CBC for the most part and had no h/o bleeding growing up. MRI brain 2/5/2021 was obtained and showed a 4.2 x 4 cm posterior nasopharyngeal mass Extending into the sphenoid sinus with narrowing f the R distal internal carotid artery. CTA did not show active extravasation, but showed bilateral cervical adenopathy. He had a biopsy of the NP mass 2/6/2021 and pathology showed Nasopharyngeal carcinoma. He completed 3 cycles of cisplatin / gemzar. He comes today for week 3 of cisplatin + RT. Has a poor appetite. Denies mouth sores or dysphagia. Denies nausea/vomiting or diarrhea/constipation. Has intermittent numbness/tingling in finger tips. Denies bleeding or headaches. He has no other complaints today. History reviewed. No pertinent past medical history.    Past Surgical History:   Procedure Laterality Date    IR INSERT TUNL CVC W PORT OVER 5 YEARS  2/11/2021         IR INSERT TUNL CVC W PORT OVER 5 YEARS  2/11/2021      Social History     Tobacco Use    Smoking status: Never Smoker    Smokeless tobacco: Never Used   Substance Use Topics    Alcohol use: Not Currently      History reviewed. No pertinent family history. Current Outpatient Medications   Medication Sig    topiramate (TOPAMAX) 50 mg tablet Take 1 Tablet by mouth two (2) times a day.  dexAMETHasone (DECADRON) 4 mg tablet Take 2 tabs (8mg) on days 2 & 3 of chemotherapy    amLODIPine (NORVASC) 5 mg tablet Take 1 Tab by mouth daily.  ondansetron (ZOFRAN ODT) 8 mg disintegrating tablet Take 1 Tab by mouth every eight (8) hours as needed for Nausea or Vomiting.  lidocaine-prilocaine (EMLA) topical cream Apply  to affected area as needed for Pain.  senna (SENOKOT) 8.6 mg tablet Take 2 Tabs by mouth daily.  polyethylene glycol (MIRALAX) 17 gram packet Take 1 Packet by mouth daily. Mix in 8 oz of water, juice, soda, coffee, or tea prior to administration    ZOLMitriptan (ZOMIG) 2.5 mg tablet Take 1 Tab by mouth as needed for Migraine (May repeat in 2 hours if not improved. Max 10mg/24HRs).  magnesium 250 mg tab Si po qhs    fexofenadine (ALLEGRA) 180 mg tablet Take 1 Tab by mouth daily. Indications: seasonal runny nose    acetaminophen (TYLENOL) 500 mg tablet Take  by mouth every six (6) hours as needed for Pain. 2 or 3 po daily      No current facility-administered medications for this visit. Allergies   Allergen Reactions    Iodinated Contrast Media Itching        Review of Systems: A complete review of systems was obtained, negative except as described above.     Physical Exam:     Visit Vitals  /81 (BP 1 Location: Left upper arm, BP Patient Position: Sitting)   Pulse 83   Temp 98.2 °F (36.8 °C)   Resp 18   Wt 204 lb (92.5 kg)   SpO2 98%   BMI 31.95 kg/m²     General appearance - alert, well appearing, and in no distress, nervous, poor eye contact  Mental status - oriented to person, place, and time  Mouth - mucous membranes moist, OP clear   Neck - supple, PAC in place   Lymphatics - no palpable lymphadenopathy  Skin - normal coloration and turgor, no new rashes, no suspicious skin lesions noted    ECOG PS:1    Results:     Lab Results   Component Value Date/Time    WBC 5.7 05/26/2021 09:27 AM    HGB 14.4 05/26/2021 09:27 AM    HCT 42.6 05/26/2021 09:27 AM    PLATELET 607 77/40/0007 09:27 AM    MCV 85.7 05/26/2021 09:27 AM    ABS. NEUTROPHILS 4.0 05/26/2021 09:27 AM     Lab Results   Component Value Date/Time    Sodium 137 05/26/2021 09:27 AM    Potassium 4.0 05/26/2021 09:27 AM    Chloride 109 (H) 05/26/2021 09:27 AM    CO2 21 05/26/2021 09:27 AM    Glucose 111 (H) 05/26/2021 09:27 AM    BUN 14 05/26/2021 09:27 AM    Creatinine 0.76 05/26/2021 09:27 AM    GFR est AA >60 05/26/2021 09:27 AM    GFR est non-AA >60 05/26/2021 09:27 AM    Calcium 8.9 05/26/2021 09:27 AM    Glucose (POC) 113 (H) 04/15/2021 08:07 AM     Lab Results   Component Value Date/Time    Bilirubin, total 0.3 05/26/2021 09:27 AM    ALT (SGPT) 26 05/26/2021 09:27 AM    Alk. phosphatase 93 05/26/2021 09:27 AM    Protein, total 7.3 05/26/2021 09:27 AM    Albumin 4.1 05/26/2021 09:27 AM    Globulin 3.2 05/26/2021 09:27 AM     Component      Latest Ref Rng & Units 2/7/2021          12:26 PM   Cristóbal-Purdy DNA QT, PCR      Negative copies/mL 327   Cristóbal-Barr Virus log10      log10 copy/mL 2.515       Records reviewed and summarized above. Pathology report(s) reviewed     PATHOLOGY     FINAL PATHOLOGIC DIAGNOSIS   1. Right nasopharyngeal mass, biopsy:   Nasopharyngeal carcinoma, nonkeratinizing, undifferentiated type (see comment)   In situ hybridization for Cristóbal-Barr virus (SEMAJ) is positive   2. Right nasopharyngeal mass, biopsy:   Nasopharyngeal carcinoma, nonkeratinizing, undifferentiated type   Comment   Immunohistochemical stains performed from block 1A show that the invasive carcinoma expresses p40, AE1/AE3, and CAM5.2 while negative for p16, CK7, and CK20.  This immunoprofile supports the above rendered diagnosis     Radiology report(s) reviewed above. CTA neck    IMPRESSION  1. Attenuation of the right internal carotid artery as it passes through the  right nasopharyngeal mass but no evidence of active extravasation or  intraluminal thrombus. 2.  Bilateral cervical lymphadenopathy in level 2 and level 5B. 3.  Right posterior nasopharyngeal mass with sphenoid sinus involvement and  diffuse sinus disease    MRI brain    IMPRESSION  1. 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 2.  Diffuse sinus mucosal thickening. 3.  Mass related narrowing of the right distal cervical internal carotid artery  but no large vessel occlusion. 4.  No evidence of infarct.       MRI orbit and face    IMPRESSION  Again demonstrated is large right nasopharyngeal tumor as previously described  and without significant change. Some involvement right internal carotid artery. Associated mucosal thickening and fluid right paranasal sinuses. PET 2/18/21:  IMPRESSION  The right nasopharyngeal mass lesion is hypermetabolic and  compatible with the known neoplasm. Bilateral hypermetabolic cervical lymph  nodes. Focal increased tracer activity is seen involving the very posterior  aspect of the nasal septum. PET 4/15/21:  IMPRESSION  No foci evident to suggest recurrent or metastatic disease. Resolved  nasopharyngeal mass uptake and bilateral cervical lymph node tracer  Distribution. Assessment:   1) Nasopharyngeal carcinoma- R   SEMAJ positive    4.2 x 4 cm, extends into the sphenoid sinus, bilateral cervical nodes. [place] but does not involve carotid artery    Nasopharyngeal carcinoma - at least T3N2M0-Stage III disease.   This is an aggressive malignancy with high risk of fatal local complications  In his case , the abutment of carotid artery is highly concerning for risk of tumor extension and catastrophic bleeding  He is very symptomatic with severe pain, epistaxis, hearing impediment    His PET was reviewed from 2/18/21 and shows a significant fco burden. Hence, after discussion with radiation decision was made to move forward with induction chemo with cisplatin + gemzar x 3 cycles followed by chemoRT (with weekly Cisplatin). He completed 3 cycles of cisplatin / gemzar. PET obtained 4/15/21 was reviewed and shows a CR. Plan for 6 weeks of chemoRT with cisplatin. Today is week 3 of chemoRT w/ cisplatin. Tolerating well. 2) Epistaxis  Secondary to the mass  CTA without active extravasation  Resolved     3) Headaches  Secondary to the nasopharyngeal mass with splenoid sinus involvement  Controlled on Verapamil, Zomig, Oxycodone  Continue palliative care follow up  Resolved     4) Management of high risk drugs like chemotherapy  Chemo calendar provided to patient  Labs reviewed   Tolerating well thus far     Plan:     · Proceed today for week 3 of weekly Cisplatin (40mg/m2) + RT   · Labs to include cbc with diff, CMP prior to each infusion   · zofran / compazine PRN   · Dexamethasone on days 2 , 3   · Follow with radiation oncology as scheduled   · Continue to follow with palliative care    RTC in 1 week for week 4     I appreciate the opportunity to participate in Mr. Rola Mendoza's care. I performed a history and physical examination of the patient and discussed his management with the NPP.  I reviewed the NPP note and agree with the documented findings and plan of care  NP carcinoma with CR after induction and now on week 3 of chemoRT with Cisplatin    HIs labs are stable, he has denied any neuropathy  On exam he has no facial tenderness, no epistaxis  Continue as planned   Signed By: Mayur Javier MD

## 2021-06-03 NOTE — PROGRESS NOTES
Kirby Nash is a 29 y.o. 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 Have you been to the ER, urgent care clinic since your last visit? Hospitalized since your last visit? No    2. Have you seen or consulted any other health care providers outside of the [address] [place] [address] since your last visit? Include any pap smears or colon screening.  No

## 2021-06-04 ENCOUNTER — HOSPITAL ENCOUNTER (OUTPATIENT)
Dept: RADIATION THERAPY | Age: 36
Discharge: HOME | End: 2021-06-04

## 2021-06-07 ENCOUNTER — VIRTUAL VISIT (OUTPATIENT)
Dept: PALLATIVE CARE | Age: 36
End: 2021-06-07
Payer: SUBSIDIZED

## 2021-06-07 ENCOUNTER — HOSPITAL ENCOUNTER (OUTPATIENT)
Dept: RADIATION THERAPY | Age: 36
Discharge: HOME | End: 2021-06-07

## 2021-06-07 DIAGNOSIS — C11.9 NASOPHARYNGEAL CARCINOMA (HCC): ICD-10-CM

## 2021-06-07 DIAGNOSIS — R04.0 EPISTAXIS: ICD-10-CM

## 2021-06-07 DIAGNOSIS — R43.2 DYSGEUSIA: Primary | ICD-10-CM

## 2021-06-07 DIAGNOSIS — R53.83 FATIGUE, UNSPECIFIED TYPE: ICD-10-CM

## 2021-06-07 DIAGNOSIS — G93.9 HEADACHE DUE TO INTRACRANIAL DISEASE: ICD-10-CM

## 2021-06-07 DIAGNOSIS — R51.9 HEADACHE DUE TO INTRACRANIAL DISEASE: ICD-10-CM

## 2021-06-07 DIAGNOSIS — Z71.89 COUNSELING REGARDING ADVANCE DIRECTIVES AND GOALS OF CARE: ICD-10-CM

## 2021-06-07 DIAGNOSIS — R11.0 NAUSEA WITHOUT VOMITING: ICD-10-CM

## 2021-06-07 PROCEDURE — 99497 ADVNCD CARE PLAN 30 MIN: CPT | Performed by: INTERNAL MEDICINE

## 2021-06-07 PROCEDURE — 99443 PR PHYS/QHP TELEPHONE EVALUATION 21-30 MIN: CPT | Performed by: INTERNAL MEDICINE

## 2021-06-07 NOTE — PROGRESS NOTES
Palliative Medicine Office Visit  Palliative Medicine Nurse Check In  [phone]    Patient Name: Kallie Schafer  YOB: 1985      Date of Office Visit: 6/7/2021     Patient states: \"  \"    From Check In Sheet (scanned in Media):  Has a medical provider talked with you about cardiopulmonary resuscitation (CPR)? [x] Yes   [] No   [] Unable to obtain    Nurse reminder to complete or update ACP FlowSheet:    Is ACP on the Problem List?    [] Yes    [x] No  IF ACP Document is ON FILE; Nurse to place ACP on Problem List     Is there an ACP Note in Chart Review/Note? [x] Yes    [] No   If NO: ALERT PROVIDER          Primary Decision Maker (Active): Rose Baltazar - Spouse - [phone]    Secondary Decision Maker (Active): [phone], [phone] [facility] [phone]  Advance Care Planning 6/7/2021   Patient's Healthcare Decision Maker is: Verbal statement (Legal Next of Kin remains as decision maker)   Confirm Advance Directive None   Patient Would Like to Complete Advance Directive -       Is there anything that we should know about you as a person in order to provide you the best care possible? Have you been to the ER, urgent care clinic since your last visit? [] Yes   [x] No   [] Unable to obtain    Have you been hospitalized since your last visit? [] Yes   [x] No   [] Unable to obtain    Have you seen or consulted any other health care providers outside of the [address] [place] [address] since your last visit?    [] Yes   [x] No   [] Unable to obtain    Functional status (describe):         Last BM: 6/7/2021     accessed (date):  6/7/2021    Bottle review (for opioid pain medication):  Medication 1:   Date filled:   Directions:   # filled:   # left:   # pills taking per day:  Last dose taken:    Medication 2:   Date filled:   Directions:   # filled:   # left:   # pills taking per day:  Last dose taken:    Medication 3:   Date filled:   Directions:   # filled:   # left:   # pills taking per day:  Last dose taken:    Medication 4:   Date filled:   Directions:   # filled:   # left:   # pills taking per day:  Last dose taken:

## 2021-06-07 NOTE — PROGRESS NOTES
Palliative Medicine Outpatient Services  [place]: [phone] ([phone])    Patient Name: Ruth Corral  YOB: 1985    Date of Current Visit: 06/07/21  Location of Current Visit:    [] [facility] Office  [] [facility] Office  [] [address] [facility] Office  [] Home   []Synchronous real-time A/V virtual visit  [x] Telephone     Date of Initial Visit: 3/1/21   Referral from: Aleks Watt MD  Primary Care Physician: JESSICA Montero      SUMMARY:   Ruth Corral is a 29y.o. 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 [place] but does not involve carotid artery), who was referred to Palliative Medicine by Dr. Annika Quiroga for symptom management and psychosocial support. He was diagnosed in 2/21 after presenting with months of recurrent headaches and epistaxis. He is currently being treated with induction chemo with cisplatin + gemzar x 3 cycles followed by chemoRT ( with weekly Cisplatin) under the care of Dr. Annika Quiroga.       The patients social history includes: he is  to [place]. They have an 6year old daughter, Joyce García, and a 9year old son, Jenny Cooper. He worked in construction until 2019. He is an uninsured, undocumented resident from [facility]. Palliative Medicine is addressing the following current patient/family concerns: headaches, migrainous, likely related to malignancy; nausea; neck pain; symptom and psychosocial support over course of treatment; advance care planning     Initial Referral Intake note reviewed   PALLIATIVE DIAGNOSES:       ICD-10-CM ICD-9-CM    1. Dysgeusia  R43.2 781.1    2. Nausea without vomiting  R11.0 787.02    3. Fatigue, unspecified type  R53.83 780.79    4. Counseling regarding advance directives and goals of care  Z71.89 V65.49    5. Headache due to intracranial disease  R51.9 784.0     G93.9 348.9    6. Epistaxis  R04.0 784.7    7.  Nasopharyngeal carcinoma (HCC)  C11.9 147.9 PLAN:   Patient Instructions     Dear Agustin Huang ,    It was a pleasure seeing you today via telephone visit. We will see you again in 8 weeks for a virtual visit. If labs or imaging tests have been ordered for you today, please call the office  at [phone] 48 hours after completion to obtain the results. Your described symptoms were: Fatigue: 5 Drowsiness: 5   Depression: 0 Pain: 0   Anxiety: 0 Nausea: 1   Anorexia:  (Patient has an appetite,but when he eats the food tastes bad) Dyspnea: 1   Best Well-Bein Constipation: No   Other Problem (Comment): 0       This is the plan we talked about:    1. Dysgeusia (food tasting bad)  -Continue using plastic utensils  -Continue Ensure    2. Nausea  -Continue ondansetron  -Continue prochlorperazine    3. Fatigue  -You feel more fatigued after chemo  -You sleep well at night    4. Headaches  -Continue topiramate 50-mg twice daily  -Continue tylenol 500-mg: take 2 pills every 6 hours as needed  -Continue zolmitriptan (Zomig) 2.5-mg as needed    5. Nose bleeds  -You've had no recent nosebleeds    5.  Advance care planning  -Today we talked again about your wishes for care and who you would want to make medical decisions for you if you were to be too sick to make these decisions for yourself  -You confirmed you want your wife, Luis Abdalla, to be your primary medical decision maker and your Rogenia Pallas, to be your secondary medical decision maker  -You wish to be hospitalized for any medical conditions at this point unless you had a serious medical condition from which you were not expected to recover  -You would want our doctors to let you and your family know if you had such a serious medical condition  -You would not want to be placed on a mechanical ventilator if you had a serious medical condition from which you would likely not receover  -We also talked in depth about CPR (cardiopulmonary resuscitation)  -If your heart were to suddenly stop beating and you stopped breathing, you would want CPR \"to give it a try\"  -If it appears as if you're going to die, your preference is to die at home  -I recommended you complete an Advance Medical Directive to have a copy of your wishes with you in your home      6. Cancer  -You started chemotherapy  -You will receive radiation therapy, along with another chemotherapy, after this initial course (induction chemotherapy)          This is what you have shared with us about Advance Care Planning:      Primary Decision Maker (Active): Lul Wagner - Spouse - [phone]    Secondary Decision Maker (Active): Jonathan Kims, [address] [phone]  Advance Care Planning 6/7/2021   Patient's Healthcare Decision Maker is: Verbal statement (Legal Next of Kin remains as decision maker)   Confirm Advance Directive None   Patient Would Like to Complete Advance Directive -           The Palliative Medicine Team is here to support you and your family. Sincerely,      Larry Ambriz MD and the Palliative Medicine Team            GOALS OF CARE / TREATMENT PREFERENCES:   [====Goals of Care====]  GOALS OF CARE:  Patient / health care proxy stated goals: See Patient Instructions / Summary    TREATMENT PREFERENCES:   Code Status:  [x] Attempt Resuscitation       [] Do Not Attempt Resuscitation    Advance Care Planning:  [x] [facility] Interdisciplinary Team has updated the ACP Navigator with Decision Maker and Patient Capacity      Primary Decision Maker (Active): Lul Wagner - Spouse - [phone]    Secondary Decision Maker (Active):  Jonathan Trammell, [address] [phone]  Advance Care Planning 6/7/2021   Patient's Healthcare Decision Maker is: Verbal statement (Legal Next of Kin remains as decision maker)   Confirm Advance Directive None   Patient Would Like to Complete Advance Directive -       Other:  (If patient appropriate for POST, consider using PALLPOST smart phrase here)    The palliative care team has discussed with patient / health care proxy about goals of care / treatment preferences for patient.  [====Goals of Care====]     PRESCRIPTIONS GIVEN:   No orders of the defined types were placed in this encounter.           FOLLOW UP:     Future Appointments   Date Time Provider Renae Fajardo   6/7/2021 10:30 AM RAD [address] [place]   6/8/2021 10:30 AM RAD [address] [place]   6/9/2021  9:00 AM F1 JOSUE LONG [address] [facility]   6/9/2021  9:15 AM Keesha Crenshaw,  [address] BS AMB   6/9/2021 10:30 AM RAD ONC THERAPY RCR [address] [place]   6/10/2021 10:30 AM RAD [address] [place]   6/11/2021 10:30 AM RAD [address] [place]   6/14/2021 10:30 AM RAD [address] [place]   6/15/2021 10:30 AM RAD [address] [place]   6/16/2021 10:00 AM E4 JOSUE LONG [facility]   6/16/2021 10:30 AM RAD ONC THERAPY RCR [address] [place]   6/17/2021 10:30 AM RAD [address] [place]   6/18/2021 10:30 AM RAD [address] [place]   6/21/2021 10:30 AM RAD [address] [place]   6/22/2021 10:30 AM RAD [address] [place]   6/23/2021 10:30 AM RAD [address] [place]   6/24/2021 10:30 AM RAD [address] [place]   6/25/2021 10:30 AM RAD [address] [place]   6/28/2021 10:30 AM RAD [address] [place]   6/29/2021 10:30 AM RAD [address] [place]   6/30/2021 10:00 AM E4 JOSUE LONG [facility]   6/30/2021 10:30 AM RAD ONC THERAPY RCR [address] [place]   7/1/2021  9:00 AM E4 [facility]UE LONG [place] [facility]   7/1/2021 10:30 AM RAD ONC THERAPY RCR [address] [place]   7/2/2021 10:30 AM RAD [address] [place]   7/6/2021 10:30 AM RAD [address] [place]   7/7/2021 10:30 AM RAD ONC THERAPY RCR [address]   7/8/2021  9:00 AM E4 JOSUE LONG [address] [place] [address]   7/8/2021 10:30 AM RAD ONC THERAPY [facility]           PHYSICIANS INVOLVED IN CARE:   Patient Care Team:  JESSICA Fernandez as PCP - General (Physician Assistant)  JESSICA Fernandez as PCP - [address]en [address] Empaneled Provider  Maria De Jesus Brennan MD (Palliative Medicine)  Litzy Nguyễn MD (Hematology and Oncology)       HISTORY:   Reviewed patient-completed ESAS and advance care planning form. Reviewed patient record in prescription monitoring program.    CHIEF COMPLAINT:   Chief Complaint   Patient presents with    Other     \"food tastes bad\"       HPI/SUBJECTIVE:    The patient is: [x] Verbal / [] Nonverbal - history obtained with assistance of [address]en [address] , Ammy Gonzalez    He's been OK. Treatments are getting a little harder, though. Food tastes really bad, like metal.  He's eating a little bit. He hasn't lost any weight. He met with the [facility] last Thursday who gave him some Ensure (no visit documented in [facility]). He gets nauseous but doesn't vomit. He's moving his bowels. He hasn't had any headaches. He has no pain. He's had no nosebleeds. From IV 3/1/21:  He started having nosebleeds a few months ago. He went to the ED twice and had his nose packed. He started seeing Dr. Yris Lara who used the scope (nasal endoscopy) which didn't show anything. Then he started having headaches and he continued to have nosebleeds, a lot of bleeding. He came to the ED again about a month ago. That's when he found out he had cancer. This news hit him hard. The doctors all got together and decided he should have chemotherapy first, then radiation therapy with another kind of chemotherapy. It has a 50-50 chance of working. The news hit him hard but he's trying to stay positive. He gets strength from his wife and children and from his friends.   He talks with his family in [place] by phone. He isn't having headaches since he started taking the medicine (Topamax). He still gets nosebleeds, he had a small nosebleed lat week. Dr. Aracelis Borjas talked with him about having a catheter put in his vein and going up to try to stop the bleeding (embolization). He sees Dr. Aracelis Borjas again today. The muscles in his neck feel sore sometimes. He thinks it's from how he sleeps sometimes. He doesn't have neck pain or soreness now. He had a little nausea after chemo last week. He has nausea medicine he can take. His appetite is good. He feels good. It's hard to to believe he has cancer. Clinical Pain Assessment (nonverbal scale for nonverbal patients):   [++++ Clinical Pain Assessment++++]  [++++Pain Severity++++]: Pain: 0  [++++Pain Character++++]:  [++++Pain Duration++++]:  [++++Pain Effect++++]:  [++++Pain Factors++++]:  [++++Pain Frequency++++]:  [++++Pain Location++++]:  [++++ Clinical Pain Assessment++++]       FUNCTIONAL ASSESSMENT:     Palliative Performance Scale (PPS):  PPS: 70       PSYCHOSOCIAL/SPIRITUAL SCREENING:     Any spiritual / Rastafari concerns:  [] Yes /  [x] No    Caregiver Burnout:  [] Yes /  [] No /  [x] No Caregiver Present      Anticipatory grief assessment:   [x] Normal  / [] Maladaptive       ESAS Anxiety: Anxiety: 0    ESAS Depression: Depression: 0       REVIEW OF SYSTEMS:     The following systems were [x] reviewed / [] unable to be reviewed  Systems: constitutional, ears/nose/mouth/throat, respiratory, gastrointestinal, genitourinary, musculoskeletal, integumentary, neurologic, psychiatric, endocrine. Positive findings noted below.   Modified ESAS Completed by: provider   Fatigue: 5 Drowsiness: 5   Depression: 0 Pain: 0   Anxiety: 0 Nausea: 1   Anorexia:  (Patient has an appetite,but when he eats the food tastes bad) Dyspnea: 1   Best Well-Bein Constipation: No   Other Problem (Comment): 0          PHYSICAL EXAM:     Wt Readings from Last 3 Encounters:   06/03/21 203 lb 12.8 oz (92.4 kg)   06/03/21 204 lb (92.5 kg)   05/26/21 207 lb (93.9 kg)     There were no vitals taken for this visit. Last bowel movement: See Nursing Note    NO PHYSICAL EXAM - PHONE VISIT    Constitutional    [] Appears well-developed and well-nourished in no apparent distress    [] Abnormal:  Mental status  [] Alert and awake  [] Oriented to person/place/time  [] Able to follow commands  [] Abnormal:   Eyes  [] EOM normal   [] Sclera normal   [] No visible ocular discharge  [] Abnormal:   HENT  [] Normocephalic, atraumatic  [] Mouth/Throat: Moist mucous membranes   [] External Ears normal  [] Abnormal:  Neck  [] No visualized mass  [] Abnormal:  Pulmonary/Chest   [] Respiratory effort normal  [] No visualized signs of difficulty breathing or respiratory distress  [] Abnormal:  Musculoskeletal  [] Normal gait with no signs of ataxia  [] Normal range of motion of neck  [] Abnormal: sitting  Neurological:   [] No facial asymmetry (Cranial nerve 7 motor function)  [] No gaze palsy  [] Abnormal:   Skin  [] No significant exanthematous lesions or discoloration noted on facial skin  [] Abnormal:                                  Psychiatric  [] Normal affect  [] No hallucinations  [] Abnormal:    Other pertinent observable physical exam findings:    Due to this being a TeleHealth evaluation, many elements of the physical examination are unable to be assessed. HISTORY:     No past medical history on file. Past Surgical History:   Procedure Laterality Date    IR INSERT TUNL CVC W PORT OVER 5 YEARS  2/11/2021         IR INSERT TUNL CVC W PORT OVER 5 YEARS  2/11/2021      No family history on file. History reviewed, no pertinent family history.   Social History     Tobacco Use    Smoking status: Never Smoker    Smokeless tobacco: Never Used   Substance Use Topics    Alcohol use: Not Currently     Allergies   Allergen Reactions    Iodinated Contrast Media Itching Current Outpatient Medications   Medication Sig    topiramate (TOPAMAX) 50 mg tablet Take 1 Tablet by mouth two (2) times a day.  dexAMETHasone (DECADRON) 4 mg tablet Take 2 tabs (8mg) on days 2 & 3 of chemotherapy    amLODIPine (NORVASC) 5 mg tablet Take 1 Tab by mouth daily.  ondansetron (ZOFRAN ODT) 8 mg disintegrating tablet Take 1 Tab by mouth every eight (8) hours as needed for Nausea or Vomiting.  lidocaine-prilocaine (EMLA) topical cream Apply  to affected area as needed for Pain.  polyethylene glycol (MIRALAX) 17 gram packet Take 1 Packet by mouth daily. Mix in 8 oz of water, juice, soda, coffee, or tea prior to administration    acetaminophen (TYLENOL) 500 mg tablet Take  by mouth every six (6) hours as needed for Pain. 2 or 3 po daily     senna (SENOKOT) 8.6 mg tablet Take 2 Tabs by mouth daily. (Patient not taking: Reported on 2021)    ZOLMitriptan (ZOMIG) 2.5 mg tablet Take 1 Tab by mouth as needed for Migraine (May repeat in 2 hours if not improved. Max 10mg/24HRs). (Patient not taking: Reported on 2021)    magnesium 250 mg tab Si po qhs (Patient not taking: Reported on 2021)    fexofenadine (ALLEGRA) 180 mg tablet Take 1 Tab by mouth daily. Indications: seasonal runny nose (Patient not taking: Reported on 2021)     No current facility-administered medications for this visit. LAB DATA REVIEWED:     Lab Results   Component Value Date/Time    WBC 6.4 2021 11:18 AM    HGB 14.0 2021 11:18 AM    PLATELET 562  11:18 AM     Lab Results   Component Value Date/Time    Sodium 139 2021 11:18 AM    Potassium 4.2 2021 11:18 AM    Chloride 109 (H) 2021 11:18 AM    CO2 24 2021 11:18 AM    BUN 14 2021 11:18 AM    Creatinine 0.72 2021 11:18 AM    Calcium 9.0 2021 11:18 AM    Magnesium 2.2 2021 11:18 AM    Phosphorus 4.4 2021 02:31 AM      Lab Results   Component Value Date/Time    Alk. phosphatase 94 06/03/2021 11:18 AM    Protein, total 7.3 06/03/2021 11:18 AM    Albumin 4.0 06/03/2021 11:18 AM    Globulin 3.3 06/03/2021 11:18 AM     Lab Results   Component Value Date/Time    INR 1.1 02/04/2021 09:01 PM    Prothrombin time 11.0 02/04/2021 09:01 PM    aPTT 30.8 02/04/2021 09:01 PM      No results found for: IRON, FE, TIBC, IBCT, PSAT, FERR     PET/CT 2/18  FINDINGS:  HEAD/NECK: The right nasopharyngeal mass lesion is hypermetabolic, maximum SUV  is 15.3. Focal increased tracer activity is noted involving the very posterior  aspect of the septum. Bilateral hypermetabolic cervical lymph nodes are noted. CHEST: Small but hypermetabolic right supraclavicular lymph node is noted. ABDOMEN/PELVIS: No foci of abnormal hypermetabolism. SKELETON: No foci of abnormal hypermetabolism in the axial and visualized  appendicular skeleton. IMPRESSION  The right nasopharyngeal mass lesion is hypermetabolic and  compatible with the known neoplasm. Bilateral hypermetabolic cervical lymph  nodes. Focal increased tracer activity is seen involving the very posterior  aspect of the nasal septum. MRI orbit/face/neck 2/7:  FINDINGS:   Agree with above description of no significant change in the large right  nasopharyngeal mass extending into the right sphenoid sinus extending down into  the pterygoid region is present demonstrated on MRI and CTA 2/5/21. The right  internal carotid artery traverses in area of the skull is in contact with the  tumor in the foramen transversarium and proximal siphon region at the posterior  cavernous sinus. Again demonstrated is generalized mucosal thickening in sphenoid, right ethmoid  and maxillary sinus. Fluid right mastoid air cells. No abnormal intracranial enhancement demonstrated. No other masses. IMPRESSION  Again demonstrated is large right nasopharyngeal tumor as previously described  and without significant change.  Some involvement right internal carotid artery. Associated mucosal thickening and fluid right paranasal sinuses. MRI/MRA brain 2/5  FINDINGS:   MRI brain:  Ventricles and sulci are normal in size configuration. No evidence of acute  infarct. No edema or midline shift. No intracranial mass. In the right posterior nasopharynx there is a heterogeneously enhancing 4.2 x  4.0 cm soft tissue mass which extends superiorly into the sphenoid sinus and  obstructs the right ostiomeatal complex. There is circumferential lobulated  sinus mucosal thickening in the right maxillary sinus as well as the right  posterior ethmoid air cells. Right-sided mastoid effusion. MRA brain:  Narrowing of the right distal cervical internal carotid artery. Left distal  cervical internal carotid artery is normal. [place] of [place] is intact. Anterior  cerebral, middle cerebral, and posterior cerebral arteries are patent. Basilar  artery and distal vertebral arteries are patent. IMPRESSION  1. 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 2.  Diffuse sinus mucosal thickening. 3.  Mass related narrowing of the right distal cervical internal carotid artery  but no large vessel occlusion. 4.  No evidence of infarct. CTA neck 2/5:  CTA NECK:     Great vessels: Four-vessel aortic arch with patent origins. Right subclavian artery: Patent     Left subclavian artery: Patent      Right common carotid artery: Patent      Left common carotid artery: Patent      Cervical right internal carotid artery: Patent with no significant stenosis by  NASCET criteria. Attenuation of the right internal carotid artery as it passes  through the right nasopharyngeal mass, but no intraluminal thrombus or evidence  of active extravasation. Cervical left internal carotid artery: Patent with no significant stenosis by  NASCET criteria.      Right vertebral artery: Patent     Left vertebral artery: Patent, arises directly off the aortic arch. Imaged lung apices are clear. Thyroid gland is normal. Bilateral cervical  lymphadenopathy predominantly in level 2 and level 5A. 4.3 x 3.5 cm  heterogeneously enhancing right posterior nasopharyngeal soft tissue mass with  vessels coursing through the mass. No evidence of active extravasation. CTA HEAD 2/5:  Right cavernous internal carotid artery: Attenuated but patent     Left cavernous internal carotid artery: Patent     Anterior cerebral arteries: Patent     Anterior communicating artery: Patent     Right middle cerebral artery: Patent     Left middle cerebral artery: Patent     Posterior communicating arteries: Patent     Posterior cerebral arteries: Patent     Basilar artery: Patent     Distal vertebral arteries: Patent     No evidence for intracranial aneurysm or hemodynamically significant stenosis. IMPRESSION  1. Attenuation of the right internal carotid artery as it passes through the  right nasopharyngeal mass but no evidence of active extravasation or  intraluminal thrombus. 2.  Bilateral cervical lymphadenopathy in level 2 and level 5B. 3.  Right posterior nasopharyngeal mass with sphenoid sinus involvement and  diffuse sinus disease. PET 4/15/21:  IMPRESSION  No foci evident to suggest recurrent or metastatic disease. Resolved  nasopharyngeal mass uptake and bilateral cervical lymph node tracer  Distribution.      CONTROLLED SUBSTANCES SAFETY ASSESSMENT (IF ON CONTROLLED SUBSTANCES):     Reviewed opioid safety handout:  [] Yes   [] No  24 hour opioid dose >150mg morphine equivalent/day:  [] Yes   [] No  Benzodiazepines:  [] Yes   [] No  Sleep apnea:  [] Yes   [] No  Urine Toxicology Testing within last 6 months:  [] Yes   [] No  History of or new aberrant medication taking behaviors:  [] Yes   [] No  Has Narcan been prescribed [] Yes   [] No          Total time:   Counseling / coordination time:   > 50% counseling / coordination?:     Time visit started: 9:00am  Time visit ended: 9:30am

## 2021-06-07 NOTE — PATIENT INSTRUCTIONS
Dear Betty Fernandez ,    It was a pleasure seeing you today via telephone visit. We will see you again in 8 weeks for a virtual visit. If labs or imaging tests have been ordered for you today, please call the office  at [phone] 48 hours after completion to obtain the results. Your described symptoms were: Fatigue: 5 Drowsiness: 5   Depression: 0 Pain: 0   Anxiety: 0 Nausea: 1   Anorexia:  (Patient has an appetite,but when he eats the food tastes bad) Dyspnea: 1   Best Well-Bein Constipation: No   Other Problem (Comment): 0       This is the plan we talked about:    1. Dysgeusia (food tasting bad)  -Continue using plastic utensils  -Continue Ensure    2. Nausea  -Continue ondansetron  -Continue prochlorperazine    3. Fatigue  -You feel more fatigued after chemo  -You sleep well at night    4. Headaches  -Continue topiramate 50-mg twice daily  -Continue tylenol 500-mg: take 2 pills every 6 hours as needed  -Continue zolmitriptan (Zomig) 2.5-mg as needed    5. Nose bleeds  -You've had no recent nosebleeds    5.  Advance care planning  -Today we talked again about your wishes for care and who you would want to make medical decisions for you if you were to be too sick to make these decisions for yourself  -You confirmed you want your wife, Radha Malloy, to be your primary medical decision maker and your Taisha Gutierrez, to be your secondary medical decision maker  -You wish to be hospitalized for any medical conditions at this point unless you had a serious medical condition from which you were not expected to recover  -You would want our doctors to let you and your family know if you had such a serious medical condition  -You would not want to be placed on a mechanical ventilator if you had a serious medical condition from which you would likely not receover  -We also talked in depth about CPR (cardiopulmonary resuscitation)  -If your heart were to suddenly stop beating and you stopped breathing, you would want CPR \"to give it a try\"  -If it appears as if you're going to die, your preference is to die at home  -I recommended you complete an Advance Medical Directive to have a copy of your wishes with you in your home      6. Cancer  -You started chemotherapy  -You will receive radiation therapy, along with another chemotherapy, after this initial course (induction chemotherapy)          This is what you have shared with us about Advance Care Planning:      Primary Decision Maker (Active): Shadi Herrera - Spouse - [phone]    Secondary Decision Maker (Active): Adelina Onofre, [address] [facility] [phone]  Advance Care Planning 6/7/2021   Patient's Healthcare Decision Maker is: Verbal statement (Legal Next of Kin remains as decision maker)   Confirm Advance Directive None   Patient Would Like to Complete Advance Directive -           The Palliative Medicine Team is here to support you and your family.        Sincerely,      Meryle Po, MD and the Palliative Medicine Team

## 2021-06-07 NOTE — PROGRESS NOTES
Cancer [place] at [facility]   [address] [facility], [address] [facility] [address]  [place], [address]   W: [phone]  F: [phone] [facility]      Reason for nutrition visit:  Supportive visit with patient to introduce self and discussed role of oncology dietitian in providing supportive nutrition care. Explained that RD is available to be a resource for managing symptoms, minimizing weight changes and maintaining optimal nutrition status during and after cancer treatment. Used Fanminder as a . He is currently on week 3 of cisplatin and radiation. He has poor appetite. Denies mouth sores or dysphagia. Pain with swallowing. His biggest concern is dry mouth. We discussed strategies. Chemotherapy Flowsheet 6/3/2021   Cycle C1D15   Date 6/3/2021   Drug / Regimen Cisplatin   Dosage 82.8mg   Time Up 1506   Time Down 1606   Pre Hydration given   Post Hydration none   Pre Meds given   Notes given       Wt Readings from Last 4 Encounters:   06/03/21 203 lb 12.8 oz (92.4 kg)   06/03/21 204 lb (92.5 kg)   05/26/21 207 lb (93.9 kg)   05/26/21 207 lb (93.9 kg)     Estimated Nutrition Needs:   Calorie Range: 2760-3220kcal/day      Protein Range: 85-100g/day      Fluid Needs: 2700ml      Assessment:   Inadequate oral food or beverage intake related to concurrent chemoradiation as evidence by treatment side effects. Swallowing difficulty related to dry mouth as evidence by treatment side effects. Plan:   - Utilize OTC dry mouth products such as Biotine dry mouth spray, ACT dry mouth wash, xylimelts, ACT dry mouth gum.  - Use club soda/selzter water to help with dry mouth. - Add lemon to water to help increase secretions in mouth. - Continue to consume high calorie foods. I appreciate the opportunity to participate in Mr. Ibeth Mendoza's care.     Signed By: Sophia Henley, [address], [address]     Contact: [phone]

## 2021-06-07 NOTE — ACP (ADVANCE CARE PLANNING)
Advance Care Planning     Advance Care Planning (ACP) Physician/NP/PA Conversation      Date of Conversation: 6/7/2021  Conducted with: Patient with Decision Making Capacity    Healthcare Decision Maker:     Primary Decision Maker (Active): Judy Gaytanjeevan - [phone]    Secondary Decision Maker (Active): Samuel Alvarado - [phone]      Care Preferences:    Hospitalization: \"If your health worsens and it becomes clear that your chance of recovery is unlikely, what would be your preference regarding hospitalization? \"  The patient would prefer comfort-focused treatment without hospitalization. Ventilation: \"If you were unable to breathe on your own and your chance of recovery was unlikely, what would be your preference about the use of a ventilator (breathing machine) if it was available to you? \"   The patient would NOT desire the use of a ventilator. Resuscitation: \"In the event your heart stopped as a result of an underlying serious health condition, would you want attempts to be made to restart your heart, or would you prefer a natural death? \"   Yes, attempt to resuscitate.         Conversation Outcomes / Follow-Up Plan:   ACP incomplete - refer to ACP Clinical Specialist  Reviewed DNR/DNI and patient elects Full Code (Attempt Resuscitation)     Length of Voluntary ACP Conversation in minutes:  16 minutes    Liam Ames MD

## 2021-06-08 ENCOUNTER — HOSPITAL ENCOUNTER (OUTPATIENT)
Dept: RADIATION THERAPY | Age: 36
Discharge: HOME | End: 2021-06-08

## 2021-06-09 ENCOUNTER — HOSPITAL ENCOUNTER (OUTPATIENT)
Dept: INFUSION THERAPY | Age: 36
Discharge: HOME | End: 2021-06-09
Payer: SUBSIDIZED

## 2021-06-09 ENCOUNTER — OFFICE VISIT (OUTPATIENT)
Dept: ONCOLOGY | Age: 36
End: 2021-06-09

## 2021-06-09 ENCOUNTER — HOSPITAL ENCOUNTER (OUTPATIENT)
Dept: RADIATION THERAPY | Age: 36
Discharge: HOME | End: 2021-06-09

## 2021-06-09 VITALS
OXYGEN SATURATION: 98 % | RESPIRATION RATE: 18 BRPM | WEIGHT: 202.4 LBS | DIASTOLIC BLOOD PRESSURE: 86 MMHG | BODY MASS INDEX: 31.77 KG/M2 | HEIGHT: 67 IN | TEMPERATURE: 97.8 F | SYSTOLIC BLOOD PRESSURE: 121 MMHG | HEART RATE: 100 BPM

## 2021-06-09 VITALS
SYSTOLIC BLOOD PRESSURE: 122 MMHG | RESPIRATION RATE: 18 BRPM | DIASTOLIC BLOOD PRESSURE: 87 MMHG | HEART RATE: 90 BPM | TEMPERATURE: 97.8 F

## 2021-06-09 DIAGNOSIS — R11.0 CHEMOTHERAPY-INDUCED NAUSEA: ICD-10-CM

## 2021-06-09 DIAGNOSIS — Z95.828 PORT-A-CATH IN PLACE: ICD-10-CM

## 2021-06-09 DIAGNOSIS — T45.1X5A CHEMOTHERAPY-INDUCED NEUROPATHY (HCC): ICD-10-CM

## 2021-06-09 DIAGNOSIS — Z51.11 ENCOUNTER FOR ANTINEOPLASTIC CHEMOTHERAPY: Primary | ICD-10-CM

## 2021-06-09 DIAGNOSIS — C76.0 HEAD AND NECK CANCER (HCC): Primary | ICD-10-CM

## 2021-06-09 DIAGNOSIS — C11.9 NASOPHARYNGEAL CARCINOMA (HCC): ICD-10-CM

## 2021-06-09 DIAGNOSIS — G62.0 CHEMOTHERAPY-INDUCED NEUROPATHY (HCC): ICD-10-CM

## 2021-06-09 DIAGNOSIS — K12.30 MUCOSITIS: ICD-10-CM

## 2021-06-09 DIAGNOSIS — T45.1X5A CHEMOTHERAPY-INDUCED NAUSEA: ICD-10-CM

## 2021-06-09 DIAGNOSIS — C76.0 HEAD AND NECK CANCER (HCC): ICD-10-CM

## 2021-06-09 LAB
ALBUMIN SERPL-MCNC: 4.1 G/DL (ref 3.5–5)
ALBUMIN/GLOB SERPL: 1.1 {RATIO} (ref 1.1–2.2)
ALP SERPL-CCNC: 100 U/L (ref 45–117)
ALT SERPL-CCNC: 26 U/L (ref 12–78)
ANION GAP SERPL CALC-SCNC: 10 MMOL/L (ref 5–15)
AST SERPL-CCNC: 12 U/L (ref 15–37)
BASOPHILS # BLD: 0 K/UL (ref 0–0.1)
BASOPHILS NFR BLD: 0 % (ref 0–1)
BILIRUB SERPL-MCNC: 0.3 MG/DL (ref 0.2–1)
BUN SERPL-MCNC: 15 MG/DL (ref 6–20)
BUN/CREAT SERPL: 19 (ref 12–20)
CALCIUM SERPL-MCNC: 9.6 MG/DL (ref 8.5–10.1)
CHLORIDE SERPL-SCNC: 106 MMOL/L (ref 97–108)
CO2 SERPL-SCNC: 21 MMOL/L (ref 21–32)
CREAT SERPL-MCNC: 0.79 MG/DL (ref 0.7–1.3)
DIFFERENTIAL METHOD BLD: ABNORMAL
EOSINOPHIL # BLD: 0.1 K/UL (ref 0–0.4)
EOSINOPHIL NFR BLD: 2 % (ref 0–7)
ERYTHROCYTE [DISTWIDTH] IN BLOOD BY AUTOMATED COUNT: 15.2 % (ref 11.5–14.5)
GLOBULIN SER CALC-MCNC: 3.8 G/DL (ref 2–4)
GLUCOSE SERPL-MCNC: 113 MG/DL (ref 65–100)
HCT VFR BLD AUTO: 42.3 % (ref 36.6–50.3)
HGB BLD-MCNC: 14.3 G/DL (ref 12.1–17)
IMM GRANULOCYTES # BLD AUTO: 0 K/UL (ref 0–0.04)
IMM GRANULOCYTES NFR BLD AUTO: 0 % (ref 0–0.5)
LYMPHOCYTES # BLD: 0.7 K/UL (ref 0.8–3.5)
LYMPHOCYTES NFR BLD: 16 % (ref 12–49)
MAGNESIUM SERPL-MCNC: 2.2 MG/DL (ref 1.6–2.4)
MCH RBC QN AUTO: 29 PG (ref 26–34)
MCHC RBC AUTO-ENTMCNC: 33.8 G/DL (ref 30–36.5)
MCV RBC AUTO: 85.8 FL (ref 80–99)
MONOCYTES # BLD: 0.6 K/UL (ref 0–1)
MONOCYTES NFR BLD: 12 % (ref 5–13)
NEUTS SEG # BLD: 3.2 K/UL (ref 1.8–8)
NEUTS SEG NFR BLD: 70 % (ref 32–75)
NRBC # BLD: 0 K/UL (ref 0–0.01)
NRBC BLD-RTO: 0 PER 100 WBC
PLATELET # BLD AUTO: 223 K/UL (ref 150–400)
PMV BLD AUTO: 9.7 FL (ref 8.9–12.9)
POTASSIUM SERPL-SCNC: 4 MMOL/L (ref 3.5–5.1)
PROT SERPL-MCNC: 7.9 G/DL (ref 6.4–8.2)
RBC # BLD AUTO: 4.93 M/UL (ref 4.1–5.7)
RBC MORPH BLD: ABNORMAL
SODIUM SERPL-SCNC: 137 MMOL/L (ref 136–145)
WBC # BLD AUTO: 4.6 K/UL (ref 4.1–11.1)

## 2021-06-09 PROCEDURE — 77030013169 SET IV BLD ICUM -A

## 2021-06-09 PROCEDURE — 36415 COLL VENOUS BLD VENIPUNCTURE: CPT

## 2021-06-09 PROCEDURE — 96413 CHEMO IV INFUSION 1 HR: CPT

## 2021-06-09 PROCEDURE — 74011250636 HC RX REV CODE- 250/636: Performed by: INTERNAL MEDICINE

## 2021-06-09 PROCEDURE — 99215 OFFICE O/P EST HI 40 MIN: CPT | Performed by: INTERNAL MEDICINE

## 2021-06-09 PROCEDURE — 96375 TX/PRO/DX INJ NEW DRUG ADDON: CPT

## 2021-06-09 PROCEDURE — 96367 TX/PROPH/DG ADDL SEQ IV INF: CPT

## 2021-06-09 PROCEDURE — 85025 COMPLETE CBC W/AUTO DIFF WBC: CPT

## 2021-06-09 PROCEDURE — 83735 ASSAY OF MAGNESIUM: CPT

## 2021-06-09 PROCEDURE — 80053 COMPREHEN METABOLIC PANEL: CPT

## 2021-06-09 PROCEDURE — 74011000258 HC RX REV CODE- 258: Performed by: INTERNAL MEDICINE

## 2021-06-09 RX ORDER — SODIUM CHLORIDE 0.9 % (FLUSH) 0.9 %
10 SYRINGE (ML) INJECTION AS NEEDED
Status: DISPENSED | OUTPATIENT
Start: 2021-06-09 | End: 2021-06-09

## 2021-06-09 RX ORDER — PALONOSETRON 0.05 MG/ML
0.25 INJECTION, SOLUTION INTRAVENOUS ONCE
Status: COMPLETED | OUTPATIENT
Start: 2021-06-09 | End: 2021-06-09

## 2021-06-09 RX ORDER — SODIUM CHLORIDE 9 MG/ML
25 INJECTION, SOLUTION INTRAVENOUS CONTINUOUS
Status: DISPENSED | OUTPATIENT
Start: 2021-06-09 | End: 2021-06-09

## 2021-06-09 RX ORDER — HEPARIN 100 UNIT/ML
300-500 SYRINGE INTRAVENOUS AS NEEDED
Status: ACTIVE | OUTPATIENT
Start: 2021-06-09 | End: 2021-06-09

## 2021-06-09 RX ORDER — SODIUM CHLORIDE 9 MG/ML
10 INJECTION INTRAMUSCULAR; INTRAVENOUS; SUBCUTANEOUS AS NEEDED
Status: ACTIVE | OUTPATIENT
Start: 2021-06-09 | End: 2021-06-09

## 2021-06-09 RX ADMIN — HEPARIN 500 UNITS: 100 SYRINGE at 14:32

## 2021-06-09 RX ADMIN — CISPLATIN 82.8 MG: 100 INJECTION, SOLUTION INTRAVENOUS at 13:28

## 2021-06-09 RX ADMIN — SODIUM CHLORIDE 25 ML/HR: 900 INJECTION, SOLUTION INTRAVENOUS at 11:52

## 2021-06-09 RX ADMIN — SODIUM CHLORIDE 10 ML: 9 INJECTION INTRAMUSCULAR; INTRAVENOUS; SUBCUTANEOUS at 14:32

## 2021-06-09 RX ADMIN — DEXAMETHASONE SODIUM PHOSPHATE 12 MG: 4 INJECTION, SOLUTION INTRA-ARTICULAR; INTRALESIONAL; INTRAMUSCULAR; INTRAVENOUS; SOFT TISSUE at 12:23

## 2021-06-09 RX ADMIN — PALONOSETRON 0.25 MG: 0.05 INJECTION, SOLUTION INTRAVENOUS at 11:54

## 2021-06-09 RX ADMIN — MAGNESIUM SULFATE HEPTAHYDRATE: 500 INJECTION, SOLUTION INTRAMUSCULAR; INTRAVENOUS at 12:22

## 2021-06-09 RX ADMIN — SODIUM CHLORIDE 150 MG: 900 INJECTION, SOLUTION INTRAVENOUS at 11:57

## 2021-06-09 NOTE — PROGRESS NOTES
Chief Complaint   Patient presents with    Follow-up     Alfa Tabares is a pleasant 29year old male who presents as a follow up for nasopharyngeal cancer.  He denies pain

## 2021-06-09 NOTE — PROGRESS NOTES
Cancer [place] at [place] [address] [address], [address], [place]: [phone]  F: [phone]    Reason for visit   Leonel Upton is a 29 y.o. 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 male with no significant PMH seen for above  He was seen by Dr. Melanie Espinosa with ENT in December with c/o recurrent epistaxis x 2 months requiring ER visits and packing. His prior nasal endoscopy was clear. He was admitted on 2/4/2021 for recurrent HAs and epistaxis which lasted several minutes with improvement on pressure. He had a normal CBC for the most part and had no h/o bleeding growing up. MRI brain 2/5/2021 was obtained and showed a 4.2 x 4 cm posterior nasopharyngeal mass Extending into the sphenoid sinus with narrowing f the R distal internal carotid artery. CTA did not show active extravasation, but showed bilateral cervical adenopathy. He had a biopsy of the NP mass 2/6/2021 and pathology showed Nasopharyngeal carcinoma. He completed 3 cycles of cisplatin / gemzar. He comes today for week 4 of cisplatin + RT. He feels fair. Has taste changes. Denies mouth sores. Denies dysphagia. Has no mouthwash. Has mild nausea without emesis. Has not tried zofran but has this on hand. Denies diarrhea/constipation. Has occasional numbness/tingling in fingers/toes. Denies headaches or bleeding. Has some redness around areas of RT. No other complaints today. No past medical history on file.    Past Surgical History:   Procedure Laterality Date    IR INSERT TUNL CVC W PORT OVER 5 YEARS  2/11/2021  IR INSERT SONIA CVC W PORT OVER 5 YEARS  2021      Social History     Tobacco Use    Smoking status: Never Smoker    Smokeless tobacco: Never Used   Substance Use Topics    Alcohol use: Not Currently      No family history on file. Current Outpatient Medications   Medication Sig    topiramate (TOPAMAX) 50 mg tablet Take 1 Tablet by mouth two (2) times a day.  dexAMETHasone (DECADRON) 4 mg tablet Take 2 tabs (8mg) on days 2 & 3 of chemotherapy    amLODIPine (NORVASC) 5 mg tablet Take 1 Tab by mouth daily.  ondansetron (ZOFRAN ODT) 8 mg disintegrating tablet Take 1 Tab by mouth every eight (8) hours as needed for Nausea or Vomiting.  lidocaine-prilocaine (EMLA) topical cream Apply  to affected area as needed for Pain.  polyethylene glycol (MIRALAX) 17 gram packet Take 1 Packet by mouth daily. Mix in 8 oz of water, juice, soda, coffee, or tea prior to administration    magnesium 250 mg tab Si po qhs    fexofenadine (ALLEGRA) 180 mg tablet Take 1 Tab by mouth daily. Indications: seasonal runny nose    acetaminophen (TYLENOL) 500 mg tablet Take  by mouth every six (6) hours as needed for Pain. 2 or 3 po daily     senna (SENOKOT) 8.6 mg tablet Take 2 Tabs by mouth daily. (Patient not taking: Reported on 2021)    ZOLMitriptan (ZOMIG) 2.5 mg tablet Take 1 Tab by mouth as needed for Migraine (May repeat in 2 hours if not improved. Max 10mg/24HRs). (Patient not taking: Reported on 2021)     No current facility-administered medications for this visit. Allergies   Allergen Reactions    Iodinated Contrast Media Itching        Review of Systems: A complete review of systems was obtained, negative except as described above.     Physical Exam:     Visit Vitals  /86   Pulse 100   Temp 97.8 °F (36.6 °C)   Resp 18   Ht 5' 7\" (1.702 m)   Wt 202 lb 6.4 oz (91.8 kg)   SpO2 98%   BMI 31.70 kg/m²     General appearance - alert, well appearing, and in no distress, nervous, poor eye contact  Mental status - oriented to person, place, and time  Mouth - mucous membranes moist, OP clear   Neck - supple, PAC in place   Lymphatics - no palpable lymphadenopathy  Skin - mild erythema of neck / face     ECOG PS:1    Results:     Lab Results   Component Value Date/Time    WBC 6.4 06/03/2021 11:18 AM    HGB 14.0 06/03/2021 11:18 AM    HCT 42.7 06/03/2021 11:18 AM    PLATELET 940 86/27/9616 11:18 AM    MCV 87.5 06/03/2021 11:18 AM    ABS. NEUTROPHILS 4.2 06/03/2021 11:18 AM     Lab Results   Component Value Date/Time    Sodium 139 06/03/2021 11:18 AM    Potassium 4.2 06/03/2021 11:18 AM    Chloride 109 (H) 06/03/2021 11:18 AM    CO2 24 06/03/2021 11:18 AM    Glucose 84 06/03/2021 11:18 AM    BUN 14 06/03/2021 11:18 AM    Creatinine 0.72 06/03/2021 11:18 AM    GFR est AA >60 06/03/2021 11:18 AM    GFR est non-AA >60 06/03/2021 11:18 AM    Calcium 9.0 06/03/2021 11:18 AM    Glucose (POC) 113 (H) 04/15/2021 08:07 AM     Lab Results   Component Value Date/Time    Bilirubin, total 0.2 06/03/2021 11:18 AM    ALT (SGPT) 25 06/03/2021 11:18 AM    Alk. phosphatase 94 06/03/2021 11:18 AM    Protein, total 7.3 06/03/2021 11:18 AM    Albumin 4.0 06/03/2021 11:18 AM    Globulin 3.3 06/03/2021 11:18 AM     Component      Latest Ref Rng & Units 2/7/2021          12:26 PM   Cristóbal-Purdy DNA QT, PCR      Negative copies/mL 327   Cristóbal-Barr Virus log10      log10 copy/mL 2.515       Records reviewed and summarized above. Pathology report(s) reviewed     PATHOLOGY     FINAL PATHOLOGIC DIAGNOSIS   1. Right nasopharyngeal mass, biopsy:   Nasopharyngeal carcinoma, nonkeratinizing, undifferentiated type (see comment)   In situ hybridization for Cristóbal-Barr virus (SEMAJ) is positive   2.  Right nasopharyngeal mass, biopsy:   Nasopharyngeal carcinoma, nonkeratinizing, undifferentiated type   Comment   Immunohistochemical stains performed from block 1A show that the invasive carcinoma expresses p40, AE1/AE3, and CAM5.2 while negative for p16, CK7, and CK20. This immunoprofile supports the above rendered diagnosis     Radiology report(s) reviewed above. CTA neck    IMPRESSION  1. Attenuation of the right internal carotid artery as it passes through the  right nasopharyngeal mass but no evidence of active extravasation or  intraluminal thrombus. 2.  Bilateral cervical lymphadenopathy in level 2 and level 5B. 3.  Right posterior nasopharyngeal mass with sphenoid sinus involvement and  diffuse sinus disease    MRI brain    IMPRESSION  1. 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 2.  Diffuse sinus mucosal thickening. 3.  Mass related narrowing of the right distal cervical internal carotid artery  but no large vessel occlusion. 4.  No evidence of infarct.       MRI orbit and face    IMPRESSION  Again demonstrated is large right nasopharyngeal tumor as previously described  and without significant change. Some involvement right internal carotid artery. Associated mucosal thickening and fluid right paranasal sinuses. PET 2/18/21:  IMPRESSION  The right nasopharyngeal mass lesion is hypermetabolic and  compatible with the known neoplasm. Bilateral hypermetabolic cervical lymph  nodes. Focal increased tracer activity is seen involving the very posterior  aspect of the nasal septum. PET 4/15/21:  IMPRESSION  No foci evident to suggest recurrent or metastatic disease. Resolved  nasopharyngeal mass uptake and bilateral cervical lymph node tracer  Distribution. Assessment:   1) Nasopharyngeal carcinoma- R   SEMAJ positive    4.2 x 4 cm, extends into the sphenoid sinus, bilateral cervical nodes. [place] but does not involve carotid artery    Nasopharyngeal carcinoma - at least T3N2M0-Stage III disease.   This is an aggressive malignancy with high risk of fatal local complications  In his case , the abutment of carotid artery is highly concerning for risk of tumor extension and catastrophic bleeding  He is very symptomatic with severe pain, epistaxis, hearing impediment    His PET was reviewed from 2/18/21 and shows a significant fco burden. Hence, after discussion with radiation decision was made to move forward with induction chemo with cisplatin + gemzar x 3 cycles followed by chemoRT (with weekly Cisplatin). He completed 3 cycles of cisplatin / gemzar. PET obtained 4/15/21 was reviewed and shows a CR. Plan for 6 weeks of chemoRT with cisplatin. Today is week 4 of chemoRT w/ cisplatin. Tolerating well with grade 1 mucositis, grade 1 neuropathy, grade 1 nausea   Labs pending     2) Epistaxis  Secondary to the mass  CTA without active extravasation  Resolved     3) Headaches  Secondary to the nasopharyngeal mass with splenoid sinus involvement  Controlled on Verapamil, Zomig, Oxycodone  Continue palliative care follow up  Resolved     4) Management of high risk drugs like chemotherapy  Chemo calendar provided to patient  Tolerating well thus far w/ grade 1 mucositis, grade 1 neuropathy, grade 1 nausea   Labs pending     Plan:     · Proceed today for week 4 of weekly Cisplatin (40mg/m2) + RT   · Labs to include cbc with diff, CMP prior to each infusion   · zofran / compazine PRN   · Dexamethasone on days 2 , 3   · Follow with radiation oncology as scheduled   · Continue to follow with palliative care  · Magic mouthwash PRN     RTC in 1 week for week 5     I appreciate the opportunity to participate in Mr. Debbi Mendoza's care. I performed a history and physical examination of the patient and discussed his management with the NPP.  I reviewed the NPP note and agree with the documented findings and plan of care    Florencia Campbell is tolerating Cisplatin + XRT for NP carcinoma  This is week 4  He has no facial masses, no epistaxis  Has grade 1 neuropathy  His creatinine is normal and he is ok to proceed with 4/6 weekly treatments    Signed By: Leann Guzman Magno Flores MD

## 2021-06-09 NOTE — PROGRESS NOTES
[facility] Chemo Progress Note    Date: 2021    Name: Wilmer Castillo    MRN: [id number]         : 1985    [address] Bouchra cAosta Arrived to [facility] for  C 1 D 22 Cisplatin ambulatory in stable condition. Assessment was completed, no acute issues at this time, no new complaints voiced. Port accessed with positive blood return. Labs drawn and sent for processing. Chemotherapy Flowsheet 2021   Cycle C1 D22   Date 2021   Drug / Regimen Cisplatin   Dosage -   Time Up -   Time Down -   Pre Hydration given   Post Hydration -   Pre Meds given   Notes given         Patient denies SOB, fever, cough, general not feeling well. Patient denies recent exposure to someone who has tested positive for COVID-19. Patient denies having contact with anyone who has a pending COVID test.      Mr. Dennis Mendoza's vitals were reviewed. Patient Vitals for the past 12 hrs:   Temp Pulse Resp BP   21 1426  90  122/87   21 0944 97.8 °F (36.6 °C) 100 18 121/86         Lab results were obtained and reviewed. Recent Results (from the past 12 hour(s))   CBC WITH AUTOMATED DIFF    Collection Time: 21  9:48 AM   Result Value Ref Range    WBC 4.6 4.1 - 11.1 K/uL    RBC 4.93 4.10 - 5.70 M/uL    HGB 14.3 12.1 - 17.0 g/dL    HCT 42.3 36.6 - 50.3 %    MCV 85.8 80.0 - 99.0 FL    MCH 29.0 26.0 - 34.0 PG    MCHC 33.8 30.0 - 36.5 g/dL    RDW 15.2 (H) 11.5 - 14.5 %    PLATELET 803 274 - 476 K/uL    MPV 9.7 8.9 - 12.9 FL    NRBC 0.0 0  WBC    ABSOLUTE NRBC 0.00 0.00 - 0.01 K/uL    NEUTROPHILS 70 32 - 75 %    LYMPHOCYTES 16 12 - 49 %    MONOCYTES 12 5 - 13 %    EOSINOPHILS 2 0 - 7 %    BASOPHILS 0 0 - 1 %    IMMATURE GRANULOCYTES 0 0.0 - 0.5 %    ABS. NEUTROPHILS 3.2 1.8 - 8.0 K/UL    ABS. LYMPHOCYTES 0.7 (L) 0.8 - 3.5 K/UL    ABS. MONOCYTES 0.6 0.0 - 1.0 K/UL    ABS. EOSINOPHILS 0.1 0.0 - 0.4 K/UL    ABS. BASOPHILS 0.0 0.0 - 0.1 K/UL    ABS. IMM.  GRANS. 0.0 0.00 - 0.04 K/UL    DF SMEAR SCANNED RBC COMMENTS NORMOCYTIC, NORMOCHROMIC     METABOLIC PANEL, COMPREHENSIVE    Collection Time: 06/09/21  9:48 AM   Result Value Ref Range    Sodium 137 136 - 145 mmol/L    Potassium 4.0 3.5 - 5.1 mmol/L    Chloride 106 97 - 108 mmol/L    CO2 21 21 - 32 mmol/L    Anion gap 10 5 - 15 mmol/L    Glucose 113 (H) 65 - 100 mg/dL    BUN 15 6 - 20 MG/DL    Creatinine 0.79 0.70 - 1.30 MG/DL    BUN/Creatinine ratio 19 12 - 20      GFR est AA >60 >60 ml/min/1.73m2    GFR est non-AA >60 >60 ml/min/1.73m2    Calcium 9.6 8.5 - 10.1 MG/DL    Bilirubin, total 0.3 0.2 - 1.0 MG/DL    ALT (SGPT) 26 12 - 78 U/L    AST (SGOT) 12 (L) 15 - 37 U/L    Alk. phosphatase 100 45 - 117 U/L    Protein, total 7.9 6.4 - 8.2 g/dL    Albumin 4.1 3.5 - 5.0 g/dL    Globulin 3.8 2.0 - 4.0 g/dL    A-G Ratio 1.1 1.1 - 2.2     MAGNESIUM    Collection Time: 06/09/21  9:48 AM   Result Value Ref Range    Magnesium 2.2 1.6 - 2.4 mg/dL       Pre-medications  were administered as ordered and chemotherapy was initiated.   Medications Administered     0.9% sodium chloride 1,000 mL with potassium chloride 10 mEq, magnesium sulfate 2 g infusion     Admin Date  06/09/2021 Action  New Bag Dose   Rate  1,000 mL/hr Route  IntraVENous Administered By  Brandt Stone RN          0.9% sodium chloride infusion     Admin Date  06/09/2021 Action  New Bag Dose  25 mL/hr Rate  25 mL/hr Route  IntraVENous Administered By  Brandt Stone RN          0.9% sodium chloride injection 10 mL     Admin Date  06/09/2021 Action  Given Dose  10 mL Route  IntraVENous Administered By  Brandt Stone RN          CISplatin (PLATINOL) 82.8 mg in 0.9% sodium chloride 500 mL, overfill volume 50 mL chemo infusion     Admin Date  06/09/2021 Action  New Bag Dose  82.8 mg Rate  632.8 mL/hr Route  IntraVENous Administered By  Brandt Stone RN          dexamethasone (DECADRON) 12 mg in 0.9% sodium chloride 50 mL, overfill volume 5 mL IVPB     Admin Date  06/09/2021 Action  New Bag Dose  12 mg Rate  232 mL/hr Route  IntraVENous Administered By  Rob Dailey RN          fosaprepitant (EMEND) 150 mg in 0.9% sodium chloride 150 mL IVPB     Admin Date  06/09/2021 Action  New Bag Dose  150 mg Rate  450 mL/hr Route  IntraVENous Administered By  Rob Dailey RN          heparin (porcine) pf 300-500 Units     Admin Date  06/09/2021 Action  Given Dose  500 Units Route  InterCATHeter Administered By  Rob Dailey RN          palonosetron HCl (ALOXI) injection 0.25 mg     Admin Date  06/09/2021 Action  Given Dose  0.25 mg Route  IntraVENous Administered By  Rob Dailey, JIMBO                  [id number] Patient tolerated treatment well.  port maintained positive blood return throughout treatment. Port flushed, heparinized and de accessed per protocol.  Patient was discharged from [facility]   Date Time Provider Renae Fajardo   6/10/2021 10:30 AM RAD [address] [place]   6/11/2021 10:30 AM RAD [address] [place]   6/14/2021 10:30 AM RAD [address] [place]   6/15/2021 10:30 AM RAD [address] [place]   6/16/2021 10:00 AM E4 [facility]   6/16/2021 10:30 AM RAD ONC THERAPY RCR [address] [place]   6/16/2021 10:45 AM Ciro Vergara,  [address] BS AMB   6/17/2021 10:30 AM RAD ONC THERAPY RCR [address] [place]   6/18/2021 10:30 AM RAD [address] [place]   6/21/2021 10:30 AM RAD [address] [place]   6/22/2021 10:30 AM RAD [address] [place]   6/23/2021 10:30 AM RAD [address] [place]   6/24/2021 10:30 AM RAD [address] [place]   6/25/2021 10:30 AM RAD [address] [place]   6/28/2021 10:30 AM RAD [address] [place]   6/29/2021 10:30 AM RAD [address] [place]   6/30/2021 10:00 AM E4 J[place] [facility]   6/30/2021 10:30 AM RAD ONC THERAPY RCR [facility] [place]   7/1/2021  9:00 AM E4 JOSUE LONG [facility]   7/1/2021 10:30 AM RAD ONC THERAPY RCR [address] [place]   7/2/2021 10:30 AM RAD [address] [place]   7/6/2021 10:30 AM RAD [address] [place]   7/7/2021 10:30 AM RAD [address] [place]   7/8/2021  9:00 AM E4 JOSUE LONG [place] [facility]   7/8/2021 10:30 AM RAD ONC THERAPY RCR [address] [place]         King Kobi RN  June 9, 2021

## 2021-06-10 ENCOUNTER — HOSPITAL ENCOUNTER (OUTPATIENT)
Dept: RADIATION THERAPY | Age: 36
Discharge: HOME | End: 2021-06-10

## 2021-06-11 ENCOUNTER — HOSPITAL ENCOUNTER (OUTPATIENT)
Dept: RADIATION THERAPY | Age: 36
Discharge: HOME | End: 2021-06-11

## 2021-06-14 ENCOUNTER — HOSPITAL ENCOUNTER (OUTPATIENT)
Dept: RADIATION THERAPY | Age: 36
Discharge: HOME | End: 2021-06-14

## 2021-06-15 ENCOUNTER — HOSPITAL ENCOUNTER (OUTPATIENT)
Dept: RADIATION THERAPY | Age: 36
Discharge: HOME | End: 2021-06-15

## 2021-06-16 ENCOUNTER — OFFICE VISIT (OUTPATIENT)
Dept: ONCOLOGY | Age: 36
End: 2021-06-16

## 2021-06-16 ENCOUNTER — HOSPITAL ENCOUNTER (OUTPATIENT)
Dept: INFUSION THERAPY | Age: 36
Discharge: HOME | End: 2021-06-16
Payer: SUBSIDIZED

## 2021-06-16 ENCOUNTER — HOSPITAL ENCOUNTER (OUTPATIENT)
Dept: RADIATION THERAPY | Age: 36
Discharge: HOME | End: 2021-06-16

## 2021-06-16 VITALS
WEIGHT: 194.8 LBS | BODY MASS INDEX: 30.57 KG/M2 | TEMPERATURE: 96.8 F | RESPIRATION RATE: 18 BRPM | HEART RATE: 90 BPM | HEIGHT: 67 IN | DIASTOLIC BLOOD PRESSURE: 98 MMHG | SYSTOLIC BLOOD PRESSURE: 142 MMHG

## 2021-06-16 VITALS
RESPIRATION RATE: 18 BRPM | TEMPERATURE: 96.8 F | WEIGHT: 194 LBS | SYSTOLIC BLOOD PRESSURE: 117 MMHG | HEART RATE: 92 BPM | DIASTOLIC BLOOD PRESSURE: 81 MMHG | BODY MASS INDEX: 30.38 KG/M2

## 2021-06-16 DIAGNOSIS — K12.30 MUCOSITIS: ICD-10-CM

## 2021-06-16 DIAGNOSIS — T45.1X5A CHEMOTHERAPY-INDUCED NAUSEA: ICD-10-CM

## 2021-06-16 DIAGNOSIS — T45.1X5A CHEMOTHERAPY-INDUCED NEUROPATHY (HCC): ICD-10-CM

## 2021-06-16 DIAGNOSIS — C76.0 HEAD AND NECK CANCER (HCC): ICD-10-CM

## 2021-06-16 DIAGNOSIS — G62.0 CHEMOTHERAPY-INDUCED NEUROPATHY (HCC): ICD-10-CM

## 2021-06-16 DIAGNOSIS — C11.9 NASOPHARYNGEAL CARCINOMA (HCC): ICD-10-CM

## 2021-06-16 DIAGNOSIS — Z95.828 PORT-A-CATH IN PLACE: ICD-10-CM

## 2021-06-16 DIAGNOSIS — C76.0 HEAD AND NECK CANCER (HCC): Primary | ICD-10-CM

## 2021-06-16 DIAGNOSIS — Z51.11 ENCOUNTER FOR ANTINEOPLASTIC CHEMOTHERAPY: Primary | ICD-10-CM

## 2021-06-16 DIAGNOSIS — R11.0 CHEMOTHERAPY-INDUCED NAUSEA: ICD-10-CM

## 2021-06-16 LAB
ALBUMIN SERPL-MCNC: 4 G/DL (ref 3.5–5)
ALBUMIN/GLOB SERPL: 1.2 {RATIO} (ref 1.1–2.2)
ALP SERPL-CCNC: 112 U/L (ref 45–117)
ALT SERPL-CCNC: 22 U/L (ref 12–78)
ANION GAP SERPL CALC-SCNC: 9 MMOL/L (ref 5–15)
AST SERPL-CCNC: 11 U/L (ref 15–37)
BASOPHILS # BLD: 0 K/UL (ref 0–0.1)
BASOPHILS NFR BLD: 0 % (ref 0–1)
BILIRUB SERPL-MCNC: 0.2 MG/DL (ref 0.2–1)
BUN SERPL-MCNC: 12 MG/DL (ref 6–20)
BUN/CREAT SERPL: 18 (ref 12–20)
CALCIUM SERPL-MCNC: 9.6 MG/DL (ref 8.5–10.1)
CHLORIDE SERPL-SCNC: 108 MMOL/L (ref 97–108)
CO2 SERPL-SCNC: 20 MMOL/L (ref 21–32)
CREAT SERPL-MCNC: 0.67 MG/DL (ref 0.7–1.3)
DIFFERENTIAL METHOD BLD: ABNORMAL
EOSINOPHIL # BLD: 0.1 K/UL (ref 0–0.4)
EOSINOPHIL NFR BLD: 2 % (ref 0–7)
ERYTHROCYTE [DISTWIDTH] IN BLOOD BY AUTOMATED COUNT: 15.2 % (ref 11.5–14.5)
GLOBULIN SER CALC-MCNC: 3.3 G/DL (ref 2–4)
GLUCOSE SERPL-MCNC: 97 MG/DL (ref 65–100)
HCT VFR BLD AUTO: 41.5 % (ref 36.6–50.3)
HGB BLD-MCNC: 14.2 G/DL (ref 12.1–17)
IMM GRANULOCYTES # BLD AUTO: 0.1 K/UL (ref 0–0.04)
IMM GRANULOCYTES NFR BLD AUTO: 1 % (ref 0–0.5)
LYMPHOCYTES # BLD: 0.8 K/UL (ref 0.8–3.5)
LYMPHOCYTES NFR BLD: 12 % (ref 12–49)
MAGNESIUM SERPL-MCNC: 2 MG/DL (ref 1.6–2.4)
MCH RBC QN AUTO: 29.4 PG (ref 26–34)
MCHC RBC AUTO-ENTMCNC: 34.2 G/DL (ref 30–36.5)
MCV RBC AUTO: 85.9 FL (ref 80–99)
MONOCYTES # BLD: 0.7 K/UL (ref 0–1)
MONOCYTES NFR BLD: 11 % (ref 5–13)
NEUTS SEG # BLD: 4.6 K/UL (ref 1.8–8)
NEUTS SEG NFR BLD: 74 % (ref 32–75)
NRBC # BLD: 0 K/UL (ref 0–0.01)
NRBC BLD-RTO: 0 PER 100 WBC
PLATELET # BLD AUTO: 203 K/UL (ref 150–400)
PMV BLD AUTO: 9.6 FL (ref 8.9–12.9)
POTASSIUM SERPL-SCNC: 4.2 MMOL/L (ref 3.5–5.1)
PROT SERPL-MCNC: 7.3 G/DL (ref 6.4–8.2)
RBC # BLD AUTO: 4.83 M/UL (ref 4.1–5.7)
RBC MORPH BLD: ABNORMAL
SODIUM SERPL-SCNC: 137 MMOL/L (ref 136–145)
WBC # BLD AUTO: 6.3 K/UL (ref 4.1–11.1)

## 2021-06-16 PROCEDURE — 74011250636 HC RX REV CODE- 250/636: Performed by: INTERNAL MEDICINE

## 2021-06-16 PROCEDURE — 96375 TX/PRO/DX INJ NEW DRUG ADDON: CPT

## 2021-06-16 PROCEDURE — 99214 OFFICE O/P EST MOD 30 MIN: CPT | Performed by: REGISTERED NURSE

## 2021-06-16 PROCEDURE — 36415 COLL VENOUS BLD VENIPUNCTURE: CPT

## 2021-06-16 PROCEDURE — 77030012965 HC NDL HUBR BBMI -A

## 2021-06-16 PROCEDURE — 83735 ASSAY OF MAGNESIUM: CPT

## 2021-06-16 PROCEDURE — 96413 CHEMO IV INFUSION 1 HR: CPT

## 2021-06-16 PROCEDURE — 85025 COMPLETE CBC W/AUTO DIFF WBC: CPT

## 2021-06-16 PROCEDURE — 96367 TX/PROPH/DG ADDL SEQ IV INF: CPT

## 2021-06-16 PROCEDURE — 96361 HYDRATE IV INFUSION ADD-ON: CPT

## 2021-06-16 PROCEDURE — 74011000258 HC RX REV CODE- 258: Performed by: INTERNAL MEDICINE

## 2021-06-16 PROCEDURE — 80053 COMPREHEN METABOLIC PANEL: CPT

## 2021-06-16 RX ORDER — SODIUM CHLORIDE 9 MG/ML
25 INJECTION, SOLUTION INTRAVENOUS CONTINUOUS
Status: DISPENSED | OUTPATIENT
Start: 2021-06-16 | End: 2021-06-16

## 2021-06-16 RX ORDER — SODIUM CHLORIDE 0.9 % (FLUSH) 0.9 %
10 SYRINGE (ML) INJECTION AS NEEDED
Status: DISPENSED | OUTPATIENT
Start: 2021-06-16 | End: 2021-06-16

## 2021-06-16 RX ORDER — PALONOSETRON 0.05 MG/ML
0.25 INJECTION, SOLUTION INTRAVENOUS ONCE
Status: COMPLETED | OUTPATIENT
Start: 2021-06-16 | End: 2021-06-16

## 2021-06-16 RX ORDER — HEPARIN 100 UNIT/ML
300-500 SYRINGE INTRAVENOUS AS NEEDED
Status: ACTIVE | OUTPATIENT
Start: 2021-06-16 | End: 2021-06-16

## 2021-06-16 RX ADMIN — SODIUM CHLORIDE 150 MG: 900 INJECTION, SOLUTION INTRAVENOUS at 12:55

## 2021-06-16 RX ADMIN — DEXAMETHASONE SODIUM PHOSPHATE 12 MG: 4 INJECTION, SOLUTION INTRA-ARTICULAR; INTRALESIONAL; INTRAMUSCULAR; INTRAVENOUS; SOFT TISSUE at 12:35

## 2021-06-16 RX ADMIN — Medication 10 ML: at 15:28

## 2021-06-16 RX ADMIN — Medication 10 ML: at 09:56

## 2021-06-16 RX ADMIN — SODIUM CHLORIDE 25 ML/HR: 900 INJECTION, SOLUTION INTRAVENOUS at 12:27

## 2021-06-16 RX ADMIN — MAGNESIUM SULFATE HEPTAHYDRATE: 500 INJECTION, SOLUTION INTRAMUSCULAR; INTRAVENOUS at 13:35

## 2021-06-16 RX ADMIN — Medication 10 ML: at 09:55

## 2021-06-16 RX ADMIN — PALONOSETRON 0.25 MG: 0.05 INJECTION, SOLUTION INTRAVENOUS at 12:30

## 2021-06-16 RX ADMIN — HEPARIN 500 UNITS: 100 SYRINGE at 15:28

## 2021-06-16 RX ADMIN — CISPLATIN 82.8 MG: 100 INJECTION, SOLUTION INTRAVENOUS at 13:35

## 2021-06-16 NOTE — PROGRESS NOTES
[facility] [place] [address] [address], [address], [place]: [phone]  F: [phone]    Reason for visit   Jeronimo Dutta is a 29 y.o. 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 male with no significant PMH seen for above  He was seen by Dr. Angelic Cadena with ENT in December with c/o recurrent epistaxis x 2 months requiring ER visits and packing. His prior nasal endoscopy was clear. He was admitted on 2/4/2021 for recurrent HAs and epistaxis which lasted several minutes with improvement on pressure. He had a normal CBC for the most part and had no h/o bleeding growing up. MRI brain 2/5/2021 was obtained and showed a 4.2 x 4 cm posterior nasopharyngeal mass Extending into the sphenoid sinus with narrowing f the R distal internal carotid artery. CTA did not show active extravasation, but showed bilateral cervical adenopathy. He had a biopsy of the NP mass 2/6/2021 and pathology showed Nasopharyngeal carcinoma. He completed 3 cycles of cisplatin / gemzar. He comes today for week 5 of cisplatin + RT. He feels OK today. He continues with taste changes and states everything tastes :ugly. \" He is supplementing with boost/ensure. Denies mouth sores. Denies dysphagia. He has nausea relieved by zofran. Denies diarrhea/constipation. Has occasional numbness/tingling in fingers/toes that is not worsened Denies headaches or bleeding. Has some redness around areas of RT. No other complaints today. No past medical history on file.    Past Surgical History:   Procedure Laterality Date  IR INSERT TUNL CVC W PORT OVER 5 YEARS  2021         IR INSERT TUNL CVC W PORT OVER 5 YEARS  2021      Social History     Tobacco Use    Smoking status: Never Smoker    Smokeless tobacco: Never Used   Substance Use Topics    Alcohol use: Not Currently      No family history on file. Current Outpatient Medications   Medication Sig    magic mouthwash solution Magic mouth wash Maalox Lidocaine 2% viscous Diphenhydramine oral solution Pharmacy to mix equal portions of ingredients to a total volume as indicated in the dispense amount. Swish & swallow 5mL (1 tsp) 4 times daily as needed for mouth sores    topiramate (TOPAMAX) 50 mg tablet Take 1 Tablet by mouth two (2) times a day.  dexAMETHasone (DECADRON) 4 mg tablet Take 2 tabs (8mg) on days 2 & 3 of chemotherapy    amLODIPine (NORVASC) 5 mg tablet Take 1 Tab by mouth daily.  ondansetron (ZOFRAN ODT) 8 mg disintegrating tablet Take 1 Tab by mouth every eight (8) hours as needed for Nausea or Vomiting.  lidocaine-prilocaine (EMLA) topical cream Apply  to affected area as needed for Pain.  polyethylene glycol (MIRALAX) 17 gram packet Take 1 Packet by mouth daily. Mix in 8 oz of water, juice, soda, coffee, or tea prior to administration    magnesium 250 mg tab Si po qhs    fexofenadine (ALLEGRA) 180 mg tablet Take 1 Tab by mouth daily. Indications: seasonal runny nose    acetaminophen (TYLENOL) 500 mg tablet Take  by mouth every six (6) hours as needed for Pain. 2 or 3 po daily      No current facility-administered medications for this visit.      Facility-Administered Medications Ordered in Other Visits   Medication Dose Route Frequency    heparin (porcine) pf 300-500 Units  300-500 Units InterCATHeter PRN    sodium chloride (NS) flush 10 mL  10 mL IntraVENous PRN      Allergies   Allergen Reactions    Iodinated Contrast Media Itching        Review of Systems: A complete review of systems was obtained, negative except as described above. Physical Exam:     Visit Vitals  /81 (BP 1 Location: Left upper arm, BP Patient Position: Sitting)   Pulse 92   Temp 96.8 °F (36 °C)   Resp 18   Wt 194 lb (88 kg)   BMI 30.38 kg/m²     General appearance - alert, well appearing, and in no distress, nervous, poor eye contact  Mental status - oriented to person, place, and time  Mouth - mucous membranes moist, OP clear, no ulcerations  Neck - supple, PAC in place with no erythema/edema  Lymphatics - no palpable lymphadenopathy  Skin - mild erythema of neck / face     ECOG PS:1    Results:     Lab Results   Component Value Date/Time    WBC 6.3 06/16/2021 09:52 AM    HGB 14.2 06/16/2021 09:52 AM    HCT 41.5 06/16/2021 09:52 AM    PLATELET 428 20/02/9189 09:52 AM    MCV 85.9 06/16/2021 09:52 AM    ABS. NEUTROPHILS PENDING 06/16/2021 09:52 AM     Lab Results   Component Value Date/Time    Sodium 137 06/16/2021 09:52 AM    Potassium 4.2 06/16/2021 09:52 AM    Chloride 108 06/16/2021 09:52 AM    CO2 20 (L) 06/16/2021 09:52 AM    Glucose 97 06/16/2021 09:52 AM    BUN 12 06/16/2021 09:52 AM    Creatinine 0.67 (L) 06/16/2021 09:52 AM    GFR est AA >60 06/16/2021 09:52 AM    GFR est non-AA >60 06/16/2021 09:52 AM    Calcium 9.6 06/16/2021 09:52 AM    Glucose (POC) 113 (H) 04/15/2021 08:07 AM     Lab Results   Component Value Date/Time    Bilirubin, total 0.2 06/16/2021 09:52 AM    ALT (SGPT) 22 06/16/2021 09:52 AM    Alk. phosphatase 112 06/16/2021 09:52 AM    Protein, total 7.3 06/16/2021 09:52 AM    Albumin 4.0 06/16/2021 09:52 AM    Globulin 3.3 06/16/2021 09:52 AM     Component      Latest Ref Rng & Units 2/7/2021          12:26 PM   Cristóbal-Purdy DNA QT, PCR      Negative copies/mL 327   Cristóbal-Barr Virus log10      log10 copy/mL 2.515       Records reviewed and summarized above. Pathology report(s) reviewed     PATHOLOGY     FINAL PATHOLOGIC DIAGNOSIS   1.  Right nasopharyngeal mass, biopsy:   Nasopharyngeal carcinoma, nonkeratinizing, undifferentiated type (see comment)   In situ hybridization for Cristóbal-Barr virus (SEMAJ) is positive   2. Right nasopharyngeal mass, biopsy:   Nasopharyngeal carcinoma, nonkeratinizing, undifferentiated type   Comment   Immunohistochemical stains performed from block 1A show that the invasive carcinoma expresses p40, AE1/AE3, and CAM5.2 while negative for p16, CK7, and CK20. This immunoprofile supports the above rendered diagnosis     Radiology report(s) reviewed above. CTA neck    IMPRESSION  1. Attenuation of the right internal carotid artery as it passes through the  right nasopharyngeal mass but no evidence of active extravasation or  intraluminal thrombus. 2.  Bilateral cervical lymphadenopathy in level 2 and level 5B. 3.  Right posterior nasopharyngeal mass with sphenoid sinus involvement and  diffuse sinus disease    MRI brain    IMPRESSION  1. 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 2.  Diffuse sinus mucosal thickening. 3.  Mass related narrowing of the right distal cervical internal carotid artery  but no large vessel occlusion. 4.  No evidence of infarct.       MRI orbit and face    IMPRESSION  Again demonstrated is large right nasopharyngeal tumor as previously described  and without significant change. Some involvement right internal carotid artery. Associated mucosal thickening and fluid right paranasal sinuses. PET 2/18/21:  IMPRESSION  The right nasopharyngeal mass lesion is hypermetabolic and  compatible with the known neoplasm. Bilateral hypermetabolic cervical lymph  nodes. Focal increased tracer activity is seen involving the very posterior  aspect of the nasal septum. PET 4/15/21:  IMPRESSION  No foci evident to suggest recurrent or metastatic disease. Resolved  nasopharyngeal mass uptake and bilateral cervical lymph node tracer  Distribution.     Assessment:   1) Nasopharyngeal carcinoma- R SEMAJ positive    4.2 x 4 cm, extends into the sphenoid sinus, bilateral cervical nodes. [place] but does not involve carotid artery    Nasopharyngeal carcinoma - at least T3N2M0-Stage III disease. This is an aggressive malignancy with high risk of fatal local complications  In his case , the abutment of carotid artery is highly concerning for risk of tumor extension and catastrophic bleeding  He is very symptomatic with severe pain, epistaxis, hearing impediment    His PET was reviewed from 2/18/21 and shows a significant fco burden. Hence, after discussion with radiation decision was made to move forward with induction chemo with cisplatin + gemzar x 3 cycles followed by chemoRT (with weekly Cisplatin). He completed 3 cycles of cisplatin / gemzar. PET obtained 4/15/21 was reviewed and shows a CR. Plan for 6 weeks of chemoRT with cisplatin. Today is week 5 of chemoRT w/ cisplatin. Tolerating well with grade 1 mucositis, grade 1 neuropathy, grade 1 nausea   Labs reviewed. 2) Epistaxis  Secondary to the mass  CTA without active extravasation  Resolved     3) Headaches  Secondary to the nasopharyngeal mass with splenoid sinus involvement  Controlled on Verapamil, Zomig, Oxycodone  Continue palliative care follow up  Resolved     4) Management of high risk drugs like chemotherapy  Chemo calendar provided to patient  Tolerating well thus far w/ grade 1 mucositis, grade 1 neuropathy, grade 1 nausea   Labs reviewed. No changes to current treatment    Plan:     · Proceed today for week 5 of weekly Cisplatin (40mg/m2) + RT   · Labs to include cbc with diff, CMP prior to each infusion   · Zofran / compazine PRN   · Dexamethasone on days 2 , 3   · Follow with radiation oncology as scheduled   · Continue to follow with palliative care  · Magic mouthwash PRN     RTC in 1 week for week 6     I appreciate the opportunity to participate in Mr. Annabelle Cowden Navarro's care.     I performed a history and physical examination of the patient and discussed his management with the NPP. I reviewed the NPP note and agree with the documented findings and plan of care    Kayy Peña is tolerating Cisplatin + XRT for NP carcinoma  This is week 5  He has no facial masses, no epistaxis  Has grade 1 neuropathy, dysgeusia, labs stable. His creatinine is normal and he is ok to proceed with 5/6 weekly treatments    We will plan to repeat an ENT exam with Dr. Ling Lucas after 8 weeks of RT completion and repeat a PET 10 weeks after RT. If he continues to have a CR will observe.  If he has residual disease then evaluate for resection    Signed By: Vipin Ziegler MD

## 2021-06-16 NOTE — PROGRESS NOTES
[facility] Chemo Progress Note    0945  Mr. Luis Carlos Recinos Arrived to [facility] for  Cisplatin (C1D29) ambulatory in stable condition. Assessment was completed, no acute issues at this time, patient reports an episode of vomiting yesterday. Port accessed with positive blood return. Labs drawn and sent for processing. Port flushed and capped. Patient left for medical oncology appointment. 1120 Patient returned to [facility]. Labs WDL for treatment. Medication ordered from pharmacy. Chemotherapy Flowsheet 6/16/2021   Cycle C1D29   Date 6/16/2021   Drug / Regimen Cisplatin   Dosage -   Time Up -   Time Down -   Pre Hydration given   Post Hydration -   Pre Meds given   Notes given        Mr. Yosi Mendoza's vitals were reviewed. Patient Vitals for the past 12 hrs:   Temp Pulse Resp BP   06/16/21 1528  90  (!) 142/98   06/16/21 0949 96.8 °F (36 °C) 92 18 117/81         Lab results were obtained and reviewed. Recent Results (from the past 12 hour(s))   CBC WITH AUTOMATED DIFF    Collection Time: 06/16/21  9:52 AM   Result Value Ref Range    WBC 6.3 4.1 - 11.1 K/uL    RBC 4.83 4.10 - 5.70 M/uL    HGB 14.2 12.1 - 17.0 g/dL    HCT 41.5 36.6 - 50.3 %    MCV 85.9 80.0 - 99.0 FL    MCH 29.4 26.0 - 34.0 PG    MCHC 34.2 30.0 - 36.5 g/dL    RDW 15.2 (H) 11.5 - 14.5 %    PLATELET 504 188 - 332 K/uL    MPV 9.6 8.9 - 12.9 FL    NRBC 0.0 0  WBC    ABSOLUTE NRBC 0.00 0.00 - 0.01 K/uL    NEUTROPHILS 74 32 - 75 %    LYMPHOCYTES 12 12 - 49 %    MONOCYTES 11 5 - 13 %    EOSINOPHILS 2 0 - 7 %    BASOPHILS 0 0 - 1 %    IMMATURE GRANULOCYTES 1 (H) 0.0 - 0.5 %    ABS. NEUTROPHILS 4.6 1.8 - 8.0 K/UL    ABS. LYMPHOCYTES 0.8 0.8 - 3.5 K/UL    ABS. MONOCYTES 0.7 0.0 - 1.0 K/UL    ABS. EOSINOPHILS 0.1 0.0 - 0.4 K/UL    ABS. BASOPHILS 0.0 0.0 - 0.1 K/UL    ABS. IMM.  GRANS. 0.1 (H) 0.00 - 0.04 K/UL    DF SMEAR SCANNED      RBC COMMENTS NORMOCYTIC, NORMOCHROMIC     METABOLIC PANEL, COMPREHENSIVE    Collection Time: 06/16/21  9:52 AM Result Value Ref Range    Sodium 137 136 - 145 mmol/L    Potassium 4.2 3.5 - 5.1 mmol/L    Chloride 108 97 - 108 mmol/L    CO2 20 (L) 21 - 32 mmol/L    Anion gap 9 5 - 15 mmol/L    Glucose 97 65 - 100 mg/dL    BUN 12 6 - 20 MG/DL    Creatinine 0.67 (L) 0.70 - 1.30 MG/DL    BUN/Creatinine ratio 18 12 - 20      GFR est AA >60 >60 ml/min/1.73m2    GFR est non-AA >60 >60 ml/min/1.73m2    Calcium 9.6 8.5 - 10.1 MG/DL    Bilirubin, total 0.2 0.2 - 1.0 MG/DL    ALT (SGPT) 22 12 - 78 U/L    AST (SGOT) 11 (L) 15 - 37 U/L    Alk. phosphatase 112 45 - 117 U/L    Protein, total 7.3 6.4 - 8.2 g/dL    Albumin 4.0 3.5 - 5.0 g/dL    Globulin 3.3 2.0 - 4.0 g/dL    A-G Ratio 1.2 1.1 - 2.2     MAGNESIUM    Collection Time: 06/16/21  9:52 AM   Result Value Ref Range    Magnesium 2.0 1.6 - 2.4 mg/dL       Pre-medications  were administered as ordered and chemotherapy was initiated.   Medications Administered     0.9% sodium chloride 1,000 mL with potassium chloride 10 mEq, magnesium sulfate 2 g infusion     Admin Date  06/16/2021 Action  New Bag Dose   Rate  1,000 mL/hr Route  IntraVENous Administered By  Eduar Waters RN          0.9% sodium chloride infusion     Admin Date  06/16/2021 Action  New Bag Dose  25 mL/hr Rate  25 mL/hr Route  IntraVENous Administered By  Eduar Waters RN          CISplatin (PLATINOL) 82.8 mg in 0.9% sodium chloride 500 mL, overfill volume 50 mL chemo infusion     Admin Date  06/16/2021 Action  New Bag Dose  82.8 mg Rate  632.8 mL/hr Route  IntraVENous Administered By  Eduar Waters RN          dexamethasone (DECADRON) 12 mg in 0.9% sodium chloride 50 mL, overfill volume 5 mL IVPB     Admin Date  06/16/2021 Action  New Bag Dose  12 mg Rate  232 mL/hr Route  IntraVENous Administered By  Eduar Waters RN          fosaprepitant (EMEND) 150 mg in 0.9% sodium chloride 150 mL IVPB     Admin Date  06/16/2021 Action  New Bag Dose  150 mg Rate  450 mL/hr Route  IntraVENous Administered By  Eduar Waters RN heparin (porcine) pf 300-500 Units     Admin Date  06/16/2021 Action  Given Dose  500 Units Route  InterCATHeter Administered By  Nafisa Arguelles RN          palonosetron HCl (ALOXI) injection 0.25 mg     Admin Date  06/16/2021 Action  Given Dose  0.25 mg Route  IntraVENous Administered By  Nafisa Arguelles RN          sodium chloride (NS) flush 10 mL     Admin Date  06/16/2021 Action  Given Dose  10 mL Route  IntraVENous Administered By  Nafisa Arguelles RN           Admin Date  06/16/2021 Action  Given Dose  10 mL Route  IntraVENous Administered By  Nafisa Arguelles RN           Admin Date  06/16/2021 Action  Given Dose  10 mL Route  IntraVENous Administered By  Nafisa Arguelles RN              7846 Patient tolerated treatment well. Port maintained positive blood return throughout treatment. Port flushed, heparinized and de accessed per protocol. Patient was discharged from [address] [place] [address] in stable condition. Patient is aware of next appointment.     Future Appointments   Date Time Provider Renae Fajardo   6/17/2021 10:30 AM RAD [address] [place]   6/18/2021 10:30 AM RAD [address] [place]   6/21/2021 10:30 AM RAD [address] [place]   6/22/2021 10:30 AM RAD [address] [place]   6/23/2021  9:00 AM E3 JOSUE MED [address] [facility] H   6/23/2021  9:30 AM Mary Ann Puri,  [address] BS AMB   6/23/2021 10:30 AM RAD ONC THERAPY RCR [address] [place]   6/24/2021 10:30 AM RAD [address] [place]   6/25/2021 10:30 AM RAD [address] [place]   6/28/2021 10:30 AM RAD [address] [place]   6/29/2021 10:30 AM RAD [address] [place]   6/30/2021 10:00 AM E4 JOSUE LONG [place] [facility] H   6/30/2021 10:30 AM RAD ONC THERAPY RCR [address] [place]   7/1/2021  9:00 AM [facility]   7/1/2021 10:30 AM RAD ONC THERAPY RCR [address]   7/2/2021 10:30 AM RAD ONC THERAPY RCR [address] [place]   7/6/2021 10:30 AM RAD [address] [place]   7/7/2021 10:30 AM RAD [address] [place]   7/8/2021  9:00 AM E4 JOSUE [place] [facility]   7/8/2021 10:30 AM RAD ONC THERAPY RCR [address] [place]   8/4/2021 10:30 AM Neymar Campbell MD [address] [facility] BS ULICES Bahena RN  June 16, 2021

## 2021-06-16 NOTE — PROGRESS NOTES
Wilmer Castillo is a 29 y.o. 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 Have you been to the ER, urgent care clinic since your last visit? Hospitalized since your last visit? No    2. Have you seen or consulted any other health care providers outside of the [address] [place] [address] since your last visit? Include any pap smears or colon screening.  No

## 2021-06-17 ENCOUNTER — HOSPITAL ENCOUNTER (OUTPATIENT)
Dept: RADIATION THERAPY | Age: 36
Discharge: HOME | End: 2021-06-17

## 2021-06-18 ENCOUNTER — HOSPITAL ENCOUNTER (OUTPATIENT)
Dept: RADIATION THERAPY | Age: 36
Discharge: HOME | End: 2021-06-18

## 2021-06-21 ENCOUNTER — HOSPITAL ENCOUNTER (OUTPATIENT)
Dept: RADIATION THERAPY | Age: 36
Discharge: HOME | End: 2021-06-21

## 2021-06-22 ENCOUNTER — HOSPITAL ENCOUNTER (OUTPATIENT)
Dept: RADIATION THERAPY | Age: 36
Discharge: HOME | End: 2021-06-22

## 2021-06-23 ENCOUNTER — HOSPITAL ENCOUNTER (OUTPATIENT)
Dept: INFUSION THERAPY | Age: 36
Discharge: HOME | End: 2021-06-23
Payer: SUBSIDIZED

## 2021-06-23 ENCOUNTER — OFFICE VISIT (OUTPATIENT)
Dept: ONCOLOGY | Age: 36
End: 2021-06-23
Payer: SUBSIDIZED

## 2021-06-23 ENCOUNTER — HOSPITAL ENCOUNTER (OUTPATIENT)
Dept: RADIATION THERAPY | Age: 36
Discharge: HOME | End: 2021-06-23

## 2021-06-23 VITALS
SYSTOLIC BLOOD PRESSURE: 127 MMHG | TEMPERATURE: 97.1 F | OXYGEN SATURATION: 98 % | DIASTOLIC BLOOD PRESSURE: 85 MMHG | HEART RATE: 83 BPM | WEIGHT: 188.6 LBS | BODY MASS INDEX: 29.6 KG/M2 | RESPIRATION RATE: 18 BRPM | HEIGHT: 67 IN

## 2021-06-23 VITALS
SYSTOLIC BLOOD PRESSURE: 119 MMHG | DIASTOLIC BLOOD PRESSURE: 84 MMHG | HEART RATE: 104 BPM | RESPIRATION RATE: 18 BRPM | TEMPERATURE: 97.1 F | WEIGHT: 188 LBS | BODY MASS INDEX: 29.44 KG/M2 | OXYGEN SATURATION: 98 %

## 2021-06-23 DIAGNOSIS — C76.0 HEAD AND NECK CANCER (HCC): Primary | ICD-10-CM

## 2021-06-23 DIAGNOSIS — C76.0 HEAD AND NECK CANCER (HCC): ICD-10-CM

## 2021-06-23 LAB
ALBUMIN SERPL-MCNC: 4 G/DL (ref 3.5–5)
ALBUMIN/GLOB SERPL: 1 {RATIO} (ref 1.1–2.2)
ALP SERPL-CCNC: 122 U/L (ref 45–117)
ALT SERPL-CCNC: 29 U/L (ref 12–78)
ANION GAP SERPL CALC-SCNC: 6 MMOL/L (ref 5–15)
AST SERPL-CCNC: 11 U/L (ref 15–37)
BASOPHILS # BLD: 0 K/UL (ref 0–0.1)
BASOPHILS NFR BLD: 0 % (ref 0–1)
BILIRUB SERPL-MCNC: 0.3 MG/DL (ref 0.2–1)
BUN SERPL-MCNC: 19 MG/DL (ref 6–20)
BUN/CREAT SERPL: 23 (ref 12–20)
CALCIUM SERPL-MCNC: 9.6 MG/DL (ref 8.5–10.1)
CHLORIDE SERPL-SCNC: 107 MMOL/L (ref 97–108)
CO2 SERPL-SCNC: 23 MMOL/L (ref 21–32)
CREAT SERPL-MCNC: 0.83 MG/DL (ref 0.7–1.3)
DIFFERENTIAL METHOD BLD: ABNORMAL
EOSINOPHIL # BLD: 0.1 K/UL (ref 0–0.4)
EOSINOPHIL NFR BLD: 1 % (ref 0–7)
ERYTHROCYTE [DISTWIDTH] IN BLOOD BY AUTOMATED COUNT: 15 % (ref 11.5–14.5)
GLOBULIN SER CALC-MCNC: 3.9 G/DL (ref 2–4)
GLUCOSE SERPL-MCNC: 122 MG/DL (ref 65–100)
HCT VFR BLD AUTO: 41.3 % (ref 36.6–50.3)
HGB BLD-MCNC: 14.4 G/DL (ref 12.1–17)
IMM GRANULOCYTES # BLD AUTO: 0 K/UL (ref 0–0.04)
IMM GRANULOCYTES NFR BLD AUTO: 0 % (ref 0–0.5)
LYMPHOCYTES # BLD: 0.4 K/UL (ref 0.8–3.5)
LYMPHOCYTES NFR BLD: 8 % (ref 12–49)
MAGNESIUM SERPL-MCNC: 1.9 MG/DL (ref 1.6–2.4)
MCH RBC QN AUTO: 29.7 PG (ref 26–34)
MCHC RBC AUTO-ENTMCNC: 34.9 G/DL (ref 30–36.5)
MCV RBC AUTO: 85.2 FL (ref 80–99)
MONOCYTES # BLD: 0.5 K/UL (ref 0–1)
MONOCYTES NFR BLD: 9 % (ref 5–13)
NEUTS SEG # BLD: 4 K/UL (ref 1.8–8)
NEUTS SEG NFR BLD: 82 % (ref 32–75)
NRBC # BLD: 0 K/UL (ref 0–0.01)
NRBC BLD-RTO: 0 PER 100 WBC
PLATELET # BLD AUTO: 186 K/UL (ref 150–400)
PMV BLD AUTO: 9.5 FL (ref 8.9–12.9)
POTASSIUM SERPL-SCNC: 3.8 MMOL/L (ref 3.5–5.1)
PROT SERPL-MCNC: 7.9 G/DL (ref 6.4–8.2)
RBC # BLD AUTO: 4.85 M/UL (ref 4.1–5.7)
RBC MORPH BLD: ABNORMAL
SODIUM SERPL-SCNC: 136 MMOL/L (ref 136–145)
WBC # BLD AUTO: 5 K/UL (ref 4.1–11.1)

## 2021-06-23 PROCEDURE — 74011250636 HC RX REV CODE- 250/636: Performed by: INTERNAL MEDICINE

## 2021-06-23 PROCEDURE — 83735 ASSAY OF MAGNESIUM: CPT

## 2021-06-23 PROCEDURE — 80053 COMPREHEN METABOLIC PANEL: CPT

## 2021-06-23 PROCEDURE — 96413 CHEMO IV INFUSION 1 HR: CPT

## 2021-06-23 PROCEDURE — 96361 HYDRATE IV INFUSION ADD-ON: CPT

## 2021-06-23 PROCEDURE — 96375 TX/PRO/DX INJ NEW DRUG ADDON: CPT

## 2021-06-23 PROCEDURE — 85025 COMPLETE CBC W/AUTO DIFF WBC: CPT

## 2021-06-23 PROCEDURE — 99214 OFFICE O/P EST MOD 30 MIN: CPT | Performed by: INTERNAL MEDICINE

## 2021-06-23 PROCEDURE — 74011000258 HC RX REV CODE- 258: Performed by: INTERNAL MEDICINE

## 2021-06-23 PROCEDURE — 77030012965 HC NDL HUBR BBMI -A

## 2021-06-23 PROCEDURE — 36415 COLL VENOUS BLD VENIPUNCTURE: CPT

## 2021-06-23 RX ORDER — SODIUM CHLORIDE 9 MG/ML
10 INJECTION INTRAMUSCULAR; INTRAVENOUS; SUBCUTANEOUS AS NEEDED
Status: CANCELLED | OUTPATIENT
Start: 2021-07-14

## 2021-06-23 RX ORDER — HEPARIN 100 UNIT/ML
300-500 SYRINGE INTRAVENOUS AS NEEDED
Status: CANCELLED | OUTPATIENT
Start: 2021-07-14

## 2021-06-23 RX ORDER — SODIUM CHLORIDE 9 MG/ML
10 INJECTION INTRAMUSCULAR; INTRAVENOUS; SUBCUTANEOUS AS NEEDED
Status: CANCELLED | OUTPATIENT
Start: 2021-07-08

## 2021-06-23 RX ORDER — ONDANSETRON 2 MG/ML
8 INJECTION INTRAMUSCULAR; INTRAVENOUS
Status: CANCELLED
Start: 2021-07-21

## 2021-06-23 RX ORDER — SODIUM CHLORIDE 9 MG/ML
10 INJECTION INTRAMUSCULAR; INTRAVENOUS; SUBCUTANEOUS AS NEEDED
Status: CANCELLED | OUTPATIENT
Start: 2021-07-21

## 2021-06-23 RX ORDER — SODIUM CHLORIDE 0.9 % (FLUSH) 0.9 %
10 SYRINGE (ML) INJECTION AS NEEDED
Status: CANCELLED | OUTPATIENT
Start: 2021-07-14

## 2021-06-23 RX ORDER — PALONOSETRON 0.05 MG/ML
0.25 INJECTION, SOLUTION INTRAVENOUS ONCE
Status: COMPLETED | OUTPATIENT
Start: 2021-06-23 | End: 2021-06-23

## 2021-06-23 RX ORDER — HEPARIN 100 UNIT/ML
300-500 SYRINGE INTRAVENOUS AS NEEDED
Status: CANCELLED | OUTPATIENT
Start: 2021-06-30

## 2021-06-23 RX ORDER — SODIUM CHLORIDE 0.9 % (FLUSH) 0.9 %
10 SYRINGE (ML) INJECTION AS NEEDED
Status: DISPENSED | OUTPATIENT
Start: 2021-06-23 | End: 2021-06-23

## 2021-06-23 RX ORDER — SODIUM CHLORIDE 0.9 % (FLUSH) 0.9 %
10 SYRINGE (ML) INJECTION AS NEEDED
Status: CANCELLED | OUTPATIENT
Start: 2021-07-08

## 2021-06-23 RX ORDER — HEPARIN 100 UNIT/ML
300-500 SYRINGE INTRAVENOUS AS NEEDED
Status: ACTIVE | OUTPATIENT
Start: 2021-06-23 | End: 2021-06-23

## 2021-06-23 RX ORDER — ONDANSETRON 2 MG/ML
8 INJECTION INTRAMUSCULAR; INTRAVENOUS
Status: CANCELLED
Start: 2021-06-30

## 2021-06-23 RX ORDER — ONDANSETRON 2 MG/ML
8 INJECTION INTRAMUSCULAR; INTRAVENOUS
Status: CANCELLED
Start: 2021-07-08

## 2021-06-23 RX ORDER — SODIUM CHLORIDE 0.9 % (FLUSH) 0.9 %
10 SYRINGE (ML) INJECTION AS NEEDED
Status: CANCELLED | OUTPATIENT
Start: 2021-06-30

## 2021-06-23 RX ORDER — HEPARIN 100 UNIT/ML
300-500 SYRINGE INTRAVENOUS AS NEEDED
Status: CANCELLED | OUTPATIENT
Start: 2021-07-21

## 2021-06-23 RX ORDER — SODIUM CHLORIDE 9 MG/ML
10 INJECTION INTRAMUSCULAR; INTRAVENOUS; SUBCUTANEOUS AS NEEDED
Status: CANCELLED | OUTPATIENT
Start: 2021-06-30

## 2021-06-23 RX ORDER — ONDANSETRON 2 MG/ML
8 INJECTION INTRAMUSCULAR; INTRAVENOUS
Status: CANCELLED
Start: 2021-07-14

## 2021-06-23 RX ORDER — HEPARIN 100 UNIT/ML
300-500 SYRINGE INTRAVENOUS AS NEEDED
Status: CANCELLED | OUTPATIENT
Start: 2021-07-08

## 2021-06-23 RX ORDER — SODIUM CHLORIDE 9 MG/ML
10 INJECTION INTRAMUSCULAR; INTRAVENOUS; SUBCUTANEOUS AS NEEDED
Status: ACTIVE | OUTPATIENT
Start: 2021-06-23 | End: 2021-06-23

## 2021-06-23 RX ORDER — SODIUM CHLORIDE 0.9 % (FLUSH) 0.9 %
10 SYRINGE (ML) INJECTION AS NEEDED
Status: CANCELLED | OUTPATIENT
Start: 2021-07-21

## 2021-06-23 RX ORDER — SODIUM CHLORIDE 9 MG/ML
25 INJECTION, SOLUTION INTRAVENOUS CONTINUOUS
Status: DISPENSED | OUTPATIENT
Start: 2021-06-23 | End: 2021-06-23

## 2021-06-23 RX ADMIN — CISPLATIN 82.8 MG: 100 INJECTION, SOLUTION INTRAVENOUS at 11:08

## 2021-06-23 RX ADMIN — HEPARIN 500 UNITS: 100 SYRINGE at 11:07

## 2021-06-23 RX ADMIN — SODIUM CHLORIDE 10 ML: 9 INJECTION INTRAMUSCULAR; INTRAVENOUS; SUBCUTANEOUS at 09:05

## 2021-06-23 RX ADMIN — PALONOSETRON 0.25 MG: 0.25 INJECTION, SOLUTION INTRAVENOUS at 09:59

## 2021-06-23 RX ADMIN — SODIUM CHLORIDE 10 ML: 9 INJECTION INTRAMUSCULAR; INTRAVENOUS; SUBCUTANEOUS at 11:06

## 2021-06-23 RX ADMIN — SODIUM CHLORIDE 150 MG: 900 INJECTION, SOLUTION INTRAVENOUS at 10:36

## 2021-06-23 RX ADMIN — Medication 10 ML: at 11:06

## 2021-06-23 RX ADMIN — SODIUM CHLORIDE 25 ML/HR: 900 INJECTION, SOLUTION INTRAVENOUS at 09:59

## 2021-06-23 RX ADMIN — MAGNESIUM SULFATE HEPTAHYDRATE: 500 INJECTION, SOLUTION INTRAMUSCULAR; INTRAVENOUS at 11:04

## 2021-06-23 RX ADMIN — DEXAMETHASONE SODIUM PHOSPHATE 12 MG: 4 INJECTION, SOLUTION INTRA-ARTICULAR; INTRALESIONAL; INTRAMUSCULAR; INTRAVENOUS; SOFT TISSUE at 10:36

## 2021-06-23 NOTE — PROGRESS NOTES
Anson Bedolla is a 29 y.o. male    Chief Complaint   Patient presents with    Chemotherapy     nasopharyngeal Carcinoma       1. Have you been to the ER, urgent care clinic since your last visit? Hospitalized since your last visit? No    2. Have you seen or consulted any other health care providers outside of the [address] [place] [address] since your last visit? Include any pap smears or colon screening.  No

## 2021-06-23 NOTE — Clinical Note
Please schedule fu with Dr. Alexandra Paredes (ENT) 8 weeks post radiation completion (which will be week of September 2nd) then notify pt of apt (he is Ethiopian speaking)

## 2021-06-23 NOTE — PROGRESS NOTES
[facility] [place] [address] [address], [address], [place]: [phone]  F: [phone]    Reason for visit   John Rausch is a 29 y.o. 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 male with no significant PMH seen for above  He was seen by Dr. Don [place] with ENT in December with c/o recurrent epistaxis x 2 months requiring ER visits and packing. His prior nasal endoscopy was clear. He was admitted on 2/4/2021 for recurrent HAs and epistaxis which lasted several minutes with improvement on pressure. He had a normal CBC for the most part and had no h/o bleeding growing up. MRI brain 2/5/2021 was obtained and showed a 4.2 x 4 cm posterior nasopharyngeal mass Extending into the sphenoid sinus with narrowing f the R distal internal carotid artery. CTA did not show active extravasation, but showed bilateral cervical adenopathy. He had a biopsy of the NP mass 2/6/2021 and pathology showed Nasopharyngeal carcinoma. He completed 3 cycles of cisplatin / gemzar. He comes today for week 6 of cisplatin + RT. He feels good today. He has some nausea and zofran helps. Has taste changes but denies mouth sores. He denies dysphagia. Denies diarrhea/constipation. Has occasional numbness/tingling in fingers/toes that is not worsened Denies headaches. Has occasional bleeding from R nare when blowing his nose. No other complaints today. Last day of RT is July 8th. No past medical history on file.    Past Surgical History:   Procedure Laterality Date    IR INSERT TUNL CVC W PORT OVER 5 YEARS  2021         IR INSERT TUNL CVC W PORT OVER 5 YEARS  2021      Social History     Tobacco Use    Smoking status: Never Smoker    Smokeless tobacco: Never Used   Substance Use Topics    Alcohol use: Not Currently      No family history on file. Current Outpatient Medications   Medication Sig    magic mouthwash solution Magic mouth wash Maalox Lidocaine 2% viscous Diphenhydramine oral solution Pharmacy to mix equal portions of ingredients to a total volume as indicated in the dispense amount. Swish & swallow 5mL (1 tsp) 4 times daily as needed for mouth sores    topiramate (TOPAMAX) 50 mg tablet Take 1 Tablet by mouth two (2) times a day.  dexAMETHasone (DECADRON) 4 mg tablet Take 2 tabs (8mg) on days 2 & 3 of chemotherapy    amLODIPine (NORVASC) 5 mg tablet Take 1 Tab by mouth daily.  ondansetron (ZOFRAN ODT) 8 mg disintegrating tablet Take 1 Tab by mouth every eight (8) hours as needed for Nausea or Vomiting.  lidocaine-prilocaine (EMLA) topical cream Apply  to affected area as needed for Pain.  polyethylene glycol (MIRALAX) 17 gram packet Take 1 Packet by mouth daily. Mix in 8 oz of water, juice, soda, coffee, or tea prior to administration    magnesium 250 mg tab Si po qhs    fexofenadine (ALLEGRA) 180 mg tablet Take 1 Tab by mouth daily. Indications: seasonal runny nose    acetaminophen (TYLENOL) 500 mg tablet Take  by mouth every six (6) hours as needed for Pain. 2 or 3 po daily      No current facility-administered medications for this visit. Allergies   Allergen Reactions    Iodinated Contrast Media Itching        Review of Systems: A complete review of systems was obtained, negative except as described above.     Physical Exam:     Visit Vitals  /84 (BP 1 Location: Left upper arm, BP Patient Position: Sitting)   Pulse (!) 104   Temp 97.1 °F (36.2 °C)   Resp 18   Wt 188 lb (85.3 kg)   SpO2 98%   BMI 29.44 kg/m²     General appearance - alert, well appearing, and in no distress, nervous, poor eye contact  Mental status - oriented to person, place, and time  Mouth - mucous membranes moist, OP clear, no ulcerations, mild erythema on R inner cheek   Neck - supple, PAC in place with no erythema/edema  Lymphatics - no palpable lymphadenopathy  Skin - mild erythema of neck / face     ECOG PS:1    Results:     Lab Results   Component Value Date/Time    WBC 6.3 06/16/2021 09:52 AM    HGB 14.2 06/16/2021 09:52 AM    HCT 41.5 06/16/2021 09:52 AM    PLATELET 892 10/69/3902 09:52 AM    MCV 85.9 06/16/2021 09:52 AM    ABS. NEUTROPHILS 4.6 06/16/2021 09:52 AM     Lab Results   Component Value Date/Time    Sodium 137 06/16/2021 09:52 AM    Potassium 4.2 06/16/2021 09:52 AM    Chloride 108 06/16/2021 09:52 AM    CO2 20 (L) 06/16/2021 09:52 AM    Glucose 97 06/16/2021 09:52 AM    BUN 12 06/16/2021 09:52 AM    Creatinine 0.67 (L) 06/16/2021 09:52 AM    GFR est AA >60 06/16/2021 09:52 AM    GFR est non-AA >60 06/16/2021 09:52 AM    Calcium 9.6 06/16/2021 09:52 AM    Glucose (POC) 113 (H) 04/15/2021 08:07 AM     Lab Results   Component Value Date/Time    Bilirubin, total 0.2 06/16/2021 09:52 AM    ALT (SGPT) 22 06/16/2021 09:52 AM    Alk. phosphatase 112 06/16/2021 09:52 AM    Protein, total 7.3 06/16/2021 09:52 AM    Albumin 4.0 06/16/2021 09:52 AM    Globulin 3.3 06/16/2021 09:52 AM     Component      Latest Ref Rng & Units 2/7/2021          12:26 PM   Cristóbal-Purdy DNA QT, PCR      Negative copies/mL 327   Cristóbal-Barr Virus log10      log10 copy/mL 2.515       Records reviewed and summarized above. Pathology report(s) reviewed     PATHOLOGY     FINAL PATHOLOGIC DIAGNOSIS   1. Right nasopharyngeal mass, biopsy:   Nasopharyngeal carcinoma, nonkeratinizing, undifferentiated type (see comment)   In situ hybridization for Cristóbal-Barr virus (SEMAJ) is positive   2.  Right nasopharyngeal mass, biopsy:   Nasopharyngeal carcinoma, nonkeratinizing, undifferentiated type   Comment Immunohistochemical stains performed from block 1A show that the invasive carcinoma expresses p40, AE1/AE3, and CAM5.2 while negative for p16, CK7, and CK20. This immunoprofile supports the above rendered diagnosis     Radiology report(s) reviewed above. CTA neck    IMPRESSION  1. Attenuation of the right internal carotid artery as it passes through the  right nasopharyngeal mass but no evidence of active extravasation or  intraluminal thrombus. 2.  Bilateral cervical lymphadenopathy in level 2 and level 5B. 3.  Right posterior nasopharyngeal mass with sphenoid sinus involvement and  diffuse sinus disease    MRI brain    IMPRESSION  1. 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 2.  Diffuse sinus mucosal thickening. 3.  Mass related narrowing of the right distal cervical internal carotid artery  but no large vessel occlusion. 4.  No evidence of infarct.       MRI orbit and face    IMPRESSION  Again demonstrated is large right nasopharyngeal tumor as previously described  and without significant change. Some involvement right internal carotid artery. Associated mucosal thickening and fluid right paranasal sinuses. PET 2/18/21:  IMPRESSION  The right nasopharyngeal mass lesion is hypermetabolic and  compatible with the known neoplasm. Bilateral hypermetabolic cervical lymph  nodes. Focal increased tracer activity is seen involving the very posterior  aspect of the nasal septum. PET 4/15/21:  IMPRESSION  No foci evident to suggest recurrent or metastatic disease. Resolved  nasopharyngeal mass uptake and bilateral cervical lymph node tracer  Distribution. Assessment:   1) Nasopharyngeal carcinoma- R   SEMAJ positive    4.2 x 4 cm, extends into the sphenoid sinus, bilateral cervical nodes. Crane but does not involve carotid artery    Nasopharyngeal carcinoma - at least T3N2M0-Stage III disease.   This is an aggressive malignancy with high risk of fatal local complications  In his case , the abutment of carotid artery is highly concerning for risk of tumor extension and catastrophic bleeding  He is very symptomatic with severe pain, epistaxis, hearing impediment    His PET was reviewed from 2/18/21 and shows a significant fco burden. Hence, after discussion with radiation decision was made to move forward with induction chemo with cisplatin + gemzar x 3 cycles followed by chemoRT (with weekly Cisplatin). He completed 3 cycles of cisplatin / gemzar. PET obtained 4/15/21 was reviewed and shows a CR. Plan for 6 weeks of chemoRT with cisplatin. Today is week 6 of chemoRT w/ cisplatin. Tolerating well with grade 1 mucositis, grade 1 neuropathy, grade 1 nausea   Labs reviewed. We will plan to repeat an ENT exam with Dr. Cash Banuelos after 8 weeks of RT completion and repeat a PET 10 weeks after RT. If he continues to have a CR will observe. If he has residual disease then evaluate for resection - PET will be due September 16th,     2) Epistaxis  Secondary to the mass  CTA without active extravasation  Resolved     3) Headaches  Secondary to the nasopharyngeal mass with splenoid sinus involvement  Controlled on Verapamil, Zomig, Oxycodone  Continue palliative care follow up  Resolved     4) Management of high risk drugs like chemotherapy  Chemo calendar provided to patient  Tolerating well thus far w/ grade 1 mucositis, grade 1 neuropathy, grade 1 nausea   Labs reviewed.    No changes to current treatment  PET will be     Plan:     · Proceed today for week 6 of weekly Cisplatin (40mg/m2) + RT   · Labs to include cbc with diff, CMP prior to each infusion   · Zofran / compazine PRN   · Dexamethasone on days 2 , 3   · Follow with radiation oncology as scheduled   · Continue to follow with palliative care  · Magic mouthwash PRN   · PET September 16th  · Arrange appointment with Dr. Cash Banuelos 8 weeks post RT     RTC mid September for PET review     I appreciate the opportunity to participate in Mr. Merna Mendoza's care. I performed a history and physical examination of the patient and discussed his management with the NPP.  I reviewed the NPP note and agree with the documented findings and plan of care    NP carcinoma  Today is cycle 6 of weekly Cis with RT  He will compete RT by 7/6 then 8 week ENT exam and see us with PET in 12 weeks  He has some facial fullness but no tenderness on exam  Stable tingling  GFR is normal  Signed By: Angel Carrillo MD

## 2021-06-23 NOTE — PROGRESS NOTES
Pt arrived to [facility] ambulatory in no acute distress at 0905 for Cisplatin. Assessment unremarkable except nausea. R chest port accessed without issue and positive blood return noted. Labs obtained, CBC, CMP< and Mag. Patient denied having any symptoms of COVID-19, i.e. SOB, coughing, fever, or generally not feeling well.   Also denies having been exposed to COVID-19 recently or having had any recent contact with family/friend that has a pending COVID test.    Patient Vitals for the past 12 hrs:   Temp Pulse Resp BP SpO2   06/23/21 1300  83 18 127/85 98 %   06/23/21 0849 97.1 °F (36.2 °C) (!) 104 18 119/84 98 %     The following medications administered:  Medications Administered     0.9% sodium chloride 1,000 mL with potassium chloride 10 mEq, magnesium sulfate 2 g infusion     Admin Date  06/23/2021 Action  New Bag Dose   Rate  1,000 mL/hr Route  IntraVENous Administered By  Niall Mariee RN          0.9% sodium chloride infusion     Admin Date  06/23/2021 Action  New Bag Dose  25 mL/hr Rate  25 mL/hr Route  IntraVENous Administered By  Hebert Nath RN          0.9% sodium chloride injection 10 mL     Admin Date  06/23/2021 Action  Given Dose  10 mL Route  IntraVENous Administered By  Hebert Nath RN           Admin Date  06/23/2021 Action  Given Dose  10 mL Route  IntraVENous Administered By  Niall Mariee RN          CISplatin (PLATINOL) 82.8 mg in 0.9% sodium chloride 500 mL, overfill volume 50 mL chemo infusion     Admin Date  06/23/2021 Action  New Bag Dose  82.8 mg Rate  632.8 mL/hr Route  IntraVENous Administered By  Niall Mariee RN          dexamethasone (DECADRON) 12 mg in 0.9% sodium chloride 50 mL, overfill volume 5 mL IVPB     Admin Date  06/23/2021 Action  New Bag Dose  12 mg Rate  232 mL/hr Route  IntraVENous Administered By  Hebert Nath RN          fosaprepitant (EMEND) 150 mg in 0.9% sodium chloride 150 mL IVPB     Admin Date  06/23/2021 Action  New Bag Dose  150 mg Rate  450 mL/hr Route  IntraVENous Administered By  Isabella Piper RN          heparin (porcine) pf 300-500 Units     Admin Date  06/23/2021 Action  Given Dose  500 Units Route  InterCATHeter Administered By  Lakesha Marina RN          palonosetron HCl (ALOXI) injection 0.25 mg     Admin Date  06/23/2021 Action  Given Dose  0.25 mg Route  IntraVENous Administered By  Isabella Piper RN          sodium chloride (NS) flush 10 mL     Admin Date  06/23/2021 Action  Given Dose  10 mL Route  IntraVENous Administered By  Lakesha Marina, JIMBO                Pt tolerated treatment well. Port flushed, heparinized, and de-accessed per policy. Pt discharged ambulatory in no acute distress at 1300, accompanied by self. Next appointment 06/30/2021.

## 2021-06-24 ENCOUNTER — HOSPITAL ENCOUNTER (OUTPATIENT)
Dept: RADIATION THERAPY | Age: 36
Discharge: HOME | End: 2021-06-24

## 2021-06-25 ENCOUNTER — HOSPITAL ENCOUNTER (OUTPATIENT)
Dept: RADIATION THERAPY | Age: 36
Discharge: HOME | End: 2021-06-25

## 2021-06-28 ENCOUNTER — HOSPITAL ENCOUNTER (OUTPATIENT)
Dept: RADIATION THERAPY | Age: 36
Discharge: HOME | End: 2021-06-28

## 2021-06-29 ENCOUNTER — HOSPITAL ENCOUNTER (OUTPATIENT)
Dept: RADIATION THERAPY | Age: 36
Discharge: HOME | End: 2021-06-29

## 2021-06-30 ENCOUNTER — HOSPITAL ENCOUNTER (OUTPATIENT)
Dept: RADIATION THERAPY | Age: 36
Discharge: HOME | End: 2021-06-30

## 2021-06-30 ENCOUNTER — HOSPITAL ENCOUNTER (OUTPATIENT)
Dept: INFUSION THERAPY | Age: 36
Discharge: HOME | End: 2021-06-30
Payer: SUBSIDIZED

## 2021-06-30 VITALS
TEMPERATURE: 97.6 F | RESPIRATION RATE: 20 BRPM | SYSTOLIC BLOOD PRESSURE: 138 MMHG | DIASTOLIC BLOOD PRESSURE: 92 MMHG | HEART RATE: 118 BPM

## 2021-06-30 DIAGNOSIS — C11.9 NPC (NASOPHARYNGEAL CARCINOMA) (HCC): Primary | ICD-10-CM

## 2021-06-30 PROCEDURE — 74011250636 HC RX REV CODE- 250/636: Performed by: REGISTERED NURSE

## 2021-06-30 RX ORDER — ONDANSETRON 2 MG/ML
8 INJECTION INTRAMUSCULAR; INTRAVENOUS
Status: COMPLETED | OUTPATIENT
Start: 2021-06-30 | End: 2021-06-30

## 2021-06-30 RX ORDER — SODIUM CHLORIDE 0.9 % (FLUSH) 0.9 %
10 SYRINGE (ML) INJECTION AS NEEDED
Status: DISPENSED | OUTPATIENT
Start: 2021-06-30 | End: 2021-06-30

## 2021-06-30 RX ORDER — SODIUM CHLORIDE 9 MG/ML
10 INJECTION INTRAMUSCULAR; INTRAVENOUS; SUBCUTANEOUS AS NEEDED
Status: DISPENSED | OUTPATIENT
Start: 2021-06-30 | End: 2021-06-30

## 2021-06-30 RX ORDER — HEPARIN 100 UNIT/ML
300-500 SYRINGE INTRAVENOUS AS NEEDED
Status: ACTIVE | OUTPATIENT
Start: 2021-06-30 | End: 2021-06-30

## 2021-06-30 RX ADMIN — Medication 10 ML: at 11:05

## 2021-06-30 RX ADMIN — SODIUM CHLORIDE 10 ML: 9 INJECTION INTRAMUSCULAR; INTRAVENOUS; SUBCUTANEOUS at 11:05

## 2021-06-30 RX ADMIN — ONDANSETRON 8 MG: 2 INJECTION, SOLUTION INTRAMUSCULAR; INTRAVENOUS at 11:18

## 2021-06-30 RX ADMIN — HEPARIN 500 UNITS: 100 SYRINGE at 12:10

## 2021-06-30 RX ADMIN — SODIUM CHLORIDE 1000 ML: 900 INJECTION, SOLUTION INTRAVENOUS at 11:08

## 2021-06-30 RX ADMIN — Medication 10 ML: at 12:10

## 2021-07-01 ENCOUNTER — HOSPITAL ENCOUNTER (OUTPATIENT)
Dept: RADIATION THERAPY | Age: 36
Discharge: HOME | End: 2021-07-01

## 2021-07-01 ENCOUNTER — APPOINTMENT (OUTPATIENT)
Dept: INFUSION THERAPY | Age: 36
End: 2021-07-01
Payer: SUBSIDIZED

## 2021-07-02 ENCOUNTER — HOSPITAL ENCOUNTER (OUTPATIENT)
Dept: RADIATION THERAPY | Age: 36
Discharge: HOME | End: 2021-07-02

## 2021-07-06 ENCOUNTER — HOSPITAL ENCOUNTER (OUTPATIENT)
Dept: RADIATION THERAPY | Age: 36
Discharge: HOME | End: 2021-07-06

## 2021-07-06 DIAGNOSIS — G89.3 CANCER RELATED PAIN: Primary | ICD-10-CM

## 2021-07-06 RX ORDER — MORPHINE SULFATE ORAL SOLUTION 10 MG/5ML
1 SOLUTION ORAL
Qty: 500 ML | Refills: 0 | Status: SHIPPED | OUTPATIENT
Start: 2021-07-06 | End: 2021-07-23

## 2021-07-07 ENCOUNTER — HOSPITAL ENCOUNTER (OUTPATIENT)
Dept: RADIATION THERAPY | Age: 36
Discharge: HOME | End: 2021-07-07

## 2021-07-08 ENCOUNTER — HOSPITAL ENCOUNTER (OUTPATIENT)
Dept: INFUSION THERAPY | Age: 36
Discharge: HOME | End: 2021-07-08
Payer: SUBSIDIZED

## 2021-07-08 ENCOUNTER — HOSPITAL ENCOUNTER (OUTPATIENT)
Dept: RADIATION THERAPY | Age: 36
Discharge: HOME | End: 2021-07-08

## 2021-07-08 VITALS — TEMPERATURE: 97.6 F | RESPIRATION RATE: 18 BRPM

## 2021-07-08 DIAGNOSIS — C11.9 NPC (NASOPHARYNGEAL CARCINOMA) (HCC): Primary | ICD-10-CM

## 2021-07-08 PROCEDURE — 74011250636 HC RX REV CODE- 250/636: Performed by: REGISTERED NURSE

## 2021-07-08 PROCEDURE — 96374 THER/PROPH/DIAG INJ IV PUSH: CPT

## 2021-07-08 PROCEDURE — 96361 HYDRATE IV INFUSION ADD-ON: CPT

## 2021-07-08 PROCEDURE — 77030012965 HC NDL HUBR BBMI -A

## 2021-07-08 RX ORDER — HEPARIN 100 UNIT/ML
300-500 SYRINGE INTRAVENOUS AS NEEDED
Status: DISCONTINUED | OUTPATIENT
Start: 2021-07-08 | End: 2021-07-09 | Stop reason: HOSPADM

## 2021-07-08 RX ORDER — ONDANSETRON 2 MG/ML
8 INJECTION INTRAMUSCULAR; INTRAVENOUS
Status: COMPLETED | OUTPATIENT
Start: 2021-07-08 | End: 2021-07-08

## 2021-07-08 RX ORDER — SODIUM CHLORIDE 9 MG/ML
10 INJECTION INTRAMUSCULAR; INTRAVENOUS; SUBCUTANEOUS AS NEEDED
Status: DISCONTINUED | OUTPATIENT
Start: 2021-07-08 | End: 2021-07-09 | Stop reason: HOSPADM

## 2021-07-08 RX ORDER — SODIUM CHLORIDE 0.9 % (FLUSH) 0.9 %
10 SYRINGE (ML) INJECTION AS NEEDED
Status: DISCONTINUED | OUTPATIENT
Start: 2021-07-08 | End: 2021-07-09 | Stop reason: HOSPADM

## 2021-07-08 RX ADMIN — ONDANSETRON 8 MG: 2 INJECTION, SOLUTION INTRAMUSCULAR; INTRAVENOUS at 13:40

## 2021-07-08 RX ADMIN — SODIUM CHLORIDE 10 ML: 9 INJECTION INTRAMUSCULAR; INTRAVENOUS; SUBCUTANEOUS at 14:40

## 2021-07-08 RX ADMIN — HEPARIN 500 UNITS: 100 SYRINGE at 14:40

## 2021-07-08 RX ADMIN — Medication 10 ML: at 13:34

## 2021-07-08 RX ADMIN — SODIUM CHLORIDE 1000 ML: 900 INJECTION, SOLUTION INTRAVENOUS at 13:34

## 2021-07-08 RX ADMIN — SODIUM CHLORIDE 10 ML: 9 INJECTION INTRAMUSCULAR; INTRAVENOUS; SUBCUTANEOUS at 13:34

## 2021-07-08 NOTE — PROGRESS NOTES
Outpatient Infusion Center -  Progress Note    2570 Pt admit to [facility] for Hydration ambulatory in stable condition. Assessment completed. No new concerns voiced. Port accessed with positive blood return. Visit Vitals  Temp 97.6 °F (36.4 °C)   Resp 18       Medications:  Medications Administered     0.9% sodium chloride injection 10 mL     Admin Date  07/08/2021 Action  Given Dose  10 mL Route  IntraVENous Administered By  José Miguel Barros RN           Admin Date  07/08/2021 Action  Given Dose  10 mL Route  IntraVENous Administered By  José Miguel Barros RN          heparin (porcine) pf 300-500 Units     Admin Date  07/08/2021 Action  Given Dose  500 Units Route  InterCATHeter Administered By  José Miguel Barros RN          ondansetron [facility]) injection 8 mg     Admin Date  07/08/2021 Action  Given Dose  8 mg Route  IntraVENous Administered By  José Miguel Barros RN          sodium chloride (NS) flush 10 mL     Admin Date  07/08/2021 Action  Given Dose  10 mL Route  IntraVENous Administered By  José Miguel Barros RN          sodium chloride 0.9 % bolus infusion 1,000 mL     Admin Date  07/08/2021 Action  New Bag Dose  1,000 mL Rate  1,000 mL/hr Route  IntraVENous Administered By  José Miguel Barros RN                1440 Pt tolerated treatment well. Port maintained positive blood return throughout treatment, flushed with positive blood return at conclusion. D/c home ambulatory in no distress. Pt aware of next appointment scheduled for 7/14/21.

## 2021-07-14 ENCOUNTER — HOSPITAL ENCOUNTER (OUTPATIENT)
Dept: INFUSION THERAPY | Age: 36
Discharge: HOME | End: 2021-07-14
Payer: SUBSIDIZED

## 2021-07-14 VITALS
SYSTOLIC BLOOD PRESSURE: 139 MMHG | HEART RATE: 106 BPM | DIASTOLIC BLOOD PRESSURE: 97 MMHG | TEMPERATURE: 98.3 F | RESPIRATION RATE: 18 BRPM

## 2021-07-14 DIAGNOSIS — C11.9 NPC (NASOPHARYNGEAL CARCINOMA) (HCC): Primary | ICD-10-CM

## 2021-07-14 PROCEDURE — 77030012965 HC NDL HUBR BBMI -A

## 2021-07-14 PROCEDURE — 74011250636 HC RX REV CODE- 250/636: Performed by: REGISTERED NURSE

## 2021-07-14 PROCEDURE — 96374 THER/PROPH/DIAG INJ IV PUSH: CPT

## 2021-07-14 PROCEDURE — 96361 HYDRATE IV INFUSION ADD-ON: CPT

## 2021-07-14 PROCEDURE — 74011000250 HC RX REV CODE- 250: Performed by: REGISTERED NURSE

## 2021-07-14 RX ORDER — HEPARIN 100 UNIT/ML
300-500 SYRINGE INTRAVENOUS AS NEEDED
Status: DISCONTINUED | OUTPATIENT
Start: 2021-07-14 | End: 2021-07-15 | Stop reason: HOSPADM

## 2021-07-14 RX ORDER — ONDANSETRON 2 MG/ML
8 INJECTION INTRAMUSCULAR; INTRAVENOUS
Status: COMPLETED | OUTPATIENT
Start: 2021-07-14 | End: 2021-07-14

## 2021-07-14 RX ORDER — SODIUM CHLORIDE 9 MG/ML
10 INJECTION INTRAMUSCULAR; INTRAVENOUS; SUBCUTANEOUS AS NEEDED
Status: DISCONTINUED | OUTPATIENT
Start: 2021-07-14 | End: 2021-07-15 | Stop reason: HOSPADM

## 2021-07-14 RX ORDER — SODIUM CHLORIDE 0.9 % (FLUSH) 0.9 %
10 SYRINGE (ML) INJECTION AS NEEDED
Status: DISCONTINUED | OUTPATIENT
Start: 2021-07-14 | End: 2021-07-15 | Stop reason: HOSPADM

## 2021-07-14 RX ADMIN — SODIUM CHLORIDE 1000 ML: 900 INJECTION, SOLUTION INTRAVENOUS at 15:17

## 2021-07-14 RX ADMIN — Medication 500 UNITS: at 16:17

## 2021-07-14 RX ADMIN — ONDANSETRON 8 MG: 2 INJECTION, SOLUTION INTRAMUSCULAR; INTRAVENOUS at 15:19

## 2021-07-14 RX ADMIN — SODIUM CHLORIDE 10 ML: 9 INJECTION INTRAMUSCULAR; INTRAVENOUS; SUBCUTANEOUS at 15:17

## 2021-07-14 RX ADMIN — SODIUM CHLORIDE 10 ML: 9 INJECTION INTRAMUSCULAR; INTRAVENOUS; SUBCUTANEOUS at 16:17

## 2021-07-14 NOTE — PROGRESS NOTES
Outpatient Infusion Center -  Progress Note    4918 Pt admit to [facility] for Hydration ambulatory in stable condition. Assessment completed. No new concerns voiced. Port accessed with positive blood return. Visit Vitals  BP (!) 139/97 (BP 1 Location: Right arm, BP Patient Position: At rest)   Pulse (!) 106   Temp 98.3 °F (36.8 °C)   Resp 18       Medications:  Medications Administered     0.9% sodium chloride injection 10 mL     Admin Date  07/14/2021 Action  Given Dose  10 mL Route  IntraVENous Administered By  Kt Mayers RN          heparin (porcine) pf 300-500 Units     Admin Date  07/14/2021 Action  Given Dose  500 Units Route  InterCATHeter Administered By  Kt Mayers RN          [facility]ansetron [facility]) injection 8 mg     Admin Date  07/14/2021 Action  Given Dose  8 mg Route  IntraVENous Administered By  Kt Mayers RN          sodium chloride (NS) flush 10 mL     Admin Date  07/14/2021 Action  Given Dose  10 mL Route  IntraVENous Administered By  Kt Mayers RN           Admin Date  07/14/2021 Action  Given Dose  10 mL Route  IntraVENous Administered By  Kt Mayers RN          sodium chloride 0.9 % bolus infusion 1,000 mL     Admin Date  07/14/2021 Action  New Bag Dose  1,000 mL Rate  1,000 mL/hr Route  IntraVENous Administered By  Kt Mayers RN                      3883 Pt tolerated treatment well. Port maintained positive blood return throughout treatment, flushed with positive blood return at conclusion. D/c home ambulatory in no distress. Pt aware of next appointment scheduled for 7/21/21.

## 2021-07-21 ENCOUNTER — HOSPITAL ENCOUNTER (OUTPATIENT)
Dept: INFUSION THERAPY | Age: 36
Discharge: HOME | End: 2021-07-21
Payer: SUBSIDIZED

## 2021-07-21 VITALS
RESPIRATION RATE: 16 BRPM | SYSTOLIC BLOOD PRESSURE: 122 MMHG | DIASTOLIC BLOOD PRESSURE: 88 MMHG | TEMPERATURE: 97.6 F | HEART RATE: 86 BPM

## 2021-07-21 DIAGNOSIS — C11.9 NPC (NASOPHARYNGEAL CARCINOMA) (HCC): Primary | ICD-10-CM

## 2021-07-21 PROCEDURE — 77030012965 HC NDL HUBR BBMI -A

## 2021-07-21 PROCEDURE — 74011250636 HC RX REV CODE- 250/636: Performed by: REGISTERED NURSE

## 2021-07-21 PROCEDURE — 96360 HYDRATION IV INFUSION INIT: CPT

## 2021-07-21 RX ORDER — SODIUM CHLORIDE 0.9 % (FLUSH) 0.9 %
10 SYRINGE (ML) INJECTION AS NEEDED
Status: DISCONTINUED | OUTPATIENT
Start: 2021-07-21 | End: 2021-07-22 | Stop reason: HOSPADM

## 2021-07-21 RX ORDER — SODIUM CHLORIDE 9 MG/ML
10 INJECTION INTRAMUSCULAR; INTRAVENOUS; SUBCUTANEOUS AS NEEDED
Status: DISCONTINUED | OUTPATIENT
Start: 2021-07-21 | End: 2021-07-22 | Stop reason: HOSPADM

## 2021-07-21 RX ORDER — HEPARIN 100 UNIT/ML
300-500 SYRINGE INTRAVENOUS AS NEEDED
Status: DISCONTINUED | OUTPATIENT
Start: 2021-07-21 | End: 2021-07-22 | Stop reason: HOSPADM

## 2021-07-21 RX ADMIN — SODIUM CHLORIDE 10 ML: 9 INJECTION INTRAMUSCULAR; INTRAVENOUS; SUBCUTANEOUS at 15:46

## 2021-07-21 RX ADMIN — SODIUM CHLORIDE 1000 ML: 900 INJECTION, SOLUTION INTRAVENOUS at 15:47

## 2021-07-21 RX ADMIN — HEPARIN 500 UNITS: 100 SYRINGE at 17:00

## 2021-07-21 RX ADMIN — Medication 10 ML: at 17:00

## 2021-07-21 NOTE — PROGRESS NOTES
Outpatient Infusion Center -  Progress Note    1080 Pt admit to [facility] for Hydration ambulatory in stable condition. Assessment completed. No new concerns voiced. Port accessed with positive blood return. Hydration started. Visit Vitals  /88   Pulse 86   Temp 97.6 °F (36.4 °C)   Resp 16       Medications:  Medications Administered     0.9% sodium chloride injection 10 mL     Admin Date  07/21/2021 Action  Given Dose  10 mL Route  IntraVENous Administered By  Leah Camejo RN          heparin (porcine) pf 300-500 Units     Admin Date  07/21/2021 Action  Given Dose  500 Units Route  InterCATHeter Administered By  Leah Camejo RN          sodium chloride (NS) flush 10 mL     Admin Date  07/21/2021 Action  Given Dose  10 mL Route  IntraVENous Administered By  Leah Camejo RN          sodium chloride 0.9 % bolus infusion 1,000 mL     Admin Date  07/21/2021 Action  New Bag Dose  1,000 mL Rate  1,000 mL/hr Route  IntraVENous Administered By  Leah Camejo RN                1700 Pt tolerated treatment well. Port maintained positive blood return throughout treatment, flushed with positive blood return at conclusion. D/c home ambulatory in no distress.  Pt aware of next appointment scheduled for   Future Appointments   Date Time Provider Renae Fajardo   7/23/2021  2:45 PM RAD [address] [place]   8/4/2021 10:30 AM Kelsie Hill MD [address] [facility] BS AMB   9/1/2021  3:30 PM G2 [facility]   9/16/2021 10:00 AM [facility] [place] RCR PET DOSE 1 SMHRCHPET FOSTER [place]   9/16/2021 11:00 AM [facility] RCR PET 1 SMHRCHPET [facility]ER [place]   9/29/2021 10:00 AM Savanah Bradford  [address] BS AMB   10/13/2021  3:30 PM G2 J[facility]UE [facility]

## 2021-07-23 ENCOUNTER — HOSPITAL ENCOUNTER (OUTPATIENT)
Dept: RADIATION THERAPY | Age: 36
Discharge: HOME | End: 2021-07-23

## 2021-08-01 NOTE — PROGRESS NOTES
Palliative Medicine Outpatient Services  [facility]: [phone] ([phone])    Patient Name: Jovi Greer  YOB: 1985    Date of Current Visit: 08/04/21  Location of Current Visit:    [] [address] [place] [address] Office  [] [facility] Office  [] [facility] Office  [] Home  []Synchronous real-time A/V virtual visit  [x]Phone visit    Date of Initial Visit: 3/1/21   Referral from: Jung Castillo MD  Primary Care Physician: Vallie Merlin, PA      SUMMARY:   Jovi Greer is a 29y.o. year old with a  history of right nasopharyngeal carcinoma, who was referred to Palliative Medicine by Dr. Molly Perry for symptom management and psychosocial support. He was diagnosed in 2/21 after presenting with months of recurrent headaches and epistaxis. He's completed chemoRT with resolution of his headaches and nosebleeds.       The patients social history includes: he is  to [place]. They have an 6year old daughter, Lyric Mooney, and a 9year old son, Garr Libman. He worked in construction until 2019. He is an uninsured, undocumented resident from [place]. Palliative Medicine is addressing the following current patient/family concerns: headaches, migrainous, likely related to malignancy; nausea; neck pain; symptom and psychosocial support over course of treatment; advance care planning     Initial Referral Intake note reviewed   PALLIATIVE DIAGNOSES:       ICD-10-CM ICD-9-CM    1. Headache due to intracranial disease  R51.9 784.0     G93.9 348.9    2. [facility]  C11.9 147.9           PLAN:   Patient Instructions     Dear oJvi Greer ,    It was a pleasure seeing you today via telephone visit. We will see you again in 12 weeks for a virtual visit. If labs or imaging tests have been ordered for you today, please call the office  at [phone] 48 hours after completion to obtain the results. Your described symptoms were:   Fatigue: 0 Drowsiness: 0   Depression: 0 Pain: 0   Anxiety: 0 Nausea: 0   Anorexia: 0 Dyspnea: 0   Best Well-Bein Constipation: No   Other Problem (Comment): 0       This is the plan we talked about:      1. Headaches  -Continue topiramate 50-mg twice daily  -Continue tylenol 500-mg: take 2 pills every 6 hours as needed  -Continue zolmitriptan (Zomig) 2.5-mg as needed    2. Advance care planning (we talked about this during your visit in )  -You want your wife, Joyce Cannon, to be your primary medical decision maker and your Maria Fernandaneradha Sy, to be your secondary medical decision maker  -You wish to be hospitalized for any medical conditions at this point unless you had a serious medical condition from which you were not expected to recover  -You would want our doctors to let you and your family know if you had such a serious medical condition  -You would not want to be placed on a mechanical ventilator if you had a serious medical condition from which you would likely not receover  -We also talked in depth about CPR (cardiopulmonary resuscitation)  -If your heart were to suddenly stop beating and you stopped breathing, you would want CPR \"to give it a try\"  -If it appears as if you're going to die, your preference is to die at home  -I recommended you complete an Advance Medical Directive to have a copy of your wishes with you in your home    3. Nasopharyngeal Cancer  -You've completed chemotherapy and radiation therapy  -You have visits scheduled in September with Dr. William Burgos, Dr. Kim Earl and Dr. Funmilayo Matias        This is what you have shared with us about Advance Care Planning:      Primary Decision Maker (Active): Kristal Gerard - Spouse - [phone]    Secondary Decision Maker (Active):  Vic Guardado - [phone]  Advance Care Planning 2021   Patient's Healthcare Decision Maker is: Legal Next of Kin   Confirm Advance Directive None   Patient Would Like to Complete Advance Directive -           The Palliative Medicine Team is here to support you and your family. Sincerely,      Mckenna Thomas MD and the Palliative Medicine Team            GOALS OF CARE / TREATMENT PREFERENCES:   [====Goals of Care====]  GOALS OF CARE:  Patient / health care proxy stated goals: See Patient Instructions / Summary    TREATMENT PREFERENCES:   Code Status:  [x] Attempt Resuscitation       [] Do Not Attempt Resuscitation    Advance Care Planning:  [x] The Pall [facility] Interdisciplinary Team has updated the ACP Navigator with Decision Maker and Patient Capacity      Primary Decision Maker (Active): Archana Frances - Spouse - [phone]    Secondary Decision Maker (Active): [address], [address] [address] [phone]  Advance Care Planning 8/4/2021   Patient's Healthcare Decision Maker is: Legal Next of Kin   Confirm Advance Directive None   Patient Would Like to Complete Advance Directive -       Other:  (If patient appropriate for POST, consider using PALLPOST smart phrase here)    The palliative care team has discussed with patient / health care proxy about goals of care / treatment preferences for patient.  [====Goals of Care====]     PRESCRIPTIONS GIVEN:   No orders of the defined types were placed in this encounter. FOLLOW UP:     Future Appointments   Date Time Provider Renae Fajardo   9/1/2021  3:30 PM G2 JOSUE FASTRACK [facility]   9/16/2021 10:00 AM [facility] RCR PET DOSE 1 [facility]CHPET FOSTER VANESSA   9/16/2021 11:00 AM [facility] RCR PET 1 SMHRCHPET FOSTER VANESSA   9/29/2021 10:00 AM Jimi Bradford  [address] BS AMB   10/13/2021  3:30 PM G2 JOSUE FASTRACK [facility]           PHYSICIANS INVOLVED IN CARE:   Patient Care Team:  Colletta Cooley, PA as PCP - General (Physician Assistant)  Vanessa Ku, JESSICA Lorenz as PCP - [facility]  Mckenna Thomas MD (Palliative Medicine)  Judie Devi MD (Hematology and Oncology)       HISTORY:   Reviewed patient-completed ESAS and advance care planning form.   Reviewed patient record in prescription monitoring program.    CHIEF COMPLAINT:   No chief complaint on file. HPI/SUBJECTIVE:    The patient is: [x] Verbal / [] Nonverbal - history obtained with assistance of Ambar Montgomery    He feels good now that he's finished treatment. He's had a few headaches. The medicine he's taking (Topamax) seems to help. He's had no nosebleeds. His appetite is good. He's has appointments in September with Dr. Quinn Diez, Dr. Sheryl Chapman and Dr. Gamaliel Anaya. From  3/1/21:  He started having nosebleeds a few months ago. He went to the ED twice and had his nose packed. He started seeing Dr. Sheryl Chapman who used the scope (nasal endoscopy) which didn't show anything. Then he started having headaches and he continued to have nosebleeds, a lot of bleeding. He came to the ED again about a month ago. That's when he found out he had cancer. This news hit him hard. The doctors all got together and decided he should have chemotherapy first, then radiation therapy with another kind of chemotherapy. It has a 50-50 chance of working. The news hit him hard but he's trying to stay positive. He gets strength from his wife and children and from his friends. He talks with his family in [place] by phone. He isn't having headaches since he started taking the medicine (Topamax). He still gets nosebleeds, he had a small nosebleed lat week. Dr. Sheryl Chapman talked with him about having a catheter put in his vein and going up to try to stop the bleeding (embolization). He sees Dr. Sheryl Chapman again today. The muscles in his neck feel sore sometimes. He thinks it's from how he sleeps sometimes. He doesn't have neck pain or soreness now. He had a little nausea after chemo last week. He has nausea medicine he can take. His appetite is good. He feels good. It's hard to to believe he has cancer.     Clinical Pain Assessment (nonverbal scale for nonverbal patients):   [++++ Clinical Pain Assessment++++]  [++++Pain Severity++++]: Pain: 0  [++++Pain Character++++]:  [++++Pain Duration++++]:  [++++Pain Effect++++]:  [++++Pain Factors++++]:  [++++Pain Frequency++++]:  [++++Pain Location++++]:  [++++ Clinical Pain Assessment++++]       FUNCTIONAL ASSESSMENT:     Palliative Performance Scale (PPS):  PPS: 70       PSYCHOSOCIAL/SPIRITUAL SCREENING:     Any spiritual / Cheondoism concerns:  [] Yes /  [x] No    Caregiver Burnout:  [] Yes /  [] No /  [x] No Caregiver Present      Anticipatory grief assessment:   [x] Normal  / [] Maladaptive       ESAS Anxiety: Anxiety: 0    ESAS Depression: Depression: 0       REVIEW OF SYSTEMS:     The following systems were [x] reviewed / [] unable to be reviewed  Systems: constitutional, ears/nose/mouth/throat, respiratory, gastrointestinal, genitourinary, musculoskeletal, integumentary, neurologic, psychiatric, endocrine. Positive findings noted below. Modified ESAS Completed by: provider   Fatigue: 0 Drowsiness: 0   Depression: 0 Pain: 0   Anxiety: 0 Nausea: 0   Anorexia: 0 Dyspnea: 0   Best Well-Bein Constipation: No   Other Problem (Comment): 0          PHYSICAL EXAM:     Wt Readings from Last 3 Encounters:   21 188 lb 9.6 oz (85.5 kg)   21 188 lb (85.3 kg)   21 194 lb 12.8 oz (88.4 kg)     There were no vitals taken for this visit.   Last bowel movement: See Nursing Note    NO PHYSICAL EXAM - PHONE VISIT    Constitutional    [] Appears well-developed and well-nourished in no apparent distress    [] Abnormal:  Mental status  [] Alert and awake  [] Oriented to person/place/time  [] Able to follow commands  [] Abnormal:   Eyes  [] EOM normal   [] Sclera normal   [] No visible ocular discharge  [] Abnormal:   HENT  [] Normocephalic, atraumatic  [] Mouth/Throat: Moist mucous membranes   [] External Ears normal  [] Abnormal:  Neck  [x] No visualized mass  [] Abnormal:  Pulmonary/Chest   [] Respiratory effort normal  [] No visualized signs of difficulty breathing or respiratory distress  [] Abnormal:  Musculoskeletal  [] Normal gait with no signs of ataxia  [] Normal range of motion of neck  [x] Abnormal: sitting  Neurological:   [] No facial asymmetry (Cranial nerve 7 motor function)  [] No gaze palsy  [] Abnormal:   Skin  [] No significant exanthematous lesions or discoloration noted on facial skin  [] Abnormal:                                  Psychiatric  [] Normal affect  [] No hallucinations  [] Abnormal:    Other pertinent observable physical exam findings:    Due to this being a TeleHealth evaluation, many elements of the physical examination are unable to be assessed. HISTORY:     No past medical history on file. Past Surgical History:   Procedure Laterality Date    IR INSERT TUNL CVC W PORT OVER 5 YEARS  2/11/2021         IR INSERT TUNL CVC W PORT OVER 5 YEARS  2/11/2021      No family history on file. History reviewed, no pertinent family history. Social History     Tobacco Use    Smoking status: Never Smoker    Smokeless tobacco: Never Used   Substance Use Topics    Alcohol use: Not Currently     Allergies   Allergen Reactions    Iodinated Contrast Media Itching      Current Outpatient Medications   Medication Sig    topiramate (TOPAMAX) 50 mg tablet Take 1 Tablet by mouth two (2) times a day.  amLODIPine (NORVASC) 5 mg tablet Take 1 Tab by mouth daily.  acetaminophen (TYLENOL) 500 mg tablet Take  by mouth every six (6) hours as needed for Pain. 2 or 3 po daily     magic mouthwash solution Magic mouth wash Maalox Lidocaine 2% viscous Diphenhydramine oral solution Pharmacy to mix equal portions of ingredients to a total volume as indicated in the dispense amount.  Swish & swallow 5mL (1 tsp) 4 times daily as needed for mouth sores (Patient not taking: Reported on 8/4/2021)    dexAMETHasone (DECADRON) 4 mg tablet Take 2 tabs (8mg) on days 2 & 3 of chemotherapy (Patient not taking: Reported on 8/4/2021)    ondansetron (ZOFRAN ODT) 8 mg disintegrating tablet Take 1 Tab by mouth every eight (8) hours as needed for Nausea or Vomiting. (Patient not taking: Reported on 2021)    lidocaine-prilocaine (EMLA) topical cream Apply  to affected area as needed for Pain. (Patient not taking: Reported on 2021)    polyethylene glycol (MIRALAX) 17 gram packet Take 1 Packet by mouth daily. Mix in 8 oz of water, juice, soda, coffee, or tea prior to administration (Patient not taking: Reported on 2021)    magnesium 250 mg tab Si po qhs (Patient not taking: Reported on 2021)    fexofenadine (ALLEGRA) 180 mg tablet Take 1 Tab by mouth daily. Indications: seasonal runny nose (Patient not taking: Reported on 2021)     No current facility-administered medications for this visit. LAB DATA REVIEWED:     Lab Results   Component Value Date/Time    WBC 5.0 2021 08:53 AM    HGB 14.4 2021 08:53 AM    PLATELET 265  08:53 AM     Lab Results   Component Value Date/Time    Sodium 136 2021 08:53 AM    Potassium 3.8 2021 08:53 AM    Chloride 107 2021 08:53 AM    CO2 23 2021 08:53 AM    BUN 19 2021 08:53 AM    Creatinine 0.83 2021 08:53 AM    Calcium 9.6 2021 08:53 AM    Magnesium 1.9 2021 08:53 AM    Phosphorus 4.4 2021 02:31 AM      Lab Results   Component Value Date/Time    Alk. phosphatase 122 (H) 2021 08:53 AM    Protein, total 7.9 2021 08:53 AM    Albumin 4.0 2021 08:53 AM    Globulin 3.9 2021 08:53 AM     Lab Results   Component Value Date/Time    INR 1.1 2021 09:01 PM    Prothrombin time 11.0 2021 09:01 PM    aPTT 30.8 2021 09:01 PM      No results found for: IRON, FE, TIBC, IBCT, PSAT, FERR     PET/CT   FINDINGS:  HEAD/NECK: The right nasopharyngeal mass lesion is hypermetabolic, maximum SUV  is 15.3. Focal increased tracer activity is noted involving the very posterior  aspect of the septum.  Bilateral hypermetabolic cervical lymph nodes are noted. CHEST: Small but hypermetabolic right supraclavicular lymph node is noted. ABDOMEN/PELVIS: No foci of abnormal hypermetabolism. SKELETON: No foci of abnormal hypermetabolism in the axial and visualized  appendicular skeleton. IMPRESSION  The right nasopharyngeal mass lesion is hypermetabolic and  compatible with the known neoplasm. Bilateral hypermetabolic cervical lymph  nodes. Focal increased tracer activity is seen involving the very posterior  aspect of the nasal septum. MRI orbit/face/neck 2/7:  FINDINGS:   Agree with above description of no significant change in the large right  nasopharyngeal mass extending into the right sphenoid sinus extending down into  the pterygoid region is present demonstrated on MRI and CTA 2/5/21. The right  internal carotid artery traverses in area of the skull is in contact with the  tumor in the foramen transversarium and proximal siphon region at the posterior  cavernous sinus. Again demonstrated is generalized mucosal thickening in sphenoid, right ethmoid  and maxillary sinus. Fluid right mastoid air cells. No abnormal intracranial enhancement demonstrated. No other masses. IMPRESSION  Again demonstrated is large right nasopharyngeal tumor as previously described  and without significant change. Some involvement right internal carotid artery. Associated mucosal thickening and fluid right paranasal sinuses. MRI/MRA brain 2/5  FINDINGS:   MRI brain:  Ventricles and sulci are normal in size configuration. No evidence of acute  infarct. No edema or midline shift. No intracranial mass. In the right posterior nasopharynx there is a heterogeneously enhancing 4.2 x  4.0 cm soft tissue mass which extends superiorly into the sphenoid sinus and  obstructs the right ostiomeatal complex.  There is circumferential lobulated  sinus mucosal thickening in the right maxillary sinus as well as the right  posterior ethmoid air cells. Right-sided mastoid effusion. MRA brain:  Narrowing of the right distal cervical internal carotid artery. Left distal  cervical internal carotid artery is normal. [place] is intact. Anterior  cerebral, middle cerebral, and posterior cerebral arteries are patent. Basilar  artery and distal vertebral arteries are patent. IMPRESSION  1. 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 2.  Diffuse sinus mucosal thickening. 3.  Mass related narrowing of the right distal cervical internal carotid artery  but no large vessel occlusion. 4.  No evidence of infarct. CTA neck 2/5:  CTA NECK:     Great vessels: Four-vessel aortic arch with patent origins. Right subclavian artery: Patent     Left subclavian artery: Patent      Right common carotid artery: Patent      Left common carotid artery: Patent      Cervical right internal carotid artery: Patent with no significant stenosis by  NASCET criteria. Attenuation of the right internal carotid artery as it passes  through the right nasopharyngeal mass, but no intraluminal thrombus or evidence  of active extravasation. Cervical left internal carotid artery: Patent with no significant stenosis by  NASCET criteria. Right vertebral artery: Patent     Left vertebral artery: Patent, arises directly off the aortic arch. Imaged lung apices are clear. Thyroid gland is normal. Bilateral cervical  lymphadenopathy predominantly in level 2 and level 5A. 4.3 x 3.5 cm  heterogeneously enhancing right posterior nasopharyngeal soft tissue mass with  vessels coursing through the mass. No evidence of active extravasation.      CTA HEAD 2/5:  Right cavernous internal carotid artery: Attenuated but patent     Left cavernous internal carotid artery: Patent     Anterior cerebral arteries: Patent     Anterior communicating artery: Patent     Right middle cerebral artery: Patent     Left middle cerebral artery: Patent     Posterior communicating arteries: Patent     Posterior cerebral arteries: Patent     Basilar artery: Patent     Distal vertebral arteries: Patent     No evidence for intracranial aneurysm or hemodynamically significant stenosis. IMPRESSION  1. Attenuation of the right internal carotid artery as it passes through the  right nasopharyngeal mass but no evidence of active extravasation or  intraluminal thrombus. 2.  Bilateral cervical lymphadenopathy in level 2 and level 5B. 3.  Right posterior nasopharyngeal mass with sphenoid sinus involvement and  diffuse sinus disease.             CONTROLLED SUBSTANCES SAFETY ASSESSMENT (IF ON CONTROLLED SUBSTANCES):     Reviewed opioid safety handout:  [] Yes   [] No  24 hour opioid dose >150mg morphine equivalent/day:  [] Yes   [] No  Benzodiazepines:  [] Yes   [] No  Sleep apnea:  [] Yes   [] No  Urine Toxicology Testing within last 6 months:  [] Yes   [] No  History of or new aberrant medication taking behaviors:  [] Yes   [] No  Has Narcan been prescribed [] Yes   [] No          Total time:   Counseling / coordination time:   > 50% counseling / coordination?:     Time visit started: 11:10am  Time visit ended: 11:19 am

## 2021-08-04 ENCOUNTER — VIRTUAL VISIT (OUTPATIENT)
Dept: PALLATIVE CARE | Age: 36
End: 2021-08-04

## 2021-08-04 DIAGNOSIS — R51.9 HEADACHE DUE TO INTRACRANIAL DISEASE: Primary | ICD-10-CM

## 2021-08-04 DIAGNOSIS — C11.9 NASOPHARYNGEAL CARCINOMA (HCC): ICD-10-CM

## 2021-08-04 DIAGNOSIS — G93.9 HEADACHE DUE TO INTRACRANIAL DISEASE: Primary | ICD-10-CM

## 2021-08-04 PROCEDURE — 99441 PR PHYS/QHP TELEPHONE EVALUATION 5-10 MIN: CPT | Performed by: INTERNAL MEDICINE

## 2021-08-04 NOTE — PROGRESS NOTES
Palliative Medicine Office Visit  Palliative Medicine Nurse Check In  [phone]    Patient Name: Leesa Palomo  YOB: 1985      Date of Office Visit: 8/4/2021    Patient states: \"  \"    From Check In Sheet (scanned in Media):  Has a medical provider talked with you about cardiopulmonary resuscitation (CPR)? [x] Yes   [] No   [] Unable to obtain    Nurse reminder to complete or update ACP FlowSheet:    Is ACP on the Problem List?    [] Yes    [x] No  IF ACP Document is ON FILE; Nurse to place ACP on Problem List     Is there an ACP Note in Chart Review/Note? [x] Yes    [] No   If NO: ALERT PROVIDER          Primary Decision Maker (Active): Kristal Gerard - Spouse - [phone]    Secondary Decision Maker (Active): Alma Mathis, [address] [facility] [phone]  Advance Care Planning 8/4/2021   Patient's Healthcare Decision Maker is: Legal Next of Kin   Confirm Advance Directive None   Patient Would Like to Complete Advance Directive -       Is there anything that we should know about you as a person in order to provide you the best care possible? Have you been to the ER, urgent care clinic since your last visit? [] Yes   [x] No   [] Unable to obtain    Have you been hospitalized since your last visit? [] Yes   [x] No   [] Unable to obtain    Have you seen or consulted any other health care providers outside of the [address] [place] [address] since your last visit?    [] Yes   [x] No   [] Unable to obtain    Functional status (describe):         Last BM:  8/3/2021     accessed (date): 8/4/2021    Bottle review (for opioid pain medication):  Medication 1:   Date filled:   Directions:   # filled:   # left:   # pills taking per day:  Last dose taken:    Medication 2:   Date filled:   Directions:   # filled:   # left:   # pills taking per day:  Last dose taken:    Medication 3:   Date filled:   Directions:   # filled:   # left:   # pills taking per day:  Last dose taken:     Medication 4:   Date filled:   Directions:   # filled:   # left:   # pills taking per day:  Last dose taken:

## 2021-08-04 NOTE — PATIENT INSTRUCTIONS
Dear Marcus Lassiter ,    It was a pleasure seeing you today via telephone visit. We will see you again in 12 weeks for a virtual visit. If labs or imaging tests have been ordered for you today, please call the office  at [phone] 48 hours after completion to obtain the results. Your described symptoms were: Fatigue: 0 Drowsiness: 0   Depression: 0 Pain: 0   Anxiety: 0 Nausea: 0   Anorexia: 0 Dyspnea: 0   Best Well-Bein Constipation: No   Other Problem (Comment): 0       This is the plan we talked about:      1. Headaches  -Continue topiramate 50-mg twice daily  -Continue tylenol 500-mg: take 2 pills every 6 hours as needed  -Continue zolmitriptan (Zomig) 2.5-mg as needed    2. Advance care planning (we talked about this during your visit in )  -You want your wife, Candance Nares, to be your primary medical decision maker and your Rickycayla Britot, to be your secondary medical decision maker  -You wish to be hospitalized for any medical conditions at this point unless you had a serious medical condition from which you were not expected to recover  -You would want our doctors to let you and your family know if you had such a serious medical condition  -You would not want to be placed on a mechanical ventilator if you had a serious medical condition from which you would likely not receover  -We also talked in depth about CPR (cardiopulmonary resuscitation)  -If your heart were to suddenly stop beating and you stopped breathing, you would want CPR \"to give it a try\"  -If it appears as if you're going to die, your preference is to die at home  -I recommended you complete an Advance Medical Directive to have a copy of your wishes with you in your home    3.  Nasopharyngeal Cancer  -You've completed chemotherapy and radiation therapy  -You have visits scheduled in September with Dr. Lacinda Bloch, Dr. Brandon Nieto and Dr. Kurtis Olsen        This is what you have shared with us about Advance Care Planning:      Primary Decision Maker (Active): Herminia Jackman - Spouse - [phone]    Secondary Decision Maker (Active): Annette Sweet - [phone]  Advance Care Planning 8/4/2021   Patient's Healthcare Decision Maker is: Legal Next of Kin   Confirm Advance Directive None   Patient Would Like to Complete Advance Directive -           The Palliative Medicine Team is here to support you and your family.        Sincerely,      Kenny Dong MD and the Palliative Medicine Team

## 2021-08-27 RX ORDER — HEPARIN 100 UNIT/ML
500 SYRINGE INTRAVENOUS AS NEEDED
Status: CANCELLED | OUTPATIENT
Start: 2021-09-01

## 2021-08-27 RX ORDER — SODIUM CHLORIDE 9 MG/ML
10 INJECTION INTRAMUSCULAR; INTRAVENOUS; SUBCUTANEOUS AS NEEDED
Status: CANCELLED | OUTPATIENT
Start: 2021-09-01

## 2021-08-27 RX ORDER — SODIUM CHLORIDE 0.9 % (FLUSH) 0.9 %
5-10 SYRINGE (ML) INJECTION AS NEEDED
Status: CANCELLED | OUTPATIENT
Start: 2021-09-01

## 2021-09-01 ENCOUNTER — HOSPITAL ENCOUNTER (OUTPATIENT)
Dept: INFUSION THERAPY | Age: 36
Discharge: HOME | End: 2021-09-01

## 2021-09-01 VITALS
SYSTOLIC BLOOD PRESSURE: 113 MMHG | HEART RATE: 85 BPM | RESPIRATION RATE: 18 BRPM | TEMPERATURE: 97.5 F | DIASTOLIC BLOOD PRESSURE: 83 MMHG

## 2021-09-01 DIAGNOSIS — C11.9 NPC (NASOPHARYNGEAL CARCINOMA) (HCC): Primary | ICD-10-CM

## 2021-09-01 LAB
ALBUMIN SERPL-MCNC: 4 G/DL (ref 3.5–5)
ALBUMIN/GLOB SERPL: 1.1 {RATIO} (ref 1.1–2.2)
ALP SERPL-CCNC: 76 U/L (ref 45–117)
ALT SERPL-CCNC: 23 U/L (ref 12–78)
ANION GAP SERPL CALC-SCNC: 6 MMOL/L (ref 5–15)
AST SERPL-CCNC: 11 U/L (ref 15–37)
BASOPHILS # BLD: 0 K/UL (ref 0–0.1)
BASOPHILS NFR BLD: 1 % (ref 0–1)
BILIRUB SERPL-MCNC: 0.7 MG/DL (ref 0.2–1)
BUN SERPL-MCNC: 16 MG/DL (ref 6–20)
BUN/CREAT SERPL: 19 (ref 12–20)
CALCIUM SERPL-MCNC: 8.9 MG/DL (ref 8.5–10.1)
CHLORIDE SERPL-SCNC: 110 MMOL/L (ref 97–108)
CO2 SERPL-SCNC: 22 MMOL/L (ref 21–32)
CREAT SERPL-MCNC: 0.83 MG/DL (ref 0.7–1.3)
DIFFERENTIAL METHOD BLD: ABNORMAL
EOSINOPHIL # BLD: 0.1 K/UL (ref 0–0.4)
EOSINOPHIL NFR BLD: 2 % (ref 0–7)
ERYTHROCYTE [DISTWIDTH] IN BLOOD BY AUTOMATED COUNT: 15.1 % (ref 11.5–14.5)
GLOBULIN SER CALC-MCNC: 3.6 G/DL (ref 2–4)
GLUCOSE SERPL-MCNC: 78 MG/DL (ref 65–100)
HCT VFR BLD AUTO: 40 % (ref 36.6–50.3)
HGB BLD-MCNC: 13.6 G/DL (ref 12.1–17)
IMM GRANULOCYTES # BLD AUTO: 0 K/UL (ref 0–0.04)
IMM GRANULOCYTES NFR BLD AUTO: 0 % (ref 0–0.5)
LYMPHOCYTES # BLD: 0.9 K/UL (ref 0.8–3.5)
LYMPHOCYTES NFR BLD: 21 % (ref 12–49)
MCH RBC QN AUTO: 30 PG (ref 26–34)
MCHC RBC AUTO-ENTMCNC: 34 G/DL (ref 30–36.5)
MCV RBC AUTO: 88.1 FL (ref 80–99)
MONOCYTES # BLD: 0.6 K/UL (ref 0–1)
MONOCYTES NFR BLD: 13 % (ref 5–13)
NEUTS SEG # BLD: 2.7 K/UL (ref 1.8–8)
NEUTS SEG NFR BLD: 63 % (ref 32–75)
NRBC # BLD: 0 K/UL (ref 0–0.01)
NRBC BLD-RTO: 0 PER 100 WBC
PLATELET # BLD AUTO: 198 K/UL (ref 150–400)
PMV BLD AUTO: 10 FL (ref 8.9–12.9)
POTASSIUM SERPL-SCNC: 3.6 MMOL/L (ref 3.5–5.1)
PROT SERPL-MCNC: 7.6 G/DL (ref 6.4–8.2)
RBC # BLD AUTO: 4.54 M/UL (ref 4.1–5.7)
SODIUM SERPL-SCNC: 138 MMOL/L (ref 136–145)
WBC # BLD AUTO: 4.3 K/UL (ref 4.1–11.1)

## 2021-09-01 PROCEDURE — 85025 COMPLETE CBC W/AUTO DIFF WBC: CPT

## 2021-09-01 PROCEDURE — 77030012965 HC NDL HUBR BBMI -A

## 2021-09-01 PROCEDURE — 36591 DRAW BLOOD OFF VENOUS DEVICE: CPT

## 2021-09-01 PROCEDURE — 74011250636 HC RX REV CODE- 250/636: Performed by: REGISTERED NURSE

## 2021-09-01 PROCEDURE — 80053 COMPREHEN METABOLIC PANEL: CPT

## 2021-09-01 RX ORDER — HEPARIN 100 UNIT/ML
500 SYRINGE INTRAVENOUS AS NEEDED
Status: DISCONTINUED | OUTPATIENT
Start: 2021-09-01 | End: 2021-09-02 | Stop reason: HOSPADM

## 2021-09-01 RX ORDER — SODIUM CHLORIDE 0.9 % (FLUSH) 0.9 %
5-10 SYRINGE (ML) INJECTION AS NEEDED
Status: DISCONTINUED | OUTPATIENT
Start: 2021-09-01 | End: 2021-09-02 | Stop reason: HOSPADM

## 2021-09-01 RX ORDER — SODIUM CHLORIDE 9 MG/ML
10 INJECTION INTRAMUSCULAR; INTRAVENOUS; SUBCUTANEOUS AS NEEDED
Status: DISCONTINUED | OUTPATIENT
Start: 2021-09-01 | End: 2021-09-02 | Stop reason: HOSPADM

## 2021-09-01 RX ADMIN — HEPARIN 500 UNITS: 100 SYRINGE at 15:15

## 2021-09-01 RX ADMIN — SODIUM CHLORIDE 10 ML: 9 INJECTION INTRAMUSCULAR; INTRAVENOUS; SUBCUTANEOUS at 15:15

## 2021-09-01 NOTE — PROGRESS NOTES
[facility] Progress Note    Date: September 1, 2021      1505: Pt arrived ambulatory to [facility] for [place] [facility] Lab Work in stable condition. Assessment completed. No new complaints voiced at this time. Port accessed with positive blood return. Labs drawn and sent for processing. Patient denies any symptoms of COVID-19, including SOB, coughing, fever, or generally not feeling well. Patient denies any recent exposure to COVID-19. Patient denies any recent contact with family or friends that have a pending COVID-19 test.    Patient Vitals for the past 12 hrs:   Temp Pulse Resp BP   09/01/21 1509 97.5 °F (36.4 °C) 85 18 113/83       Medications Administered     0.9% sodium chloride injection 10 mL     Admin Date  09/01/2021 Action  Given Dose  10 mL Route  IntraVENous Administered By  Silvia Elizabeth          heparin (porcine) pf 500 Units     Admin Date  09/01/2021 Action  Given Dose  500 Units Route  IntraVENous Administered By  Silvia Elizabeth                1520: Tolerated treatment well, no adverse reactions noted. Port flushed, heparinized and de- accessed per protocol. D/Cd from [facility] ambulatory and in no distress. Patient is aware of next scheduled [facility] appointment on 10/13/21.     Future Appointments   Date Time Provider Renae Fajardo   9/1/2021  3:30 PM [facility]   9/16/2021 10:00 AM [facility] RCR PET DOSE 1 [facility]PET FOSTER VANESSA   9/16/2021 11:00 AM [facility] RCR PET 1 [facility]CHPET FOSTER VANESSA   9/29/2021 10:00 AM Yonis Bradford  [address] BS AMB   10/13/2021  3:30 [facility]   10/27/2021 11:30 AM Ngozi Campbell MD [address] [facility] BS AMB   11/24/2021  3:30 PM G2 JOSUE Fernando S Yuan Lantigua RN  September 1, 2021

## 2021-09-16 ENCOUNTER — HOSPITAL ENCOUNTER (OUTPATIENT)
Dept: PET IMAGING | Age: 36
Discharge: HOME | End: 2021-09-16
Attending: REGISTERED NURSE

## 2021-09-16 VITALS — BODY MASS INDEX: 29.51 KG/M2 | HEIGHT: 67 IN | WEIGHT: 188 LBS

## 2021-09-16 DIAGNOSIS — C76.0 HEAD AND NECK CANCER (HCC): ICD-10-CM

## 2021-09-16 LAB
GLUCOSE BLD STRIP.AUTO-MCNC: 88 MG/DL (ref 65–117)
SERVICE CMNT-IMP: NORMAL

## 2021-09-16 PROCEDURE — A9552 F18 FDG: HCPCS

## 2021-09-16 RX ORDER — SODIUM CHLORIDE 0.9 % (FLUSH) 0.9 %
10 SYRINGE (ML) INJECTION
Status: COMPLETED | OUTPATIENT
Start: 2021-09-16 | End: 2021-09-16

## 2021-09-16 RX ORDER — FLUDEOXYGLUCOSE F-18 200 MCI/ML
10 INJECTION INTRAVENOUS ONCE
Status: COMPLETED | OUTPATIENT
Start: 2021-09-16 | End: 2021-09-16

## 2021-09-16 RX ADMIN — FLUDEOXYGLUCOSE F-18 10 MILLICURIE: 200 INJECTION INTRAVENOUS at 09:47

## 2021-09-16 RX ADMIN — Medication 10 ML: at 09:47

## 2021-09-29 ENCOUNTER — OFFICE VISIT (OUTPATIENT)
Dept: ONCOLOGY | Age: 36
End: 2021-09-29

## 2021-09-29 VITALS
OXYGEN SATURATION: 98 % | TEMPERATURE: 98.3 F | SYSTOLIC BLOOD PRESSURE: 119 MMHG | RESPIRATION RATE: 18 BRPM | BODY MASS INDEX: 27.1 KG/M2 | WEIGHT: 173 LBS | DIASTOLIC BLOOD PRESSURE: 83 MMHG | HEART RATE: 79 BPM

## 2021-09-29 DIAGNOSIS — C76.0 HEAD AND NECK CANCER (HCC): ICD-10-CM

## 2021-09-29 DIAGNOSIS — C11.9 NASOPHARYNGEAL CARCINOMA (HCC): Primary | ICD-10-CM

## 2021-09-29 PROCEDURE — 99214 OFFICE O/P EST MOD 30 MIN: CPT | Performed by: INTERNAL MEDICINE

## 2021-09-29 RX ORDER — HEPARIN 100 UNIT/ML
500 SYRINGE INTRAVENOUS AS NEEDED
Status: CANCELLED | OUTPATIENT
Start: 2021-10-27

## 2021-09-29 RX ORDER — SODIUM CHLORIDE 9 MG/ML
10 INJECTION INTRAMUSCULAR; INTRAVENOUS; SUBCUTANEOUS AS NEEDED
Status: CANCELLED | OUTPATIENT
Start: 2021-10-27

## 2021-09-29 RX ORDER — SODIUM CHLORIDE 0.9 % (FLUSH) 0.9 %
5-10 SYRINGE (ML) INJECTION AS NEEDED
Status: CANCELLED | OUTPATIENT
Start: 2021-10-27

## 2021-09-29 NOTE — Clinical Note
Discharge instructions reviewed with patient    Medication list and understanding of medications reviewed with patient. OTC and herbal medications reviewed and added to med list if applicable  Barriers to adherence assessed. Guidance given regarding new medications this visit, including reason for taking this medicine, and common side effects. Labs drawn.  Coupon for methotrexate given to pt along with AVS. Port flush every 8 weeks

## 2021-09-29 NOTE — PROGRESS NOTES
Chelsea Cha is a 29 y.o. 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 Have you been to the ER, urgent care clinic since your last visit? Hospitalized since your last visit? No    2. Have you seen or consulted any other health care providers outside of the [address] [place] [address] since your last visit? Include any pap smears or colon screening.  No

## 2021-09-29 NOTE — PROGRESS NOTES
[facility] [place] [address] [place], [address] [address], [place]: [phone]  F: [phone]    Reason for visit   Cuong Brower is a 29 y.o. 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 male with no significant PMH seen for above  He was seen by Dr. Sherman Perez with ENT in December with c/o recurrent epistaxis x 2 months requiring ER visits and packing. His prior nasal endoscopy was clear. He was admitted on 2/4/2021 for recurrent HAs and epistaxis which lasted several minutes with improvement on pressure. He had a normal CBC for the most part and had no h/o bleeding growing up. MRI brain 2/5/2021 was obtained and showed a 4.2 x 4 cm posterior nasopharyngeal mass Extending into the sphenoid sinus with narrowing f the R distal internal carotid artery. CTA did not show active extravasation, but showed bilateral cervical adenopathy. He had a biopsy of the NP mass 2/6/2021 and pathology showed Nasopharyngeal carcinoma. He completed 3 cycles of cisplatin / gemzar and then cisplatin + RT. Comes with scans. He comes with his family. Doing well, He has rare HAs, no epistaxis, no nausea, he has no loss of hearing, some ringing in R ear, he has no neuropathy. He is to see Dr. Anmol Swift in 6 months    No past medical history on file.    Past Surgical History:   Procedure Laterality Date    IR INSERT TUNL CVC W PORT OVER 5 YEARS  2/11/2021         IR INSERT TUNL CVC W PORT OVER 5 YEARS  2/11/2021      Social History     Tobacco Use    Smoking status: Never Smoker    Smokeless tobacco: Never Used   Substance Use Topics    Alcohol use: Not Currently      No family history on file. Current Outpatient Medications   Medication Sig    magic mouthwash solution Magic mouth wash Maalox Lidocaine 2% viscous Diphenhydramine oral solution Pharmacy to mix equal portions of ingredients to a total volume as indicated in the dispense amount. Swish & swallow 5mL (1 tsp) 4 times daily as needed for mouth sores    topiramate (TOPAMAX) 50 mg tablet Take 1 Tablet by mouth two (2) times a day.  dexAMETHasone (DECADRON) 4 mg tablet Take 2 tabs (8mg) on days 2 & 3 of chemotherapy    amLODIPine (NORVASC) 5 mg tablet Take 1 Tab by mouth daily.  ondansetron (ZOFRAN ODT) 8 mg disintegrating tablet Take 1 Tab by mouth every eight (8) hours as needed for Nausea or Vomiting.  lidocaine-prilocaine (EMLA) topical cream Apply  to affected area as needed for Pain.  polyethylene glycol (MIRALAX) 17 gram packet Take 1 Packet by mouth daily. Mix in 8 oz of water, juice, soda, coffee, or tea prior to administration    magnesium 250 mg tab Si po qhs    fexofenadine (ALLEGRA) 180 mg tablet Take 1 Tab by mouth daily. Indications: seasonal runny nose    acetaminophen (TYLENOL) 500 mg tablet Take  by mouth every six (6) hours as needed for Pain. 2 or 3 po daily      No current facility-administered medications for this visit. Allergies   Allergen Reactions    Iodinated Contrast Media Itching        Review of Systems: A complete review of systems was obtained, negative except as described above.     Physical Exam:     Visit Vitals  /83 (BP 1 Location: Left upper arm, BP Patient Position: Sitting)   Pulse 79   Temp 98.3 °F (36.8 °C)   Resp 18   Wt 173 lb (78.5 kg)   SpO2 98%   BMI 27.10 kg/m²     General appearance - alert, well appearing, and in no distress, nervous, poor eye contact  Mental status - oriented to person, place, and time  Mouth - mucous membranes moist, OP clear, no ulcerations, mild erythema on R inner cheek   Neck - supple, PAC in place with no erythema/edema  Lymphatics - no palpable lymphadenopathy  Skin - mild erythema of neck / face     ECOG PS:1    Results:     Lab Results   Component Value Date/Time    WBC 4.3 09/01/2021 03:10 PM    HGB 13.6 09/01/2021 03:10 PM    HCT 40.0 09/01/2021 03:10 PM    PLATELET 118 53/30/6560 03:10 PM    MCV 88.1 09/01/2021 03:10 PM    ABS. NEUTROPHILS 2.7 09/01/2021 03:10 PM     Lab Results   Component Value Date/Time    Sodium 138 09/01/2021 03:10 PM    Potassium 3.6 09/01/2021 03:10 PM    Chloride 110 (H) 09/01/2021 03:10 PM    CO2 22 09/01/2021 03:10 PM    Glucose 78 09/01/2021 03:10 PM    BUN 16 09/01/2021 03:10 PM    Creatinine 0.83 09/01/2021 03:10 PM    GFR est AA >60 09/01/2021 03:10 PM    GFR est non-AA >60 09/01/2021 03:10 PM    Calcium 8.9 09/01/2021 03:10 PM    Glucose (POC) 88 09/16/2021 09:42 AM     Lab Results   Component Value Date/Time    Bilirubin, total 0.7 09/01/2021 03:10 PM    ALT (SGPT) 23 09/01/2021 03:10 PM    Alk. phosphatase 76 09/01/2021 03:10 PM    Protein, total 7.6 09/01/2021 03:10 PM    Albumin 4.0 09/01/2021 03:10 PM    Globulin 3.6 09/01/2021 03:10 PM     Component      Latest Ref Rng & Units 2/7/2021          12:26 PM   Cristóbal-Purdy DNA QT, PCR      Negative copies/mL 327   Cristóbal-Barr Virus log10      log10 copy/mL 2.515       Records reviewed and summarized above. Pathology report(s) reviewed     PATHOLOGY     FINAL PATHOLOGIC DIAGNOSIS   1. Right nasopharyngeal mass, biopsy:   Nasopharyngeal carcinoma, nonkeratinizing, undifferentiated type (see comment)   In situ hybridization for Cristóbal-Barr virus (SEMAJ) is positive   2. Right nasopharyngeal mass, biopsy:   Nasopharyngeal carcinoma, nonkeratinizing, undifferentiated type   Comment   Immunohistochemical stains performed from block 1A show that the invasive carcinoma expresses p40, AE1/AE3, and CAM5.2 while negative for p16, CK7, and CK20. This immunoprofile supports the above rendered diagnosis     Radiology report(s) reviewed above. CTA neck    IMPRESSION  1. Attenuation of the right internal carotid artery as it passes through the  right nasopharyngeal mass but no evidence of active extravasation or  intraluminal thrombus. 2.  Bilateral cervical lymphadenopathy in level 2 and level 5B. 3.  Right posterior nasopharyngeal mass with sphenoid sinus involvement and  diffuse sinus disease    MRI brain    IMPRESSION  1. 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 2.  Diffuse sinus mucosal thickening. 3.  Mass related narrowing of the right distal cervical internal carotid artery  but no large vessel occlusion. 4.  No evidence of infarct.       MRI orbit and face    IMPRESSION  Again demonstrated is large right nasopharyngeal tumor as previously described  and without significant change. Some involvement right internal carotid artery. Associated mucosal thickening and fluid right paranasal sinuses. PET 2/18/21:  IMPRESSION  The right nasopharyngeal mass lesion is hypermetabolic and  compatible with the known neoplasm. Bilateral hypermetabolic cervical lymph  nodes. Focal increased tracer activity is seen involving the very posterior  aspect of the nasal septum. PET 4/15/21:  IMPRESSION  No foci evident to suggest recurrent or metastatic disease. Resolved  nasopharyngeal mass uptake and bilateral cervical lymph node tracer  Distribution. PET CT 9/2021    IMPRESSION  1. No foci of abnormal hypermetabolism. No change. 2. No evidence of residual or recurrent tumor/metastasis.         Assessment:   1) Nasopharyngeal carcinoma- R   SEMAJ positive    4.2 x 4 cm, extends into the sphenoid sinus, bilateral cervical nodes. [place] but does not involve carotid artery    Nasopharyngeal carcinoma - at least T3N2M0-Stage III disease.   This is an aggressive malignancy with high risk of fatal local complications  In his case , the abutment of carotid artery is highly concerning for risk of tumor extension and catastrophic bleeding  He is very symptomatic with severe pain, epistaxis, hearing impediment    His PET was reviewed from 2/18/21 and shows a significant fco burden. Hence, after discussion with radiation decision was made to move forward with induction chemo with cisplatin + gemzar x 3 cycles followed by chemoRT (with weekly Cisplatin). He completed 3 cycles of cisplatin / gemzar and chemoRT by 6/2021    PET CT 9/2021 reviewed personally and shows a CR  Will continue with observation hereon      2) Epistaxis  Secondary to the mass  resolved    3) Headaches  Secondary to the nasopharyngeal mass with splenoid sinus involvement  Controlled on Verapamil, Zomig, Oxycodone  Continue palliative care follow up  Improved    4) Psychosocial  Comes with family. Supportive  Albanian speaking     Plan:     · Follow with radiation oncology as scheduled   · Continue to follow with palliative care  · Magic mouthwash PRN   Arrange appointment with Dr. Carolyn Alvarado (ENT)  in 6 months. Had a normal exam 1 month ago  Port flush every 8 weeks    RTC 6 months with scans. If ANA then will remove port    I appreciate the opportunity to participate in Mr. Bing Mendoza's care.     Signed By: Reese Armas MD

## 2021-09-30 NOTE — PROGRESS NOTES
MA attempted to reach out to patient via mobile phone number listed in patients chart, no answer. Left a voicemail stating to give the office a call back to receive an update on appointment changes for BONNIE Lay

## 2021-10-01 NOTE — PROGRESS NOTES
HIPPA Verified. Called patient off mobile phone number listed. Patient was made aware his appointment has now been changed for 10/27/21 at 3:30PM, patient verbalized understanding and expressed no question!   Katie Boo

## 2021-10-13 ENCOUNTER — APPOINTMENT (OUTPATIENT)
Dept: INFUSION THERAPY | Age: 36
End: 2021-10-13

## 2021-10-27 ENCOUNTER — HOSPITAL ENCOUNTER (OUTPATIENT)
Dept: INFUSION THERAPY | Age: 36
Discharge: HOME | End: 2021-10-27

## 2021-10-27 ENCOUNTER — VIRTUAL VISIT (OUTPATIENT)
Dept: PALLATIVE CARE | Age: 36
End: 2021-10-27

## 2021-10-27 VITALS
DIASTOLIC BLOOD PRESSURE: 80 MMHG | RESPIRATION RATE: 18 BRPM | SYSTOLIC BLOOD PRESSURE: 117 MMHG | TEMPERATURE: 97 F | HEART RATE: 69 BPM

## 2021-10-27 DIAGNOSIS — C11.9 NASOPHARYNGEAL CARCINOMA (HCC): Primary | ICD-10-CM

## 2021-10-27 DIAGNOSIS — C11.9 NASOPHARYNGEAL CARCINOMA (HCC): ICD-10-CM

## 2021-10-27 DIAGNOSIS — R51.9 NONINTRACTABLE HEADACHE, UNSPECIFIED CHRONICITY PATTERN, UNSPECIFIED HEADACHE TYPE: Primary | ICD-10-CM

## 2021-10-27 LAB
ALBUMIN SERPL-MCNC: 3.6 G/DL (ref 3.5–5)
ALBUMIN/GLOB SERPL: 1.1 {RATIO} (ref 1.1–2.2)
ALP SERPL-CCNC: 69 U/L (ref 45–117)
ALT SERPL-CCNC: 33 U/L (ref 12–78)
ANION GAP SERPL CALC-SCNC: 6 MMOL/L (ref 5–15)
AST SERPL-CCNC: 58 U/L (ref 15–37)
BASOPHILS # BLD: 0 K/UL (ref 0–0.1)
BASOPHILS NFR BLD: 1 % (ref 0–1)
BILIRUB SERPL-MCNC: 0.3 MG/DL (ref 0.2–1)
BUN SERPL-MCNC: 13 MG/DL (ref 6–20)
BUN/CREAT SERPL: 12 (ref 12–20)
CALCIUM SERPL-MCNC: 8.9 MG/DL (ref 8.5–10.1)
CHLORIDE SERPL-SCNC: 113 MMOL/L (ref 97–108)
CO2 SERPL-SCNC: 19 MMOL/L (ref 21–32)
CREAT SERPL-MCNC: 1.09 MG/DL (ref 0.7–1.3)
DIFFERENTIAL METHOD BLD: ABNORMAL
EOSINOPHIL # BLD: 0.1 K/UL (ref 0–0.4)
EOSINOPHIL NFR BLD: 2 % (ref 0–7)
ERYTHROCYTE [DISTWIDTH] IN BLOOD BY AUTOMATED COUNT: 12.4 % (ref 11.5–14.5)
GLOBULIN SER CALC-MCNC: 3.4 G/DL (ref 2–4)
GLUCOSE SERPL-MCNC: 98 MG/DL (ref 65–100)
HCT VFR BLD AUTO: 40.2 % (ref 36.6–50.3)
HGB BLD-MCNC: 13.3 G/DL (ref 12.1–17)
IMM GRANULOCYTES # BLD AUTO: 0 K/UL (ref 0–0.04)
IMM GRANULOCYTES NFR BLD AUTO: 1 % (ref 0–0.5)
LYMPHOCYTES # BLD: 0.8 K/UL (ref 0.8–3.5)
LYMPHOCYTES NFR BLD: 21 % (ref 12–49)
MCH RBC QN AUTO: 30.4 PG (ref 26–34)
MCHC RBC AUTO-ENTMCNC: 33.1 G/DL (ref 30–36.5)
MCV RBC AUTO: 91.8 FL (ref 80–99)
MONOCYTES # BLD: 0.4 K/UL (ref 0–1)
MONOCYTES NFR BLD: 9 % (ref 5–13)
NEUTS SEG # BLD: 2.6 K/UL (ref 1.8–8)
NEUTS SEG NFR BLD: 66 % (ref 32–75)
NRBC # BLD: 0 K/UL (ref 0–0.01)
NRBC BLD-RTO: 0 PER 100 WBC
PLATELET # BLD AUTO: 151 K/UL (ref 150–400)
PMV BLD AUTO: 10.6 FL (ref 8.9–12.9)
POTASSIUM SERPL-SCNC: 4.8 MMOL/L (ref 3.5–5.1)
PROT SERPL-MCNC: 7 G/DL (ref 6.4–8.2)
RBC # BLD AUTO: 4.38 M/UL (ref 4.1–5.7)
SODIUM SERPL-SCNC: 138 MMOL/L (ref 136–145)
WBC # BLD AUTO: 3.9 K/UL (ref 4.1–11.1)

## 2021-10-27 PROCEDURE — 85025 COMPLETE CBC W/AUTO DIFF WBC: CPT

## 2021-10-27 PROCEDURE — 36591 DRAW BLOOD OFF VENOUS DEVICE: CPT

## 2021-10-27 PROCEDURE — 80053 COMPREHEN METABOLIC PANEL: CPT

## 2021-10-27 PROCEDURE — 74011250636 HC RX REV CODE- 250/636: Performed by: REGISTERED NURSE

## 2021-10-27 PROCEDURE — 77030012965 HC NDL HUBR BBMI -A

## 2021-10-27 PROCEDURE — 99443 PR PHYS/QHP TELEPHONE EVALUATION 21-30 MIN: CPT | Performed by: INTERNAL MEDICINE

## 2021-10-27 RX ORDER — HEPARIN 100 UNIT/ML
500 SYRINGE INTRAVENOUS AS NEEDED
Status: DISCONTINUED | OUTPATIENT
Start: 2021-10-27 | End: 2021-10-28 | Stop reason: HOSPADM

## 2021-10-27 RX ORDER — SODIUM CHLORIDE 0.9 % (FLUSH) 0.9 %
5-10 SYRINGE (ML) INJECTION AS NEEDED
Status: DISCONTINUED | OUTPATIENT
Start: 2021-10-27 | End: 2021-10-28 | Stop reason: HOSPADM

## 2021-10-27 RX ORDER — SODIUM CHLORIDE 9 MG/ML
10 INJECTION INTRAMUSCULAR; INTRAVENOUS; SUBCUTANEOUS AS NEEDED
Status: DISCONTINUED | OUTPATIENT
Start: 2021-10-27 | End: 2021-10-28 | Stop reason: HOSPADM

## 2021-10-27 RX ADMIN — SODIUM CHLORIDE 10 ML: 9 INJECTION INTRAMUSCULAR; INTRAVENOUS; SUBCUTANEOUS at 15:40

## 2021-10-27 RX ADMIN — Medication 10 ML: at 15:40

## 2021-10-27 RX ADMIN — HEPARIN 500 UNITS: 100 SYRINGE at 15:40

## 2021-10-27 NOTE — PROGRESS NOTES
[facility] Chemotherapy Progress Note    4093 Pt admit to [facility] for port flush/labs ambulatory in stable condition. Assessment completed. No new concerns voiced. Port accessed with positive blood return. Labs drawn per order and sent. Line flushed, heparinized and de-accessed. Patient denies SOB, fever, cough, general not feeling well. Patient denies recent exposure to someone who has tested positive for COVID-19. Patient  denies having contact with anyone who has a pending COVID test.     Visit Vitals  /80   Pulse 69   Temp 97 °F (36.1 °C)   Resp 18       Medications Administered     0.9% sodium chloride injection 10 mL     Admin Date  10/27/2021 Action  Given Dose  10 mL Route  IntraVENous Administered By  Kingston Paget, RN          heparin (porcine) pf 500 Units     Admin Date  10/27/2021 Action  Given Dose  500 Units Route  IntraVENous Administered By  Kingston Paget, RN          sodium chloride (NS) flush 5-10 mL     Admin Date  10/27/2021 Action  Given Dose  10 mL Route  IntraVENous Administered By  Kingston Paget, JIMBO                  2778 Pt tolerated treatment well. D/c home ambulatory in no distress. Pt aware of next [facility] appointment scheduled for 12/22/2021.     Please review labs in CC once available

## 2021-10-27 NOTE — PROGRESS NOTES
Palliative Medicine Outpatient Services  [place]: [phone] ([phone])    Patient Name: Fara Chavez  YOB: 1985    Date of Current Visit: 10/27/21  Location of Current Visit:    [] [facility] Office  [] [facility] Office  [] [facility] Office  [] Home  []Synchronous real-time A/V virtual visit  [x]Phone visit    Date of Initial Visit: 3/1/21   Referral from: Evan Mccain MD  Primary Care Physician: JESSICA Almaguer      SUMMARY:   Fara Chavez is a 29y.o. year old with a  history of right nasopharyngeal carcinoma, who was referred to Palliative Medicine by Dr. Charlie Orta for symptom management and psychosocial support. He was diagnosed in 2/21 after presenting with months of recurrent headaches and epistaxis. He's completed chemoRT with resolution of his nosebleeds and persistent mild headache. PET-CT 9/16/21 showed no areas of abnormal hypermetabolism.       The patients social history includes: he is  to [place]. They have an 6year old daughter, Alexandr Rasmussen, and a 9year old son, Willam Quinteros. He worked in construction until 2019. He is an uninsured, undocumented resident from [place]. Palliative Medicine is addressing the following current patient/family concerns: headaches, migrainous, likely related to malignancy; nausea; neck pain; symptom and psychosocial support over course of treatment; advance care planning     Initial Referral Intake note reviewed   PALLIATIVE DIAGNOSES:       ICD-10-CM ICD-9-CM    1. Nonintractable headache, unspecified chronicity pattern, unspecified headache type  R51.9 784.0    2. [facility]  C11.9 147.9           PLAN:   Patient Instructions     Dear Fara Chavez ,    It was a pleasure seeing you today via telephone visit. We will see you again in 6 months for a virtual visit.     If labs or imaging tests have been ordered for you today, please call the office  at [phone] 48 hours after completion to obtain the results. Your described symptoms were: Fatigue: 0 Drowsiness: 0   Depression: 0 Pain: 0   Anxiety: 0 Nausea: 0   Anorexia: 0 Dyspnea: 0   Best Well-Bein Constipation: No   Other Problem (Comment): 0       This is the plan we talked about:      1. Headaches  -You continue to have very mild headaches from your past radiation therapy  -Continue topiramate 50-mg twice daily  -Continue tylenol 500-mg: take 2 pills every 6 hours as needed  -Continue zolmitriptan (Zomig) 2.5-mg as needed    2. Nasopharyngeal Cancer  -You've completed chemotherapy and radiation therapy  -Your PET-CT scan on 21 showed no areas suspicious for cancer  -You're now being followed for surveillance by Dr. Leona Rider, Dr. Hunter Reynolds, and Dr. Pedro Olea    3. Advance care planning (we talked about this during your visit in )  -You want your wife, Vivian Hansen, to be your primary medical decision maker and your Kikimckinley Langley, to be your secondary medical decision maker  -You wish to be hospitalized for any medical conditions at this point unless you had a serious medical condition from which you were not expected to recover  -You would want our doctors to let you and your family know if you had such a serious medical condition  -You would not want to be placed on a mechanical ventilator if you had a serious medical condition from which you would likely not receover  -We also talked in depth about CPR (cardiopulmonary resuscitation)  -If your heart were to suddenly stop beating and you stopped breathing, you would want CPR \"to give it a try\"  -If it appears as if you're going to die, your preference is to die at home  -I recommended you complete an Advance Medical Directive to have a copy of your wishes with you in your home        This is what you have shared with us about Advance Care Planning:      Primary Decision Maker (Active): Nonacayla Moody - Spouse - [phone]    Secondary Decision Maker (Active):  Sandy Caldera - Jones Orantes [phone]  Advance Care Planning 8/4/2021   Patient's Healthcare Decision Maker is: Legal Next of Kin   Confirm Advance Directive None   Patient Would Like to Complete Advance Directive -           The Palliative Medicine Team is here to support you and your family. Sincerely,      Nelson Lemus MD and the Palliative Medicine Team            GOALS OF CARE / TREATMENT PREFERENCES:   [====Goals of Care====]  GOALS OF CARE:  Patient / health care proxy stated goals: See Patient Instructions / Summary    TREATMENT PREFERENCES:   Code Status:  [x] Attempt Resuscitation       [] Do Not Attempt Resuscitation    Advance Care Planning:  [x] The [employer] Interdisciplinary Team has updated the ACP Navigator with Decision Maker and Patient Capacity      Primary Decision Maker (Active): Nba Rodriguez - [facility] [phone]    Secondary Decision Maker (Active): Nikunj Melchor, [id number] [place] [address] [phone]  Advance Care Planning 8/4/2021   Patient's Healthcare Decision Maker is: Legal Next of Kin   Confirm Advance Directive None   Patient Would Like to Complete Advance Directive -       Other:  (If patient appropriate for POST, consider using PALLPOST smart phrase here)    The palliative care team has discussed with patient / health care proxy about goals of care / treatment preferences for patient.  [====Goals of Care====]     PRESCRIPTIONS GIVEN:   No orders of the defined types were placed in this encounter.           FOLLOW UP:     Future Appointments   Date Time Provider Renae Fajardo   10/27/2021  3:30 PM G1 JOSUE FASTRACK [facility]   12/22/2021  3:30 PM G1 JOSUE FASTRACK [facility]   2/16/2022  3:30 PM H1 JOSUE FASTRACK [facility]   4/7/2022 10:00 AM Richy Bradford  [facility] AM[facility]           PHYSICIANS INVOLVED IN CARE:   Patient Care Team:  JESSICA Pack as PCP - General (Physician Assistant)  [place] [facility], JESSICA Cooley as PCP - [address] [place] [address] Provider  Manuela Serrano, [place] MD HAYDEN (Palliative Medicine)  Yajaira Enciso MD (Hematology and Oncology)       HISTORY:   Reviewed patient-completed ESAS and advance care planning form. Reviewed patient record in prescription monitoring program.    CHIEF COMPLAINT:   Chief Complaint   Patient presents with    Head Pain       HPI/SUBJECTIVE:    The patient is: [x] Verbal / [] Nonverbal - history obtained with assistance of Leah Montgomery    See Plan/Patient Instructions for interval history    From IV 3/1/21:  He started having nosebleeds a few months ago. He went to the ED twice and had his nose packed. He started seeing Dr. Michelle Ahuja who used the scope (nasal endoscopy) which didn't show anything. Then he started having headaches and he continued to have nosebleeds, a lot of bleeding. He came to the ED again about a month ago. That's when he found out he had cancer. This news hit him hard. The doctors all got together and decided he should have chemotherapy first, then radiation therapy with another kind of chemotherapy. It has a 50-50 chance of working. The news hit him hard but he's trying to stay positive. He gets strength from his wife and children and from his friends. He talks with his family in [place] by phone. He isn't having headaches since he started taking the medicine (Topamax). He still gets nosebleeds, he had a small nosebleed last week. Dr. Michelle Ahuja talked with him about having a catheter put in his vein and going up to try to stop the bleeding (embolization). He sees Dr. Michelle Ahuja again today. The muscles in his neck feel sore sometimes. He thinks it's from how he sleeps sometimes. He doesn't have neck pain or soreness now. He had a little nausea after chemo last week. He has nausea medicine he can take. His appetite is good. He feels good. It's hard to to believe he has cancer.     Clinical Pain Assessment (nonverbal scale for nonverbal patients):   [++++ Clinical Pain Assessment++++]  [++++Pain Severity++++]: Pain: 0  [++++Pain Character++++]:  [++++Pain Duration++++]:  [++++Pain Effect++++]:  [++++Pain Factors++++]:  [++++Pain Frequency++++]:  [++++Pain Location++++]:  [++++ Clinical Pain Assessment++++]       FUNCTIONAL ASSESSMENT:     Palliative Performance Scale (PPS):  PPS: 70       PSYCHOSOCIAL/SPIRITUAL SCREENING:     Any spiritual / Taoism concerns:  [] Yes /  [x] No    Caregiver Burnout:  [] Yes /  [] No /  [x] No Caregiver Present      Anticipatory grief assessment:   [x] Normal  / [] Maladaptive       ESAS Anxiety: Anxiety: 0    ESAS Depression: Depression: 0       REVIEW OF SYSTEMS:     The following systems were [x] reviewed / [] unable to be reviewed  Systems: constitutional, ears/nose/mouth/throat, respiratory, gastrointestinal, genitourinary, musculoskeletal, integumentary, neurologic, psychiatric, endocrine. Positive findings noted below. Modified ESAS Completed by: provider   Fatigue: 0 Drowsiness: 0   Depression: 0 Pain: 0   Anxiety: 0 Nausea: 0   Anorexia: 0 Dyspnea: 0   Best Well-Bein Constipation: No   Other Problem (Comment): 0          PHYSICAL EXAM:     Wt Readings from Last 3 Encounters:   21 173 lb (78.5 kg)   21 188 lb (85.3 kg)   21 188 lb 9.6 oz (85.5 kg)     There were no vitals taken for this visit.   Last bowel movement: See Nursing Note    NO PHYSICAL EXAM - PHONE VISIT    Constitutional    [] Appears well-developed and well-nourished in no apparent distress    [] Abnormal:  Mental status  [] Alert and awake  [] Oriented to person/place/time  [] Able to follow commands  [] Abnormal:   Eyes  [] EOM normal   [] Sclera normal   [] No visible ocular discharge  [] Abnormal:   HENT  [] Normocephalic, atraumatic  [] Mouth/Throat: Moist mucous membranes   [] External Ears normal  [] Abnormal:  Neck  [x] No visualized mass  [] Abnormal:  Pulmonary/Chest   [] Respiratory effort normal  [] No visualized signs of difficulty breathing or respiratory distress  [] Abnormal:  Musculoskeletal  [] Normal gait with no signs of ataxia  [] Normal range of motion of neck  [x] Abnormal: sitting  Neurological:   [] No facial asymmetry (Cranial nerve 7 motor function)  [] No gaze palsy  [] Abnormal:   Skin  [] No significant exanthematous lesions or discoloration noted on facial skin  [] Abnormal:                                  Psychiatric  [] Normal affect  [] No hallucinations  [] Abnormal:    Other pertinent observable physical exam findings:    Due to this being a TeleHealth evaluation, many elements of the physical examination are unable to be assessed. HISTORY:     History reviewed. No pertinent past medical history. Past Surgical History:   Procedure Laterality Date    IR INSERT TUNL CVC W PORT OVER 5 YEARS  2/11/2021         IR INSERT TUNL CVC W PORT OVER 5 YEARS  2/11/2021      History reviewed. No pertinent family history. History reviewed, no pertinent family history. Social History     Tobacco Use    Smoking status: Never Smoker    Smokeless tobacco: Never Used   Substance Use Topics    Alcohol use: Not Currently     Allergies   Allergen Reactions    Iodinated Contrast Media Itching      Current Outpatient Medications   Medication Sig    topiramate (TOPAMAX) 50 mg tablet Take 1 Tablet by mouth two (2) times a day.  amLODIPine (NORVASC) 5 mg tablet Take 1 Tab by mouth daily.  acetaminophen (TYLENOL) 500 mg tablet Take  by mouth every six (6) hours as needed for Pain. 2 or 3 po daily     magic mouthwash solution Magic mouth wash Maalox Lidocaine 2% viscous Diphenhydramine oral solution Pharmacy to mix equal portions of ingredients to a total volume as indicated in the dispense amount.  Swish & swallow 5mL (1 tsp) 4 times daily as needed for mouth sores (Patient not taking: Reported on 10/27/2021)    dexAMETHasone (DECADRON) 4 mg tablet Take 2 tabs (8mg) on days 2 & 3 of chemotherapy (Patient not taking: Reported on 10/27/2021)    ondansetron (ZOFRAN ODT) 8 mg disintegrating tablet Take 1 Tab by mouth every eight (8) hours as needed for Nausea or Vomiting. (Patient not taking: Reported on 10/27/2021)    lidocaine-prilocaine (EMLA) topical cream Apply  to affected area as needed for Pain. (Patient not taking: Reported on 10/27/2021)    polyethylene glycol (MIRALAX) 17 gram packet Take 1 Packet by mouth daily. Mix in 8 oz of water, juice, soda, coffee, or tea prior to administration (Patient not taking: Reported on 10/27/2021)    magnesium 250 mg tab Si po qhs (Patient not taking: Reported on 10/27/2021)    fexofenadine (ALLEGRA) 180 mg tablet Take 1 Tab by mouth daily. Indications: seasonal runny nose (Patient not taking: Reported on 10/27/2021)     No current facility-administered medications for this visit. LAB DATA REVIEWED:     Lab Results   Component Value Date/Time    WBC 4.3 2021 03:10 PM    HGB 13.6 2021 03:10 PM    PLATELET 183  03:10 PM     Lab Results   Component Value Date/Time    Sodium 138 2021 03:10 PM    Potassium 3.6 2021 03:10 PM    Chloride 110 (H) 2021 03:10 PM    CO2 22 2021 03:10 PM    BUN 16 2021 03:10 PM    Creatinine 0.83 2021 03:10 PM    Calcium 8.9 2021 03:10 PM    Magnesium 1.9 2021 08:53 AM    Phosphorus 4.4 2021 02:31 AM      Lab Results   Component Value Date/Time    Alk. phosphatase 76 2021 03:10 PM    Protein, total 7.6 2021 03:10 PM    Albumin 4.0 2021 03:10 PM    Globulin 3.6 2021 03:10 PM     Lab Results   Component Value Date/Time    INR 1.1 2021 09:01 PM    Prothrombin time 11.0 2021 09:01 PM    aPTT 30.8 2021 09:01 PM      No results found for: IRON, FE, TIBC, IBCT, PSAT, FERR     PET/CT   FINDINGS:  HEAD/NECK: The right nasopharyngeal mass lesion is hypermetabolic, maximum SUV  is 15.3.  Focal increased tracer activity is noted involving the very posterior  aspect of the septum. Bilateral hypermetabolic cervical lymph nodes are noted. CHEST: Small but hypermetabolic right supraclavicular lymph node is noted. ABDOMEN/PELVIS: No foci of abnormal hypermetabolism. SKELETON: No foci of abnormal hypermetabolism in the axial and visualized  appendicular skeleton. IMPRESSION  The right nasopharyngeal mass lesion is hypermetabolic and  compatible with the known neoplasm. Bilateral hypermetabolic cervical lymph  nodes. Focal increased tracer activity is seen involving the very posterior  aspect of the nasal septum. MRI orbit/face/neck 2/7:  FINDINGS:   Agree with above description of no significant change in the large right  nasopharyngeal mass extending into the right sphenoid sinus extending down into  the pterygoid region is present demonstrated on MRI and CTA 2/5/21. The right  internal carotid artery traverses in area of the skull is in contact with the  tumor in the foramen transversarium and proximal siphon region at the posterior  cavernous sinus. Again demonstrated is generalized mucosal thickening in sphenoid, right ethmoid  and maxillary sinus. Fluid right mastoid air cells. No abnormal intracranial enhancement demonstrated. No other masses. IMPRESSION  Again demonstrated is large right nasopharyngeal tumor as previously described  and without significant change. Some involvement right internal carotid artery. Associated mucosal thickening and fluid right paranasal sinuses. MRI/MRA brain 2/5  FINDINGS:   MRI brain:  Ventricles and sulci are normal in size configuration. No evidence of acute  infarct. No edema or midline shift. No intracranial mass. In the right posterior nasopharynx there is a heterogeneously enhancing 4.2 x  4.0 cm soft tissue mass which extends superiorly into the sphenoid sinus and  obstructs the right ostiomeatal complex.  There is circumferential lobulated  sinus mucosal thickening in the right maxillary sinus as well as the right  posterior ethmoid air cells. Right-sided mastoid effusion. MRA brain:  Narrowing of the right distal cervical internal carotid artery. Left distal  cervical internal carotid artery is normal. [place] is intact. Anterior  cerebral, middle cerebral, and posterior cerebral arteries are patent. Basilar  artery and distal vertebral arteries are patent. IMPRESSION  1. 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 2.  Diffuse sinus mucosal thickening. 3.  Mass related narrowing of the right distal cervical internal carotid artery  but no large vessel occlusion. 4.  No evidence of infarct. CTA neck 2/5:  CTA NECK:     Great vessels: Four-vessel aortic arch with patent origins. Right subclavian artery: Patent     Left subclavian artery: Patent      Right common carotid artery: Patent      Left common carotid artery: Patent      Cervical right internal carotid artery: Patent with no significant stenosis by  NASCET criteria. Attenuation of the right internal carotid artery as it passes  through the right nasopharyngeal mass, but no intraluminal thrombus or evidence  of active extravasation. Cervical left internal carotid artery: Patent with no significant stenosis by  NASCET criteria. Right vertebral artery: Patent     Left vertebral artery: Patent, arises directly off the aortic arch. Imaged lung apices are clear. Thyroid gland is normal. Bilateral cervical  lymphadenopathy predominantly in level 2 and level 5A. 4.3 x 3.5 cm  heterogeneously enhancing right posterior nasopharyngeal soft tissue mass with  vessels coursing through the mass. No evidence of active extravasation.      CTA HEAD 2/5:  Right cavernous internal carotid artery: Attenuated but patent     Left cavernous internal carotid artery: Patent     Anterior cerebral arteries: Patent     Anterior communicating artery: Patent     Right middle cerebral artery: Patent     Left middle cerebral artery: Patent     Posterior communicating arteries: Patent     Posterior cerebral arteries: Patent     Basilar artery: Patent     Distal vertebral arteries: Patent     No evidence for intracranial aneurysm or hemodynamically significant stenosis. IMPRESSION  1. Attenuation of the right internal carotid artery as it passes through the  right nasopharyngeal mass but no evidence of active extravasation or  intraluminal thrombus. 2.  Bilateral cervical lymphadenopathy in level 2 and level 5B. 3.  Right posterior nasopharyngeal mass with sphenoid sinus involvement and  diffuse sinus disease. PET-CT 9/16/21:  1. No foci of abnormal hypermetabolism. No change. 2. No evidence of residual or recurrent tumor/metastasis.      CONTROLLED SUBSTANCES SAFETY ASSESSMENT (IF ON CONTROLLED SUBSTANCES):     Reviewed opioid safety handout:  [] Yes   [] No  24 hour opioid dose >150mg morphine equivalent/day:  [] Yes   [] No  Benzodiazepines:  [] Yes   [] No  Sleep apnea:  [] Yes   [] No  Urine Toxicology Testing within last 6 months:  [] Yes   [] No  History of or new aberrant medication taking behaviors:  [] Yes   [] No  Has Narcan been prescribed [] Yes   [] No          Total time:   Counseling / coordination time:   > 50% counseling / coordination?:     Time visit started: 12:00pm  Time visit ended: 12/22pm

## 2021-10-27 NOTE — PROGRESS NOTES
.    Palliative Medicine Office Visit  Palliative Medicine Nurse Check In  [phone]    Patient Name: Mandie Cunningham  YOB: 1985      Date of Office Visit: 10/27/2021    Patient states: \" Requesting a refill on amlodipine \"    From Check In Sheet (scanned in Media):  Has a medical provider talked with you about cardiopulmonary resuscitation (CPR)? [x] Yes   [] No   [] Unable to obtain    Nurse reminder to complete or update ACP FlowSheet:    Is ACP on the Problem List?    [] Yes    [x] No  IF ACP Document is ON FILE; Nurse to place ACP on Problem List     Is there an ACP Note in Chart Review/Note? [x] Yes    [] No   If NO: ALERT PROVIDER          Primary Decision Maker (Active): Judd Regalado [facility] - [phone]    Secondary Decision Maker (Active): [address], [address] [facility] [phone]  Advance Care Planning 8/4/2021   Patient's Healthcare Decision Maker is: Legal Next of Kin   Confirm Advance Directive None   Patient Would Like to Complete Advance Directive -       Is there anything that we should know about you as a person in order to provide you the best care possible? Have you been to the ER, urgent care clinic since your last visit? [] Yes   [x] No   [] Unable to obtain    Have you been hospitalized since your last visit? [] Yes   [x] No   [] Unable to obtain    Have you seen or consulted any other health care providers outside of the [address] [place] [address] since your last visit?    [] Yes   [x] No   [] Unable to obtain    Functional status (describe):         Last BM:  10/27/2021     accessed (date): 10/27/2021    Bottle review (for opioid pain medication):  Medication 1:   Date filled:   Directions:   # filled:   # left:   # pills taking per day:  Last dose taken:    Medication 2:   Date filled:   Directions:   # filled:   # left:   # pills taking per day:  Last dose taken:    Medication 3:   Date filled:   Directions:   # filled:   # left:   # pills taking per day:  Last dose taken:    Medication 4:   Date filled:   Directions:   # filled:   # left:   # pills taking per day:  Last dose taken:

## 2021-10-27 NOTE — PATIENT INSTRUCTIONS
Dear Hayley Grimes ,    It was a pleasure seeing you today via telephone visit. We will see you again in 6 months for a virtual visit. If labs or imaging tests have been ordered for you today, please call the office  at [phone] 48 hours after completion to obtain the results. Your described symptoms were: Fatigue: 0 Drowsiness: 0   Depression: 0 Pain: 0   Anxiety: 0 Nausea: 0   Anorexia: 0 Dyspnea: 0   Best Well-Bein Constipation: No   Other Problem (Comment): 0       This is the plan we talked about:      1. Headaches  -You continue to have very mild headaches from your past radiation therapy  -Continue topiramate 50-mg twice daily  -Continue tylenol 500-mg: take 2 pills every 6 hours as needed  -Continue zolmitriptan (Zomig) 2.5-mg as needed    2. Nasopharyngeal Cancer  -You've completed chemotherapy and radiation therapy  -Your PET-CT scan on 21 showed no areas suspicious for cancer  -You're now being followed for surveillance by Dr. Leona Rider, Dr. Hunter Reynolds, and Dr. Pedro Olea    3.  Advance care planning (we talked about this during your visit in )  -You want your wife, Vivian Hansen, to be your primary medical decision maker and your Kiki Langley, to be your secondary medical decision maker  -You wish to be hospitalized for any medical conditions at this point unless you had a serious medical condition from which you were not expected to recover  -You would want our doctors to let you and your family know if you had such a serious medical condition  -You would not want to be placed on a mechanical ventilator if you had a serious medical condition from which you would likely not receover  -We also talked in depth about CPR (cardiopulmonary resuscitation)  -If your heart were to suddenly stop beating and you stopped breathing, you would want CPR \"to give it a try\"  -If it appears as if you're going to die, your preference is to die at home  -I recommended you complete an Advance Medical Directive to have a copy of your wishes with you in your home        This is what you have shared with us about Advance Care Planning:      Primary Decision Maker (Active): Tony Busch - [facility] - [phone]    Secondary Decision Maker (Active): Ayan Jiménez - [phone]  Advance Care Planning 8/4/2021   Patient's Healthcare Decision Maker is: Legal Next of Kin   Confirm Advance Directive None   Patient Would Like to Complete Advance Directive -           The Palliative Medicine Team is here to support you and your family.        Sincerely,      Claudette Opal, MD and the Palliative Medicine Team

## 2021-11-01 ENCOUNTER — HOSPITAL ENCOUNTER (OUTPATIENT)
Dept: RADIATION THERAPY | Age: 36
Discharge: HOME | End: 2021-11-01

## 2021-11-03 ENCOUNTER — VIRTUAL VISIT (OUTPATIENT)
Dept: FAMILY MEDICINE CLINIC | Age: 36
End: 2021-11-03

## 2021-11-03 DIAGNOSIS — I10 ESSENTIAL HYPERTENSION: Primary | ICD-10-CM

## 2021-11-03 PROCEDURE — 99213 OFFICE O/P EST LOW 20 MIN: CPT | Performed by: FAMILY MEDICINE

## 2021-11-03 RX ORDER — AMLODIPINE BESYLATE 2.5 MG/1
2.5 TABLET ORAL DAILY
Qty: 90 TABLET | Refills: 3 | Status: SHIPPED | OUTPATIENT
Start: 2021-11-03 | End: 2022-08-10 | Stop reason: ALTCHOICE

## 2021-11-03 NOTE — PROGRESS NOTES
Coordination of Care  1. Have you been to the ER, urgent care clinic since your last visit? Hospitalized since your last visit? No    2. Have you seen or consulted any other health care providers outside of the [address] [place] [address] since your last visit? Include any pap smears or colon screening. No    Does the patient need refills? YES    Learning Assessment Complete? yes  Depression Screening complete in the past 12 months? yes      CMA made t/c to patient to review discharge and medication instructions per provider. CMA explained to patient that Meds  (amlodipine 2.5mg 90d supply with 3 refills) sent to [facility] (good rx)  No good Rx coupon needed medication at its lowest price. This has been fully explained to the patient, who indicates understanding.

## 2021-11-03 NOTE — PROGRESS NOTES
Hayley Grimes is a 29 y.o. male   Chief Complaint   Patient presents with    Hypertension     f/u, med refill         ASSESSMENT AND PLAN:    1. Essential hypertension  Pt has stable, at goal measurements on 2.5mg amlodipine. Refilled today. - amLODIPine (NORVASC) 2.5 mg tablet; Take 1 Tablet by mouth daily for 360 days. Indications: high blood pressure  Dispense: 90 Tablet; Refill: 3          SUBJECTIVE:    HPI:  Bhaskar Funez is a 29 y.o. male who presents for refills on his amlodipine. Previous documentation reports an increase from his 2.5mg dose to 5mg but he had requested refills of the 2.5mg-- so the appointment is mainly to clarify his meds. PT reports that he has been taking the 2.5mg dose. He has a BP monitor at home - this morning his BP was 123 sys. It is typically between 110 and 130. Overall he is doing pretty well. He stopped working for radiation and chemo which he completed. He has not resumed work. He states his mood and energy are good. He still has occasional minor headaches but they have improved significantly. He is taking topiramate for prophylaxis. He saw his oncologist 2 days ago and has followup scheduled in March or so. He is followed by palliative med. He denies any additional questions or concerns. Review of Systems   Respiratory: Negative. Cardiovascular: Negative. Gastrointestinal: Negative. Genitourinary: Negative. Neurological: Positive for headaches. Negative for dizziness. There were no vitals taken for this visit. Physical Exam - not performed; telephone encounter.

## 2021-11-24 ENCOUNTER — APPOINTMENT (OUTPATIENT)
Dept: INFUSION THERAPY | Age: 36
End: 2021-11-24

## 2021-12-14 RX ORDER — HEPARIN 100 UNIT/ML
500 SYRINGE INTRAVENOUS AS NEEDED
Status: CANCELLED | OUTPATIENT
Start: 2021-12-22

## 2021-12-14 RX ORDER — SODIUM CHLORIDE 0.9 % (FLUSH) 0.9 %
5-10 SYRINGE (ML) INJECTION AS NEEDED
Status: CANCELLED | OUTPATIENT
Start: 2021-12-22

## 2021-12-14 RX ORDER — SODIUM CHLORIDE 9 MG/ML
10 INJECTION INTRAMUSCULAR; INTRAVENOUS; SUBCUTANEOUS AS NEEDED
Status: CANCELLED | OUTPATIENT
Start: 2021-12-22

## 2021-12-22 ENCOUNTER — HOSPITAL ENCOUNTER (OUTPATIENT)
Dept: INFUSION THERAPY | Age: 36
Discharge: HOME | End: 2021-12-22

## 2021-12-22 VITALS
TEMPERATURE: 97.1 F | SYSTOLIC BLOOD PRESSURE: 124 MMHG | HEART RATE: 82 BPM | DIASTOLIC BLOOD PRESSURE: 88 MMHG | RESPIRATION RATE: 18 BRPM

## 2021-12-22 DIAGNOSIS — C11.9 NASOPHARYNGEAL CARCINOMA (HCC): Primary | ICD-10-CM

## 2021-12-22 LAB
ALBUMIN SERPL-MCNC: 4 G/DL (ref 3.5–5)
ALBUMIN/GLOB SERPL: 1.2 {RATIO} (ref 1.1–2.2)
ALP SERPL-CCNC: 74 U/L (ref 45–117)
ALT SERPL-CCNC: 40 U/L (ref 12–78)
ANION GAP SERPL CALC-SCNC: 7 MMOL/L (ref 5–15)
AST SERPL-CCNC: 15 U/L (ref 15–37)
BASOPHILS # BLD: 0 K/UL (ref 0–0.1)
BASOPHILS NFR BLD: 1 % (ref 0–1)
BILIRUB SERPL-MCNC: 0.5 MG/DL (ref 0.2–1)
BUN SERPL-MCNC: 14 MG/DL (ref 6–20)
BUN/CREAT SERPL: 13 (ref 12–20)
CALCIUM SERPL-MCNC: 9.3 MG/DL (ref 8.5–10.1)
CHLORIDE SERPL-SCNC: 109 MMOL/L (ref 97–108)
CO2 SERPL-SCNC: 23 MMOL/L (ref 21–32)
CREAT SERPL-MCNC: 1.06 MG/DL (ref 0.7–1.3)
DIFFERENTIAL METHOD BLD: NORMAL
EOSINOPHIL # BLD: 0.1 K/UL (ref 0–0.4)
EOSINOPHIL NFR BLD: 2 % (ref 0–7)
ERYTHROCYTE [DISTWIDTH] IN BLOOD BY AUTOMATED COUNT: 14.2 % (ref 11.5–14.5)
GLOBULIN SER CALC-MCNC: 3.4 G/DL (ref 2–4)
GLUCOSE SERPL-MCNC: 91 MG/DL (ref 65–100)
HCT VFR BLD AUTO: 41.4 % (ref 36.6–50.3)
HGB BLD-MCNC: 14.1 G/DL (ref 12.1–17)
IMM GRANULOCYTES # BLD AUTO: 0 K/UL (ref 0–0.04)
IMM GRANULOCYTES NFR BLD AUTO: 0 % (ref 0–0.5)
LYMPHOCYTES # BLD: 0.9 K/UL (ref 0.8–3.5)
LYMPHOCYTES NFR BLD: 18 % (ref 12–49)
MCH RBC QN AUTO: 29.2 PG (ref 26–34)
MCHC RBC AUTO-ENTMCNC: 34.1 G/DL (ref 30–36.5)
MCV RBC AUTO: 85.7 FL (ref 80–99)
MONOCYTES # BLD: 0.4 K/UL (ref 0–1)
MONOCYTES NFR BLD: 8 % (ref 5–13)
NEUTS SEG # BLD: 3.4 K/UL (ref 1.8–8)
NEUTS SEG NFR BLD: 71 % (ref 32–75)
NRBC # BLD: 0 K/UL (ref 0–0.01)
NRBC BLD-RTO: 0 PER 100 WBC
PLATELET # BLD AUTO: 215 K/UL (ref 150–400)
PMV BLD AUTO: 9.8 FL (ref 8.9–12.9)
POTASSIUM SERPL-SCNC: 4.2 MMOL/L (ref 3.5–5.1)
PROT SERPL-MCNC: 7.4 G/DL (ref 6.4–8.2)
RBC # BLD AUTO: 4.83 M/UL (ref 4.1–5.7)
SODIUM SERPL-SCNC: 139 MMOL/L (ref 136–145)
WBC # BLD AUTO: 4.8 K/UL (ref 4.1–11.1)

## 2021-12-22 PROCEDURE — 74011250636 HC RX REV CODE- 250/636: Performed by: REGISTERED NURSE

## 2021-12-22 PROCEDURE — 80053 COMPREHEN METABOLIC PANEL: CPT

## 2021-12-22 PROCEDURE — 77030012965 HC NDL HUBR BBMI -A

## 2021-12-22 PROCEDURE — 85025 COMPLETE CBC W/AUTO DIFF WBC: CPT

## 2021-12-22 PROCEDURE — 36591 DRAW BLOOD OFF VENOUS DEVICE: CPT

## 2021-12-22 RX ORDER — HEPARIN 100 UNIT/ML
500 SYRINGE INTRAVENOUS AS NEEDED
Status: DISCONTINUED | OUTPATIENT
Start: 2021-12-22 | End: 2021-12-23 | Stop reason: HOSPADM

## 2021-12-22 RX ORDER — SODIUM CHLORIDE 0.9 % (FLUSH) 0.9 %
5-10 SYRINGE (ML) INJECTION AS NEEDED
Status: DISCONTINUED | OUTPATIENT
Start: 2021-12-22 | End: 2021-12-23 | Stop reason: HOSPADM

## 2021-12-22 RX ORDER — SODIUM CHLORIDE 9 MG/ML
10 INJECTION INTRAMUSCULAR; INTRAVENOUS; SUBCUTANEOUS AS NEEDED
Status: DISCONTINUED | OUTPATIENT
Start: 2021-12-22 | End: 2021-12-23 | Stop reason: HOSPADM

## 2021-12-22 RX ADMIN — Medication 10 ML: at 15:24

## 2021-12-22 RX ADMIN — SODIUM CHLORIDE 10 ML: 9 INJECTION INTRAMUSCULAR; INTRAVENOUS; SUBCUTANEOUS at 15:25

## 2021-12-22 RX ADMIN — HEPARIN 500 UNITS: 100 SYRINGE at 15:24

## 2021-12-22 NOTE — PROGRESS NOTES
[facility] - Chemotherapy Progress Note    [id number] Pt admit to [facility] for port flush/labs ambulatory in stable condition. Assessment completed. No new concerns voiced. Port accessed with positive blood return. Labs drawn per order and sent. Line flushed, heparinized and de-accessed. Visit Vitals  /88 (BP 1 Location: Left upper arm, BP Patient Position: At rest)   Pulse 82   Temp 97.1 °F (36.2 °C)   Resp 18       Medications Administered     0.9% sodium chloride injection 10 mL     Admin Date  12/22/2021 Action  Given Dose  10 mL Route  IntraVENous Administered By  Paul Henriquez RN          heparin (porcine) pf 500 Units     Admin Date  12/22/2021 Action  Given Dose  500 Units Route  IntraVENous Administered By  Pual Henriquez RN          sodium chloride (NS) flush 5-10 mL     Admin Date  12/22/2021 Action  Given Dose  10 mL Route  IntraVENous Administered By  Paul Henriquez RN                        [id number] Pt tolerated treatment well. D/c home ambulatory in no distress. Pt aware of next [facility] appointment scheduled for 2/16/22    Recent Results (from the past 24 hour(s))   CBC WITH AUTOMATED DIFF    Collection Time: 12/22/21  3:30 PM   Result Value Ref Range    WBC 4.8 4.1 - 11.1 K/uL    RBC 4.83 4.10 - 5.70 M/uL    HGB 14.1 12.1 - 17.0 g/dL    HCT 41.4 36.6 - 50.3 %    MCV 85.7 80.0 - 99.0 FL    MCH 29.2 26.0 - 34.0 PG    MCHC 34.1 30.0 - 36.5 g/dL    RDW 14.2 11.5 - 14.5 %    PLATELET 774 251 - 145 K/uL    MPV 9.8 8.9 - 12.9 FL    NRBC 0.0 0  WBC    ABSOLUTE NRBC 0.00 0.00 - 0.01 K/uL    NEUTROPHILS 71 32 - 75 %    LYMPHOCYTES 18 12 - 49 %    MONOCYTES 8 5 - 13 %    EOSINOPHILS 2 0 - 7 %    BASOPHILS 1 0 - 1 %    IMMATURE GRANULOCYTES 0 0.0 - 0.5 %    ABS. NEUTROPHILS 3.4 1.8 - 8.0 K/UL    ABS. LYMPHOCYTES 0.9 0.8 - 3.5 K/UL    ABS. MONOCYTES 0.4 0.0 - 1.0 K/UL    ABS. EOSINOPHILS 0.1 0.0 - 0.4 K/UL    ABS. BASOPHILS 0.0 0.0 - 0.1 K/UL    ABS. IMM.  GRANS. 0.0 0.00 - 0.04 K/UL    DF AUTOMATED     METABOLIC PANEL, COMPREHENSIVE    Collection Time: 12/22/21  3:30 PM   Result Value Ref Range    Sodium 139 136 - 145 mmol/L    Potassium 4.2 3.5 - 5.1 mmol/L    Chloride 109 (H) 97 - 108 mmol/L    CO2 23 21 - 32 mmol/L    Anion gap 7 5 - 15 mmol/L    Glucose 91 65 - 100 mg/dL    BUN 14 6 - 20 MG/DL    Creatinine 1.06 0.70 - 1.30 MG/DL    BUN/Creatinine ratio 13 12 - 20      GFR est AA >60 >60 ml/min/1.73m2    GFR est non-AA >60 >60 ml/min/1.73m2    Calcium 9.3 8.5 - 10.1 MG/DL    Bilirubin, total 0.5 0.2 - 1.0 MG/DL    ALT (SGPT) 40 12 - 78 U/L    AST (SGOT) 15 15 - 37 U/L    Alk.  phosphatase 74 45 - 117 U/L    Protein, total 7.4 6.4 - 8.2 g/dL    Albumin 4.0 3.5 - 5.0 g/dL    Globulin 3.4 2.0 - 4.0 g/dL    A-G Ratio 1.2 1.1 - 2.2

## 2022-02-09 RX ORDER — SODIUM CHLORIDE 9 MG/ML
10 INJECTION INTRAMUSCULAR; INTRAVENOUS; SUBCUTANEOUS AS NEEDED
Status: CANCELLED | OUTPATIENT
Start: 2022-02-16

## 2022-02-09 RX ORDER — SODIUM CHLORIDE 0.9 % (FLUSH) 0.9 %
5-10 SYRINGE (ML) INJECTION AS NEEDED
Status: CANCELLED | OUTPATIENT
Start: 2022-02-16

## 2022-02-09 RX ORDER — HEPARIN 100 UNIT/ML
500 SYRINGE INTRAVENOUS AS NEEDED
Status: CANCELLED | OUTPATIENT
Start: 2022-02-16

## 2022-02-16 ENCOUNTER — HOSPITAL ENCOUNTER (OUTPATIENT)
Dept: INFUSION THERAPY | Age: 37
Discharge: HOME | End: 2022-02-16

## 2022-02-16 VITALS
DIASTOLIC BLOOD PRESSURE: 69 MMHG | TEMPERATURE: 96.8 F | SYSTOLIC BLOOD PRESSURE: 111 MMHG | RESPIRATION RATE: 18 BRPM | HEART RATE: 78 BPM

## 2022-02-16 DIAGNOSIS — C11.9 NASOPHARYNGEAL CARCINOMA (HCC): Primary | ICD-10-CM

## 2022-02-16 LAB
ALBUMIN SERPL-MCNC: 3.5 G/DL (ref 3.5–5)
ALBUMIN/GLOB SERPL: 1 {RATIO} (ref 1.1–2.2)
ALP SERPL-CCNC: 94 U/L (ref 45–117)
ALT SERPL-CCNC: 21 U/L (ref 12–78)
ANION GAP SERPL CALC-SCNC: 7 MMOL/L (ref 5–15)
AST SERPL-CCNC: 10 U/L (ref 15–37)
BASOPHILS # BLD: 0 K/UL (ref 0–0.1)
BASOPHILS NFR BLD: 1 % (ref 0–1)
BILIRUB SERPL-MCNC: 0.3 MG/DL (ref 0.2–1)
BUN SERPL-MCNC: 14 MG/DL (ref 6–20)
BUN/CREAT SERPL: 17 (ref 12–20)
CALCIUM SERPL-MCNC: 9.3 MG/DL (ref 8.5–10.1)
CHLORIDE SERPL-SCNC: 111 MMOL/L (ref 97–108)
CO2 SERPL-SCNC: 22 MMOL/L (ref 21–32)
CREAT SERPL-MCNC: 0.82 MG/DL (ref 0.7–1.3)
DIFFERENTIAL METHOD BLD: NORMAL
EOSINOPHIL # BLD: 0.1 K/UL (ref 0–0.4)
EOSINOPHIL NFR BLD: 2 % (ref 0–7)
ERYTHROCYTE [DISTWIDTH] IN BLOOD BY AUTOMATED COUNT: 13.6 % (ref 11.5–14.5)
GLOBULIN SER CALC-MCNC: 3.4 G/DL (ref 2–4)
GLUCOSE SERPL-MCNC: 124 MG/DL (ref 65–100)
HCT VFR BLD AUTO: 39.4 % (ref 36.6–50.3)
HGB BLD-MCNC: 13.3 G/DL (ref 12.1–17)
IMM GRANULOCYTES # BLD AUTO: 0 K/UL (ref 0–0.04)
IMM GRANULOCYTES NFR BLD AUTO: 0 % (ref 0–0.5)
LYMPHOCYTES # BLD: 1.1 K/UL (ref 0.8–3.5)
LYMPHOCYTES NFR BLD: 20 % (ref 12–49)
MCH RBC QN AUTO: 28.5 PG (ref 26–34)
MCHC RBC AUTO-ENTMCNC: 33.8 G/DL (ref 30–36.5)
MCV RBC AUTO: 84.4 FL (ref 80–99)
MONOCYTES # BLD: 0.4 K/UL (ref 0–1)
MONOCYTES NFR BLD: 7 % (ref 5–13)
NEUTS SEG # BLD: 3.7 K/UL (ref 1.8–8)
NEUTS SEG NFR BLD: 70 % (ref 32–75)
NRBC # BLD: 0 K/UL (ref 0–0.01)
NRBC BLD-RTO: 0 PER 100 WBC
PLATELET # BLD AUTO: 254 K/UL (ref 150–400)
PMV BLD AUTO: 9.4 FL (ref 8.9–12.9)
POTASSIUM SERPL-SCNC: 3.6 MMOL/L (ref 3.5–5.1)
PROT SERPL-MCNC: 6.9 G/DL (ref 6.4–8.2)
RBC # BLD AUTO: 4.67 M/UL (ref 4.1–5.7)
SODIUM SERPL-SCNC: 140 MMOL/L (ref 136–145)
WBC # BLD AUTO: 5.3 K/UL (ref 4.1–11.1)

## 2022-02-16 PROCEDURE — 36591 DRAW BLOOD OFF VENOUS DEVICE: CPT

## 2022-02-16 PROCEDURE — 77030012965 HC NDL HUBR BBMI -A

## 2022-02-16 PROCEDURE — 85025 COMPLETE CBC W/AUTO DIFF WBC: CPT

## 2022-02-16 PROCEDURE — 74011250636 HC RX REV CODE- 250/636: Performed by: REGISTERED NURSE

## 2022-02-16 PROCEDURE — 80053 COMPREHEN METABOLIC PANEL: CPT

## 2022-02-16 PROCEDURE — 74011000250 HC RX REV CODE- 250: Performed by: REGISTERED NURSE

## 2022-02-16 RX ORDER — SODIUM CHLORIDE 9 MG/ML
10 INJECTION INTRAMUSCULAR; INTRAVENOUS; SUBCUTANEOUS AS NEEDED
Status: DISCONTINUED | OUTPATIENT
Start: 2022-02-16 | End: 2022-02-17 | Stop reason: HOSPADM

## 2022-02-16 RX ORDER — SODIUM CHLORIDE 0.9 % (FLUSH) 0.9 %
5-10 SYRINGE (ML) INJECTION AS NEEDED
Status: DISCONTINUED | OUTPATIENT
Start: 2022-02-16 | End: 2022-02-17 | Stop reason: HOSPADM

## 2022-02-16 RX ORDER — HEPARIN 100 UNIT/ML
500 SYRINGE INTRAVENOUS AS NEEDED
Status: DISCONTINUED | OUTPATIENT
Start: 2022-02-16 | End: 2022-02-17 | Stop reason: HOSPADM

## 2022-02-16 RX ADMIN — HEPARIN 500 UNITS: 100 SYRINGE at 15:40

## 2022-02-16 RX ADMIN — SODIUM CHLORIDE, PRESERVATIVE FREE 10 ML: 5 INJECTION INTRAVENOUS at 15:40

## 2022-02-18 ENCOUNTER — TRANSCRIBE ORDER (OUTPATIENT)
Dept: SCHEDULING | Age: 37
End: 2022-02-18

## 2022-02-18 DIAGNOSIS — C11.9 NASOPHARYNGEAL CARCINOMA (HCC): Primary | ICD-10-CM

## 2022-03-18 PROBLEM — C76.0 HEAD AND NECK CANCER (HCC): Status: ACTIVE | Noted: 2021-02-12

## 2022-03-18 PROBLEM — R04.0 EPISTAXIS: Status: ACTIVE | Noted: 2021-02-04

## 2022-03-19 PROBLEM — C11.9 NASOPHARYNGEAL CARCINOMA (HCC): Status: ACTIVE | Noted: 2021-02-17

## 2022-03-20 PROBLEM — R51.9 HEADACHE DUE TO INTRACRANIAL DISEASE: Status: ACTIVE | Noted: 2021-03-01

## 2022-03-20 PROBLEM — G93.9 HEADACHE DUE TO INTRACRANIAL DISEASE: Status: ACTIVE | Noted: 2021-03-01

## 2022-04-06 RX ORDER — SODIUM CHLORIDE 0.9 % (FLUSH) 0.9 %
5-10 SYRINGE (ML) INJECTION AS NEEDED
Status: CANCELLED | OUTPATIENT
Start: 2022-04-13

## 2022-04-06 RX ORDER — SODIUM CHLORIDE 0.9 % (FLUSH) 0.9 %
5-10 SYRINGE (ML) INJECTION AS NEEDED
OUTPATIENT
Start: 2022-06-08

## 2022-04-06 RX ORDER — SODIUM CHLORIDE 9 MG/ML
10 INJECTION INTRAMUSCULAR; INTRAVENOUS; SUBCUTANEOUS AS NEEDED
OUTPATIENT
Start: 2022-06-08

## 2022-04-06 RX ORDER — SODIUM CHLORIDE 9 MG/ML
10 INJECTION INTRAMUSCULAR; INTRAVENOUS; SUBCUTANEOUS AS NEEDED
OUTPATIENT
Start: 2022-08-03

## 2022-04-06 RX ORDER — HEPARIN 100 UNIT/ML
500 SYRINGE INTRAVENOUS AS NEEDED
Status: CANCELLED | OUTPATIENT
Start: 2022-04-13

## 2022-04-06 RX ORDER — HEPARIN 100 UNIT/ML
500 SYRINGE INTRAVENOUS AS NEEDED
OUTPATIENT
Start: 2022-06-08

## 2022-04-06 RX ORDER — HEPARIN 100 UNIT/ML
500 SYRINGE INTRAVENOUS AS NEEDED
OUTPATIENT
Start: 2022-08-03

## 2022-04-06 RX ORDER — SODIUM CHLORIDE 0.9 % (FLUSH) 0.9 %
5-10 SYRINGE (ML) INJECTION AS NEEDED
OUTPATIENT
Start: 2022-08-03

## 2022-04-06 RX ORDER — SODIUM CHLORIDE 9 MG/ML
10 INJECTION INTRAMUSCULAR; INTRAVENOUS; SUBCUTANEOUS AS NEEDED
Status: CANCELLED | OUTPATIENT
Start: 2022-04-13

## 2022-04-08 ENCOUNTER — HOSPITAL ENCOUNTER (OUTPATIENT)
Dept: PET IMAGING | Age: 37
Discharge: HOME | End: 2022-04-08
Attending: INTERNAL MEDICINE

## 2022-04-08 VITALS — BODY MASS INDEX: 25.58 KG/M2 | HEIGHT: 67 IN | WEIGHT: 163 LBS

## 2022-04-08 DIAGNOSIS — C76.0 HEAD AND NECK CANCER (HCC): ICD-10-CM

## 2022-04-08 LAB
GLUCOSE BLD STRIP.AUTO-MCNC: 86 MG/DL (ref 65–117)
SERVICE CMNT-IMP: NORMAL

## 2022-04-08 PROCEDURE — A9552 F18 FDG: HCPCS

## 2022-04-08 RX ORDER — FLUDEOXYGLUCOSE F-18 200 MCI/ML
10 INJECTION INTRAVENOUS ONCE
Status: COMPLETED | OUTPATIENT
Start: 2022-04-08 | End: 2022-04-08

## 2022-04-08 RX ADMIN — FLUDEOXYGLUCOSE F-18 10.01 MILLICURIE: 200 INJECTION INTRAVENOUS at 07:58

## 2022-04-10 NOTE — PROGRESS NOTES
Palliative Medicine Outpatient Services  Thanh: [phone] ([phone])    Patient Name: Junior Hagan  YOB: 1985    Date of Current Visit: 04/11/22  Location of Current Visit:    [x] [facility] Office  [] [facility] Office  [] [id number] [place] [facility] Office  [] Home  []Synchronous real-time A/V virtual visit    Date of Initial Visit: 3/1/21   Referral from: Maikel Moody MD  Primary Care Physician: JESSICA Moore      SUMMARY:   Junior Hagan is a 40y.o. 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 [place] but does not involve carotid artery), who was referred to Palliative Medicine by Dr. Ariane Case for symptom management and psychosocial support. He was diagnosed in 2/21 after presenting with months of recurrent headaches and epistaxis. He was treated with induction chemo with cisplatin + gemzar x 3 cycles followed by chemoRT under the care of Dr. Ariane Case. Nasal laryngoscopy 2/22/22: - no evidence of recurrent disease.       The patients social history includes: he is  to [place]. They have an 6year old daughter, Sean Dawn, and a 9year old son, Hannah Gale. He worked in construction until 2019. He is an uninsured, undocumented resident from [place]. Palliative Medicine is addressing the following current patient/family concerns: headaches, migrainous, likely related to malignancy; nausea; neck pain; symptom and psychosocial support over course of treatment; advance care planning     Initial Referral Intake note reviewed   PALLIATIVE DIAGNOSES:       ICD-10-CM ICD-9-CM    1. Headache due to intracranial disease  R51.9 784.0     G93.9 348.9    2. Nasopharyngeal carcinoma ([facility] [address]  C11.9 147.9           PLAN:   Patient Instructions     Dear Junior Hagan ,    It was a pleasure seeing you today via telephone visit.     We will defer making a follow-up appointment for you as you have no active symptoms and your recent scans showed you have no recurrent or metastatic cancer. If labs or imaging tests have been ordered for you today, please call the office  at [phone] 48 hours after completion to obtain the results. Your described symptoms were: Fatigue: 0 Drowsiness: 1   Depression: 0 Pain: 0   Anxiety: 0 Nausea: 0   Anorexia: 0 Dyspnea: 0   Best Well-Bein Constipation: No   Other Problem (Comment): 0       This is the plan we talked about:      1. Headaches  -You continue to have very mild headaches from your past radiation therapy  -Continue topiramate 50-mg twice daily  -Continue tylenol 500-mg: take 2 pills every 6 hours as needed    2. Nasopharyngeal Cancer  -You've completed chemotherapy and radiation therapy  -Your PET-CT scan on 22 showed no areas suspicious for cancer  -You're now being followed for surveillance by Dr. Juan Morris, Dr. Nelson Gilbert, and Dr. Jenny Kenney    3.  Advance care planning (we talked about this during your visit in )  -You want your wife, Dao Patel, to be your primary medical decision maker and your Shoshana Connolly, to be your secondary medical decision maker  -You wish to be hospitalized for any medical conditions at this point unless you had a serious medical condition from which you were not expected to recover  -You would want our doctors to let you and your family know if you had such a serious medical condition  -You would not want to be placed on a mechanical ventilator if you had a serious medical condition from which you would likely not receover  -We also talked in depth about CPR (cardiopulmonary resuscitation)  -If your heart were to suddenly stop beating and you stopped breathing, you would want CPR \"to give it a try\"  -If it appears as if you're going to die, your preference is to die at home  -I recommended you complete an Advance Medical Directive to have a copy of your wishes with you in your home        This is what you have shared with us about Advance Care Planning:      Primary Decision Maker (Active): Alivia Pate - Spouse - [phone]    Secondary Decision Maker (Active): [address], [address] [phone]  Advance Care Planning 2/16/2022   Patient's Healthcare Decision Maker is: Legal Next of Kin   Confirm Advance Directive -   Patient Would Like to Complete Advance Directive -           The Palliative Medicine Team is here to support you and your family. Sincerely,      Abbey Jefferson MD and the Palliative Medicine Team            GOALS OF CARE / TREATMENT PREFERENCES:   [====Goals of Care====]  GOALS OF CARE:  Patient / health care proxy stated goals: See Patient Instructions / Summary    TREATMENT PREFERENCES:   Code Status:  [x] Attempt Resuscitation       [] Do Not Attempt Resuscitation    Advance Care Planning:  [x] The Pall Med Interdisciplinary Team has updated the ACP Navigator with Decision Maker and Patient Capacity      Primary Decision Maker (Active): Alivia Pate - Spouse - [phone]    Secondary Decision Maker (Active): [phone], [id number] [facility] [phone]  Advance Care Planning 2/16/2022   Patient's Healthcare Decision Maker is: Legal Next of Kin   Confirm Advance Directive -   Patient Would Like to Complete Advance Directive -       Other:  (If patient appropriate for POST, consider using PALLPOST smart phrase here)    The palliative care team has discussed with patient / health care proxy about goals of care / treatment preferences for patient.  [====Goals of Care====]     PRESCRIPTIONS GIVEN:   No orders of the defined types were placed in this encounter.           FOLLOW UP:     Future Appointments   Date Time Provider Renae Fajardo   4/12/2022  8:15 AM Warren Durham  [address] BS AMB   4/13/2022  3:30 PM [facility]   6/8/2022  3:30 PM H1 JOSUE FARIAS [facility]           PHYSICIANS INVOLVED IN CARE:   Patient Care Team:  JESSICA Velasquez as PCP - General (Physician Assistant)  Vallie Merlin, PA as PCP - [facility] Empaneled Provider  Reola Sandifer, MD (Palliative Medicine)  Ruby Romeo MD (Hematology and Oncology)       HISTORY:   Reviewed patient-completed ESAS and advance care planning form. Reviewed patient record in prescription monitoring program.    CHIEF COMPLAINT:   Chief Complaint   Patient presents with    Headache       HPI/SUBJECTIVE:    The patient is: [x] Verbal / [] Nonverbal - history obtained with assistance of [facility] , Lakesha Iverson     He feels good. He has no pain. He sometimes gets a headache but not often. He continues to take Topamax    He had a slight nosebleed earlier this year, none since. He saw Dr. Tiffany Balderas on 2/22/22. Nasal laryngoscopy: - no evidence of recurrent disease    He had a PET-CT on 4/8/22: no evidence of recurrent or metastatic disease. See Plan/Patient Instructions for addition interval history    From IV 3/1/21:  He started having nosebleeds a few months ago. He went to the ED twice and had his nose packed. He started seeing Dr. Asael Oliver who used the scope (nasal endoscopy) which didn't show anything. Then he started having headaches and he continued to have nosebleeds, a lot of bleeding. He came to the ED again about a month ago. That's when he found out he had cancer. This news hit him hard. The doctors all got together and decided he should have chemotherapy first, then radiation therapy with another kind of chemotherapy. It has a 50-50 chance of working. The news hit him hard but he's trying to stay positive. He gets strength from his wife and children and from his friends. He talks with his family in [place] by phone. He isn't having headaches since he started taking the medicine (Topamax). He still gets nosebleeds, he had a small nosebleed lat week.   Dr. Asael Oliver talked with him about having a catheter put in his vein and going up to try to stop the bleeding (embolization). He sees Dr. Brando Gordon again today. The muscles in his neck feel sore sometimes. He thinks it's from how he sleeps sometimes. He doesn't have neck pain or soreness now. He had a little nausea after chemo last week. He has nausea medicine he can take. His appetite is good. He feels good. It's hard to to believe he has cancer. Clinical Pain Assessment (nonverbal scale for nonverbal patients):   [++++ Clinical Pain Assessment++++]  [++++Pain Severity++++]: Pain: 0  [++++Pain Character++++]:  [++++Pain Duration++++]:  [++++Pain Effect++++]:  [++++Pain Factors++++]:  [++++Pain Frequency++++]:  [++++Pain Location++++]:  [++++ Clinical Pain Assessment++++]       FUNCTIONAL ASSESSMENT:     Palliative Performance Scale (PPS):  PPS: 70       PSYCHOSOCIAL/SPIRITUAL SCREENING:     Any spiritual / Sabianism concerns:  [] Yes /  [x] No    Caregiver Burnout:  [] Yes /  [] No /  [x] No Caregiver Present      Anticipatory grief assessment:   [x] Normal  / [] Maladaptive       ESAS Anxiety: Anxiety: 0    ESAS Depression: Depression: 0       REVIEW OF SYSTEMS:     The following systems were [x] reviewed / [] unable to be reviewed  Systems: constitutional, ears/nose/mouth/throat, respiratory, gastrointestinal, genitourinary, musculoskeletal, integumentary, neurologic, psychiatric, endocrine. Positive findings noted below. Modified ESAS Completed by: provider   Fatigue: 0 Drowsiness: 1   Depression: 0 Pain: 0   Anxiety: 0 Nausea: 0   Anorexia: 0 Dyspnea: 0   Best Well-Bein Constipation: No   Other Problem (Comment): 0          PHYSICAL EXAM:     Wt Readings from Last 3 Encounters:   22 163 lb (73.9 kg)   21 173 lb (78.5 kg)   21 188 lb (85.3 kg)     There were no vitals taken for this visit.   Last bowel movement: See Nursing Note    Constitutional    [x] Appears well-developed and well-nourished in no apparent distress    [] Abnormal:  Mental status  [x] Alert and awake  [x] Oriented to person/place/time  [x] Able to follow commands  [] Abnormal:   Eyes  [x] EOM normal   [x] Sclera normal   [x] No visible ocular discharge  [] Abnormal:   HENT  [x] Normocephalic, atraumatic  [x] Mouth/Throat: Moist mucous membranes   [x] External Ears normal  [] Abnormal:  Neck  [x] No visualized mass  [] Abnormal:  Pulmonary/Chest   [x] Respiratory effort normal  [x] No visualized signs of difficulty breathing or respiratory distress  [] Abnormal:  Musculoskeletal  [x] Normal gait with no signs of ataxia  [x] Normal range of motion of neck  [] Abnormal:  Neurological:   [x] No facial asymmetry (Cranial nerve 7 motor function)  [x] No gaze palsy  [] Abnormal:   Skin  [x] No significant exanthematous lesions or discoloration noted on facial skin  [] Abnormal:                                  Psychiatric  [x] Normal affect  [x] No hallucinations  [] Abnormal:    Other pertinent observable physical exam findings:    Due to this being a TeleHealth evaluation, many elements of the physical examination are unable to be assessed. HISTORY:     History reviewed. No pertinent past medical history. Past Surgical History:   Procedure Laterality Date    IR INSERT TUNL CVC W PORT OVER 5 YEARS  2/11/2021         IR INSERT TUNL CVC W PORT OVER 5 YEARS  2/11/2021      History reviewed. No pertinent family history. History reviewed, no pertinent family history. Social History     Tobacco Use    Smoking status: Never Smoker    Smokeless tobacco: Never Used   Substance Use Topics    Alcohol use: Not Currently     Allergies   Allergen Reactions    Iodinated Contrast Media Itching      Current Outpatient Medications   Medication Sig    amLODIPine (NORVASC) 2.5 mg tablet Take 1 Tablet by mouth daily for 360 days. Indications: high blood pressure    topiramate (TOPAMAX) 50 mg tablet Take 1 Tablet by mouth two (2) times a day.     magic mouthwash solution Magic mouth wash Maalox Lidocaine 2% viscous Diphenhydramine oral solution Pharmacy to mix equal portions of ingredients to a total volume as indicated in the dispense amount. Swish & swallow 5mL (1 tsp) 4 times daily as needed for mouth sores (Patient not taking: Reported on 10/27/2021)    dexAMETHasone (DECADRON) 4 mg tablet Take 2 tabs (8mg) on days 2 & 3 of chemotherapy (Patient not taking: Reported on 10/27/2021)    ondansetron (ZOFRAN ODT) 8 mg disintegrating tablet Take 1 Tab by mouth every eight (8) hours as needed for Nausea or Vomiting. (Patient not taking: Reported on 10/27/2021)    lidocaine-prilocaine (EMLA) topical cream Apply  to affected area as needed for Pain. (Patient not taking: Reported on 10/27/2021)    polyethylene glycol (MIRALAX) 17 gram packet Take 1 Packet by mouth daily. Mix in 8 oz of water, juice, soda, coffee, or tea prior to administration (Patient not taking: Reported on 10/27/2021)    magnesium 250 mg tab Si po qhs (Patient not taking: Reported on 10/27/2021)    fexofenadine (ALLEGRA) 180 mg tablet Take 1 Tab by mouth daily. Indications: seasonal runny nose (Patient not taking: Reported on 10/27/2021)    acetaminophen (TYLENOL) 500 mg tablet Take  by mouth every six (6) hours as needed for Pain. 2 or 3 po daily  (Patient not taking: Reported on 11/3/2021)     No current facility-administered medications for this visit. LAB DATA REVIEWED:     Lab Results   Component Value Date/Time    WBC 5.3 2022 03:39 PM    HGB 13.3 2022 03:39 PM    PLATELET 810  03:39 PM     Lab Results   Component Value Date/Time    Sodium 140 2022 03:39 PM    Potassium 3.6 2022 03:39 PM    Chloride 111 (H) 2022 03:39 PM    CO2 22 2022 03:39 PM    BUN 14 2022 03:39 PM    Creatinine 0.82 2022 03:39 PM    Calcium 9.3 2022 03:39 PM    Magnesium 1.9 2021 08:53 AM    Phosphorus 4.4 2021 02:31 AM      Lab Results   Component Value Date/Time    Alk.  phosphatase 94 02/16/2022 03:39 PM    Protein, total 6.9 02/16/2022 03:39 PM    Albumin 3.5 02/16/2022 03:39 PM    Globulin 3.4 02/16/2022 03:39 PM     Lab Results   Component Value Date/Time    INR 1.1 02/04/2021 09:01 PM    Prothrombin time 11.0 02/04/2021 09:01 PM    aPTT 30.8 02/04/2021 09:01 PM      No results found for: IRON, FE, TIBC, IBCT, PSAT, FERR     PET/CT 2/18  FINDINGS:  HEAD/NECK: The right nasopharyngeal mass lesion is hypermetabolic, maximum SUV  is 15.3. Focal increased tracer activity is noted involving the very posterior  aspect of the septum. Bilateral hypermetabolic cervical lymph nodes are noted. CHEST: Small but hypermetabolic right supraclavicular lymph node is noted. ABDOMEN/PELVIS: No foci of abnormal hypermetabolism. SKELETON: No foci of abnormal hypermetabolism in the axial and visualized  appendicular skeleton. IMPRESSION  The right nasopharyngeal mass lesion is hypermetabolic and  compatible with the known neoplasm. Bilateral hypermetabolic cervical lymph  nodes. Focal increased tracer activity is seen involving the very posterior  aspect of the nasal septum. MRI orbit/face/neck 2/7:  FINDINGS:   Agree with above description of no significant change in the large right  nasopharyngeal mass extending into the right sphenoid sinus extending down into  the pterygoid region is present demonstrated on MRI and CTA 2/5/21. The right  internal carotid artery traverses in area of the skull is in contact with the  tumor in the foramen transversarium and proximal siphon region at the posterior  cavernous sinus. Again demonstrated is generalized mucosal thickening in sphenoid, right ethmoid  and maxillary sinus. Fluid right mastoid air cells. No abnormal intracranial enhancement demonstrated. No other masses. IMPRESSION  Again demonstrated is large right nasopharyngeal tumor as previously described  and without significant change.  Some involvement right internal carotid artery. Associated mucosal thickening and fluid right paranasal sinuses. MRI/MRA brain 2/5  FINDINGS:   MRI brain:  Ventricles and sulci are normal in size configuration. No evidence of acute  infarct. No edema or midline shift. No intracranial mass. In the right posterior nasopharynx there is a heterogeneously enhancing 4.2 x  4.0 cm soft tissue mass which extends superiorly into the sphenoid sinus and  obstructs the right ostiomeatal complex. There is circumferential lobulated  sinus mucosal thickening in the right maxillary sinus as well as the right  posterior ethmoid air cells. Right-sided mastoid effusion. MRA brain:  Narrowing of the right distal cervical internal carotid artery. Left distal  cervical internal carotid artery is normal. [place] is intact. Anterior  cerebral, middle cerebral, and posterior cerebral arteries are patent. Basilar  artery and distal vertebral arteries are patent. IMPRESSION  1. 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 2.  Diffuse sinus mucosal thickening. 3.  Mass related narrowing of the right distal cervical internal carotid artery  but no large vessel occlusion. 4.  No evidence of infarct. CTA neck 2/5:  CTA NECK:     Great vessels: Four-vessel aortic arch with patent origins. Right subclavian artery: Patent     Left subclavian artery: Patent      Right common carotid artery: Patent      Left common carotid artery: Patent      Cervical right internal carotid artery: Patent with no significant stenosis by  NASCET criteria. Attenuation of the right internal carotid artery as it passes  through the right nasopharyngeal mass, but no intraluminal thrombus or evidence  of active extravasation. Cervical left internal carotid artery: Patent with no significant stenosis by  NASCET criteria.      Right vertebral artery: Patent     Left vertebral artery: Patent, arises directly off the aortic arch. Imaged lung apices are clear. Thyroid gland is normal. Bilateral cervical  lymphadenopathy predominantly in level 2 and level 5A. 4.3 x 3.5 cm  heterogeneously enhancing right posterior nasopharyngeal soft tissue mass with  vessels coursing through the mass. No evidence of active extravasation. CTA HEAD 2/5:  Right cavernous internal carotid artery: Attenuated but patent     Left cavernous internal carotid artery: Patent     Anterior cerebral arteries: Patent     Anterior communicating artery: Patent     Right middle cerebral artery: Patent     Left middle cerebral artery: Patent     Posterior communicating arteries: Patent     Posterior cerebral arteries: Patent     Basilar artery: Patent     Distal vertebral arteries: Patent     No evidence for intracranial aneurysm or hemodynamically significant stenosis. IMPRESSION  1. Attenuation of the right internal carotid artery as it passes through the  right nasopharyngeal mass but no evidence of active extravasation or  intraluminal thrombus. 2.  Bilateral cervical lymphadenopathy in level 2 and level 5B. 3.  Right posterior nasopharyngeal mass with sphenoid sinus involvement and  diffuse sinus disease. PET-CT 4/8/22:  No PET avid evidence of recurrent or metastatic disease. CONTROLLED SUBSTANCES SAFETY ASSESSMENT (IF ON CONTROLLED SUBSTANCES):     Reviewed opioid safety handout:  [] Yes   [] No  24 hour opioid dose >150mg morphine equivalent/day:  [] Yes   [] No  Benzodiazepines:  [] Yes   [] No  Sleep apnea:  [] Yes   [] No  Urine Toxicology Testing within last 6 months:  [] Yes   [] No  History of or new aberrant medication taking behaviors:  [] Yes   [] No  Has Narcan been prescribed [] Yes   [] No          Total time:   Counseling / coordination time:   > 50% counseling / coordination?:     Nelsy Bah , was evaluated through a synchronous (real-time) audio-video encounter.  The patient (or guardian if applicable) is aware that this is a billable service, which includes applicable co-pays. This Virtual Visit was conducted with patient's (and/or legal guardian's) consent. The visit was conducted pursuant to the emergency declaration under the [address] [place] [address], [address] [place] [address] authority and the [employer] Act. Patient identification was verified, and a caregiver was present when appropriate. The patient was located in a state where the provider was licensed to provide care.

## 2022-04-10 NOTE — PROGRESS NOTES
[facility] at [address] [address] [address], [id number] [place] [address] Road, [address] [place] [address]  W: [phone]  F: [phone]    Reason for visit   Sharonda Goode is a 40 y.o. 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 male with no significant PMH seen for above  He was seen by Dr. Claudeen Glatter with ENT in December with c/o recurrent epistaxis x 2 months requiring ER visits and packing. His prior nasal endoscopy was clear. He was admitted on 2/4/2021 for recurrent HAs and epistaxis which lasted several minutes with improvement on pressure. He had a normal CBC for the most part and had no h/o bleeding growing up. MRI brain 2/5/2021 was obtained and showed a 4.2 x 4 cm posterior nasopharyngeal mass Extending into the sphenoid sinus with narrowing f the R distal internal carotid artery. CTA did not show active extravasation, but showed bilateral cervical adenopathy. He had a biopsy of the NP mass 2/6/2021 and pathology showed Nasopharyngeal carcinoma. He completed 3 cycles of cisplatin / gemzar and then cisplatin + RT. on surveillance and comes with scans. He comes with his wife. Doing well, He has no HAs, still on topiramate. He has no bleeding. He has no neuropathy, he has no ear pain, no new bone pain. He follows with Dr. Phillip Shah every 6 months      No past medical history on file.    Past Surgical History:   Procedure Laterality Date    IR INSERT TUNL CVC W PORT OVER 5 YEARS  2/11/2021         IR INSERT TUNL CVC W PORT OVER 5 YEARS  2/11/2021      Social History     Tobacco Use  Smoking status: Never Smoker    Smokeless tobacco: Never Used   Substance Use Topics    Alcohol use: Not Currently      No family history on file. Current Outpatient Medications   Medication Sig    amLODIPine (NORVASC) 2.5 mg tablet Take 1 Tablet by mouth daily for 360 days. Indications: high blood pressure    magic mouthwash solution Magic mouth wash Maalox Lidocaine 2% viscous Diphenhydramine oral solution Pharmacy to mix equal portions of ingredients to a total volume as indicated in the dispense amount. Swish & swallow 5mL (1 tsp) 4 times daily as needed for mouth sores (Patient not taking: Reported on 10/27/2021)    topiramate (TOPAMAX) 50 mg tablet Take 1 Tablet by mouth two (2) times a day.  dexAMETHasone (DECADRON) 4 mg tablet Take 2 tabs (8mg) on days 2 & 3 of chemotherapy (Patient not taking: Reported on 10/27/2021)    ondansetron (ZOFRAN ODT) 8 mg disintegrating tablet Take 1 Tab by mouth every eight (8) hours as needed for Nausea or Vomiting. (Patient not taking: Reported on 10/27/2021)    lidocaine-prilocaine (EMLA) topical cream Apply  to affected area as needed for Pain. (Patient not taking: Reported on 10/27/2021)    polyethylene glycol (MIRALAX) 17 gram packet Take 1 Packet by mouth daily. Mix in 8 oz of water, juice, soda, coffee, or tea prior to administration (Patient not taking: Reported on 10/27/2021)    magnesium 250 mg tab Si po qhs (Patient not taking: Reported on 10/27/2021)    fexofenadine (ALLEGRA) 180 mg tablet Take 1 Tab by mouth daily. Indications: seasonal runny nose (Patient not taking: Reported on 10/27/2021)    acetaminophen (TYLENOL) 500 mg tablet Take  by mouth every six (6) hours as needed for Pain. 2 or 3 po daily  (Patient not taking: Reported on 11/3/2021)     No current facility-administered medications for this visit.       Allergies   Allergen Reactions    Iodinated Contrast Media Itching        Review of Systems: A complete review of systems was obtained, negative except as described above. Physical Exam:     Visit Vitals  /66 (BP 1 Location: Left upper arm, BP Patient Position: Sitting)   Pulse 69   Temp 98.2 °F (36.8 °C)   Resp 18   Wt 162 lb (73.5 kg)   SpO2 98%   BMI 25.37 kg/m²     General appearance - alert, well appearing  Mental status - oriented to person, place, and time  Mouth - mucous membranes moist.  Neck - supple, PAC in place with no erythema/edema  Lymphatics - no palpable lymphadenopathy  Skin -no lesion  NEURO : CN Intact     ECOG PS:1    Results:     Lab Results   Component Value Date/Time    WBC 5.3 02/16/2022 03:39 PM    HGB 13.3 02/16/2022 03:39 PM    HCT 39.4 02/16/2022 03:39 PM    PLATELET 620 15/70/4606 03:39 PM    MCV 84.4 02/16/2022 03:39 PM    ABS. NEUTROPHILS 3.7 02/16/2022 03:39 PM     Lab Results   Component Value Date/Time    Sodium 140 02/16/2022 03:39 PM    Potassium 3.6 02/16/2022 03:39 PM    Chloride 111 (H) 02/16/2022 03:39 PM    CO2 22 02/16/2022 03:39 PM    Glucose 124 (H) 02/16/2022 03:39 PM    BUN 14 02/16/2022 03:39 PM    Creatinine 0.82 02/16/2022 03:39 PM    GFR est AA >60 02/16/2022 03:39 PM    GFR est non-AA >60 02/16/2022 03:39 PM    Calcium 9.3 02/16/2022 03:39 PM    Glucose (POC) 86 04/08/2022 07:53 AM     Lab Results   Component Value Date/Time    Bilirubin, total 0.3 02/16/2022 03:39 PM    ALT (SGPT) 21 02/16/2022 03:39 PM    Alk. phosphatase 94 02/16/2022 03:39 PM    Protein, total 6.9 02/16/2022 03:39 PM    Albumin 3.5 02/16/2022 03:39 PM    Globulin 3.4 02/16/2022 03:39 PM     Component      Latest Ref Rng & Units 2/7/2021          12:26 PM   Cristóbal-Purdy DNA QT, PCR      Negative copies/mL 327   Cristóbal-Barr Virus log10      log10 copy/mL 2.515       Records reviewed and summarized above. Pathology report(s) reviewed     PATHOLOGY     FINAL PATHOLOGIC DIAGNOSIS   1.  Right nasopharyngeal mass, biopsy:   Nasopharyngeal carcinoma, nonkeratinizing, undifferentiated type (see comment)   In situ hybridization for Cristóbal-Barr virus (SEMAJ) is positive   2. Right nasopharyngeal mass, biopsy:   Nasopharyngeal carcinoma, nonkeratinizing, undifferentiated type   Comment   Immunohistochemical stains performed from block 1A show that the invasive carcinoma expresses p40, AE1/AE3, and CAM5.2 while negative for p16, CK7, and CK20. This immunoprofile supports the above rendered diagnosis     Radiology report(s) reviewed above. CTA neck    IMPRESSION  1. Attenuation of the right internal carotid artery as it passes through the  right nasopharyngeal mass but no evidence of active extravasation or  intraluminal thrombus. 2.  Bilateral cervical lymphadenopathy in level 2 and level 5B. 3.  Right posterior nasopharyngeal mass with sphenoid sinus involvement and  diffuse sinus disease    MRI brain    IMPRESSION  1. 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 2.  Diffuse sinus mucosal thickening. 3.  Mass related narrowing of the right distal cervical internal carotid artery  but no large vessel occlusion. 4.  No evidence of infarct.       MRI orbit and face    IMPRESSION  Again demonstrated is large right nasopharyngeal tumor as previously described  and without significant change. Some involvement right internal carotid artery. Associated mucosal thickening and fluid right paranasal sinuses. PET 2/18/21:  IMPRESSION  The right nasopharyngeal mass lesion is hypermetabolic and  compatible with the known neoplasm. Bilateral hypermetabolic cervical lymph  nodes. Focal increased tracer activity is seen involving the very posterior  aspect of the nasal septum. PET/CT 4/8/2022     IMPRESSION  No PET avid evidence of recurrent or metastatic disease. Assessment:   1) Nasopharyngeal carcinoma- R   SEMAJ positive    At diagnosis he had a 4.2 x 4 cm mass, extended into the sphenoid sinus, bilateral cervical nodes.  Anderson but does not involve carotid artery  Nasopharyngeal carcinoma - at least T3N2M0-Stage III disease. This is an aggressive malignancy with high risk of fatal local complications  In his case , the abutment of carotid artery is highly concerning for risk of tumor extension and catastrophic bleeding. Received induction chemo with cisplatin + gemzar x 3 cycles followed by chemoRT (with weekly Cisplatin). He completed 3 cycles of cisplatin / gemzar and chemoRT by 6/2021 with a CR. On surveillance with no evidence of recurrence. PET scan 4/8/2022 was reviewed and is ANA. CN exam is unremarkable  He is keeping up with endoscopic exans  Reviewed surveillance, he is 1 year out from diagnosis. He will continue with every 3 to 6-month exams with Dr. Dary Arellano, I will get imaging every 6 months for another year thereafter as indicated. We will continue 6 monthly follow-ups till year 5 and then annual follow-ups. He was counseled on acute and late side effects of radiation. Lorette Nap includes cranial nerve palsies, carotid artery injury, delayed risk of second malignancies. Discussed that role of EBV for surveillance is controversial, as is that of periodic imaging      2) Epistaxis  Secondary to the mass  resolved    3) Headaches  Secondary to the nasopharyngeal mass with splenoid sinus involvement  Controlled on Verapamil but he should be able to wean off this- will do so with PCP   Continue palliative care follow up  Improved    4) Psychosocial  Comes with family. Supportive  Palauan speaking     Plan:     · Follow with radiation oncology, ENT as scheduled   · Continue to follow with palliative care  · Port removal    Arrange appointment with Dr. Dary Arellano (ENT)  in 6 months. RTC 6 months with scans  I appreciate the opportunity to participate in Mr. Gregory Mendoza's care.     Signed By: Alaina Fatima MD

## 2022-04-11 ENCOUNTER — VIRTUAL VISIT (OUTPATIENT)
Dept: PALLATIVE CARE | Age: 37
End: 2022-04-11

## 2022-04-11 DIAGNOSIS — C11.9 NASOPHARYNGEAL CARCINOMA (HCC): ICD-10-CM

## 2022-04-11 DIAGNOSIS — G93.9 HEADACHE DUE TO INTRACRANIAL DISEASE: Primary | ICD-10-CM

## 2022-04-11 DIAGNOSIS — R51.9 HEADACHE DUE TO INTRACRANIAL DISEASE: Primary | ICD-10-CM

## 2022-04-11 PROCEDURE — 99213 OFFICE O/P EST LOW 20 MIN: CPT | Performed by: INTERNAL MEDICINE

## 2022-04-11 NOTE — PROGRESS NOTES
Palliative Medicine Office Visit  Palliative Medicine Nurse Check In  [phone]    Patient Name: Junior Hagan  YOB: 1985      Date of Office Visit: 4/11/2022    Patient states: \"  \"    From Check In Sheet (scanned in Media):  Has a medical provider talked with you about cardiopulmonary resuscitation (CPR)? [] Yes   [] No   [] Unable to obtain    Nurse reminder to complete or update ACP FlowSheet:    Is ACP on the Problem List?    [] Yes    [] No  IF ACP Document is ON FILE; Nurse to place ACP on Problem List     Is there an ACP Note in Chart Review/Note? [] Yes    [] No   If NO: [address] Planning 2/16/2022   Patient's Healthcare Decision Maker is: Legal Next of Kin   Confirm Advance Directive -   Patient Would Like to Complete Advance Directive -       Is there anything that we should know about you as a person in order to provide you the best care possible? Have you been to the ER, urgent care clinic since your last visit? [] Yes   [x] No   [] Unable to obtain    Have you been hospitalized since your last visit? [] Yes   [x] No   [] Unable to obtain    Have you seen or consulted any other health care providers outside of the [address] [place] [address] since your last visit?    [] Yes   [x] No   [] Unable to obtain    Functional status (describe):         Last BM: 4/10/2022     accessed (date): 4/11/2022    Bottle review (for opioid pain medication):  Medication 1:   Date filled:   Directions:   # filled:   # left:   # pills taking per day:  Last dose taken:    Medication 2:   Date filled:   Directions:   # filled:   # left:   # pills taking per day:  Last dose taken:    Medication 3:   Date filled:   Directions:   # filled:   # left:   # pills taking per day:  Last dose taken:    Medication 4:   Date filled:   Directions:   # filled:   # left:   # pills taking per day:  Last dose taken:

## 2022-04-11 NOTE — PATIENT INSTRUCTIONS
Dear Nelsy Bah ,    It was a pleasure seeing you today via telephone visit. We will defer making a follow-up appointment for you as you have no active symptoms and your recent scans showed you have no recurrent or metastatic cancer. If labs or imaging tests have been ordered for you today, please call the office  at [phone] 48 hours after completion to obtain the results. Your described symptoms were: Fatigue: 0 Drowsiness: 1   Depression: 0 Pain: 0   Anxiety: 0 Nausea: 0   Anorexia: 0 Dyspnea: 0   Best Well-Bein Constipation: No   Other Problem (Comment): 0       This is the plan we talked about:      1. Headaches  -You continue to have very mild headaches from your past radiation therapy  -Continue topiramate 50-mg twice daily  -Continue tylenol 500-mg: take 2 pills every 6 hours as needed    2. Nasopharyngeal Cancer  -You've completed chemotherapy and radiation therapy  -Your PET-CT scan on 22 showed no areas suspicious for cancer  -You're now being followed for surveillance by Dr. Gale Mckeon, Dr. Vernell Santana, and Dr. Shahriar Serra    3.  Advance care planning (we talked about this during your visit in )  -You want your wife, Jolynn Simmons, to be your primary medical decision maker and your Charlestonedmundo Fisher, to be your secondary medical decision maker  -You wish to be hospitalized for any medical conditions at this point unless you had a serious medical condition from which you were not expected to recover  -You would want our doctors to let you and your family know if you had such a serious medical condition  -You would not want to be placed on a mechanical ventilator if you had a serious medical condition from which you would likely not receover  -We also talked in depth about CPR (cardiopulmonary resuscitation)  -If your heart were to suddenly stop beating and you stopped breathing, you would want CPR \"to give it a try\"  -If it appears as if you're going to die, your preference is to die at home  -I recommended you complete an Advance Medical Directive to have a copy of your wishes with you in your home        This is what you have shared with us about Advance Care Planning:      Primary Decision Maker (Active): Tony [facility] - Spouse - [phone]    Secondary Decision Maker (Active): [facility] [address] [phone]  Advance Care Planning 2/16/2022   Patient's Healthcare Decision Maker is: Legal Next of Kin   Confirm Advance Directive -   Patient Would Like to Complete Advance Directive -           The Palliative Medicine Team is here to support you and your family.        Sincerely,      Ayden Ahuja MD and the Palliative Medicine Team

## 2022-04-12 ENCOUNTER — OFFICE VISIT (OUTPATIENT)
Dept: ONCOLOGY | Age: 37
End: 2022-04-12

## 2022-04-12 VITALS
HEART RATE: 69 BPM | SYSTOLIC BLOOD PRESSURE: 105 MMHG | OXYGEN SATURATION: 98 % | WEIGHT: 162 LBS | BODY MASS INDEX: 25.37 KG/M2 | RESPIRATION RATE: 18 BRPM | DIASTOLIC BLOOD PRESSURE: 66 MMHG | TEMPERATURE: 98.2 F

## 2022-04-12 DIAGNOSIS — C76.0 HEAD AND NECK CANCER (HCC): ICD-10-CM

## 2022-04-12 DIAGNOSIS — C11.9 NPC (NASOPHARYNGEAL CARCINOMA) (HCC): Primary | ICD-10-CM

## 2022-04-12 PROCEDURE — 99214 OFFICE O/P EST MOD 30 MIN: CPT | Performed by: INTERNAL MEDICINE

## 2022-04-12 NOTE — PROGRESS NOTES
Scott Kidd is a 40 y.o. 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 Have you been to the ER, urgent care clinic since your last visit? Hospitalized since your last visit? No    2. Have you seen or consulted any other health care providers outside of the [address] [place] [address] since your last visit? Include any pap smears or colon screening.  No

## 2022-04-13 ENCOUNTER — HOSPITAL ENCOUNTER (OUTPATIENT)
Dept: INFUSION THERAPY | Age: 37
Discharge: HOME | End: 2022-04-13

## 2022-04-13 VITALS
DIASTOLIC BLOOD PRESSURE: 64 MMHG | TEMPERATURE: 97.3 F | SYSTOLIC BLOOD PRESSURE: 113 MMHG | HEART RATE: 77 BPM | RESPIRATION RATE: 16 BRPM

## 2022-04-13 DIAGNOSIS — C11.9 NPC (NASOPHARYNGEAL CARCINOMA) (HCC): Primary | ICD-10-CM

## 2022-04-13 LAB
ALBUMIN SERPL-MCNC: 3.9 G/DL (ref 3.5–5)
ALBUMIN/GLOB SERPL: 1.3 {RATIO} (ref 1.1–2.2)
ALP SERPL-CCNC: 74 U/L (ref 45–117)
ALT SERPL-CCNC: 28 U/L (ref 12–78)
ANION GAP SERPL CALC-SCNC: 9 MMOL/L (ref 5–15)
AST SERPL-CCNC: 12 U/L (ref 15–37)
BASOPHILS # BLD: 0 K/UL (ref 0–0.1)
BASOPHILS NFR BLD: 0 % (ref 0–1)
BILIRUB SERPL-MCNC: 0.4 MG/DL (ref 0.2–1)
BUN SERPL-MCNC: 18 MG/DL (ref 6–20)
BUN/CREAT SERPL: 17 (ref 12–20)
CALCIUM SERPL-MCNC: 9 MG/DL (ref 8.5–10.1)
CHLORIDE SERPL-SCNC: 110 MMOL/L (ref 97–108)
CO2 SERPL-SCNC: 23 MMOL/L (ref 21–32)
CREAT SERPL-MCNC: 1.05 MG/DL (ref 0.7–1.3)
DIFFERENTIAL METHOD BLD: NORMAL
EOSINOPHIL # BLD: 0.1 K/UL (ref 0–0.4)
EOSINOPHIL NFR BLD: 2 % (ref 0–7)
ERYTHROCYTE [DISTWIDTH] IN BLOOD BY AUTOMATED COUNT: 14.5 % (ref 11.5–14.5)
GLOBULIN SER CALC-MCNC: 3.1 G/DL (ref 2–4)
GLUCOSE SERPL-MCNC: 93 MG/DL (ref 65–100)
HCT VFR BLD AUTO: 40.6 % (ref 36.6–50.3)
HGB BLD-MCNC: 13.8 G/DL (ref 12.1–17)
IMM GRANULOCYTES # BLD AUTO: 0 K/UL (ref 0–0.04)
IMM GRANULOCYTES NFR BLD AUTO: 0 % (ref 0–0.5)
LYMPHOCYTES # BLD: 1 K/UL (ref 0.8–3.5)
LYMPHOCYTES NFR BLD: 21 % (ref 12–49)
MCH RBC QN AUTO: 28.6 PG (ref 26–34)
MCHC RBC AUTO-ENTMCNC: 34 G/DL (ref 30–36.5)
MCV RBC AUTO: 84.1 FL (ref 80–99)
MONOCYTES # BLD: 0.4 K/UL (ref 0–1)
MONOCYTES NFR BLD: 9 % (ref 5–13)
NEUTS SEG # BLD: 3.2 K/UL (ref 1.8–8)
NEUTS SEG NFR BLD: 68 % (ref 32–75)
NRBC # BLD: 0 K/UL (ref 0–0.01)
NRBC BLD-RTO: 0 PER 100 WBC
PLATELET # BLD AUTO: 205 K/UL (ref 150–400)
PMV BLD AUTO: 9.7 FL (ref 8.9–12.9)
POTASSIUM SERPL-SCNC: 4 MMOL/L (ref 3.5–5.1)
PROT SERPL-MCNC: 7 G/DL (ref 6.4–8.2)
RBC # BLD AUTO: 4.83 M/UL (ref 4.1–5.7)
SODIUM SERPL-SCNC: 142 MMOL/L (ref 136–145)
WBC # BLD AUTO: 4.8 K/UL (ref 4.1–11.1)

## 2022-04-13 PROCEDURE — 85025 COMPLETE CBC W/AUTO DIFF WBC: CPT

## 2022-04-13 PROCEDURE — 36591 DRAW BLOOD OFF VENOUS DEVICE: CPT

## 2022-04-13 PROCEDURE — 77030012965 HC NDL HUBR BBMI -A

## 2022-04-13 PROCEDURE — 74011000250 HC RX REV CODE- 250: Performed by: INTERNAL MEDICINE

## 2022-04-13 PROCEDURE — 80053 COMPREHEN METABOLIC PANEL: CPT

## 2022-04-13 PROCEDURE — 74011250636 HC RX REV CODE- 250/636: Performed by: INTERNAL MEDICINE

## 2022-04-13 RX ORDER — HEPARIN 100 UNIT/ML
500 SYRINGE INTRAVENOUS AS NEEDED
Status: DISCONTINUED | OUTPATIENT
Start: 2022-04-13 | End: 2022-04-14 | Stop reason: HOSPADM

## 2022-04-13 RX ORDER — SODIUM CHLORIDE 0.9 % (FLUSH) 0.9 %
5-10 SYRINGE (ML) INJECTION AS NEEDED
Status: DISCONTINUED | OUTPATIENT
Start: 2022-04-13 | End: 2022-04-14 | Stop reason: HOSPADM

## 2022-04-13 RX ORDER — SODIUM CHLORIDE 9 MG/ML
10 INJECTION INTRAMUSCULAR; INTRAVENOUS; SUBCUTANEOUS AS NEEDED
Status: DISCONTINUED | OUTPATIENT
Start: 2022-04-13 | End: 2022-04-14 | Stop reason: HOSPADM

## 2022-04-13 RX ADMIN — HEPARIN 500 UNITS: 100 SYRINGE at 16:00

## 2022-04-13 RX ADMIN — SODIUM CHLORIDE, PRESERVATIVE FREE 10 ML: 5 INJECTION INTRAVENOUS at 16:00

## 2022-04-13 NOTE — PROGRESS NOTES
[facility] - Chemotherapy Progress Note    6666 Pt admit to [facility] for port flush/labs ambulatory in stable condition. Assessment completed. No new concerns voiced. Port accessed with positive blood return. Labs drawn per order and sent. Line flushed, heparinized and de-accessed. Patient denies SOB, fever, cough, general not feeling well. Patient denies recent exposure to someone who has tested positive for COVID-19. Patient  denies having contact with anyone who has a pending COVID test.     Visit Vitals  /64   Pulse 77   Temp 97.3 °F (36.3 °C)   Resp 16       Medications Administered     0.9% sodium chloride injection 10 mL     Admin Date  04/13/2022 Action  Given Dose  10 mL Route  IntraVENous Administered By  Tanesha Oden RN          heparin (porcine) pf 500 Units     Admin Date  04/13/2022 Action  Given Dose  500 Units Route  IntraVENous Administered By  Tanesha Oden RN          sodium chloride (NS) flush 5-10 mL     Admin Date  04/13/2022 Action  Given Dose  10 mL Route  IntraVENous Administered By  Tanesha Oden RN                  1600 Pt tolerated treatment well. D/c home ambulatory in no distress. Pt aware of next [facility] appointment scheduled for 06/08/2022.     Please see CC for lab resulted when available

## 2022-04-22 ENCOUNTER — HOSPITAL ENCOUNTER (OUTPATIENT)
Dept: INTERVENTIONAL RADIOLOGY/VASCULAR | Age: 37
Discharge: HOME | End: 2022-04-22
Attending: INTERNAL MEDICINE | Admitting: STUDENT IN AN ORGANIZED HEALTH CARE EDUCATION/TRAINING PROGRAM

## 2022-04-22 VITALS
TEMPERATURE: 98.4 F | WEIGHT: 162 LBS | DIASTOLIC BLOOD PRESSURE: 82 MMHG | RESPIRATION RATE: 14 BRPM | HEIGHT: 69 IN | HEART RATE: 66 BPM | SYSTOLIC BLOOD PRESSURE: 119 MMHG | OXYGEN SATURATION: 99 % | BODY MASS INDEX: 23.99 KG/M2

## 2022-04-22 DIAGNOSIS — C76.0 HEAD AND NECK CANCER (HCC): ICD-10-CM

## 2022-04-22 PROCEDURE — 2709999900 HC NON-CHARGEABLE SUPPLY

## 2022-04-22 PROCEDURE — 74011000250 HC RX REV CODE- 250

## 2022-04-22 PROCEDURE — 77030011893 HC TY CUT DN TRIS -B

## 2022-04-22 PROCEDURE — 74011250636 HC RX REV CODE- 250/636: Performed by: STUDENT IN AN ORGANIZED HEALTH CARE EDUCATION/TRAINING PROGRAM

## 2022-04-22 PROCEDURE — 77030010507 HC ADH SKN DERMBND J&J -B

## 2022-04-22 PROCEDURE — 74011000250 HC RX REV CODE- 250: Performed by: STUDENT IN AN ORGANIZED HEALTH CARE EDUCATION/TRAINING PROGRAM

## 2022-04-22 PROCEDURE — 36590 REMOVAL TUNNELED CV CATH: CPT

## 2022-04-22 PROCEDURE — 77030031139 HC SUT VCRL2 J&J -A

## 2022-04-22 RX ORDER — FLUMAZENIL 0.1 MG/ML
0.5 INJECTION INTRAVENOUS
Status: DISCONTINUED | OUTPATIENT
Start: 2022-04-22 | End: 2022-04-22 | Stop reason: HOSPADM

## 2022-04-22 RX ORDER — FENTANYL CITRATE 50 UG/ML
200 INJECTION, SOLUTION INTRAMUSCULAR; INTRAVENOUS
Status: DISCONTINUED | OUTPATIENT
Start: 2022-04-22 | End: 2022-04-22 | Stop reason: HOSPADM

## 2022-04-22 RX ORDER — LIDOCAINE HYDROCHLORIDE AND EPINEPHRINE 10; 10 MG/ML; UG/ML
20 INJECTION, SOLUTION INFILTRATION; PERINEURAL
Status: COMPLETED | OUTPATIENT
Start: 2022-04-22 | End: 2022-04-22

## 2022-04-22 RX ORDER — NALOXONE HYDROCHLORIDE 0.4 MG/ML
0.4 INJECTION, SOLUTION INTRAMUSCULAR; INTRAVENOUS; SUBCUTANEOUS
Status: DISCONTINUED | OUTPATIENT
Start: 2022-04-22 | End: 2022-04-22 | Stop reason: HOSPADM

## 2022-04-22 RX ORDER — LIDOCAINE HYDROCHLORIDE 20 MG/ML
20 INJECTION, SOLUTION INFILTRATION; PERINEURAL
Status: COMPLETED | OUTPATIENT
Start: 2022-04-22 | End: 2022-04-22

## 2022-04-22 RX ORDER — MIDAZOLAM HYDROCHLORIDE 1 MG/ML
5 INJECTION, SOLUTION INTRAMUSCULAR; INTRAVENOUS
Status: DISCONTINUED | OUTPATIENT
Start: 2022-04-22 | End: 2022-04-22 | Stop reason: HOSPADM

## 2022-04-22 RX ORDER — SODIUM CHLORIDE 9 MG/ML
25 INJECTION, SOLUTION INTRAVENOUS
Status: DISCONTINUED | OUTPATIENT
Start: 2022-04-22 | End: 2022-04-22 | Stop reason: HOSPADM

## 2022-04-22 RX ORDER — LIDOCAINE HYDROCHLORIDE 20 MG/ML
INJECTION, SOLUTION INFILTRATION; PERINEURAL
Status: COMPLETED
Start: 2022-04-22 | End: 2022-04-22

## 2022-04-22 RX ADMIN — LIDOCAINE HYDROCHLORIDE 3 ML: 20 INJECTION, SOLUTION INFILTRATION; PERINEURAL at 09:03

## 2022-04-22 RX ADMIN — FENTANYL CITRATE 50 MCG: 50 INJECTION, SOLUTION INTRAMUSCULAR; INTRAVENOUS at 08:57

## 2022-04-22 RX ADMIN — MIDAZOLAM 1 MG: 1 INJECTION INTRAMUSCULAR; INTRAVENOUS at 08:57

## 2022-04-22 RX ADMIN — LIDOCAINE HYDROCHLORIDE,EPINEPHRINE BITARTRATE 1 ML: 10; .01 INJECTION, SOLUTION INFILTRATION; PERINEURAL at 09:03

## 2022-04-22 NOTE — DISCHARGE INSTRUCTIONS
PORT REMOVAL    DISCHARGE INSTRUCTIONS    General Information:     Your doctor has ordered for us to remove your port This could be that you do not need it anymore, it is not doing its job, your physician has decided on another plan for your treatment and/or it may need replacing. Home Care Instructions: You can resume your regular diet and medication regimen. Do not drink alcohol, drive, or make any important legal decisions in the next 24 hours. Do not lift anything heavier than a gallon of milk, or do anything strenuous for the next 24 hours. You will notice a dressing over the site of the removal. This dressing should stay in place until the site is healed. The dressing should be changed at least every 48 hours. You should change the dressing sooner if it becomes soiled or wet. The nurse who discharges you to home should review with you any wound care instructions. Resume your normal level of activity slowly. You may shower after 24 hours, but do not take a bath or swim or immerse yourself in water until the site has healed, and keep the dressing dry with plastic wrap while showering. The site may ooze for a couple of days. This drainage should lessen with each passing day. Call If:     You should call your Physician and/or the Radiology Nurse if you have any bleeding other than a small spot on your bandage. Call if you have any signs of infection, fever, or increased pain at the site of the tube. Call if the oozing increases, if it changes color, or begins to have an odor. Follow-Up Instructions: Please see your ordering doctor as he/she has requested. To Reach Us:  You have received sedation medications today. YOU SHOULD NOT DRIVE FOR 24 HOURS, DO NOT OPERATE HEAVY MACHINERY, DO NOT MAKE ANY LEGAL DECISIONS OR SIGN LEGAL DOCUMENTS FOR 24 HOURS. DO NOT DRINK ALCOHOL, TAKE ANY MEDICATIONS UNLESS PRESCRIBED BY YOUR DOCTOR. IF YOU ARE A CAREGIVER, SOMEONE SHOULD TAKE THAT ROLE FOR 24 HOURS. Side effects of sedation medications and other medications used today have been reviewed  Those side effects can include but are not limited to: dizziness, drowsiness, poor balance, fatigue, sleepiness. Take precautions at home to prevent falls, such as assistance with walking or stairs if allowed and /or when needed or position changes. Allergic or adverse reactions could include nausea, itching, hives, dizziness, or anything else out of the ordinary. Should you experience any of these significant changes, please call [phone] between the hours of 7:30 am and 3:30 pm or [phone] after hours. After hours, ask the  to page the X-ray Technologist, and describe the problem to the technologist. If you are experiencing chest pain, shortness of breath, altered mental state, unusual bleeding or any other emergent symptom you should call 911 immediately.               Patient Signature:  Date: 4/22/2022  Discharging Nurse: Ron Michaels RN

## 2022-04-22 NOTE — H&P
Radiology History and Physical    Patient: Pamela Iniguez 40 y.o. male       Chief Complaint: No chief complaint on file. History of Present Illness: Port removal    History:    No past medical history on file. No family history on file. Social History     Socioeconomic History    Marital status:      Spouse name: Not on file    Number of children: Not on file    Years of education: Not on file    Highest education level: Not on file   Occupational History    Not on file   Tobacco Use    Smoking status: Never Smoker    Smokeless tobacco: Never Used   Substance and Sexual Activity    Alcohol use: Not Currently    Drug use: Never    Sexual activity: Not on file   Other Topics Concern    Not on file   Social History Narrative    Not on file     Social Determinants of Health     Financial Resource Strain:     Difficulty of Paying Living Expenses: Not on file   Food Insecurity:     Worried About Running Out of Food in the Last Year: Not on file    Marques of Food in the Last Year: Not on file   Transportation Needs:     Lack of Transportation (Medical): Not on file    Lack of Transportation (Non-Medical):  Not on file   Physical Activity:     Days of Exercise per Week: Not on file    Minutes of Exercise per Session: Not on file   Stress:     Feeling of Stress : Not on file   Social Connections:     Frequency of Communication with Friends and Family: Not on file    Frequency of Social Gatherings with Friends and Family: Not on file    Attends Adventism Services: Not on file    Active Member of Clubs or Organizations: Not on file    Attends Club or Organization Meetings: Not on file    Marital Status: Not on file   Intimate Partner Violence:     Fear of Current or Ex-Partner: Not on file    Emotionally Abused: Not on file    Physically Abused: Not on file    Sexually Abused: Not on file   Housing Stability:     Unable to Pay for Housing in the Last Year: Not on file  Number of Places Lived in the Last Year: Not on file    Unstable Housing in the Last Year: Not on file       Allergies: Allergies   Allergen Reactions    Iodinated Contrast Media Itching       Current Medications:  Current Facility-Administered Medications   Medication Dose Route Frequency    lidocaine (XYLOCAINE) 20 mg/mL (2 %) injection 400 mg  20 mL SubCUTAneous RAD ONCE    lidocaine-EPINEPHrine (XYLOCAINE) 1 %-1:100,000 injection 200 mg  20 mL SubCUTAneous RAD ONCE    lidocaine (XYLOCAINE) 20 mg/mL (2 %) injection        flumazeniL (ROMAZICON) 0.1 mg/mL injection 0.5 mg  0.5 mg IntraVENous RAD PRN    midazolam (VERSED) injection 5 mg  5 mg IntraVENous Rad Multiple    fentaNYL citrate (PF) injection 200 mcg  200 mcg IntraVENous Rad Multiple    naloxone (NARCAN) injection 0.4 mg  0.4 mg IntraVENous RAD PRN    0.9% sodium chloride infusion  25 mL/hr IntraVENous RAD CONTINUOUS        Physical Exam:  Blood pressure 121/89, pulse 67, temperature 98.4 °F (36.9 °C), resp. rate 26, height 5' 9\" (1.753 m), weight 73.5 kg (162 lb), SpO2 100 %. GENERAL: alert, cooperative, no distress, appears stated age  LUNG: clear to auscultation bilaterally  HEART: regular rate and rhythm  ABD: Non tender, non distended. Alerts:    Hospital Problems  Date Reviewed: 4/12/2022    None          Laboratory:    No results for input(s): HGB, HCT, WBC, PLT, INR, BUN, CREA, K, CRCLT, HGBEXT, HCTEXT, PLTEXT, INREXT in the last 72 hours. No lab exists for component: PTT, PT      Plan of Care/Planned Procedure:  Risks, benefits, and alternatives reviewed with patient and he agrees to proceed with the procedure. Deemed appropriate for moderate sedation with versed and fentanyl.       João Lu MD

## 2022-04-22 NOTE — PROGRESS NOTES
Pt arrives ambulatory to angio department accompanied by friend for port removal procedure. All assessments completed and consent was reviewed. Education given was regarding procedure, conscious sedation, post-procedure care and  management/follow-up. Opportunity for questions was provided and all questions and concerns were addressed.

## 2022-06-30 NOTE — PROGRESS NOTES
[facility] Chemo Progress Note      0720 Mr. Nicolas Tamayo Arrived to [facility] for  Gemzar/Cisplatin (C2) ambulatory in stable condition. Assessment was completed, no acute issues at this time, no new complaints voiced. Port accessed with positive blood return. Labs drawn and sent for processing. Port flushed, curos cap applied to end clave. Patient went to medical oncology apt. Patient denies SOB, fever, cough, general not feeling well. Patient denies recent exposure to someone who has tested positive for COVID-19. Patient denies having contact with anyone who has a pending COVID test.       3408 Patient returned to [facility]. Port connected to hydration. Chemotherapy Flowsheet 3/16/2021   Cycle C2D1   Date 3/16/2021   Drug / Regimen Gemzar/Cisplatin   Pre Hydration given   Post Hydration given   Pre Meds given   Notes given         Mr. Trey Mendoza's vitals were reviewed. Patient Vitals for the past 12 hrs:   Temp Pulse Resp BP   03/16/21 1351  92  128/78   03/16/21 0722 97.1 °F (36.2 °C) 92 16 135/88         Lab results were obtained and reviewed. Recent Results (from the past 12 hour(s))   CBC WITH AUTOMATED DIFF    Collection Time: 03/16/21  7:25 AM   Result Value Ref Range    WBC 5.4 4.1 - 11.1 K/uL    RBC 4.37 4.10 - 5.70 M/uL    HGB 11.8 (L) 12.1 - 17.0 g/dL    HCT 36.3 (L) 36.6 - 50.3 %    MCV 83.1 80.0 - 99.0 FL    MCH 27.0 26.0 - 34.0 PG    MCHC 32.5 30.0 - 36.5 g/dL    RDW 17.3 (H) 11.5 - 14.5 %    PLATELET 107 561 - 290 K/uL    MPV 9.3 8.9 - 12.9 FL    NRBC 0.0 0  WBC    ABSOLUTE NRBC 0.00 0.00 - 0.01 K/uL    NEUTROPHILS 50 32 - 75 %    LYMPHOCYTES 32 12 - 49 %    MONOCYTES 13 5 - 13 %    EOSINOPHILS 2 0 - 7 %    BASOPHILS 1 0 - 1 %    IMMATURE GRANULOCYTES 2 (H) 0.0 - 0.5 %    ABS. NEUTROPHILS 2.8 1.8 - 8.0 K/UL    ABS. LYMPHOCYTES 1.7 0.8 - 3.5 K/UL    ABS. MONOCYTES 0.7 0.0 - 1.0 K/UL    ABS. EOSINOPHILS 0.1 0.0 - 0.4 K/UL    ABS. BASOPHILS 0.0 0.0 - 0.1 K/UL    ABS. IMM.  GRANS. 0.1 (H) 0.00 - 0.04 K/UL    DF AUTOMATED     METABOLIC PANEL, COMPREHENSIVE    Collection Time: 03/16/21  7:25 AM   Result Value Ref Range    Sodium 142 136 - 145 mmol/L    Potassium 3.9 3.5 - 5.1 mmol/L    Chloride 110 (H) 97 - 108 mmol/L    CO2 23 21 - 32 mmol/L    Anion gap 9 5 - 15 mmol/L    Glucose 96 65 - 100 mg/dL    BUN 9 6 - 20 MG/DL    Creatinine 0.68 (L) 0.70 - 1.30 MG/DL    BUN/Creatinine ratio 13 12 - 20      GFR est AA >60 >60 ml/min/1.73m2    GFR est non-AA >60 >60 ml/min/1.73m2    Calcium 9.1 8.5 - 10.1 MG/DL    Bilirubin, total 0.2 0.2 - 1.0 MG/DL    ALT (SGPT) 76 12 - 78 U/L    AST (SGOT) 24 15 - 37 U/L    Alk. phosphatase 120 (H) 45 - 117 U/L    Protein, total 6.8 6.4 - 8.2 g/dL    Albumin 3.4 (L) 3.5 - 5.0 g/dL    Globulin 3.4 2.0 - 4.0 g/dL    A-G Ratio 1.0 (L) 1.1 - 2.2     MAGNESIUM    Collection Time: 03/16/21  7:25 AM   Result Value Ref Range    Magnesium 2.1 1.6 - 2.4 mg/dL       Pre-medications  were administered as ordered and chemotherapy was initiated.   Medications Administered     0.9% sodium chloride 1,000 mL with potassium chloride 10 mEq, magnesium sulfate 2 g infusion     Admin Date  03/16/2021 Action  Given Dose   Rate  1,000 mL/hr Route  IntraVENous Administered By  Viktoria Sutton RN           Admin Date  03/16/2021 Action  Given Dose   Rate   Route  IntraVENous Administered By  Viktoria Sutton RN          0.9% sodium chloride infusion     Admin Date  03/16/2021 Action  New Bag Dose  25 mL/hr Rate  25 mL/hr Route  IntraVENous Administered By  Viktoria Sutton RN          0.9% sodium chloride injection 10 mL     Admin Date  03/16/2021 Action  Given Dose  10 mL Route  IntraVENous Administered By  Viktoria Sutton RN          CISplatin (PLATINOL) 160 mg in 0.9% sodium chloride 500 mL, overfill volume 50 mL chemo infusion     Admin Date  03/16/2021 Action  New Bag Dose  160 mg Rate  355 mL/hr Route  IntraVENous Administered By  Viktoria Sutton, RN          dexamethasone (DECADRON) 12 mg in 0.9% sodium chloride 50 mL, overfill volume 5 mL IVPB     Admin Date  03/16/2021 Action  Given Dose  12 mg Rate  232 mL/hr Route  IntraVENous Administered By  Corrinne Haring, RN          fosaprepitant (EMEND) 150 mg in 0.9% sodium chloride 150 mL IVPB     Admin Date  03/16/2021 Action  Given Dose  150 mg Rate  450 mL/hr Route  IntraVENous Administered By  Corrinne Haring, RN          gemcitabine (GEMZAR) 2,000 mg in 0.9% sodium chloride 250 mL, overfill volume 25 mL chemo infusion     Admin Date  03/16/2021 Action  New Bag Dose  2,000 mg Rate  655.2 mL/hr Route  IntraVENous Administered By  Corrinne Haring, RN          heparin (porcine) pf 300-500 Units     Admin Date  03/16/2021 Action  Given Dose  500 Units Route  InterCATHeter Administered By  Corrinne Haring, RN          palonosetron HCl (ALOXI) injection 0.25 mg     Admin Date  03/16/2021 Action  Given Dose  0.25 mg Route  IntraVENous Administered By  Corrinne Haring, RN          sodium chloride (NS) flush 10 mL     Admin Date  03/16/2021 Action  Given Dose  10 mL Route  IntraVENous Administered By  Corrinne Haring, RN              1400 Patient tolerated treatment well. Port maintained positive blood return throughout treatment. Port flushed, heparinized and de accessed per protocol. Patient was discharged from [facility] in stable condition. Patient is aware of next appointment.     Future Appointments   Date Time Provider Renae Tana   3/23/2021  7:30 AM B2 JOSUE LONG [address] [facility] H   3/30/2021  5:00 PM G1 JOSUE [address]   3/31/2021 10:30 AM [facility]   4/6/2021  7:30 AM B2 JOSUE LONG [address] [facility] H   4/6/2021  8:15 AM Horacio Jones  [address] BS AMB   4/13/2021  4:30 [facility]  March 16, 2021 decreased sequencing ability/decreased weight-shifting ability

## 2022-07-26 DIAGNOSIS — G43.909 MIGRAINE SYNDROME: ICD-10-CM

## 2022-07-26 RX ORDER — TOPIRAMATE 50 MG/1
TABLET, FILM COATED ORAL
Qty: 60 TABLET | Refills: 0 | Status: SHIPPED | OUTPATIENT
Start: 2022-07-26 | End: 2022-08-10 | Stop reason: SDUPTHER

## 2022-08-10 ENCOUNTER — OFFICE VISIT (OUTPATIENT)
Dept: FAMILY MEDICINE CLINIC | Age: 37
End: 2022-08-10

## 2022-08-10 VITALS
BODY MASS INDEX: 24.85 KG/M2 | DIASTOLIC BLOOD PRESSURE: 79 MMHG | WEIGHT: 167.8 LBS | TEMPERATURE: 97.9 F | SYSTOLIC BLOOD PRESSURE: 118 MMHG | HEIGHT: 69 IN | OXYGEN SATURATION: 98 % | RESPIRATION RATE: 20 BRPM | HEART RATE: 62 BPM

## 2022-08-10 DIAGNOSIS — G43.909 MIGRAINE SYNDROME: ICD-10-CM

## 2022-08-10 DIAGNOSIS — R63.4 WEIGHT LOSS: ICD-10-CM

## 2022-08-10 DIAGNOSIS — Z23 ENCOUNTER FOR IMMUNIZATION: ICD-10-CM

## 2022-08-10 DIAGNOSIS — I10 PRIMARY HYPERTENSION: Primary | ICD-10-CM

## 2022-08-10 PROCEDURE — 90471 IMMUNIZATION ADMIN: CPT

## 2022-08-10 PROCEDURE — 90732 PPSV23 VACC 2 YRS+ SUBQ/IM: CPT

## 2022-08-10 PROCEDURE — 99213 OFFICE O/P EST LOW 20 MIN: CPT | Performed by: PHYSICIAN ASSISTANT

## 2022-08-10 RX ORDER — TOPIRAMATE 50 MG/1
50 TABLET, FILM COATED ORAL 2 TIMES DAILY
Qty: 180 TABLET | Refills: 3 | Status: SHIPPED | OUTPATIENT
Start: 2022-08-10 | End: 2022-08-18 | Stop reason: SDUPTHER

## 2022-08-10 NOTE — PROGRESS NOTES
Assessment/Plan:    Diagnoses and all orders for this visit:    1. Primary hypertension  Weight loss has brought his bp down  Sometimes he has orthostatic sxs with position changes    2. Migraine syndrome  -     topiramate (TOPAMAX) 50 mg tablet; Take 1 Tablet by mouth two (2) times a day. 3. Weight loss  Over the past 18 months, he has lost 50+ lbs. His bp is low normal on amlodipine 2.5 mg daily and he would like to stop the bp med. He has f/up with oncology in 8 weeks and they do check bp at that visit. He was instructed to call me if his bp is elevated at that visit and we can restart the amlodipine    4. Encounter for immunization  -     PNEUMOCOCCAL, PPSV23, (AGE 2 YRS+), SC/IM    Will do TDaP at f/up visit     Follow-up and Dispositions    Return in about 6 months (around 2/10/2023) for f2f . Seth Atkinson, NIESHA Galvan Or expressed understanding of this plan. An AVS was printed and given to the patient.      ----------------------------------------------------------------------    Chief Complaint   Patient presents with    Annual Wellness Visit     Annual physical, needs med refill; is wondering if his BP med can be lowered but he doesn't take BP at home       History of Present Illness:    Pt presents by my request for an annual well visit  He is still seeing radiation oncology and oncologist at regularly scheduled visits, I have reviewed all of their office notes  Additionally, I have reviewed palliative care notes  He presents with his wife, Scott Holland, today. He tells me that he is doing well. He asks to stop the amlodipine. He states that sometimes he feels dizzy with position changes. He is only taking amlodipine 2.5mg daily. He asks about the topamax. , if he needs to continue this. He is feeling well on the medication so he should continue it    He has not had any nose bleeds. He has only rare headaches. His emotional status is good.  His family is doing well with the emotional crisis of his cancer diagnosis. His children are 5and 15years old now  The pt is able to work again. He is working outdoors and he is able to stay hydrated  He states that his appetite is improving but he has not been able to taste or smell since the surgery and treatment. He was told that this could slowly improve over time     No past medical history on file. Current Outpatient Medications   Medication Sig Dispense Refill    topiramate (TOPAMAX) 50 mg tablet Take 1 Tablet by mouth two (2) times a day. 180 Tablet 3    magic mouthwash solution Magic mouth wash Maalox Lidocaine 2% viscous Diphenhydramine oral solution Pharmacy to mix equal portions of ingredients to a total volume as indicated in the dispense amount. Swish & swallow 5mL (1 tsp) 4 times daily as needed for mouth sores (Patient not taking: Reported on 8/10/2022) 480 mL 3    ondansetron (ZOFRAN ODT) 8 mg disintegrating tablet Take 1 Tab by mouth every eight (8) hours as needed for Nausea or Vomiting. (Patient not taking: Reported on 8/10/2022) 30 Tab 3    lidocaine-prilocaine (EMLA) topical cream Apply  to affected area as needed for Pain. (Patient not taking: Reported on 8/10/2022) 30 g 0    polyethylene glycol (MIRALAX) 17 gram packet Take 1 Packet by mouth daily. Mix in 8 oz of water, juice, soda, coffee, or tea prior to administration (Patient not taking: Reported on 8/10/2022) 30 Each 0    magnesium 250 mg tab Si po qhs (Patient not taking: Reported on 8/10/2022) 30 Tab 3    fexofenadine (ALLEGRA) 180 mg tablet Take 1 Tab by mouth daily. Indications: seasonal runny nose (Patient not taking: Reported on 8/10/2022) 30 Tab 0    acetaminophen (TYLENOL) 500 mg tablet Take  by mouth every six (6) hours as needed for Pain.  2 or 3 po daily         Allergies   Allergen Reactions    Iodinated Contrast Media Itching     8/10/22 pt states he is not allergic        Social History     Tobacco Use    Smoking status: Never    Smokeless tobacco: Never   Substance Use Topics    Alcohol use: Not Currently    Drug use: Never       No family history on file.     Physical Exam:     Visit Vitals  /79 (BP 1 Location: Left upper arm, BP Patient Position: Sitting)   Pulse 62   Temp 97.9 °F (36.6 °C) (Temporal)   Resp 20   Ht 5' 9.02\" (1.753 m)   Wt 167 lb 12.8 oz (76.1 kg)   SpO2 98%   BMI 24.77 kg/m²       A&Ox3  WDWN NAD  Respirations normal and non labored  Cor RRR s1s2  Lungs CTA jeanette  Nose- no acute bleeding, no masses seen  Oral- neg for acute finding  Neck supple w/out mass

## 2022-08-10 NOTE — PROGRESS NOTES
Mendoza Maya seen at d/c, full name and  verified, given After visit Summary and reviewed today's visit with patient along with instructions on when it is recommended to come back. I reviewed the medication list with the patient to ensure he knows how to and when to take his medications. Side effects, mechanisms of action and medication compliance were reiterated to ensure proper understanding. PATIENT completed screening documentation FOR PNEUMOVAX VACCINE. No contraindications for administering vaccines listed or stated. Immunizations given per policy following UADAM-10 precautions. Entered into [employer]. Vaccine Immunization Statement(s) given and instructions for adverse reaction. Explained that if signs and syptoms of allergic reaction appear (rash, swelling of mouth or face, or shortness of breath) to go directly to the nearest ER. Raquel Vera No adverse reaction noted at time of discharge from vaccine area.   A slip was filled out for parent to take to registration and set up the patient's next natalie on or after (ASAP) for TDAP VACCINE ([address])   Gris Maier RN

## 2022-08-10 NOTE — PROGRESS NOTES
Chief Complaint   Patient presents with    Annual Wellness Visit     Annual physical, needs med refill; is wondering if his BP med can be lowered but he doesn't take BP at home   Visit Vitals  /79 (BP 1 Location: Left upper arm, BP Patient Position: Sitting)   Pulse 62   Temp 97.9 °F (36.6 °C) (Temporal)   Resp 20   Ht 5' 9.02\" (1.753 m)   Wt 167 lb 12.8 oz (76.1 kg)   SpO2 98%   BMI 24.77 kg/m²       Coordination of Care  1. Have you been to the ER, urgent care clinic since your last visit? Hospitalized since your last visit? Yes was receiving chemo + having CA appts at [facility]    2. Have you seen or consulted any other health care providers outside of the [address] [place] [address] since your last visit? Include any pap smears or colon screening. Yes see above    Does the patient need refills? YES    Learning Assessment Complete?  yes  Depression Screening complete in the past 12 months? yes

## 2022-08-17 ENCOUNTER — CLINICAL SUPPORT (OUTPATIENT)
Dept: FAMILY MEDICINE CLINIC | Age: 37
End: 2022-08-17

## 2022-08-17 DIAGNOSIS — Z23 ENCOUNTER FOR IMMUNIZATION: Primary | ICD-10-CM

## 2022-08-17 PROCEDURE — 90715 TDAP VACCINE 7 YRS/> IM: CPT

## 2022-08-17 NOTE — PROGRESS NOTES
T-dap vaccine was administered with consent. VIS was given before administration. Patient waited 15 minutes after administration. No reaction noted.

## 2022-08-18 ENCOUNTER — OFFICE VISIT (OUTPATIENT)
Dept: FAMILY MEDICINE CLINIC | Age: 37
End: 2022-08-18

## 2022-08-18 VITALS
OXYGEN SATURATION: 100 % | WEIGHT: 171 LBS | BODY MASS INDEX: 25.24 KG/M2 | TEMPERATURE: 97.3 F | SYSTOLIC BLOOD PRESSURE: 119 MMHG | DIASTOLIC BLOOD PRESSURE: 77 MMHG | HEART RATE: 55 BPM

## 2022-08-18 DIAGNOSIS — I10 HYPERTENSION, UNSPECIFIED TYPE: Primary | ICD-10-CM

## 2022-08-18 DIAGNOSIS — G43.909 MIGRAINE SYNDROME: ICD-10-CM

## 2022-08-18 PROCEDURE — 99213 OFFICE O/P EST LOW 20 MIN: CPT | Performed by: FAMILY MEDICINE

## 2022-08-18 RX ORDER — TOPIRAMATE 50 MG/1
50 TABLET, FILM COATED ORAL 2 TIMES DAILY
Qty: 180 TABLET | Refills: 3 | Status: SHIPPED | OUTPATIENT
Start: 2022-08-18

## 2022-08-18 NOTE — PROGRESS NOTES
Pt no longer in waiting area. Called pt, he had already left. Verified name and , reviewed discharge instructions w/ pt over the phone, including: continuing medications, f/up appt. Pt verbalized understanding.  Told pt we will mail him a copy of AVS.

## 2022-08-18 NOTE — PROGRESS NOTES
Subjective:     Patient here for follow-up of elevated blood pressure. He {is/is not:9024}{} exercising and {is/is not:9024}{} adherent to a low-salt diet. Blood pressure {is/is not:9024}{} well controlled at home. Cardiac symptoms: {symptoms:23128}{}. Patient denies: {SYMPTOMS; CHEST PAIN SYMPTOMS:66324}{}. Cardiovascular risk factors: {risk factors:510}. Use of agents associated with hypertension: {meds:511}. History of target organ damage: {Diagnoses; cardiac:77813}. {Common ambulatory SmartLinks:41078}{}    Review of Systems  {ros; complete:35871}{}       Objective:   {exam; complete:04680}     Assessment:     Hypertension, {control:24821} ***. Evidence of target organ damage: {Diagnoses; HTN target organ damage:97572}{}.      Plan:     {Plan; hypertension:83986}

## 2022-08-18 NOTE — PROGRESS NOTES
HISTORY OF PRESENT ILLNESS  Landy Triana Grams is a 40 y.o. male. HPI  Patient states he has been checking his blood pressure at home. He is not taking any blood pressure medication right now,  his blood pressure sometime reaches 130/80s, once went to 130/90s. He has not had hypotensive episodes. He used to get dizzy when standing up from a sitting position. Denies chest pains, no shortness of breath  Needs refill of topiramate  Review of Systems   Constitutional:  Negative for chills, fever and weight loss. Respiratory:  Negative for cough, sputum production and shortness of breath. Cardiovascular:  Negative for chest pain and palpitations. Gastrointestinal:  Negative for heartburn, nausea and vomiting. Genitourinary:  Negative for dysuria, frequency and urgency. Musculoskeletal:  Negative for myalgias and neck pain. Skin:  Negative for itching and rash. /77 (BP 1 Location: Left upper arm, BP Patient Position: Sitting, BP Cuff Size: Adult)   Pulse (!) 55   Temp 97.3 °F (36.3 °C) (Temporal)   Wt 171 lb (77.6 kg)   SpO2 100%   BMI 25.24 kg/m²   Physical Exam  Constitutional:       General: He is not in acute distress. HENT:      Head: Normocephalic. Left Ear: Tympanic membrane normal.      Ears:      Comments: There is right TM perforation  Cardiovascular:      Pulses: Normal pulses. Heart sounds: No murmur heard. Pulmonary:      Effort: Pulmonary effort is normal.      Breath sounds: Normal breath sounds. Abdominal:      General: Bowel sounds are normal. There is no distension. Palpations: Abdomen is soft. Tenderness: There is no abdominal tenderness. Hernia: No hernia is present. Musculoskeletal:         General: No swelling or tenderness. Normal range of motion. Cervical back: Normal range of motion. No rigidity. Neurological:      Mental Status: He is alert. ASSESSMENT and PLAN  Diagnoses and all orders for this visit:    1. Hypertension, unspecified type    2. Migraine syndrome  -     topiramate (TOPAMAX) 50 mg tablet; Take 1 Tablet by mouth two (2) times a day.     40year old male with hypertension,  normal blood pressure, not using blood pressure medications at this point  Topiramate refilled

## 2022-08-18 NOTE — PROGRESS NOTES
Coordination of Care  1. Have you been to the ER, urgent care clinic since your last visit? Hospitalized since your last visit? No    2. Have you seen or consulted any other health care providers outside of the [address] [place] [address] since your last visit? Include any pap smears or colon screening. No    Does the patient need refills? NO    Learning Assessment Complete?  yes

## 2022-10-11 ENCOUNTER — TELEPHONE (OUTPATIENT)
Dept: ONCOLOGY | Age: 37
End: 2022-10-11

## 2022-10-11 NOTE — TELEPHONE ENCOUNTER
Called patient, spoke with contact Jaleesa Good) who transferred me to daughter to translate. Contact was told to have patient contact central schedule to schedule PET Scan (number provided).   Contact was also ask to tell patient that his OV tomorrow (10/12/22) has been rescheduled to 11/15/22 @ 10am.

## 2022-11-09 ENCOUNTER — HOSPITAL ENCOUNTER (OUTPATIENT)
Dept: PET IMAGING | Age: 37
Discharge: HOME | End: 2022-11-09
Attending: INTERNAL MEDICINE

## 2022-11-09 VITALS — WEIGHT: 171 LBS | BODY MASS INDEX: 25.24 KG/M2

## 2022-11-09 DIAGNOSIS — C76.0 HEAD AND NECK CANCER (HCC): ICD-10-CM

## 2022-11-09 LAB
GLUCOSE BLD STRIP.AUTO-MCNC: 89 MG/DL (ref 65–117)
SERVICE CMNT-IMP: NORMAL

## 2022-11-09 PROCEDURE — A9552 F18 FDG: HCPCS

## 2022-11-09 RX ORDER — FLUDEOXYGLUCOSE F-18 200 MCI/ML
10 INJECTION INTRAVENOUS ONCE
Status: COMPLETED | OUTPATIENT
Start: 2022-11-09 | End: 2022-11-09

## 2022-11-09 RX ADMIN — FLUDEOXYGLUCOSE F-18 10 MILLICURIE: 200 INJECTION INTRAVENOUS at 08:00

## 2022-11-15 ENCOUNTER — OFFICE VISIT (OUTPATIENT)
Dept: ONCOLOGY | Age: 37
End: 2022-11-15

## 2022-11-15 VITALS
SYSTOLIC BLOOD PRESSURE: 127 MMHG | OXYGEN SATURATION: 96 % | DIASTOLIC BLOOD PRESSURE: 73 MMHG | TEMPERATURE: 98.2 F | RESPIRATION RATE: 18 BRPM | BODY MASS INDEX: 26.72 KG/M2 | WEIGHT: 181 LBS | HEART RATE: 68 BPM

## 2022-11-15 DIAGNOSIS — C76.0 HEAD AND NECK CANCER (HCC): Primary | ICD-10-CM

## 2022-11-15 PROCEDURE — 99214 OFFICE O/P EST MOD 30 MIN: CPT | Performed by: INTERNAL MEDICINE

## 2022-11-15 NOTE — PROGRESS NOTES
Kel Soliman is a 40 y.o. 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 Have you been to the ER, urgent care clinic since your last visit? Hospitalized since your last visit? No    2. Have you seen or consulted any other health care providers outside of the [address] [place] [address] since your last visit? Include any pap smears or colon screening.  No

## 2022-11-15 NOTE — PROGRESS NOTES
[facility] [place] [address] [address], [address], [place]: [phone]  F: [phone]    Reason for visit   Viviane Gonzalez is a 40 y.o. 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 male with no significant PMH seen for above  He was seen by Dr. Yahir Miller with ENT in December with c/o recurrent epistaxis x 2 months requiring ER visits and packing. His prior nasal endoscopy was clear. He was admitted on 2/4/2021 for recurrent HAs and epistaxis which lasted several minutes with improvement on pressure. He had a normal CBC for the most part and had no h/o bleeding growing up. MRI brain 2/5/2021 was obtained and showed a 4.2 x 4 cm posterior nasopharyngeal mass Extending into the sphenoid sinus with narrowing f the R distal internal carotid artery. CTA did not show active extravasation, but showed bilateral cervical adenopathy. He had a biopsy of the NP mass 2/6/2021 and pathology showed Nasopharyngeal carcinoma. He completed 3 cycles of cisplatin / gemzar and then cisplatin + RT. on surveillance and comes with scans. Has a cough, dry, no pain and no Bleeding  No HA      He follows with Dr. Carmencita Ward every 6 months      No past medical history on file.    Past Surgical History:   Procedure Laterality Date    IR INSERT TUNL CVC W PORT OVER 5 YEARS  2/11/2021         IR INSERT TUNL CVC W PORT OVER 5 YEARS  2/11/2021    IR REMOVE TUNL CVAD W PORT/PUMP  4/22/2022      Social History     Tobacco Use    Smoking status: Never    Smokeless tobacco: Never   Substance Use Topics    Alcohol use: Not Currently      No family history on file. Current Outpatient Medications   Medication Sig    topiramate (TOPAMAX) 50 mg tablet Take 1 Tablet by mouth two (2) times a day. No current facility-administered medications for this visit. Allergies   Allergen Reactions    Iodinated Contrast Media Itching     8/10/22 pt states he is not allergic         Review of Systems: A complete review of systems was obtained, negative except as described above. Physical Exam:     Visit Vitals  /73 (BP 1 Location: Right arm, BP Patient Position: Sitting)   Pulse 68   Temp 98.2 °F (36.8 °C)   Resp 18   Wt 181 lb (82.1 kg)   SpO2 96%   BMI 26.72 kg/m²     General appearance - alert, well appearing  Mental status - oriented to person, place, and time  Mouth - mucous membranes moist.  Lymphatics - no palpable lymphadenopathy  Skin -no lesion  NEURO : CN Intact     ECOG PS:1    Results:     Lab Results   Component Value Date/Time    WBC 4.8 04/13/2022 03:53 PM    HGB 13.8 04/13/2022 03:53 PM    HCT 40.6 04/13/2022 03:53 PM    PLATELET 299 95/52/2044 03:53 PM    MCV 84.1 04/13/2022 03:53 PM    ABS. NEUTROPHILS 3.2 04/13/2022 03:53 PM     Lab Results   Component Value Date/Time    Sodium 142 04/13/2022 03:53 PM    Potassium 4.0 04/13/2022 03:53 PM    Chloride 110 (H) 04/13/2022 03:53 PM    CO2 23 04/13/2022 03:53 PM    Glucose 93 04/13/2022 03:53 PM    BUN 18 04/13/2022 03:53 PM    Creatinine 1.05 04/13/2022 03:53 PM    GFR est AA >60 04/13/2022 03:53 PM    GFR est non-AA >60 04/13/2022 03:53 PM    Calcium 9.0 04/13/2022 03:53 PM    Glucose (POC) 89 11/09/2022 07:43 AM     Lab Results   Component Value Date/Time    Bilirubin, total 0.4 04/13/2022 03:53 PM    ALT (SGPT) 28 04/13/2022 03:53 PM    Alk.  phosphatase 74 04/13/2022 03:53 PM    Protein, total 7.0 04/13/2022 03:53 PM    Albumin 3.9 04/13/2022 03:53 PM    Globulin 3.1 04/13/2022 03:53 PM     Component      Latest Ref Rng & Units 2/7/2021 12:26 PM   Cristóbal-Purdy DNA QT, PCR      Negative copies/mL 327   Cristóbal-Barr Virus log10      log10 copy/mL 2.515       Records reviewed and summarized above. Pathology report(s) reviewed     PATHOLOGY     FINAL PATHOLOGIC DIAGNOSIS   1. Right nasopharyngeal mass, biopsy:   Nasopharyngeal carcinoma, nonkeratinizing, undifferentiated type (see comment)   In situ hybridization for Cristóbal-Barr virus (SEMAJ) is positive   2. Right nasopharyngeal mass, biopsy:   Nasopharyngeal carcinoma, nonkeratinizing, undifferentiated type   Comment   Immunohistochemical stains performed from block 1A show that the invasive carcinoma expresses p40, AE1/AE3, and CAM5.2 while negative for p16, CK7, and CK20. This immunoprofile supports the above rendered diagnosis     Radiology report(s) reviewed above. CTA neck    IMPRESSION  1. Attenuation of the right internal carotid artery as it passes through the  right nasopharyngeal mass but no evidence of active extravasation or  intraluminal thrombus. 2.  Bilateral cervical lymphadenopathy in level 2 and level 5B. 3.  Right posterior nasopharyngeal mass with sphenoid sinus involvement and  diffuse sinus disease    MRI brain    IMPRESSION  1. 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 2.  Diffuse sinus mucosal thickening. 3.  Mass related narrowing of the right distal cervical internal carotid artery  but no large vessel occlusion. 4.  No evidence of infarct. MRI orbit and face    IMPRESSION  Again demonstrated is large right nasopharyngeal tumor as previously described  and without significant change. Some involvement right internal carotid artery. Associated mucosal thickening and fluid right paranasal sinuses. PET 2/18/21:  IMPRESSION  The right nasopharyngeal mass lesion is hypermetabolic and  compatible with the known neoplasm. Bilateral hypermetabolic cervical lymph  nodes. Focal increased tracer activity is seen involving the very posterior  aspect of the nasal septum. PET/CT 4/8/2022     IMPRESSION  No PET avid evidence of recurrent or metastatic disease. PET CT 11/9/2022    IMPRESSION  No PET avid evidence of recurrent or metastatic disease. Assessment:   1) Nasopharyngeal carcinoma- R   SEMAJ positive    At diagnosis he had a 4.2 x 4 cm mass, extended into the sphenoid sinus, bilateral cervical nodes. Narrowed but did not involve carotid artery  Nasopharyngeal carcinoma - at least T3N2M0-Stage III disease. This is an aggressive malignancy with high risk of fatal local complications  In his case , the abutment of carotid artery is highly concerning for risk of tumor extension and catastrophic bleeding. Received induction chemo with cisplatin + gemzar x 3 cycles followed by chemoRT (with weekly Cisplatin). He completed 3 cycles of cisplatin / gemzar and chemoRT by 6/2021 with a CR. On surveillance with no evidence of recurrence. PET scan 4/8/2022 was reviewed and is ANA. He is keeping up with endoscopic exans      Reviewed surveillance, he is ~ 2 years out from diagnosis. He will continue with every 3 to 6-month exams with Dr. Yusef Pacheco, I will get imaging in 6 months and then as indicated. We will continue 6 monthly follow-ups till year 5 and then annual follow-ups. He was counseled on acute and late side effects of radiation. Malone [place] includes cranial nerve palsies, carotid artery injury, delayed risk of second malignancies. Discussed that role of EBV for surveillance is controversial, as is that of periodic imaging      2) Epistaxis  Secondary to the mass  resolved    3) Headaches  Secondary to the nasopharyngeal mass with splenoid sinus involvement  Controlled on Verapamil but he should be able to wean off this- will do so with PCP   Continue palliative care follow up  Improved    4) Psychosocial  Comes with family.  Supportive  English speaking     Plan:     Follow with radiation oncology, ENT as scheduled   Continue to follow with palliative care  RTC 6 months with scans       I appreciate the opportunity to participate in Mr. Gracie Mendoza's care.     Signed By: Josh Carrillo MD

## 2023-02-09 ENCOUNTER — HOSPITAL ENCOUNTER (OUTPATIENT)
Dept: RADIATION THERAPY | Age: 38
Discharge: HOME | End: 2023-02-09

## 2023-04-03 ENCOUNTER — OFFICE VISIT (OUTPATIENT)
Dept: FAMILY MEDICINE CLINIC | Facility: CLINIC | Age: 38
End: 2023-04-03

## 2023-04-03 ENCOUNTER — HOSPITAL ENCOUNTER (OUTPATIENT)
Dept: LAB | Age: 38
End: 2023-04-03

## 2023-04-03 DIAGNOSIS — R04.0 EPISTAXIS: ICD-10-CM

## 2023-04-03 DIAGNOSIS — Z08 ENCOUNTER FOR FOLLOW-UP SURVEILLANCE OF NASOPHARYNGEAL CANCER: Primary | ICD-10-CM

## 2023-04-03 DIAGNOSIS — Z85.818 ENCOUNTER FOR FOLLOW-UP SURVEILLANCE OF NASOPHARYNGEAL CANCER: Primary | ICD-10-CM

## 2023-04-03 DIAGNOSIS — R63.5 WEIGHT GAIN: ICD-10-CM

## 2023-04-03 PROCEDURE — 83036 HEMOGLOBIN GLYCOSYLATED A1C: CPT

## 2023-04-03 PROCEDURE — 80053 COMPREHEN METABOLIC PANEL: CPT

## 2023-04-03 PROCEDURE — 36415 COLL VENOUS BLD VENIPUNCTURE: CPT

## 2023-04-03 PROCEDURE — 99213 OFFICE O/P EST LOW 20 MIN: CPT | Performed by: PHYSICIAN ASSISTANT

## 2023-04-03 PROCEDURE — 85025 COMPLETE CBC W/AUTO DIFF WBC: CPT

## 2023-04-03 RX ORDER — SODIUM CHLORIDE 0.65 %
2 DROPS NASAL
Qty: 50 ML | Refills: 1 | Status: SHIPPED | OUTPATIENT
Start: 2023-04-03

## 2023-04-03 NOTE — PROGRESS NOTES
Assisted in discharge. Name and date of birth of the patient verified. Information on the AVS was explained to patient/guardian and understanding was verbalized. Instructed patient on how to administer medication per provider order. No coupon needed. Time was made for question and answers.

## 2023-04-03 NOTE — PROGRESS NOTES
Assessment/Plan:    Diagnoses and all orders for this visit:    1. Encounter for follow-up surveillance of nasopharyngeal cancer  -     CBC WITH AUTOMATED DIFF; Future  -     METABOLIC PANEL, COMPREHENSIVE; Future    2. Weight gain  -     HEMOGLOBIN A1C WITH EAG; Future    3. Epistaxis    Other orders  -     sodium chloride (Ayr Saline) 0.65 % drop; 2 Drops by Both Nostrils route every two (2) hours as needed for Nasal Dryness. Follow-up and Dispositions    Return in about 6 months (around 10/3/2023). NIESHA Barboza expressed understanding of this plan. An AVS was printed and given to the patient.      ----------------------------------------------------------------------    Chief Complaint   Patient presents with    Follow-up       History of Present Illness:  42 yo presents for scheduled f/up. He is still under the care of oncology for nasopharyngeal cancer, I have reviewed the last office visit with oncology. He has appts coming up next month with them. The pt tells me that he has been well in the past 6 months. He tells me that his emotional status is good, he feels at peace and is not depressed. He has the support of his wife (who is with him today) and his children are doing well too. He is working. He has no problem with severe pain. He has headaches that are mild at times. He does have occasional nose bleeds that occur from both nares but that they are easily controlled and he has not had to seek emergent care for this problem. They are occurring less then once weekly. He continues to take the topiramate as rx'd. He is not having any side effects to the medication     His weight continues to go up. We discussed his weight today and a healthy lifestyle was encouraged. No past medical history on file.     Current Outpatient Medications   Medication Sig Dispense Refill    sodium chloride (Ayr Saline) 0.65 % drop 2 Drops by Both Nostrils route every two (2) hours as needed for Nasal Dryness. 50 mL 1    topiramate (TOPAMAX) 50 mg tablet Take 1 Tablet by mouth two (2) times a day. 180 Tablet 3       Allergies   Allergen Reactions    Iodinated Contrast Media Itching     8/10/22 pt states he is not allergic        Social History     Tobacco Use    Smoking status: Never    Smokeless tobacco: Never   Substance Use Topics    Alcohol use: Not Currently    Drug use: Never       No family history on file. Physical Exam:     Visit Vitals  /82 (BP 1 Location: Left arm, BP Patient Position: Sitting, BP Cuff Size: Adult)   Pulse 66   Temp 98.2 °F (36.8 °C) (Temporal)   Wt 191 lb 9.6 oz (86.9 kg)   SpO2 97%   BMI 28.28 kg/m²     Looks well, pleasant   A&Ox3  WDWN NAD  Respirations normal and non labored  HEENT- TM's jeanette intact w/out injection or effusion.  Moderate amount of cerumen present jeanette  Nares- jeanette patent w/out any active bleeds  OP clear   Neck supple w/out LAD  Lungs CTA jeanette  Cor RRR s1s2

## 2023-04-03 NOTE — PROGRESS NOTES
Coordination of Care  1. Have you been to the ER, urgent care clinic since your last visit? Hospitalized since your last visit? No    2. Have you seen or consulted any other health care providers outside of the [address] [place] [address] since your last visit? Include any pap smears or colon screening. No    Does the patient need refills? YES    Learning Assessment Complete?  yes  Depression Screening complete in the past 12 months? yes

## 2023-04-04 LAB
ALBUMIN SERPL-MCNC: 4.1 G/DL (ref 3.5–5)
ALBUMIN/GLOB SERPL: 1.3 (ref 1.1–2.2)
ALP SERPL-CCNC: 77 U/L (ref 45–117)
ALT SERPL-CCNC: 28 U/L (ref 12–78)
ANION GAP SERPL CALC-SCNC: 4 MMOL/L (ref 5–15)
AST SERPL-CCNC: 17 U/L (ref 15–37)
BASOPHILS # BLD: 0.1 K/UL (ref 0–0.1)
BASOPHILS NFR BLD: 1 % (ref 0–1)
BILIRUB SERPL-MCNC: 0.3 MG/DL (ref 0.2–1)
BUN SERPL-MCNC: 16 MG/DL (ref 6–20)
BUN/CREAT SERPL: 16 (ref 12–20)
CALCIUM SERPL-MCNC: 9.6 MG/DL (ref 8.5–10.1)
CHLORIDE SERPL-SCNC: 109 MMOL/L (ref 97–108)
CO2 SERPL-SCNC: 26 MMOL/L (ref 21–32)
CREAT SERPL-MCNC: 1.02 MG/DL (ref 0.7–1.3)
DIFFERENTIAL METHOD BLD: NORMAL
EOSINOPHIL # BLD: 0.1 K/UL (ref 0–0.4)
EOSINOPHIL NFR BLD: 2 % (ref 0–7)
ERYTHROCYTE [DISTWIDTH] IN BLOOD BY AUTOMATED COUNT: 14.4 % (ref 11.5–14.5)
EST. AVERAGE GLUCOSE BLD GHB EST-MCNC: 103 MG/DL
GLOBULIN SER CALC-MCNC: 3.1 G/DL (ref 2–4)
GLUCOSE SERPL-MCNC: 94 MG/DL (ref 65–100)
HBA1C MFR BLD: 5.2 % (ref 4–5.6)
HCT VFR BLD AUTO: 48.7 % (ref 36.6–50.3)
HGB BLD-MCNC: 15.3 G/DL (ref 12.1–17)
IMM GRANULOCYTES # BLD AUTO: 0 K/UL (ref 0–0.04)
IMM GRANULOCYTES NFR BLD AUTO: 0 % (ref 0–0.5)
LYMPHOCYTES # BLD: 1.3 K/UL (ref 0.8–3.5)
LYMPHOCYTES NFR BLD: 19 % (ref 12–49)
MCH RBC QN AUTO: 28.1 PG (ref 26–34)
MCHC RBC AUTO-ENTMCNC: 31.4 G/DL (ref 30–36.5)
MCV RBC AUTO: 89.4 FL (ref 80–99)
MONOCYTES # BLD: 0.5 K/UL (ref 0–1)
MONOCYTES NFR BLD: 8 % (ref 5–13)
NEUTS SEG # BLD: 4.8 K/UL (ref 1.8–8)
NEUTS SEG NFR BLD: 70 % (ref 32–75)
NRBC # BLD: 0 K/UL (ref 0–0.01)
NRBC BLD-RTO: 0 PER 100 WBC
PLATELET # BLD AUTO: 236 K/UL (ref 150–400)
PMV BLD AUTO: 10.9 FL (ref 8.9–12.9)
POTASSIUM SERPL-SCNC: 4 MMOL/L (ref 3.5–5.1)
PROT SERPL-MCNC: 7.2 G/DL (ref 6.4–8.2)
RBC # BLD AUTO: 5.45 M/UL (ref 4.1–5.7)
SODIUM SERPL-SCNC: 139 MMOL/L (ref 136–145)
WBC # BLD AUTO: 6.8 K/UL (ref 4.1–11.1)

## 2023-04-04 NOTE — PROGRESS NOTES
Patient was called using an [address] [facility]  [id number]. Jyoti Gómez LPN    Patient was left an voice message informing to contact our office for lab results. Patient was mailed a letter of lab results as reviewed by provider JESSICA Young.   Jyoti Gómez LPN

## 2023-04-22 DIAGNOSIS — C76.0 HEAD AND NECK CANCER (HCC): Primary | ICD-10-CM

## 2023-05-15 ENCOUNTER — HOSPITAL ENCOUNTER (OUTPATIENT)
Facility: HOSPITAL | Age: 38
Discharge: HOME | End: 2023-05-18

## 2023-05-15 ENCOUNTER — APPOINTMENT (OUTPATIENT)
Facility: HOSPITAL | Age: 38
End: 2023-05-15

## 2023-05-15 DIAGNOSIS — C76.0 HEAD AND NECK CANCER (HCC): ICD-10-CM

## 2023-05-15 LAB
GLUCOSE BLD STRIP.AUTO-MCNC: 96 MG/DL (ref 65–117)
SERVICE CMNT-IMP: NORMAL

## 2023-05-15 PROCEDURE — 78815 PET IMAGE W/CT SKULL-THIGH: CPT

## 2023-05-15 PROCEDURE — 3430000000 HC RX DIAGNOSTIC RADIOPHARMACEUTICAL: Performed by: INTERNAL MEDICINE

## 2023-05-15 PROCEDURE — A9552 F18 FDG: HCPCS | Performed by: INTERNAL MEDICINE

## 2023-05-15 PROCEDURE — 82962 GLUCOSE BLOOD TEST: CPT

## 2023-05-15 RX ORDER — FLUDEOXYGLUCOSE F-18 500 MCI/ML
10 INJECTION INTRAVENOUS
Status: COMPLETED | OUTPATIENT
Start: 2023-05-15 | End: 2023-05-15

## 2023-05-15 RX ADMIN — FLUDEOXYGLUCOSE F-18 10 MILLICURIE: 500 INJECTION INTRAVENOUS at 10:17

## 2023-05-16 ENCOUNTER — OFFICE VISIT (OUTPATIENT)
Age: 38
End: 2023-05-16

## 2023-05-16 VITALS
HEART RATE: 59 BPM | TEMPERATURE: 98.2 F | RESPIRATION RATE: 18 BRPM | DIASTOLIC BLOOD PRESSURE: 68 MMHG | SYSTOLIC BLOOD PRESSURE: 111 MMHG | BODY MASS INDEX: 28.34 KG/M2 | WEIGHT: 192 LBS | OXYGEN SATURATION: 96 %

## 2023-05-16 DIAGNOSIS — C11.9 NPC (NASOPHARYNGEAL CARCINOMA) (HCC): Primary | ICD-10-CM

## 2023-05-16 PROCEDURE — 99214 OFFICE O/P EST MOD 30 MIN: CPT | Performed by: INTERNAL MEDICINE

## 2023-05-16 NOTE — PROGRESS NOTES
[facility] [place] [address] [address], [address], [address]: [phone]  F: [phone]          Reason for visit     Jakub Wagner is a 40 y.o.  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 male with no significant PMH  seen for above   He was seen by Dr. Jess Bryosn with ENT in December with c/o recurrent epistaxis x 2 months requiring ER visits and packing. His prior nasal endoscopy was clear. He was admitted on 2/4/2021 for recurrent HAs and epistaxis which lasted several minutes with  improvement on pressure. He had a normal CBC for the most part and had no h/o bleeding growing up. MRI brain 2/5/2021 was obtained and showed a 4.2 x 4 cm posterior nasopharyngeal mass Extending into the sphenoid sinus with narrowing f the R distal internal  carotid artery. CTA did not show active extravasation, but showed bilateral cervical adenopathy. He had a biopsy of the NP mass 2/6/2021 and pathology showed Nasopharyngeal carcinoma. He completed 3 cycles of cisplatin / gemzar and then cisplatin + RT. on surveillance and comes with scans. He has had some nose bleeds, every day x 1 week but ut is minor. Had seen Dr. Kassy Joseph but does not remember when. Has no new HA or drooling. Otherwise he is well. No past medical history on file. Review of Systems: A complete review of systems was obtained, negative except as described above.           Physical Exam:        Visit Vitals        BP  127/73 (BP 1 Location: Right

## 2023-05-16 NOTE — PROGRESS NOTES
Jennifer Diez is a 41 y.o. 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 Have you been to the ER, urgent care clinic since your last visit? Hospitalized since your last visit? No    2. Have you seen or consulted any other health care providers outside of the [address] [place] [address] since your last visit? Include any pap smears or colon screening.  No

## 2023-06-13 ENCOUNTER — TELEPHONE (OUTPATIENT)
Age: 38
End: 2023-06-13

## 2023-06-16 ENCOUNTER — TELEPHONE (OUTPATIENT)
Age: 38
End: 2023-06-16

## 2023-06-19 NOTE — TELEPHONE ENCOUNTER
Return call made to the patient with assistance from [address] [facility] [address]  #[id number] to follow-up after his 06-11-23 [facility] ED visit. There was no answer at his listed home/cell phone number, so another message was left asking for a return call to nurse Mcdermott at the [facility], [place], (w) [phone], about his recent ED visit.  Kaela Andujar RN

## 2023-06-19 NOTE — TELEPHONE ENCOUNTER
T/C made to the patient with assistance from [address]  #[id number] to follow-up after his 06-11-23 [facility] ED visit for c/o: R ear pain with yellowish discharge and decreased hearing. Dx: Otitis Externa    There was no answer at the patient's listed home/cell phone number so a message was left asking for a return call to nurse Mcdermott at the [facility], [place], [phone], about his recent ED visit. The patient is not currently enrolled in the [facility] referral program.    His hx includes nasopharyngeal carcinoma and chronic epistaxis and per chart review, he is followed by Dr. Balbir Garcia and Dr. Adalid Perla, ENT. Omayra Navarro RN

## 2023-08-10 ENCOUNTER — HOSPITAL ENCOUNTER (OUTPATIENT)
Facility: HOSPITAL | Age: 38
Discharge: HOME | End: 2023-08-13

## 2023-08-10 DIAGNOSIS — C11.9 MALIGNANT NEOPLASM NASOPHARYNX (HCC): Primary | ICD-10-CM

## 2023-11-08 NOTE — PROGRESS NOTES
Patient returned call to office.  Care HAYDEN Jackson assisted. Luz Quintanilla LPN  Patient name and  verified via . Patient was given lab results as reviewed by JESSICA Chan. Patient was not scheduled for his 6 months at last visit, patient informed to contact office to schedule his follow up appointment, fasting if morning appointment.   Luz Quintanilla LPN (3) no apparent problem

## 2023-12-14 ENCOUNTER — HOSPITAL ENCOUNTER (OUTPATIENT)
Facility: HOSPITAL | Age: 38
Discharge: HOME | End: 2023-12-14

## 2023-12-14 ENCOUNTER — OFFICE VISIT (OUTPATIENT)
Age: 38
End: 2023-12-14

## 2023-12-14 VITALS
WEIGHT: 205 LBS | BODY MASS INDEX: 30.26 KG/M2 | SYSTOLIC BLOOD PRESSURE: 116 MMHG | HEART RATE: 81 BPM | DIASTOLIC BLOOD PRESSURE: 70 MMHG | TEMPERATURE: 98.2 F | OXYGEN SATURATION: 95 % | RESPIRATION RATE: 18 BRPM

## 2023-12-14 DIAGNOSIS — C11.9 NPC (NASOPHARYNGEAL CARCINOMA) (HCC): Primary | ICD-10-CM

## 2023-12-14 DIAGNOSIS — C11.9 NPC (NASOPHARYNGEAL CARCINOMA) (HCC): ICD-10-CM

## 2023-12-14 LAB
EKG ATRIAL RATE: 63 BPM
EKG DIAGNOSIS: NORMAL
EKG P AXIS: 43 DEGREES
EKG P-R INTERVAL: 166 MS
EKG Q-T INTERVAL: 392 MS
EKG QRS DURATION: 96 MS
EKG QTC CALCULATION (BAZETT): 401 MS
EKG R AXIS: 21 DEGREES
EKG T AXIS: 20 DEGREES
EKG VENTRICULAR RATE: 63 BPM

## 2023-12-14 PROCEDURE — 93005 ELECTROCARDIOGRAM TRACING: CPT

## 2023-12-14 PROCEDURE — 99214 OFFICE O/P EST MOD 30 MIN: CPT | Performed by: INTERNAL MEDICINE

## 2023-12-14 RX ORDER — ACETAMINOPHEN 325 MG/1
650 TABLET ORAL EVERY 6 HOURS PRN
COMMUNITY

## 2023-12-14 NOTE — PROGRESS NOTES
Juan Daniel Del Valle is a 41 y.o. 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 Have you been to the ER, urgent care clinic since your last visit? Hospitalized since your last visit? No    2. Have you seen or consulted any other health care providers outside of the [address] [place] [address] Avenue since your last visit? Include any pap smears or colon screening.  No    Pt reports dizzyness for the past month, on and off, last episode last week
right nasopharyngeal mass but no evidence of active extravasation or   intraluminal thrombus. 2.  Bilateral cervical lymphadenopathy in level 2 and level 5B. 3.  Right posterior nasopharyngeal mass with sphenoid sinus involvement and   diffuse sinus disease      MRI brain      IMPRESSION   1. Right posterior nasopharyngeal soft tissue mass extending into the sphenoid   sinuses concerning for nasopharyngeal carcinoma. 2.  Diffuse sinus mucosal thickening. 3.  Mass related narrowing of the right distal cervical internal carotid artery   but no large vessel occlusion. 4.  No evidence of infarct. MRI orbit and face      IMPRESSION   Again demonstrated is large right nasopharyngeal tumor as previously described   and without significant change. Some involvement right internal carotid artery. Associated mucosal thickening and fluid right paranasal sinuses. PET 2/18/21:   IMPRESSION   The right nasopharyngeal mass lesion is hypermetabolic and   compatible with the known neoplasm. Bilateral hypermetabolic cervical lymph   nodes. Focal increased tracer activity is seen involving the very posterior   aspect of the nasal septum. PET/CT 4/8/2022       IMPRESSION   No PET avid evidence of recurrent or metastatic disease. PET CT 11/9/2022      IMPRESSION   No PET avid evidence of recurrent or metastatic disease. PET 5/2023   IMPRESSION:  No abnormal FDG activity. Assessment:     1) Nasopharyngeal carcinoma- R    MIRELLA positive      At diagnosis he had a 4.2 x 4 cm mass, extended into the sphenoid sinus, bilateral cervical nodes. Narrowed but did not involve carotid artery   Nasopharyngeal carcinoma - at least T3N2M0-Stage III disease. This is an aggressive malignancy with high risk of fatal local complications   In his case , the abutment of carotid artery is highly concerning for risk of tumor extension and catastrophic bleeding.   Received induction chemo with cisplatin + gemzar x 3

## 2024-01-03 ENCOUNTER — TELEPHONE (OUTPATIENT)
Age: 39
End: 2024-01-03

## 2024-01-03 NOTE — TELEPHONE ENCOUNTER
Tc to the pt to schedule his f/u appt for ongoing care for cancer. He verified his name and . He was scheduled. Robyn Chavez RN

## 2024-01-29 ENCOUNTER — OFFICE VISIT (OUTPATIENT)
Age: 39
End: 2024-01-29

## 2024-01-29 VITALS
WEIGHT: 207.6 LBS | BODY MASS INDEX: 30.75 KG/M2 | HEIGHT: 69 IN | HEART RATE: 82 BPM | TEMPERATURE: 97.5 F | OXYGEN SATURATION: 99 % | DIASTOLIC BLOOD PRESSURE: 82 MMHG | SYSTOLIC BLOOD PRESSURE: 138 MMHG

## 2024-01-29 DIAGNOSIS — R63.5 WEIGHT GAIN: ICD-10-CM

## 2024-01-29 DIAGNOSIS — Z85.819 HISTORY OF NASOPHARYNGEAL CANCER: ICD-10-CM

## 2024-01-29 DIAGNOSIS — R03.0 ELEVATED BLOOD PRESSURE READING: ICD-10-CM

## 2024-01-29 DIAGNOSIS — Z23 ENCOUNTER FOR ADMINISTRATION OF VACCINE: ICD-10-CM

## 2024-01-29 DIAGNOSIS — R51.9 INTRACTABLE HEADACHE, UNSPECIFIED CHRONICITY PATTERN, UNSPECIFIED HEADACHE TYPE: Primary | ICD-10-CM

## 2024-01-29 PROCEDURE — 90746 HEPB VACCINE 3 DOSE ADULT IM: CPT | Performed by: PHYSICIAN ASSISTANT

## 2024-01-29 PROCEDURE — 99213 OFFICE O/P EST LOW 20 MIN: CPT | Performed by: PHYSICIAN ASSISTANT

## 2024-01-29 PROCEDURE — 90471 IMMUNIZATION ADMIN: CPT | Performed by: PHYSICIAN ASSISTANT

## 2024-01-29 PROCEDURE — 90686 IIV4 VACC NO PRSV 0.5 ML IM: CPT | Performed by: PHYSICIAN ASSISTANT

## 2024-01-29 PROCEDURE — 90472 IMMUNIZATION ADMIN EACH ADD: CPT | Performed by: PHYSICIAN ASSISTANT

## 2024-01-29 RX ORDER — TOPIRAMATE 50 MG/1
50 TABLET, FILM COATED ORAL 2 TIMES DAILY
Qty: 180 TABLET | Refills: 3 | Status: SHIPPED | OUTPATIENT
Start: 2024-01-29

## 2024-01-29 SDOH — ECONOMIC STABILITY: INCOME INSECURITY: HOW HARD IS IT FOR YOU TO PAY FOR THE VERY BASICS LIKE FOOD, HOUSING, MEDICAL CARE, AND HEATING?: NOT HARD AT ALL

## 2024-01-29 SDOH — ECONOMIC STABILITY: HOUSING INSECURITY
IN THE LAST 12 MONTHS, WAS THERE A TIME WHEN YOU DID NOT HAVE A STEADY PLACE TO SLEEP OR SLEPT IN A SHELTER (INCLUDING NOW)?: NO

## 2024-01-29 SDOH — ECONOMIC STABILITY: FOOD INSECURITY: WITHIN THE PAST 12 MONTHS, YOU WORRIED THAT YOUR FOOD WOULD RUN OUT BEFORE YOU GOT MONEY TO BUY MORE.: NEVER TRUE

## 2024-01-29 SDOH — ECONOMIC STABILITY: FOOD INSECURITY: WITHIN THE PAST 12 MONTHS, THE FOOD YOU BOUGHT JUST DIDN'T LAST AND YOU DIDN'T HAVE MONEY TO GET MORE.: NEVER TRUE

## 2024-01-29 ASSESSMENT — PATIENT HEALTH QUESTIONNAIRE - PHQ9
2. FEELING DOWN, DEPRESSED OR HOPELESS: 0
SUM OF ALL RESPONSES TO PHQ9 QUESTIONS 1 & 2: 0
1. LITTLE INTEREST OR PLEASURE IN DOING THINGS: 0
SUM OF ALL RESPONSES TO PHQ QUESTIONS 1-9: 0

## 2024-01-29 NOTE — PROGRESS NOTES
Patient discharged with AVS. Patient's name and  verified. Patient made aware of the prescription sent to the pharmacy. Medication review completed. Patient instructed to schedule an appointment to return in 4 weeks. Patient given an opportunity to voice questions/concerns. All questions addressed. [employer]  #[id number] assisted.

## 2024-01-29 NOTE — PROGRESS NOTES
Patient is currently due for Influenza, Hepatitis B  Patient educated on importance on being up to date with the adult  immunizations; consent form obtained; denies fever, egg allergy nor past reactions to any injection or immunization.  Immunization given per CDC protocol and recorded in [facility].  VIS information sheet given, explained possible S/E.  Reviewed sx indicating need to be seen in ER.  Pt had no adverse reaction at time of discharge. Patient advised to remain on site for 15 minutes for observations   TENNILLE Rendon

## 2024-01-29 NOTE — PROGRESS NOTES
Assessment/Plan:    Norbert was seen today for annual exam.    Diagnoses and all orders for this visit:    Intractable headache, unspecified chronicity pattern, unspecified headache type  -     topiramate (TOPAMAX) 50 MG tablet; Take 1 tablet by mouth 2 times daily    History of nasopharyngeal cancer    Elevated blood pressure reading    Weight gain    Encounter for administration of vaccine    Other orders  -     Influenza, FLUARIX, (age 6 mo+),  IM, Preservative Free, 0.5 mL  -     Hep B, ENGERIX-B, (age 20 yrs+), IM, 1mL, 3-dose    I have reviewed his chart before seeing him today to include his recent oncology appt  Return for fasting labs and second Hep B shot in 4 weeks: A1c, TSH, lipid, cbc, cmp (he ate at [facility] for breakfast this morning)  He is to check his bp at home and record the values- if >135/85 then he needs to come in for an earlier appt  Weight gain discussed- specific recs: limit simple carbs, increase protein and fruits and vegetables and high fiber foods  Offered RD appt but he declined, has the nutrition pamphlet from previous referral  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    ISRRAEL Faria expressed understanding of this plan. An AVS was printed and given to the patient.      ----------------------------------------------------------------------    Chief Complaint   Patient presents with    Annual Exam     F/U for general wellness after completed treatment for cancer. Reports he has pain in his head some days, but overall is feeling okay.        History of Present Illness:  Pt presents for annual check up. He reports that he is doing well. He has appt with radiation oncology on 2/21/24, he recently saw oncology. He is no longer getting MRI's. He occasionally has a little blood in his nares but minimal he reports  His headache is persistent since his treatment- located right occiput area. Oncologist

## 2024-01-29 NOTE — PROGRESS NOTES
\"Have you been to the ER, urgent care clinic since your last visit?  Hospitalized since your last visit?\"    NO    “Have you seen or consulted any other health care providers outside of [facility] since your last visit?”    NO

## 2024-02-21 ENCOUNTER — NURSE ONLY (OUTPATIENT)
Age: 39
End: 2024-02-21

## 2024-02-21 ENCOUNTER — HOSPITAL ENCOUNTER (OUTPATIENT)
Facility: HOSPITAL | Age: 39
Discharge: HOME | End: 2024-02-24

## 2024-02-21 ENCOUNTER — HOSPITAL ENCOUNTER (OUTPATIENT)
Facility: HOSPITAL | Age: 39
Setting detail: SPECIMEN
Discharge: HOME | End: 2024-02-24

## 2024-02-21 VITALS
DIASTOLIC BLOOD PRESSURE: 71 MMHG | SYSTOLIC BLOOD PRESSURE: 124 MMHG | BODY MASS INDEX: 30.07 KG/M2 | WEIGHT: 203 LBS | HEIGHT: 69 IN | RESPIRATION RATE: 18 BRPM | HEART RATE: 65 BPM

## 2024-02-21 DIAGNOSIS — E11.9 DM TYPE 2, GOAL A1C TO BE DETERMINED (HCC): Primary | ICD-10-CM

## 2024-02-21 DIAGNOSIS — E11.9 DM TYPE 2, GOAL A1C TO BE DETERMINED (HCC): ICD-10-CM

## 2024-02-21 DIAGNOSIS — C11.9 NPC (NASOPHARYNGEAL CARCINOMA) (HCC): Primary | ICD-10-CM

## 2024-02-21 PROCEDURE — 80053 COMPREHEN METABOLIC PANEL: CPT

## 2024-02-21 PROCEDURE — 36415 COLL VENOUS BLD VENIPUNCTURE: CPT

## 2024-02-21 PROCEDURE — 84443 ASSAY THYROID STIM HORMONE: CPT

## 2024-02-21 PROCEDURE — 80061 LIPID PANEL: CPT

## 2024-02-21 PROCEDURE — 85025 COMPLETE CBC W/AUTO DIFF WBC: CPT

## 2024-02-21 PROCEDURE — 83036 HEMOGLOBIN GLYCOSYLATED A1C: CPT

## 2024-02-21 ASSESSMENT — PAIN SCALES - GENERAL: PAINLEVEL_OUTOF10: 0

## 2024-02-21 NOTE — PROGRESS NOTES
[facility]  [facility]    Radiation Oncology Follow Up    Norbert Boone  [id number]  1985     Diagnosis / Oncologic History   nV4O0D2, EBV+ (327 copies/mL), WHO Type III, non-keratinizing, undifferentiated nasopharyngeal carcinoma s/p 3 cycles induction Cis/[place] followed by concurrent cisplatin/radiation to 7000cGy/35fx ending 7/8/2021    AJCC Staging has been reviewed.  Interval History   Mr. James Boone arrives today for routine follow up 31 months from end of radiation treatments.    He saw Dr. Smith in September 2023.  He was noted to have chronic right-sided eustachian tube dysfunction likely secondary to radiation.  Attempt was made to place a tympanostomy tube.  Unfortunately the tympanic membrane was very thickened and retracted and it was not able to be placed.  No evidence of disease recurrence on exam.    He continues to follow with Dr. Christensen for lab work.  No recent TSH value in our system but pending today.    He is doing very well clinically. He has no significant nose bleeds, very occasional scant blood after blowing nose. No headaches. Swallowing and eating well. No dysphagia or odynophagia. He never had his brain MRI but today he denies any dizziness.     He reports he saw Dr. Smith in January 2024 and had nasal endoscopy which was clear of cancer.     Review of Systems:  A complete review of systems was obtained and negative except as described above.    Interval Imaging/Labs     Above imaging/lab reports were reviewed.  Allergies and Medications     Allergies   Allergen Reactions    Iodinated Contrast Media Itching     8/10/22 pt states he is not allergic        Current Outpatient Medications   Medication Instructions    acetaminophen (TYLENOL) 650 mg, Oral, EVERY 6 HOURS PRN    topiramate (TOPAMAX) 50 mg, Oral, 2 TIMES DAILY      PQRS Medication Reconciliation: Medications documented and reviewed    Physical Exam:

## 2024-02-22 LAB
ALBUMIN SERPL-MCNC: 4.2 G/DL (ref 3.5–5)
ALBUMIN/GLOB SERPL: 1.2 (ref 1.1–2.2)
ALP SERPL-CCNC: 84 U/L (ref 45–117)
ALT SERPL-CCNC: 28 U/L (ref 12–78)
ANION GAP SERPL CALC-SCNC: 2 MMOL/L (ref 5–15)
AST SERPL-CCNC: 16 U/L (ref 15–37)
BASOPHILS # BLD: 0.1 K/UL (ref 0–0.1)
BASOPHILS NFR BLD: 1 % (ref 0–1)
BILIRUB SERPL-MCNC: 0.3 MG/DL (ref 0.2–1)
BUN SERPL-MCNC: 20 MG/DL (ref 6–20)
BUN/CREAT SERPL: 19 (ref 12–20)
CALCIUM SERPL-MCNC: 9.4 MG/DL (ref 8.5–10.1)
CHLORIDE SERPL-SCNC: 111 MMOL/L (ref 97–108)
CHOLEST SERPL-MCNC: 237 MG/DL
CO2 SERPL-SCNC: 25 MMOL/L (ref 21–32)
CREAT SERPL-MCNC: 1.08 MG/DL (ref 0.7–1.3)
DIFFERENTIAL METHOD BLD: ABNORMAL
EOSINOPHIL # BLD: 0.2 K/UL (ref 0–0.4)
EOSINOPHIL NFR BLD: 2 % (ref 0–7)
ERYTHROCYTE [DISTWIDTH] IN BLOOD BY AUTOMATED COUNT: 14.4 % (ref 11.5–14.5)
EST. AVERAGE GLUCOSE BLD GHB EST-MCNC: 103 MG/DL
GLOBULIN SER CALC-MCNC: 3.5 G/DL (ref 2–4)
GLUCOSE SERPL-MCNC: 99 MG/DL (ref 65–100)
HBA1C MFR BLD: 5.2 % (ref 4–5.6)
HCT VFR BLD AUTO: 48.3 % (ref 36.6–50.3)
HDLC SERPL-MCNC: 41 MG/DL
HDLC SERPL: 5.8 (ref 0–5)
HGB BLD-MCNC: 16 G/DL (ref 12.1–17)
IMM GRANULOCYTES # BLD AUTO: 0.1 K/UL (ref 0–0.04)
IMM GRANULOCYTES NFR BLD AUTO: 1 % (ref 0–0.5)
LDLC SERPL CALC-MCNC: 164.2 MG/DL (ref 0–100)
LYMPHOCYTES # BLD: 1.6 K/UL (ref 0.8–3.5)
LYMPHOCYTES NFR BLD: 23 % (ref 12–49)
MCH RBC QN AUTO: 28.8 PG (ref 26–34)
MCHC RBC AUTO-ENTMCNC: 33.1 G/DL (ref 30–36.5)
MCV RBC AUTO: 87 FL (ref 80–99)
MONOCYTES # BLD: 0.5 K/UL (ref 0–1)
MONOCYTES NFR BLD: 7 % (ref 5–13)
NEUTS SEG # BLD: 4.6 K/UL (ref 1.8–8)
NEUTS SEG NFR BLD: 66 % (ref 32–75)
NRBC # BLD: 0 K/UL (ref 0–0.01)
NRBC BLD-RTO: 0 PER 100 WBC
PLATELET # BLD AUTO: 278 K/UL (ref 150–400)
PMV BLD AUTO: 10.6 FL (ref 8.9–12.9)
POTASSIUM SERPL-SCNC: 4.3 MMOL/L (ref 3.5–5.1)
PROT SERPL-MCNC: 7.7 G/DL (ref 6.4–8.2)
RBC # BLD AUTO: 5.55 M/UL (ref 4.1–5.7)
SODIUM SERPL-SCNC: 138 MMOL/L (ref 136–145)
TRIGL SERPL-MCNC: 159 MG/DL
TSH SERPL DL<=0.05 MIU/L-ACNC: 3.04 UIU/ML (ref 0.36–3.74)
VLDLC SERPL CALC-MCNC: 31.8 MG/DL
WBC # BLD AUTO: 6.9 K/UL (ref 4.1–11.1)

## 2024-02-23 DIAGNOSIS — E78.00 ELEVATED CHOLESTEROL: Primary | ICD-10-CM

## 2024-03-25 ENCOUNTER — TELEPHONE (OUTPATIENT)
Age: 39
End: 2024-03-25

## 2024-03-25 NOTE — TELEPHONE ENCOUNTER
Tc to the pt he had left a message on the cbn line. He verified his full name and . The pt was given his lab results message and recommedations. He was scheduled for a RD appt. Robyn Chavez RN

## 2024-05-20 ENCOUNTER — OFFICE VISIT (OUTPATIENT)
Age: 39
End: 2024-05-20

## 2024-05-20 DIAGNOSIS — Z71.3 DIETARY COUNSELING AND SURVEILLANCE: Primary | ICD-10-CM

## 2024-05-20 NOTE — PROGRESS NOTES
[facility]   Nutrition Assessment - Medical Nutrition Therapy   INITIAL EVALUATION     Patient Name: Norbert Boone : 1985   Referral Source: [facility] Start of Care (SOC): 2024   Reason for visit: Hyperlipidemia, weight gain         Age: 39 y.o. Sex: male   Ht: Ht Readings from Last 1 Encounters:   24 1.753 m (5' 9\")    Wt: Wt Readings from Last 1 Encounters:   24 92.1 kg (203 lb)      BMI: 29.98 kg/m2 Category: Overweight (25 - 29.9)   Weight Hx: Wt Readings from Last 10 Encounters:   24 92.1 kg (203 lb)   24 94.2 kg (207 lb 9.6 oz)   23 93 kg (205 lb)   23 87.1 kg (192 lb)   23 86.9 kg (191 lb 9.6 oz)   11/15/22 82.1 kg (181 lb)   22 77.6 kg (171 lb)   08/10/22 76.1 kg (167 lb 12.8 oz)   22 73.5 kg (162 lb)   21 78.5 kg (173 lb)        Past Medical Hx:  Patient Active Problem List   Diagnosis    Head and neck cancer (HCC)    Epistaxis    Headache due to intracranial disease    NPC (nasopharyngeal carcinoma) (HCC)        Pertinent Medications:     Current Outpatient Medications:     topiramate (TOPAMAX) 50 MG tablet, Take 1 tablet by mouth 2 times daily, Disp: 180 tablet, Rfl: 3    acetaminophen (TYLENOL) 325 MG tablet, Take 2 tablets by mouth every 6 hours as needed for Pain, Disp: , Rfl:      Biochemical Data:   Latest Lab Result Choices:   Hemoglobin A1C   Date Value Ref Range Status   2024 5.2 4.0 - 5.6 % Final     Comment:     (NOTE)  HbA1C Interpretive Ranges  <5.7              Normal  5.7 - 6.4         Consider Prediabetes  >6.5              Consider Diabetes     2023 5.2 4.0 - 5.6 % Final     Comment:     NEW METHOD  PLEASE NOTE NEW REFERENCE RANGE  (NOTE)  HbA1C Interpretive Ranges  <5.7              Normal  5.7 - 6.4         Consider Prediabetes  >6.5              Consider Diabetes     10/28/2020 5.6 4.0 - 5.6 % Final     Comment:     NEW METHOD  PLEASE NOTE NEW REFERENCE

## 2024-07-18 ENCOUNTER — OFFICE VISIT (OUTPATIENT)
Age: 39
End: 2024-07-18

## 2024-07-18 VITALS
HEART RATE: 86 BPM | RESPIRATION RATE: 18 BRPM | OXYGEN SATURATION: 96 % | DIASTOLIC BLOOD PRESSURE: 73 MMHG | WEIGHT: 196 LBS | SYSTOLIC BLOOD PRESSURE: 105 MMHG | BODY MASS INDEX: 28.94 KG/M2 | TEMPERATURE: 98.2 F

## 2024-07-18 DIAGNOSIS — C11.9 NPC (NASOPHARYNGEAL CARCINOMA) (HCC): Primary | ICD-10-CM

## 2024-07-18 PROCEDURE — 99213 OFFICE O/P EST LOW 20 MIN: CPT

## 2024-07-18 NOTE — PROGRESS NOTES
[facility] [place] at [facility]   [address], [address] [place] [id number]   W: [phone]  F: [phone]     Reason for visit   Norbert Boone is a 39 y.o.   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  male with no significant PMH  seen for above   He was seen by Dr. Smith with ENT in December with c/o recurrent epistaxis x 2 months requiring ER visits and packing. His prior nasal endoscopy was clear. He was admitted on 2/4/2021 for recurrent HAs and epistaxis which lasted several minutes with  improvement on pressure. He had a normal CBC for the most part and had no h/o bleeding growing up. MRI brain 2/5/2021 was obtained and showed a 4.2 x 4 cm posterior nasopharyngeal mass Extending into the sphenoid sinus with narrowing f the R distal internal  carotid artery. CTA did not show active extravasation, but showed bilateral cervical adenopathy. He had a biopsy of the NP mass 2/6/2021 and pathology showed Nasopharyngeal carcinoma. He completed 3 cycles of cisplatin / gemzar and then cisplatin + RT.  on surveillance.     He has some ringing to his R ear, causing some imbalances and dizziness.  He completed course of antibiotics for otitis media. He was just seen by Dr. Smith, ENT, on 7/16- who has ordered imaging. Appetite is good, no dysphagia. He has some soreness to his neck. He has chronic migraines. He has infrequent epistaxis, usually when sneezing or blowing his nose. He has ongoing tingling to bilateral legs and feet. 
Norbert Boone is a 39 y.o. 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 Have you been to the ER, urgent care clinic since your last visit?  Hospitalized since your last visit? Yes, [facility] June 22, 2024     2. Have you seen or consulted any other health care providers outside of the [facility] since your last visit?  Include any pap smears or colon screening. Yes, ENT Dr. Smith       
x 3 cycles followed by chemoRT (with weekly Cisplatin). He completed 3 cycles of  cisplatin / gemzar and chemoRT by 6/2021 with a CR.  On surveillance with no evidence of recurrence.  PET scan 5/2023 was reviewed and is SIN.      He is keeping up with endoscopic exans         Reviewed surveillance, he is ~ 2.5 years out from diagnosis.  He will continue with every 6-month exams with Dr. Schuster, I will get imaging as indicated. We will continue 6 monthly follow-ups till year 5 and then annual follow-ups.   He was counseled on acute and late side effects of radiation.  Latter includes cranial nerve palsies, carotid artery injury, delayed risk of second malignancies.      Discussed that role of EBV for surveillance is controversial, as is that of periodic imaging         2) Epistaxis   Secondary to the mass   Resolved but has a minor epistaxis lately- he is to see Dr. Schuster       3) Headaches   Secondary to the nasopharyngeal mass with splenoid sinus involvement   Controlled on Verapamil but he should be able to wean off this- will do so with PCP    Continue palliative care follow up   Improved      4) Psychosocial   Comes with family. Supportive   Bahraini speaking           Plan:         Follow with radiation oncology, ENT as scheduled     Continue to follow with palliative care    RTC 6 months . Scans as clinically indicated         I appreciate the opportunity to participate in Mr. Norbert Boone 's care.           Signed By:  Eugenia Christensen MD

## 2024-08-07 ENCOUNTER — HOSPITAL ENCOUNTER (OUTPATIENT)
Facility: HOSPITAL | Age: 39
Discharge: HOME | End: 2024-08-10
Attending: SPECIALIST

## 2024-08-07 DIAGNOSIS — H69.80 OTHER SPECIFIED DISORDERS OF EUSTACHIAN TUBE, UNSPECIFIED EAR: ICD-10-CM

## 2024-08-07 DIAGNOSIS — C11.9 MALIGNANT NEOPLASM OF NASOPHARYNX (HCC): ICD-10-CM

## 2024-08-07 DIAGNOSIS — R26.81 UNSTEADINESS ON FEET: ICD-10-CM

## 2024-08-07 PROCEDURE — 70480 CT ORBIT/EAR/FOSSA W/O DYE: CPT

## 2024-11-20 ENCOUNTER — TELEPHONE (OUTPATIENT)
Age: 39
End: 2024-11-20

## 2024-11-20 NOTE — TELEPHONE ENCOUNTER
Returned the pt's call to the cbn line. He left a message with only his name and telephone #. Antonieta Pacheco was the . The pt verified his name and . He stated he needs refills on his H/A medication Topamax. The pt is requesting an appt. The Pharmacy was contacted because the pt stated he went to the pharmacy and they told him they did not have anymore refills. The pt has refills left on his Topamax. The Pharmacist stated they will be ready I 1 hour. The pt was given the message. The pt was scheduled a f/u appt since his last appt was 24, with f/u for annual Physical. Robyn Chavez RN

## 2024-11-21 NOTE — PROGRESS NOTES
Diagnosis: Left knee arthritis    Patient present today for viscosupplimentation injection in the left knee. Prior injections have been tolerated well.    Risks, benefits, and alteratives were discussed with patient prior to injection.  A timeout was performed which included identifying the patient by name and date of birth, verifying correct procedure and correct site(s).     Under sterile technique, the left knee was injected with Monvisc (4ml).  The patient tolerated the procedure well.    Patient is advised to ice the knee over the next week or so.  Continue taking prior oral medications.   The patient will follow-up as needed.          BRANDY CORDOVA, APRN - CNP    Coordination of Care  1. Have you been to the ER, urgent care clinic since your last visit? Hospitalized since your last visit? No    2. Have you seen or consulted any other health care providers outside of the [address] [place] [address] since your last visit? Include any pap smears or colon screening. No    Does the patient need refills? YES    Learning Assessment Complete?  yes  Depression Screening complete in the past 12 months? yes

## 2024-11-22 ENCOUNTER — TRANSCRIBE ORDERS (OUTPATIENT)
Facility: HOSPITAL | Age: 39
End: 2024-11-22

## 2024-11-22 DIAGNOSIS — H90.11 CONDUCTIVE HEARING LOSS OF RIGHT EAR WITH UNRESTRICTED HEARING OF LEFT EAR: Primary | ICD-10-CM

## 2024-11-22 DIAGNOSIS — C11.9: ICD-10-CM

## 2024-11-22 DIAGNOSIS — H71.21 CHOLESTEATOMA OF RIGHT MASTOID: ICD-10-CM

## 2024-12-05 ENCOUNTER — HOSPITAL ENCOUNTER (OUTPATIENT)
Facility: HOSPITAL | Age: 39
Discharge: HOME | End: 2024-12-08
Attending: SPECIALIST

## 2024-12-05 DIAGNOSIS — H90.11 CONDUCTIVE HEARING LOSS OF RIGHT EAR WITH UNRESTRICTED HEARING OF LEFT EAR: ICD-10-CM

## 2024-12-05 DIAGNOSIS — C11.9: ICD-10-CM

## 2024-12-05 DIAGNOSIS — H71.21 CHOLESTEATOMA OF RIGHT MASTOID: ICD-10-CM

## 2024-12-05 PROCEDURE — 70480 CT ORBIT/EAR/FOSSA W/O DYE: CPT

## 2025-01-13 ENCOUNTER — OFFICE VISIT (OUTPATIENT)
Age: 40
End: 2025-01-13

## 2025-01-13 VITALS
BODY MASS INDEX: 32.61 KG/M2 | DIASTOLIC BLOOD PRESSURE: 83 MMHG | WEIGHT: 220.8 LBS | HEART RATE: 68 BPM | SYSTOLIC BLOOD PRESSURE: 130 MMHG | TEMPERATURE: 98.7 F | OXYGEN SATURATION: 100 %

## 2025-01-13 DIAGNOSIS — R51.9 INTRACTABLE HEADACHE, UNSPECIFIED CHRONICITY PATTERN, UNSPECIFIED HEADACHE TYPE: ICD-10-CM

## 2025-01-13 PROCEDURE — 99213 OFFICE O/P EST LOW 20 MIN: CPT | Performed by: PHYSICIAN ASSISTANT

## 2025-01-13 RX ORDER — TOPIRAMATE 50 MG/1
50 TABLET, FILM COATED ORAL 2 TIMES DAILY
Qty: 180 TABLET | Refills: 3 | Status: SHIPPED | OUTPATIENT
Start: 2025-01-13

## 2025-01-13 SDOH — ECONOMIC STABILITY: TRANSPORTATION INSECURITY
IN THE PAST 12 MONTHS, HAS THE LACK OF TRANSPORTATION KEPT YOU FROM MEDICAL APPOINTMENTS OR FROM GETTING MEDICATIONS?: NO

## 2025-01-13 SDOH — SOCIAL STABILITY: SOCIAL NETWORK
DO YOU BELONG TO ANY CLUBS OR ORGANIZATIONS SUCH AS CHURCH GROUPS UNIONS, FRATERNAL OR ATHLETIC GROUPS, OR SCHOOL GROUPS?: NO

## 2025-01-13 SDOH — SOCIAL STABILITY: SOCIAL NETWORK: ARE YOU MARRIED, WIDOWED, DIVORCED, SEPARATED, NEVER MARRIED, OR LIVING WITH A PARTNER?: LIVING WITH PARTNER

## 2025-01-13 SDOH — HEALTH STABILITY: MENTAL HEALTH: HOW MANY STANDARD DRINKS CONTAINING ALCOHOL DO YOU HAVE ON A TYPICAL DAY?: PATIENT DOES NOT DRINK

## 2025-01-13 SDOH — HEALTH STABILITY: MENTAL HEALTH
STRESS IS WHEN SOMEONE FEELS TENSE, NERVOUS, ANXIOUS, OR CAN'T SLEEP AT NIGHT BECAUSE THEIR MIND IS TROUBLED. HOW STRESSED ARE YOU?: TO SOME EXTENT

## 2025-01-13 SDOH — HEALTH STABILITY: MENTAL HEALTH: HOW OFTEN DO YOU HAVE A DRINK CONTAINING ALCOHOL?: NEVER

## 2025-01-13 SDOH — SOCIAL STABILITY: SOCIAL NETWORK: HOW OFTEN DO YOU ATTEND CHURCH OR RELIGIOUS SERVICES?: NEVER

## 2025-01-13 SDOH — HEALTH STABILITY: PHYSICAL HEALTH: ON AVERAGE, HOW MANY DAYS PER WEEK DO YOU ENGAGE IN MODERATE TO STRENUOUS EXERCISE (LIKE A BRISK WALK)?: 0 DAYS

## 2025-01-13 SDOH — SOCIAL STABILITY: SOCIAL NETWORK: IN A TYPICAL WEEK, HOW MANY TIMES DO YOU TALK ON THE PHONE WITH FAMILY, FRIENDS, OR NEIGHBORS?: THREE TIMES A WEEK

## 2025-01-13 SDOH — ECONOMIC STABILITY: TRANSPORTATION INSECURITY
IN THE PAST 12 MONTHS, HAS LACK OF TRANSPORTATION KEPT YOU FROM MEETINGS, WORK, OR FROM GETTING THINGS NEEDED FOR DAILY LIVING?: NO

## 2025-01-13 SDOH — ECONOMIC STABILITY: INCOME INSECURITY: IN THE LAST 12 MONTHS, WAS THERE A TIME WHEN YOU WERE NOT ABLE TO PAY THE MORTGAGE OR RENT ON TIME?: NO

## 2025-01-13 SDOH — SOCIAL STABILITY: SOCIAL NETWORK: HOW OFTEN DO YOU ATTENT MEETINGS OF THE CLUB OR ORGANIZATION YOU BELONG TO?: NEVER

## 2025-01-13 SDOH — SOCIAL STABILITY: SOCIAL NETWORK: HOW OFTEN DO YOU GET TOGETHER WITH FRIENDS OR RELATIVES?: ONCE A WEEK

## 2025-01-13 SDOH — HEALTH STABILITY: PHYSICAL HEALTH: ON AVERAGE, HOW MANY MINUTES DO YOU ENGAGE IN EXERCISE AT THIS LEVEL?: 0 MIN

## 2025-01-13 ASSESSMENT — PATIENT HEALTH QUESTIONNAIRE - PHQ9
SUM OF ALL RESPONSES TO PHQ QUESTIONS 1-9: 0
SUM OF ALL RESPONSES TO PHQ9 QUESTIONS 1 & 2: 0
1. LITTLE INTEREST OR PLEASURE IN DOING THINGS: NOT AT ALL
2. FEELING DOWN, DEPRESSED OR HOPELESS: NOT AT ALL

## 2025-01-13 NOTE — PROGRESS NOTES
Norbert Boone seen at d/c, full name and  verified. After Visit Summary provided and all discharge instructions reviewed w/pt. Instructed pt to schedule a f/u appt in 6 months. Medication list reviewed and education provided. Pt declines flu vaccine today. Opportunity for questions and answers provided, pt verbalizes understanding.      Catrina Zavala RN

## 2025-01-13 NOTE — PROGRESS NOTES
Assessment/Plan:    Norbert was seen today for medication refill and other.    Diagnoses and all orders for this visit:    Intractable headache, unspecified chronicity pattern, unspecified headache type  -     topiramate (TOPAMAX) 50 MG tablet; Take 1 tablet by mouth 2 times daily    At this time, he is using tylenol to help with his headaches but I will reach out to neuro colleague to see if she has any other specific recs. Based on his history of nasopharyngeal cancer, I would like neuro input (for ex would nasal triptan spray be appropriate?)   6 month f/up  Lifestyle discussion to include diet and exercise today     No follow-up provider specified.    ISRRAEL Faria expressed understanding of this plan. An AVS was printed and given to the patient.      ----------------------------------------------------------------------    Chief Complaint   Patient presents with    Medication Refill     Pt comes in for medication refill     Other     Pt c/o having really bad migraines.        History of Present Illness:  Pt presents for his annual physical. He reports that he is \"doing very well overall\". He is under the care of ENT for loss of hearing, vertigo and tinnitus in his right ear which are side effects to the radiation tx for his nasopharyngeal cancer  I have reviewed the most recent ENT note from 12/24 and recent CT scan which shows chronic changes    He reports that he is having 1-2 \"migraines\" a week. They affect \"all of my head\" and can occur at any time of the day. He continues to take the topiramate as directed, one pill twice daily. When the pain occurs, he treats the pain with either sleeping for an hour or if he is at work then he takes 2 tylenol and continues to work through the [employer]. He notes that in the colder months, the pain seems to occur more in the evenings. No nausea or vomiting with the migraines         Past Medical History:   Diagnosis Date    Epistaxis

## 2025-01-15 ENCOUNTER — CLINICAL DOCUMENTATION (OUTPATIENT)
Age: 40
End: 2025-01-15

## 2025-01-15 NOTE — PROGRESS NOTES
T/c to pt, identified pt   The purpose of the call is the discuss the recommendations of neuro NP that was a side consult based on lack of available appts with neuro currently    Neuro NP Jia Macdonald:   I am not so sure I would make any major changes.  You can give him a migraine specific rescue medication such as a triptan and if that is contraindicated due to other reasons regarding cardiac or history of stroke then may be you can give him butalbital limited to 20 tablets/month     The goal of that would be so that he would not have to go to sleep for several hours that the headache would go away quickly and he can get all of his day.  Alternately if it is not contraindicated you can try him on high-dose ibuprofen 600 or 800 mg instead of the Tylenol or Naprosyn up to 500 mg per dose     This seems more like a quality-of-life issue so if he is comfortable with what he is doing I would leave well enough alone but if not I would play around more with his rescue medicine at this time and I would also get neuroimaging MRI with and without contrast just to make sure there is no evidence of other pathology such as metastasis     Pt is aware of recs and agrees to call for a sooner appt if needed to address his migraine tx options. For now, he will continue with current plan of care

## 2025-01-16 ENCOUNTER — TELEPHONE (OUTPATIENT)
Age: 40
End: 2025-01-16

## 2025-01-16 NOTE — TELEPHONE ENCOUNTER
Left vm for pt with assistance of  [facility] #[id number] advising appt scheduled 1/21 was being moved out to 2/26/25 at 9am. Requested a call back if appt did not work for pt.

## 2025-02-25 NOTE — PROGRESS NOTES
[facility] [place] at [facility]   [address] [facility] [address] [facility] [address] [facility], [address] [place] [id number]   W: [phone]  F: [phone]     Reason for visit   Norbert Boone is a 39 y.o.   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  male with no significant PMH  seen for above   He was seen by Dr. Smith with ENT in December with c/o recurrent epistaxis x 2 months requiring ER visits and packing. His prior nasal endoscopy was clear. He was admitted on 2/4/2021 for recurrent HAs and epistaxis which lasted several minutes with  improvement on pressure. He had a normal CBC for the most part and had no h/o bleeding growing up. MRI brain 2/5/2021 was obtained and showed a 4.2 x 4 cm posterior nasopharyngeal mass Extending into the sphenoid sinus with narrowing f the R distal internal  carotid artery. CTA did not show active extravasation, but showed bilateral cervical adenopathy. He had a biopsy of the NP mass 2/6/2021 and pathology showed Nasopharyngeal carcinoma. He completed 3 cycles of cisplatin / gemzar and then cisplatin + RT.  He is on surveillance.    He is feeling well. Stable tinnitus to R ear.   Rare epistaxis. 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   Denies new pain, headaches, appetite loss       He presents alone     Review of systems was obtained and pertinent findings reviewed above. Past medical history, social history, family history, medications, and allergies are located in the electronic medical records       Physical Exam:     Visit

## 2025-02-26 ENCOUNTER — OFFICE VISIT (OUTPATIENT)
Age: 40
End: 2025-02-26

## 2025-02-26 VITALS
RESPIRATION RATE: 18 BRPM | TEMPERATURE: 98.1 F | BODY MASS INDEX: 32.93 KG/M2 | HEART RATE: 76 BPM | SYSTOLIC BLOOD PRESSURE: 129 MMHG | WEIGHT: 223 LBS | OXYGEN SATURATION: 95 % | DIASTOLIC BLOOD PRESSURE: 89 MMHG

## 2025-02-26 DIAGNOSIS — C11.9 NPC (NASOPHARYNGEAL CARCINOMA) (HCC): Primary | ICD-10-CM

## 2025-02-26 PROCEDURE — 99213 OFFICE O/P EST LOW 20 MIN: CPT

## 2025-02-26 NOTE — PROGRESS NOTES
Norbert Boone is a 39 y.o. male          1. Have you been to the ER, urgent care clinic since your last visit?  Hospitalized since your last visit?No    2. Have you seen or consulted any other health care providers outside of the [facility] since your last visit?  Include any pap smears or colon screening. No    :  Issis  [id number]

## 2025-08-26 ENCOUNTER — OFFICE VISIT (OUTPATIENT)
Age: 40
End: 2025-08-26
Payer: MEDICAID

## 2025-08-26 VITALS
DIASTOLIC BLOOD PRESSURE: 76 MMHG | HEART RATE: 68 BPM | WEIGHT: 228 LBS | SYSTOLIC BLOOD PRESSURE: 130 MMHG | RESPIRATION RATE: 16 BRPM | OXYGEN SATURATION: 95 % | BODY MASS INDEX: 33.67 KG/M2 | TEMPERATURE: 98 F

## 2025-08-26 DIAGNOSIS — C11.9 NPC (NASOPHARYNGEAL CARCINOMA) (HCC): Primary | ICD-10-CM

## 2025-08-26 PROCEDURE — 99213 OFFICE O/P EST LOW 20 MIN: CPT

## (undated) DEVICE — AGENT HEMSTAT W2XL14IN OXIDIZED REGENERATED CELOS ABSRB FOR

## (undated) DEVICE — GAUZE,SPONGE,2"X2",8PLY,STERILE,LF,2'S: Brand: MEDLINE

## (undated) DEVICE — SOL IRR SOD CL 0.9% 1000ML BTL --

## (undated) DEVICE — PAD,NON-ADHERENT,3X8,STERILE,LF,1/PK: Brand: MEDLINE

## (undated) DEVICE — SPONGE GZ W4XL4IN COT RADPQ HIGHLY ABSRB

## (undated) DEVICE — PACK,EENT,TURBAN DRAPE,PK II: Brand: MEDLINE

## (undated) DEVICE — TUBING, SUCTION, 1/4" X 12', STRAIGHT: Brand: MEDLINE

## (undated) DEVICE — MARKER,SKIN,WI/RULER AND LABELS: Brand: MEDLINE

## (undated) DEVICE — NEEDLE HYPO 25GA L1.5IN BLU POLYPR HUB S STL REG BVL STR

## (undated) DEVICE — PAD NON-ADHERENT 3X4 STRL LF --

## (undated) DEVICE — NEEDLE HYPO 18GA L1.5IN PNK S STL HUB POLYPR SHLD REG BVL

## (undated) DEVICE — GRADUATED BOWL: Brand: DEVON

## (undated) DEVICE — STRAP,POSITIONING,KNEE/BODY,FOAM,4X60": Brand: MEDLINE

## (undated) DEVICE — COVER,MAYO STAND,STERILE: Brand: MEDLINE

## (undated) DEVICE — CONTAINER,SPECIMEN,4OZ,OR STRL: Brand: MEDLINE

## (undated) DEVICE — SYR 20ML LL STRL LF --

## (undated) DEVICE — INFECTION CONTROL KIT SYS

## (undated) DEVICE — KIT,ANTI FOG,W/SPONGE & FLUID,SOFT PACK: Brand: MEDLINE

## (undated) DEVICE — SYR 10ML CTRL LR LCK NSAF LF --

## (undated) DEVICE — Z DISCONTINUED USE 2131664 WIPE INSTR W3XL3IN NONLINTING

## (undated) DEVICE — CODMAN® SURGICAL PATTIES 1/2" X 3" (1.27CM X 7.62CM): Brand: CODMAN®

## (undated) DEVICE — TOWEL SURG W17XL27IN STD BLU COT NONFENESTRATED PREWASHED